# Patient Record
Sex: FEMALE | Race: WHITE | NOT HISPANIC OR LATINO | Employment: OTHER | URBAN - METROPOLITAN AREA
[De-identification: names, ages, dates, MRNs, and addresses within clinical notes are randomized per-mention and may not be internally consistent; named-entity substitution may affect disease eponyms.]

---

## 2017-01-11 ENCOUNTER — HOSPITAL ENCOUNTER (EMERGENCY)
Facility: HOSPITAL | Age: 52
Discharge: HOME | End: 2017-01-11
Attending: EMERGENCY MEDICINE
Payer: MEDICARE

## 2017-01-11 ENCOUNTER — APPOINTMENT (EMERGENCY)
Dept: RADIOLOGY | Facility: HOSPITAL | Age: 52
End: 2017-01-11
Payer: MEDICARE

## 2017-01-11 VITALS
BODY MASS INDEX: 28.35 KG/M2 | OXYGEN SATURATION: 96 % | DIASTOLIC BLOOD PRESSURE: 64 MMHG | WEIGHT: 160 LBS | TEMPERATURE: 97.9 F | RESPIRATION RATE: 20 BRPM | HEART RATE: 100 BPM | SYSTOLIC BLOOD PRESSURE: 144 MMHG | HEIGHT: 63 IN

## 2017-01-11 DIAGNOSIS — J06.9 UPPER RESPIRATORY INFECTION: Primary | ICD-10-CM

## 2017-01-11 LAB
ANION GAP SERPL CALCULATED.3IONS-SCNC: 6 MMOL/L (ref 4–13)
ATRIAL RATE: 113 BPM
BASOPHILS # BLD AUTO: 0.07 THOUSANDS/ΜL (ref 0–0.1)
BASOPHILS NFR BLD AUTO: 1 % (ref 0–1)
BUN SERPL-MCNC: 14 MG/DL (ref 5–25)
CALCIUM SERPL-MCNC: 9.3 MG/DL (ref 8.3–10.1)
CHLORIDE SERPL-SCNC: 104 MMOL/L (ref 100–108)
CO2 SERPL-SCNC: 32 MMOL/L (ref 21–32)
CREAT SERPL-MCNC: 0.92 MG/DL (ref 0.6–1.3)
EOSINOPHIL # BLD AUTO: 0.39 THOUSAND/ΜL (ref 0–0.61)
EOSINOPHIL NFR BLD AUTO: 5 % (ref 0–6)
ERYTHROCYTE [DISTWIDTH] IN BLOOD BY AUTOMATED COUNT: 13.9 % (ref 11.6–15.1)
GFR SERPL CREATININE-BSD FRML MDRD: >60 ML/MIN/1.73SQ M
GLUCOSE SERPL-MCNC: 93 MG/DL (ref 65–140)
HCT VFR BLD AUTO: 42.8 % (ref 34.8–46.1)
HGB BLD-MCNC: 14 G/DL (ref 11.5–15.4)
LYMPHOCYTES # BLD AUTO: 2.83 THOUSANDS/ΜL (ref 0.6–4.47)
LYMPHOCYTES NFR BLD AUTO: 35 % (ref 14–44)
MCH RBC QN AUTO: 32.1 PG (ref 26.8–34.3)
MCHC RBC AUTO-ENTMCNC: 32.7 G/DL (ref 31.4–37.4)
MCV RBC AUTO: 98 FL (ref 82–98)
MONOCYTES # BLD AUTO: 0.72 THOUSAND/ΜL (ref 0.17–1.22)
MONOCYTES NFR BLD AUTO: 9 % (ref 4–12)
NEUTROPHILS # BLD AUTO: 4.2 THOUSANDS/ΜL (ref 1.85–7.62)
NEUTS SEG NFR BLD AUTO: 50 % (ref 43–75)
P AXIS: 77 DEGREES
PLATELET # BLD AUTO: 240 THOUSANDS/UL (ref 149–390)
PMV BLD AUTO: 9.6 FL (ref 8.9–12.7)
POTASSIUM SERPL-SCNC: 3.9 MMOL/L (ref 3.5–5.3)
PR INTERVAL: 144 MS
QRS AXIS: 78 DEGREES
QRSD INTERVAL: 84 MS
QT INTERVAL: 322 MS
QTC INTERVAL: 441 MS
RBC # BLD AUTO: 4.36 MILLION/UL (ref 3.81–5.12)
SODIUM SERPL-SCNC: 142 MMOL/L (ref 136–145)
T WAVE AXIS: 69 DEGREES
TROPONIN I SERPL-MCNC: <0.02 NG/ML
VENTRICULAR RATE: 113 BPM
WBC # BLD AUTO: 8.21 THOUSAND/UL (ref 4.31–10.16)

## 2017-01-11 PROCEDURE — 84484 ASSAY OF TROPONIN QUANT: CPT | Performed by: EMERGENCY MEDICINE

## 2017-01-11 PROCEDURE — 93005 ELECTROCARDIOGRAM TRACING: CPT | Performed by: EMERGENCY MEDICINE

## 2017-01-11 PROCEDURE — 71020 HB CHEST X-RAY 2VW FRONTAL&LATL: CPT

## 2017-01-11 PROCEDURE — 94640 AIRWAY INHALATION TREATMENT: CPT

## 2017-01-11 PROCEDURE — 36415 COLL VENOUS BLD VENIPUNCTURE: CPT | Performed by: EMERGENCY MEDICINE

## 2017-01-11 PROCEDURE — 80048 BASIC METABOLIC PNL TOTAL CA: CPT | Performed by: EMERGENCY MEDICINE

## 2017-01-11 PROCEDURE — 99285 EMERGENCY DEPT VISIT HI MDM: CPT

## 2017-01-11 PROCEDURE — 85025 COMPLETE CBC W/AUTO DIFF WBC: CPT | Performed by: EMERGENCY MEDICINE

## 2017-01-11 PROCEDURE — 96375 TX/PRO/DX INJ NEW DRUG ADDON: CPT

## 2017-01-11 PROCEDURE — 96374 THER/PROPH/DIAG INJ IV PUSH: CPT

## 2017-01-11 RX ORDER — ONDANSETRON 2 MG/ML
4 INJECTION INTRAMUSCULAR; INTRAVENOUS ONCE
Status: COMPLETED | OUTPATIENT
Start: 2017-01-11 | End: 2017-01-11

## 2017-01-11 RX ORDER — METHYLPREDNISOLONE SODIUM SUCCINATE 125 MG/2ML
60 INJECTION, POWDER, LYOPHILIZED, FOR SOLUTION INTRAMUSCULAR; INTRAVENOUS ONCE
Status: COMPLETED | OUTPATIENT
Start: 2017-01-11 | End: 2017-01-11

## 2017-01-11 RX ORDER — AZITHROMYCIN 250 MG/1
250 TABLET, FILM COATED ORAL DAILY
Qty: 6 TABLET | Refills: 0 | Status: SHIPPED | OUTPATIENT
Start: 2017-01-11 | End: 2017-01-16

## 2017-01-11 RX ORDER — ALBUTEROL SULFATE 2.5 MG/3ML
5 SOLUTION RESPIRATORY (INHALATION) ONCE
Status: COMPLETED | OUTPATIENT
Start: 2017-01-11 | End: 2017-01-11

## 2017-01-11 RX ORDER — ALBUTEROL SULFATE 90 UG/1
2 AEROSOL, METERED RESPIRATORY (INHALATION) ONCE
Status: COMPLETED | OUTPATIENT
Start: 2017-01-11 | End: 2017-01-11

## 2017-01-11 RX ADMIN — IPRATROPIUM BROMIDE 0.5 MG: 0.5 SOLUTION RESPIRATORY (INHALATION) at 04:07

## 2017-01-11 RX ADMIN — ALBUTEROL SULFATE 2 PUFF: 90 AEROSOL, METERED RESPIRATORY (INHALATION) at 06:19

## 2017-01-11 RX ADMIN — ALBUTEROL SULFATE 5 MG: 2.5 SOLUTION RESPIRATORY (INHALATION) at 04:07

## 2017-01-11 RX ADMIN — METHYLPREDNISOLONE SODIUM SUCCINATE 60 MG: 125 INJECTION, POWDER, FOR SOLUTION INTRAMUSCULAR; INTRAVENOUS at 03:53

## 2017-01-11 RX ADMIN — ALBUTEROL SULFATE 5 MG: 2.5 SOLUTION RESPIRATORY (INHALATION) at 03:39

## 2017-01-11 RX ADMIN — ONDANSETRON 4 MG: 2 INJECTION INTRAMUSCULAR; INTRAVENOUS at 03:51

## 2017-01-11 RX ADMIN — IPRATROPIUM BROMIDE 0.5 MG: 0.5 SOLUTION RESPIRATORY (INHALATION) at 03:39

## 2017-04-04 ENCOUNTER — HOSPITAL ENCOUNTER (EMERGENCY)
Facility: HOSPITAL | Age: 52
Discharge: HOME | End: 2017-04-05
Attending: EMERGENCY MEDICINE
Payer: MEDICARE

## 2017-04-04 VITALS
HEART RATE: 93 BPM | DIASTOLIC BLOOD PRESSURE: 78 MMHG | WEIGHT: 160 LBS | OXYGEN SATURATION: 95 % | RESPIRATION RATE: 18 BRPM | HEIGHT: 63 IN | TEMPERATURE: 96.8 F | BODY MASS INDEX: 28.35 KG/M2 | SYSTOLIC BLOOD PRESSURE: 127 MMHG

## 2017-04-04 DIAGNOSIS — R51 HEADACHE(784.0): Primary | ICD-10-CM

## 2017-04-04 PROCEDURE — 96374 THER/PROPH/DIAG INJ IV PUSH: CPT

## 2017-04-04 PROCEDURE — 96375 TX/PRO/DX INJ NEW DRUG ADDON: CPT

## 2017-04-04 PROCEDURE — 96361 HYDRATE IV INFUSION ADD-ON: CPT

## 2017-04-04 RX ORDER — DIPHENHYDRAMINE HYDROCHLORIDE 50 MG/ML
25 INJECTION INTRAMUSCULAR; INTRAVENOUS ONCE
Status: COMPLETED | OUTPATIENT
Start: 2017-04-04 | End: 2017-04-04

## 2017-04-04 RX ORDER — METOCLOPRAMIDE HYDROCHLORIDE 5 MG/ML
10 INJECTION INTRAMUSCULAR; INTRAVENOUS ONCE
Status: COMPLETED | OUTPATIENT
Start: 2017-04-04 | End: 2017-04-04

## 2017-04-04 RX ORDER — KETOROLAC TROMETHAMINE 30 MG/ML
15 INJECTION, SOLUTION INTRAMUSCULAR; INTRAVENOUS ONCE
Status: COMPLETED | OUTPATIENT
Start: 2017-04-04 | End: 2017-04-04

## 2017-04-04 RX ADMIN — DIPHENHYDRAMINE HYDROCHLORIDE 25 MG: 50 INJECTION, SOLUTION INTRAMUSCULAR; INTRAVENOUS at 21:55

## 2017-04-04 RX ADMIN — SODIUM CHLORIDE 1000 ML: 0.9 INJECTION, SOLUTION INTRAVENOUS at 21:55

## 2017-04-04 RX ADMIN — KETOROLAC TROMETHAMINE 15 MG: 30 INJECTION, SOLUTION INTRAMUSCULAR at 21:55

## 2017-04-04 RX ADMIN — METOCLOPRAMIDE 10 MG: 5 INJECTION, SOLUTION INTRAMUSCULAR; INTRAVENOUS at 21:55

## 2017-04-05 PROCEDURE — 99283 EMERGENCY DEPT VISIT LOW MDM: CPT

## 2017-10-13 ENCOUNTER — HOSPITAL ENCOUNTER (EMERGENCY)
Facility: HOSPITAL | Age: 52
Discharge: HOME | End: 2017-10-13
Attending: EMERGENCY MEDICINE | Admitting: EMERGENCY MEDICINE
Payer: MEDICARE

## 2017-10-13 ENCOUNTER — APPOINTMENT (EMERGENCY)
Dept: CT IMAGING | Facility: HOSPITAL | Age: 52
End: 2017-10-13
Payer: MEDICARE

## 2017-10-13 VITALS
SYSTOLIC BLOOD PRESSURE: 161 MMHG | HEIGHT: 66 IN | RESPIRATION RATE: 18 BRPM | WEIGHT: 165 LBS | DIASTOLIC BLOOD PRESSURE: 90 MMHG | BODY MASS INDEX: 26.52 KG/M2 | TEMPERATURE: 96.4 F | OXYGEN SATURATION: 97 % | HEART RATE: 80 BPM

## 2017-10-13 DIAGNOSIS — R10.9 FLANK PAIN: Primary | ICD-10-CM

## 2017-10-13 LAB
ALBUMIN SERPL BCP-MCNC: 3.6 G/DL (ref 3.5–5)
ALP SERPL-CCNC: 125 U/L (ref 46–116)
ALT SERPL W P-5'-P-CCNC: 44 U/L (ref 12–78)
ANION GAP SERPL CALCULATED.3IONS-SCNC: 4 MMOL/L (ref 4–13)
AST SERPL W P-5'-P-CCNC: 24 U/L (ref 5–45)
BACTERIA UR QL AUTO: ABNORMAL /HPF
BASOPHILS # BLD AUTO: 0.03 THOUSANDS/ΜL (ref 0–0.1)
BASOPHILS NFR BLD AUTO: 1 % (ref 0–1)
BILIRUB SERPL-MCNC: 0.2 MG/DL (ref 0.2–1)
BILIRUB UR QL STRIP: NEGATIVE
BUN SERPL-MCNC: 10 MG/DL (ref 5–25)
CALCIUM SERPL-MCNC: 9.1 MG/DL (ref 8.3–10.1)
CHLORIDE SERPL-SCNC: 105 MMOL/L (ref 100–108)
CLARITY UR: CLEAR
CO2 SERPL-SCNC: 29 MMOL/L (ref 21–32)
COLOR UR: YELLOW
CREAT SERPL-MCNC: 0.72 MG/DL (ref 0.6–1.3)
EOSINOPHIL # BLD AUTO: 0.24 THOUSAND/ΜL (ref 0–0.61)
EOSINOPHIL NFR BLD AUTO: 4 % (ref 0–6)
ERYTHROCYTE [DISTWIDTH] IN BLOOD BY AUTOMATED COUNT: 13.9 % (ref 11.6–15.1)
GFR SERPL CREATININE-BSD FRML MDRD: 97 ML/MIN/1.73SQ M
GLUCOSE SERPL-MCNC: 92 MG/DL (ref 65–140)
GLUCOSE UR STRIP-MCNC: NEGATIVE MG/DL
HCT VFR BLD AUTO: 45.2 % (ref 34.8–46.1)
HGB BLD-MCNC: 14.8 G/DL (ref 11.5–15.4)
HGB UR QL STRIP.AUTO: ABNORMAL
KETONES UR STRIP-MCNC: NEGATIVE MG/DL
LEUKOCYTE ESTERASE UR QL STRIP: NEGATIVE
LYMPHOCYTES # BLD AUTO: 1.57 THOUSANDS/ΜL (ref 0.6–4.47)
LYMPHOCYTES NFR BLD AUTO: 25 % (ref 14–44)
MCH RBC QN AUTO: 32 PG (ref 26.8–34.3)
MCHC RBC AUTO-ENTMCNC: 32.7 G/DL (ref 31.4–37.4)
MCV RBC AUTO: 98 FL (ref 82–98)
MONOCYTES # BLD AUTO: 0.43 THOUSAND/ΜL (ref 0.17–1.22)
MONOCYTES NFR BLD AUTO: 7 % (ref 4–12)
NEUTROPHILS # BLD AUTO: 4.04 THOUSANDS/ΜL (ref 1.85–7.62)
NEUTS SEG NFR BLD AUTO: 63 % (ref 43–75)
NITRITE UR QL STRIP: NEGATIVE
NON-SQ EPI CELLS URNS QL MICRO: ABNORMAL /HPF
PH UR STRIP.AUTO: 6.5 [PH] (ref 4.5–8)
PLATELET # BLD AUTO: 254 THOUSANDS/UL (ref 149–390)
PMV BLD AUTO: 9.7 FL (ref 8.9–12.7)
POTASSIUM SERPL-SCNC: 3.9 MMOL/L (ref 3.5–5.3)
PROT SERPL-MCNC: 7.6 G/DL (ref 6.4–8.2)
PROT UR STRIP-MCNC: NEGATIVE MG/DL
RBC # BLD AUTO: 4.63 MILLION/UL (ref 3.81–5.12)
RBC #/AREA URNS AUTO: ABNORMAL /HPF
SODIUM SERPL-SCNC: 138 MMOL/L (ref 136–145)
SP GR UR STRIP.AUTO: 1.01 (ref 1–1.03)
UROBILINOGEN UR QL STRIP.AUTO: 0.2 E.U./DL
WBC # BLD AUTO: 6.31 THOUSAND/UL (ref 4.31–10.16)
WBC #/AREA URNS AUTO: ABNORMAL /HPF

## 2017-10-13 PROCEDURE — 96374 THER/PROPH/DIAG INJ IV PUSH: CPT

## 2017-10-13 PROCEDURE — 81001 URINALYSIS AUTO W/SCOPE: CPT | Performed by: EMERGENCY MEDICINE

## 2017-10-13 PROCEDURE — 74177 CT ABD & PELVIS W/CONTRAST: CPT

## 2017-10-13 PROCEDURE — 99284 EMERGENCY DEPT VISIT MOD MDM: CPT

## 2017-10-13 PROCEDURE — 80053 COMPREHEN METABOLIC PANEL: CPT | Performed by: EMERGENCY MEDICINE

## 2017-10-13 PROCEDURE — 85025 COMPLETE CBC W/AUTO DIFF WBC: CPT | Performed by: EMERGENCY MEDICINE

## 2017-10-13 PROCEDURE — 36415 COLL VENOUS BLD VENIPUNCTURE: CPT | Performed by: EMERGENCY MEDICINE

## 2017-10-13 PROCEDURE — 87086 URINE CULTURE/COLONY COUNT: CPT | Performed by: EMERGENCY MEDICINE

## 2017-10-13 RX ORDER — ACETAMINOPHEN AND CODEINE PHOSPHATE 300; 30 MG/1; MG/1
1 TABLET ORAL EVERY 6 HOURS PRN
Qty: 10 TABLET | Refills: 0 | Status: SHIPPED | OUTPATIENT
Start: 2017-10-13 | End: 2017-12-10 | Stop reason: ALTCHOICE

## 2017-10-13 RX ORDER — CIPROFLOXACIN 500 MG/1
500 TABLET, FILM COATED ORAL 2 TIMES DAILY
Qty: 20 TABLET | Refills: 0 | Status: SHIPPED | OUTPATIENT
Start: 2017-10-13 | End: 2017-10-23

## 2017-10-13 RX ADMIN — IOHEXOL 100 ML: 350 INJECTION, SOLUTION INTRAVENOUS at 11:17

## 2017-10-13 RX ADMIN — HYDROMORPHONE HYDROCHLORIDE 1 MG: 1 INJECTION, SOLUTION INTRAMUSCULAR; INTRAVENOUS; SUBCUTANEOUS at 09:21

## 2017-10-13 NOTE — ED NOTES
CT tech at bedside providing pt with PO contrast and instruction for use        Christine Arnold RN  10/13/17 4918

## 2017-10-13 NOTE — DISCHARGE INSTRUCTIONS
Flank Pain   WHAT YOU NEED TO KNOW:   Flank pain is felt in the area below your ribcage and above your hip bones, often in the lower back  Your pain may be dull or so severe that you cannot get comfortable  The pain may stay in one area or radiate to another area  It may worsen and lighten in waves  Flank pain is often a sign of problems with your urinary tract, such as a kidney stone or infection  DISCHARGE INSTRUCTIONS:   Return to the emergency department if:   · You have a fever  · Your heart is fluttering or jumping  · You see blood in your urine  · Your pain radiates into your lower abdomen and genital area  · You have intense pain in your low back next to your spine  · You are much more tired than usual and have no desire to eat  · You have a headache and your muscles jerk  Contact your healthcare provider if:   · You have an upset stomach and are vomiting  · You have to urinate more often, and with urgency  · Your pain worsens or does not improve, and you cannot get comfortable  · You pass a stone when you urinate  · You have questions or concerns about your condition or care  Medicines: The following medicines may be ordered for you:  · Pain medicine  may help decrease or relieve your pain  Do not wait until the pain is severe before you take your medicine  · Antibiotics  may help treat a urinary tract infection caused by bacteria  · Take your medicine as directed  Contact your healthcare provider if you think your medicine is not helping or if you have side effects  Tell him of her if you are allergic to any medicine  Keep a list of the medicines, vitamins, and herbs you take  Include the amounts, and when and why you take them  Bring the list or the pill bottles to follow-up visits  Carry your medicine list with you in case of an emergency    Follow up with your healthcare provider in 1 to 2 days or as directed:  Write down your questions so you remember to ask them during your visits  © 2017 [employer]  Information is for End User's use only and may not be sold, redistributed or otherwise used for commercial purposes  All illustrations and images included in CareNotes® are the copyrighted property of [employer]  or [employer]  The above information is an  only  It is not intended as medical advice for individual conditions or treatments  Talk to your doctor, nurse or pharmacist before following any medical regimen to see if it is safe and effective for you      Cipro twice a day, lots of fluids, Tylenol with codeine for pain, recheck with PCP in 2-3 days if symptoms persist

## 2017-10-13 NOTE — ED PROVIDER NOTES
History  Chief Complaint   Patient presents with    Abdominal Pain     Pt c/o right lateral mid and lower abdominal stabbing pain that began this am, denies N/V/D or any other c/o      Pt c/o sharp pain RLQ since early AM  Tender at McBurney's point  No GI or  sxs  History provided by:  Patient   used: No    Abdominal Pain   Pain location:  RLQ  Pain quality: sharp    Pain radiates to:  Does not radiate  Pain severity:  Moderate  Onset quality:  Sudden  Timing:  Constant  Progression:  Worsening  Chronicity:  New  Relieved by:  Nothing  Worsened by:  Nothing  Ineffective treatments:  None tried  Associated symptoms: no constipation, no diarrhea, no fever, no nausea and no vomiting        None       Past Medical History:   Diagnosis Date    Chronic pain     Back Pain    Hypertension     Migraine        History reviewed  No pertinent surgical history  History reviewed  No pertinent family history  I have reviewed and agree with the history as documented  Social History   Substance Use Topics    Smoking status: Current Every Day Smoker     Packs/day: 0 50     Types: Cigarettes    Smokeless tobacco: Never Used    Alcohol use No        Review of Systems   Constitutional: Negative for fever  Gastrointestinal: Positive for abdominal pain  Negative for constipation, diarrhea, nausea and vomiting  All other systems reviewed and are negative  Physical Exam  ED Triage Vitals   Temperature Pulse Respirations Blood Pressure SpO2   10/13/17 0856 10/13/17 0856 10/13/17 0856 10/13/17 0856 10/13/17 0856   (!) 96 4 °F (35 8 °C) 98 20 (!) 165/101 99 %      Temp Source Heart Rate Source Patient Position - Orthostatic VS BP Location FiO2 (%)   10/13/17 0856 10/13/17 0856 10/13/17 0856 10/13/17 0856 --   Temporal Monitor Lying Right arm       Pain Score       10/13/17 0855       6           Physical Exam   Constitutional: She is oriented to person, place, and time   She appears well-developed and well-nourished  HENT:   Nose: Nose normal    Eyes: EOM are normal  Pupils are equal, round, and reactive to light  Neck: Normal range of motion  Cardiovascular: Normal rate and regular rhythm  Pulmonary/Chest: Effort normal and breath sounds normal    Abdominal: Soft  Bowel sounds are normal  There is tenderness (RLQ)  There is rebound and guarding  Neurological: She is alert and oriented to person, place, and time  Skin: Skin is warm and dry  Psychiatric: She has a normal mood and affect  Her behavior is normal  Judgment and thought content normal    Nursing note and vitals reviewed  ED Medications  Medications   HYDROmorphone (DILAUDID) 1 mg/mL injection 1 mg (1 mg Intravenous Given 10/13/17 0921)   iohexol (OMNIPAQUE) 350 MG/ML injection (MULTI-DOSE) 100 mL (100 mL Intravenous Given 10/13/17 1117)       Diagnostic Studies  Labs Reviewed   COMPREHENSIVE METABOLIC PANEL - Abnormal        Result Value Ref Range Status    Alkaline Phosphatase 125 (*) 46 - 116 U/L Final    Sodium 138  136 - 145 mmol/L Final    Potassium 3 9  3 5 - 5 3 mmol/L Final    Chloride 105  100 - 108 mmol/L Final    CO2 29  21 - 32 mmol/L Final    Anion Gap 4  4 - 13 mmol/L Final    BUN 10  5 - 25 mg/dL Final    Creatinine 0 72  0 60 - 1 30 mg/dL Final    Comment: Standardized to IDMS reference method    Glucose 92  65 - 140 mg/dL Final    Comment: 18  If the patient is fasting, the ADA then defines impaired fasting glucose as > 100 mg/dL and diabetes as > or equal to 123 mg/dL  Specimen collection should occur prior to Sulfasalazine administration due to the potential for falsely depressed results  Specimen collection should occur prior to Sulfapyridine administration due to the potential for falsely elevated results      Calcium 9 1  8 3 - 10 1 mg/dL Final    AST 24  5 - 45 U/L Final    Comment:   Specimen collection should occur prior to Sulfasalazine administration due to the potential for falsely depressed results  ALT 44  12 - 78 U/L Final    Comment:   Specimen collection should occur prior to Sulfasalazine administration due to the potential for falsely depressed results  Total Protein 7 6  6 4 - 8 2 g/dL Final    Albumin 3 6  3 5 - 5 0 g/dL Final    Total Bilirubin 0 20  0 20 - 1 00 mg/dL Final    eGFR 97  ml/min/1 73sq m Final    Narrative:     National Kidney Disease Education Program recommendations are as follows:  GFR calculation is accurate only with a steady state creatinine  Chronic Kidney disease less than 60 ml/min/1 73 sq  meters  Kidney failure less than 15 ml/min/1 73 sq  meters     URINALYSIS WITH REFLEX TO MICROSCOPIC - Abnormal     Blood, UA Moderate (*) Negative Final    Color, UA Yellow   Final    Clarity, UA Clear   Final    Specific [place], UA 1 015  1 003 - 1 030 Final    pH, UA 6 5  4 5 - 8 0 Final    Leukocytes, UA Negative  Negative Final    Nitrite, UA Negative  Negative Final    Protein, UA Negative  Negative mg/dl Final    Glucose, UA Negative  Negative mg/dl Final    Ketones, UA Negative  Negative mg/dl Final    Urobilinogen, UA 0 2  0 2, 1 0 E U /dl E U /dl Final    Bilirubin, UA Negative  Negative Final   URINE MICROSCOPIC - Abnormal     RBC, UA 20-30 (*) None Seen, 0-5 /hpf Final    WBC, UA None Seen  None Seen, 0-5, 5-55, 5-65 /hpf Final    Epithelial Cells Occasional  None Seen, Occasional /hpf Final    Bacteria, UA Occasional  None Seen, Occasional /hpf Final   CBC AND DIFFERENTIAL - Normal    WBC 6 31  4 31 - 10 16 Thousand/uL Final    RBC 4 63  3 81 - 5 12 Million/uL Final    Hemoglobin 14 8  11 5 - 15 4 g/dL Final    Hematocrit 45 2  34 8 - 46 1 % Final    MCV 98  82 - 98 fL Final    MCH 32 0  26 8 - 34 3 pg Final    MCHC 32 7  31 4 - 37 4 g/dL Final    RDW 13 9  11 6 - 15 1 % Final    MPV 9 7  8 9 - 12 7 fL Final    Platelets 293  206 - 390 Thousands/uL Final    Neutrophils Relative 63  43 - 75 % Final    Lymphocytes Relative 25  14 - 44 % Final    Monocytes Relative 7  4 - 12 % Final    Eosinophils Relative 4  0 - 6 % Final    Basophils Relative 1  0 - 1 % Final    Neutrophils Absolute 4 04  1 85 - 7 62 Thousands/µL Final    Lymphocytes Absolute 1 57  0 60 - 4 47 Thousands/µL Final    Monocytes Absolute 0 43  0 17 - 1 22 Thousand/µL Final    Eosinophils Absolute 0 24  0 00 - 0 61 Thousand/µL Final    Basophils Absolute 0 03  0 00 - 0 10 Thousands/µL Final   URINE CULTURE       CT abdomen pelvis with contrast   Final Result      No acute CT of normality the abdomen or pelvis to account for the patient's symptoms  Normal appendix  Workstation performed: [id number]             Procedures  Procedures      Phone Contacts  ED Phone Contact    ED Course  ED Course                                MDM  Number of Diagnoses or Management Options  Flank pain: new and requires workup     Amount and/or Complexity of Data Reviewed  Clinical lab tests: ordered and reviewed  Tests in the radiology section of CPT®: ordered and reviewed    Patient Progress  Patient progress: improved    CritCare Time    Disposition  Final diagnoses:   Flank pain     ED Disposition     ED Disposition Condition Comment    Discharge  [facility] discharge to home/self care  Condition at discharge: Stable        Follow-up Information     Follow up With Specialties Details Why [address] [place] [address]  [phone]          Discharge Medication List as of 10/13/2017 12:21 PM      START taking these medications    Details   acetaminophen-codeine (TYLENOL #3) 300-30 mg per tablet Take 1 tablet by mouth every 6 (six) hours as needed for moderate pain for up to 10 doses, Starting Fri 10/13/2017, Print      ciprofloxacin (CIPRO) 500 mg tablet Take 1 tablet by mouth 2 (two) times a day for 10 days, Starting Fri 10/13/2017, Until Mon 10/23/2017, Print           No discharge procedures on file      ED Provider  Electronically Signed by Monica Ochoa MD  10/13/17 9061

## 2017-10-15 LAB — BACTERIA UR CULT: NORMAL

## 2017-12-10 ENCOUNTER — HOSPITAL ENCOUNTER (INPATIENT)
Facility: HOSPITAL | Age: 52
LOS: 4 days | Discharge: HOME | DRG: 751 | End: 2017-12-15
Attending: EMERGENCY MEDICINE | Admitting: PSYCHIATRY & NEUROLOGY
Payer: COMMERCIAL

## 2017-12-10 DIAGNOSIS — E78.5 DYSLIPIDEMIA: ICD-10-CM

## 2017-12-10 DIAGNOSIS — F32.A DEPRESSION: Primary | ICD-10-CM

## 2017-12-10 DIAGNOSIS — R45.851 SUICIDAL THOUGHTS: ICD-10-CM

## 2017-12-10 DIAGNOSIS — F33.2 SEVERE EPISODE OF RECURRENT MAJOR DEPRESSIVE DISORDER, WITHOUT PSYCHOTIC FEATURES (HCC): ICD-10-CM

## 2017-12-10 DIAGNOSIS — F41.1 GENERALIZED ANXIETY DISORDER: ICD-10-CM

## 2017-12-10 DIAGNOSIS — G47.00 INSOMNIA: ICD-10-CM

## 2017-12-10 DIAGNOSIS — F10.10 ALCOHOL ABUSE: ICD-10-CM

## 2017-12-10 LAB
ALBUMIN SERPL BCP-MCNC: 3.9 G/DL (ref 3.5–5)
ALP SERPL-CCNC: 109 U/L (ref 46–116)
ALT SERPL W P-5'-P-CCNC: 58 U/L (ref 12–78)
AMPHETAMINES SERPL QL SCN: NEGATIVE
ANION GAP SERPL CALCULATED.3IONS-SCNC: 13 MMOL/L (ref 4–13)
AST SERPL W P-5'-P-CCNC: 28 U/L (ref 5–45)
BARBITURATES UR QL: NEGATIVE
BASOPHILS # BLD AUTO: 0.06 THOUSANDS/ΜL (ref 0–0.1)
BASOPHILS NFR BLD AUTO: 1 % (ref 0–1)
BENZODIAZ UR QL: NEGATIVE
BILIRUB SERPL-MCNC: 0.6 MG/DL (ref 0.2–1)
BUN SERPL-MCNC: 14 MG/DL (ref 5–25)
CALCIUM SERPL-MCNC: 9.4 MG/DL (ref 8.3–10.1)
CHLORIDE SERPL-SCNC: 104 MMOL/L (ref 100–108)
CO2 SERPL-SCNC: 24 MMOL/L (ref 21–32)
COCAINE UR QL: NEGATIVE
CREAT SERPL-MCNC: 0.75 MG/DL (ref 0.6–1.3)
EOSINOPHIL # BLD AUTO: 0.19 THOUSAND/ΜL (ref 0–0.61)
EOSINOPHIL NFR BLD AUTO: 2 % (ref 0–6)
ERYTHROCYTE [DISTWIDTH] IN BLOOD BY AUTOMATED COUNT: 14.1 % (ref 11.6–15.1)
ETHANOL EXG-MCNC: 0.06 MG/DL
ETHANOL EXG-MCNC: 0.1 MG/DL
GFR SERPL CREATININE-BSD FRML MDRD: 92 ML/MIN/1.73SQ M
GLUCOSE SERPL-MCNC: 94 MG/DL (ref 65–140)
HCT VFR BLD AUTO: 44.9 % (ref 34.8–46.1)
HGB BLD-MCNC: 15.3 G/DL (ref 11.5–15.4)
LYMPHOCYTES # BLD AUTO: 2.54 THOUSANDS/ΜL (ref 0.6–4.47)
LYMPHOCYTES NFR BLD AUTO: 32 % (ref 14–44)
MCH RBC QN AUTO: 32.1 PG (ref 26.8–34.3)
MCHC RBC AUTO-ENTMCNC: 34.1 G/DL (ref 31.4–37.4)
MCV RBC AUTO: 94 FL (ref 82–98)
METHADONE UR QL: NEGATIVE
MONOCYTES # BLD AUTO: 0.62 THOUSAND/ΜL (ref 0.17–1.22)
MONOCYTES NFR BLD AUTO: 8 % (ref 4–12)
NEUTROPHILS # BLD AUTO: 4.44 THOUSANDS/ΜL (ref 1.85–7.62)
NEUTS SEG NFR BLD AUTO: 57 % (ref 43–75)
OPIATES UR QL SCN: NEGATIVE
PCP UR QL: NEGATIVE
PLATELET # BLD AUTO: 284 THOUSANDS/UL (ref 149–390)
PMV BLD AUTO: 9.5 FL (ref 8.9–12.7)
POTASSIUM SERPL-SCNC: 3.5 MMOL/L (ref 3.5–5.3)
PROT SERPL-MCNC: 7.8 G/DL (ref 6.4–8.2)
RBC # BLD AUTO: 4.76 MILLION/UL (ref 3.81–5.12)
SODIUM SERPL-SCNC: 141 MMOL/L (ref 136–145)
THC UR QL: NEGATIVE
WBC # BLD AUTO: 7.85 THOUSAND/UL (ref 4.31–10.16)

## 2017-12-10 PROCEDURE — 36415 COLL VENOUS BLD VENIPUNCTURE: CPT | Performed by: PHYSICIAN ASSISTANT

## 2017-12-10 PROCEDURE — 82075 ASSAY OF BREATH ETHANOL: CPT | Performed by: PHYSICIAN ASSISTANT

## 2017-12-10 PROCEDURE — 85025 COMPLETE CBC W/AUTO DIFF WBC: CPT | Performed by: PHYSICIAN ASSISTANT

## 2017-12-10 PROCEDURE — 80307 DRUG TEST PRSMV CHEM ANLYZR: CPT | Performed by: EMERGENCY MEDICINE

## 2017-12-10 PROCEDURE — 80053 COMPREHEN METABOLIC PANEL: CPT | Performed by: PHYSICIAN ASSISTANT

## 2017-12-10 PROCEDURE — 82075 ASSAY OF BREATH ETHANOL: CPT | Performed by: EMERGENCY MEDICINE

## 2017-12-10 RX ORDER — NICOTINE 21 MG/24HR
14 PATCH, TRANSDERMAL 24 HOURS TRANSDERMAL ONCE
Status: COMPLETED | OUTPATIENT
Start: 2017-12-10 | End: 2017-12-11

## 2017-12-10 RX ADMIN — NICOTINE 14 MG: 14 PATCH, EXTENDED RELEASE TRANSDERMAL at 16:47

## 2017-12-10 NOTE — ED PROVIDER NOTES
History  Chief Complaint   Patient presents with    Psychiatric Evaluation     pt c/o depression and anxiety x 3 years after the death of a family member  States the anniversary is this month and pt states she feels like "I'm having a breakdown"  Pt reports suicidal ideation - having thoughts of wanting to drive her car "into a pole and end it all"  Reports occasional ETOH intake to cope with feelings  Did drink 3 beers today  Patient presents to the ED with worsening depression  She states she has been depressed for the past 3 years since her brother   She states the past 2-3 days her depression has worsened  She states she has been drinking alcohol to help with her depression and binge drinks 3 times a week  She states she occasionally smokes marijuana which helps with her depression  Patient states she "does not want to live "  She does not currently have a psychiatrist or a therapist   She is not on any medications for depression or anxiety  Patient has never been admitted to the hospital for depression  Patient has SI with a plan to run her car into a pole  History provided by:  Patient  Psychiatric Evaluation   Presenting symptoms: depression and suicidal thoughts    Presenting symptoms: no suicide attempt    Degree of incapacity (severity): Moderate  Onset quality:  Gradual  Duration:  3 days  Timing:  Constant  Progression:  Worsening  Chronicity:  New  Context: alcohol use (binge drinks 3 times a week)    Context: not drug abuse    Treatment compliance:  Untreated  Relieved by:  Nothing  Worsened by:  Nothing  Associated symptoms: feelings of worthlessness    Associated symptoms: no abdominal pain, no anxiety, no chest pain and no decreased need for sleep        None       Past Medical History:   Diagnosis Date    Chronic pain     Back Pain    Hypertension     Migraine        History reviewed  No pertinent surgical history  History reviewed  No pertinent family history    I have reviewed and agree with the history as documented  Social History   Substance Use Topics    Smoking status: Current Every Day Smoker     Packs/day: 0 50     Types: Cigarettes    Smokeless tobacco: Never Used    Alcohol use No        Review of Systems   Constitutional: Negative for chills and fever  HENT: Negative for facial swelling and trouble swallowing  Eyes: Negative for visual disturbance  Respiratory: Negative for shortness of breath  Cardiovascular: Negative for chest pain and leg swelling  Gastrointestinal: Negative for abdominal pain, diarrhea, nausea and vomiting  Musculoskeletal: Negative for back pain and neck pain  Skin: Negative for color change and wound  Neurological: Negative for dizziness, weakness and numbness  Psychiatric/Behavioral: Positive for suicidal ideas  The patient is not nervous/anxious  All other systems reviewed and are negative  Physical Exam  ED Triage Vitals   Temperature Pulse Respirations Blood Pressure SpO2   12/10/17 1545 12/10/17 1554 12/10/17 1554 12/10/17 1554 12/10/17 1554   97 5 °F (36 4 °C) 88 18 160/88 96 %      Temp Source Heart Rate Source Patient Position - Orthostatic VS BP Location FiO2 (%)   12/10/17 1545 12/10/17 1554 -- 12/10/17 1554 --   Temporal Monitor  Right arm       Pain Score       12/10/17 1554       No Pain           Orthostatic Vital Signs  Vitals:    12/10/17 1554   BP: 160/88   Pulse: 88       Physical Exam   Constitutional: She is oriented to person, place, and time  She appears well-developed and well-nourished  She is active and cooperative  She does not appear ill  No distress  Patient tearful  HENT:   Head: Normocephalic and atraumatic  Right Ear: Hearing normal    Left Ear: Hearing normal    Nose: Nose normal    Mouth/Throat: Oropharynx is clear and moist    Eyes: Right conjunctiva is injected  Left conjunctiva is injected  Right pupil is round  Left pupil is round   Pupils are equal    Neck: Normal range of motion  Cardiovascular: Normal rate, regular rhythm and normal heart sounds  No murmur heard  Pulmonary/Chest: Effort normal and breath sounds normal  She has no wheezes  She has no rhonchi  She has no rales  Abdominal: Soft  Normal appearance and bowel sounds are normal  There is no tenderness  Musculoskeletal: Normal range of motion  She exhibits no edema, tenderness or deformity  Neurological: She is alert and oriented to person, place, and time  She has normal strength  No sensory deficit  Gait normal    Skin: Skin is warm and dry  No rash noted  She is not diaphoretic  No pallor  Psychiatric: Her speech is normal and behavior is normal  Thought content is not paranoid and not delusional  Cognition and memory are normal  She exhibits a depressed mood  She expresses suicidal ideation  She expresses no homicidal ideation  She expresses suicidal plans  She expresses no homicidal plans  Nursing note and vitals reviewed        ED Medications  Medications   nicotine (NICODERM CQ) 14 mg/24hr TD 24 hr patch 14 mg (14 mg Transdermal Medication Applied 12/10/17 1647)       Diagnostic Studies  Results Reviewed     Procedure Component Value Units Date/Time    POCT alcohol breath test [[id number]]  (Normal) Resulted:  12/10/17 1647    Lab Status:  Final result Updated:  12/10/17 1648     EXTBreath Alcohol 0 064    Comprehensive metabolic panel [[id number]] Collected:  12/10/17 1600    Lab Status:  Final result Specimen:  Blood from Arm, Right Updated:  12/10/17 1624     Sodium 141 mmol/L      Potassium 3 5 mmol/L      Chloride 104 mmol/L      CO2 24 mmol/L      Anion Gap 13 mmol/L      BUN 14 mg/dL      Creatinine 0 75 mg/dL      Glucose 94 mg/dL      Calcium 9 4 mg/dL      AST 28 U/L      ALT 58 U/L      Alkaline Phosphatase 109 U/L      Total Protein 7 8 g/dL      Albumin 3 9 g/dL      Total Bilirubin 0 60 mg/dL      eGFR 92 ml/min/1 73sq m     Narrative:         National Kidney Disease Education Program recommendations are as follows:  GFR calculation is accurate only with a steady state creatinine  Chronic Kidney disease less than 60 ml/min/1 73 sq  meters  Kidney failure less than 15 ml/min/1 73 sq  meters  Rapid drug screen, urine [[id number]]  (Normal) Collected:  12/10/17 1601    Lab Status:  Final result Specimen:  Urine from Urine, Clean Catch Updated:  12/10/17 1619     Amph/Meth UR Negative     Barbiturate Ur Negative     Benzodiazepine Urine Negative     Cocaine Urine Negative     Methadone Urine Negative     Opiate Urine Negative     PCP Ur Negative     THC Urine Negative    Narrative:         FOR MEDICAL PURPOSES ONLY  IF CONFIRMATION NEEDED PLEASE CONTACT THE LAB WITHIN 5 DAYS      Drug Screen Cutoff Levels:  AMPHETAMINE/METHAMPHETAMINES  1000 ng/mL  BARBITURATES     200 ng/mL  BENZODIAZEPINES     200 ng/mL  COCAINE      300 ng/mL  METHADONE      300 ng/mL  OPIATES      300 ng/mL  PHENCYCLIDINE     25 ng/mL  THC       50 ng/mL    CBC and differential [[id number]]  (Normal) Collected:  12/10/17 1600    Lab Status:  Final result Specimen:  Blood from Arm, Right Updated:  12/10/17 1615     WBC 7 85 Thousand/uL      RBC 4 76 Million/uL      Hemoglobin 15 3 g/dL      Hematocrit 44 9 %      MCV 94 fL      MCH 32 1 pg      MCHC 34 1 g/dL      RDW 14 1 %      MPV 9 5 fL      Platelets 406 Thousands/uL      Neutrophils Relative 57 %      Lymphocytes Relative 32 %      Monocytes Relative 8 %      Eosinophils Relative 2 %      Basophils Relative 1 %      Neutrophils Absolute 4 44 Thousands/µL      Lymphocytes Absolute 2 54 Thousands/µL      Monocytes Absolute 0 62 Thousand/µL      Eosinophils Absolute 0 19 Thousand/µL      Basophils Absolute 0 06 Thousands/µL     POCT alcohol breath test [[id number]]  (Normal) Resulted:  12/10/17 1554    Lab Status:  Final result Updated:  12/10/17 1554     EXTBreath Alcohol 0 097                 No orders to display              Procedures  Procedures       Phone Contacts  ED Phone Contact    ED Course  ED Course as of Dec 10 2149   Carter Leyvatz Dec 10, 2017   1650 Crisis called to see and evaluate patient  [address] from crisis evaluated patient over the phone  1730 Patient signed 12, it was faxed back to [facility] from crisis  5176 [facility] Patient will need to stay in the ER over night since there are no available U beds  Care transferred to Dr Maninder Patterson at 2148  MDM  CritCare Time    Disposition  Final diagnoses:   Depression   Suicidal thoughts   Alcohol abuse     Time reflects when diagnosis was documented in both MDM as applicable and the Disposition within this note     Time User Action Codes Description Comment    12/10/2017  6:55 PM Meghann Salas Add [F32 9] Depression     12/10/2017  6:55 PM Jerjonelle Salas Add [J89 587] Suicidal thoughts     12/10/2017  6:55 PM Meghann Salas Add [F10 10] Alcohol abuse       ED Disposition     None      Follow-up Information    None       Patient's Medications   Discharge Prescriptions    No medications on file     No discharge procedures on file      ED Provider  Electronically Signed by           Kellie Valenzuela PA-C  12/10/17 2146

## 2017-12-10 NOTE — ED NOTES
[facility] will need to stay in network as she has not insurance  Noted that at this time there are no in network beds

## 2017-12-10 NOTE — ED NOTES
Phone call to [facility]  Contacted [phone]  Spoke with Seun Celestin  who indicated they do not manage pt's behavioral health coverage  She noted pt's medical termed as of 10/31/7  She suggested I call [facility] at [phone] as they manage the behavioral piece  Spoke with Shi Rowe at [facility] and she indicated [employer] also termed as of 10/31/17  Pt will need to stay in network as she has no insurance

## 2017-12-10 NOTE — ED NOTES
Carlos Treadwell from crisis called - states pt has no insurance and needs to stay in-network but there are no beds currently  Pt will remain in ED until a bed becomes available        Joesph Arellano RN  12/10/17 5375

## 2017-12-10 NOTE — ED NOTES
Intake / SA completed  Pt presents to ED due to SI with plan to drive car into wall  Pt admits to increased depression / anxiety  Notes it is close to the anniversary of a family members death  Pt admits to self medicating with alcohol  States she drank 3 beers earlier today  Denies HI, A/H or V/H  Pt is requesting to sign 201 which was faxed to [facility] ED  PC was placed to [facility]   Pt is not eligible to medical assistance

## 2017-12-11 PROCEDURE — 99285 EMERGENCY DEPT VISIT HI MDM: CPT

## 2017-12-11 RX ORDER — LORAZEPAM 2 MG/ML
2 INJECTION INTRAMUSCULAR EVERY 6 HOURS PRN
Status: DISCONTINUED | OUTPATIENT
Start: 2017-12-11 | End: 2017-12-15 | Stop reason: HOSPADM

## 2017-12-11 RX ORDER — BENZTROPINE MESYLATE 1 MG/1
1 TABLET ORAL EVERY 6 HOURS PRN
Status: DISCONTINUED | OUTPATIENT
Start: 2017-12-11 | End: 2017-12-15 | Stop reason: HOSPADM

## 2017-12-11 RX ORDER — LORAZEPAM 1 MG/1
1 TABLET ORAL EVERY 6 HOURS PRN
Status: DISCONTINUED | OUTPATIENT
Start: 2017-12-11 | End: 2017-12-15 | Stop reason: HOSPADM

## 2017-12-11 RX ORDER — OLANZAPINE 10 MG/1
10 INJECTION, POWDER, LYOPHILIZED, FOR SOLUTION INTRAMUSCULAR
Status: DISCONTINUED | OUTPATIENT
Start: 2017-12-11 | End: 2017-12-15 | Stop reason: HOSPADM

## 2017-12-11 RX ORDER — THIAMINE MONONITRATE (VIT B1) 100 MG
100 TABLET ORAL DAILY
Status: DISCONTINUED | OUTPATIENT
Start: 2017-12-11 | End: 2017-12-15 | Stop reason: HOSPADM

## 2017-12-11 RX ORDER — HALOPERIDOL 5 MG/ML
5 INJECTION INTRAMUSCULAR EVERY 6 HOURS PRN
Status: DISCONTINUED | OUTPATIENT
Start: 2017-12-11 | End: 2017-12-15 | Stop reason: HOSPADM

## 2017-12-11 RX ORDER — RISPERIDONE 1 MG/1
1 TABLET, ORALLY DISINTEGRATING ORAL
Status: DISCONTINUED | OUTPATIENT
Start: 2017-12-11 | End: 2017-12-15 | Stop reason: HOSPADM

## 2017-12-11 RX ORDER — NICOTINE 21 MG/24HR
14 PATCH, TRANSDERMAL 24 HOURS TRANSDERMAL DAILY
Status: DISCONTINUED | OUTPATIENT
Start: 2017-12-12 | End: 2017-12-15 | Stop reason: HOSPADM

## 2017-12-11 RX ORDER — TRAZODONE HYDROCHLORIDE 50 MG/1
50 TABLET ORAL
Status: CANCELLED | OUTPATIENT
Start: 2017-12-11

## 2017-12-11 RX ORDER — OLANZAPINE 10 MG/1
10 TABLET ORAL
Status: DISCONTINUED | OUTPATIENT
Start: 2017-12-11 | End: 2017-12-15 | Stop reason: HOSPADM

## 2017-12-11 RX ORDER — FOLIC ACID 1 MG/1
1 TABLET ORAL DAILY
Status: DISCONTINUED | OUTPATIENT
Start: 2017-12-11 | End: 2017-12-15 | Stop reason: HOSPADM

## 2017-12-11 RX ORDER — ACETAMINOPHEN 325 MG/1
650 TABLET ORAL EVERY 6 HOURS PRN
Status: DISCONTINUED | OUTPATIENT
Start: 2017-12-11 | End: 2017-12-15 | Stop reason: HOSPADM

## 2017-12-11 RX ORDER — IBUPROFEN 400 MG/1
400 TABLET ORAL ONCE
Status: COMPLETED | OUTPATIENT
Start: 2017-12-11 | End: 2017-12-11

## 2017-12-11 RX ORDER — LORAZEPAM 1 MG/1
1 TABLET ORAL 2 TIMES DAILY
Status: DISCONTINUED | OUTPATIENT
Start: 2017-12-11 | End: 2017-12-15 | Stop reason: HOSPADM

## 2017-12-11 RX ORDER — HALOPERIDOL 5 MG
5 TABLET ORAL EVERY 6 HOURS PRN
Status: DISCONTINUED | OUTPATIENT
Start: 2017-12-11 | End: 2017-12-15 | Stop reason: HOSPADM

## 2017-12-11 RX ORDER — MAGNESIUM HYDROXIDE/ALUMINUM HYDROXICE/SIMETHICONE 120; 1200; 1200 MG/30ML; MG/30ML; MG/30ML
30 SUSPENSION ORAL EVERY 4 HOURS PRN
Status: DISCONTINUED | OUTPATIENT
Start: 2017-12-11 | End: 2017-12-15 | Stop reason: HOSPADM

## 2017-12-11 RX ORDER — BENZTROPINE MESYLATE 1 MG/ML
1 INJECTION INTRAMUSCULAR; INTRAVENOUS EVERY 6 HOURS PRN
Status: DISCONTINUED | OUTPATIENT
Start: 2017-12-11 | End: 2017-12-15 | Stop reason: HOSPADM

## 2017-12-11 RX ORDER — TRAZODONE HYDROCHLORIDE 50 MG/1
50 TABLET ORAL
Status: DISCONTINUED | OUTPATIENT
Start: 2017-12-11 | End: 2017-12-13

## 2017-12-11 RX ADMIN — LORAZEPAM 1 MG: 1 TABLET ORAL at 16:25

## 2017-12-11 RX ADMIN — TRAZODONE HYDROCHLORIDE 50 MG: 50 TABLET ORAL at 22:21

## 2017-12-11 RX ADMIN — FOLIC ACID 1 MG: 1 TABLET ORAL at 14:26

## 2017-12-11 RX ADMIN — IBUPROFEN 400 MG: 400 TABLET, FILM COATED ORAL at 07:25

## 2017-12-11 RX ADMIN — Medication 100 MG: at 14:26

## 2017-12-11 RX ADMIN — ACETAMINOPHEN 650 MG: 325 TABLET ORAL at 16:24

## 2017-12-11 NOTE — ED NOTES
Lights turned out per pt request  No further requests at this time  Will continue to monitor        Pegge Sensor, RN  12/10/17 0033

## 2017-12-11 NOTE — ED NOTES
Pt provided with safe breakfast tray and is currently eating        Mihai Smith, KULDIP  12/11/17 4949

## 2017-12-11 NOTE — ED CARE HANDOFF
Emergency Department Sign Out Note        Sign out and transfer of care from [place], [facility]  See Separate Emergency Department note  The patient, Sally Vela, was evaluated by the previous provider for depression, suicidal     Workup Completed:  Medically cleared, 201 signed  Bed search in progress    ED Course / Workup Pending (followup): Labs Reviewed   RAPID DRUG SCREEN, URINE - Normal       Result Value Ref Range Status    Amph/Meth UR Negative  Negative Final    Barbiturate Ur Negative  Negative Final    Benzodiazepine Urine Negative  Negative Final    Cocaine Urine Negative  Negative Final    Methadone Urine Negative  Negative Final    Opiate Urine Negative  Negative Final    PCP Ur Negative  Negative Final    THC Urine Negative  Negative Final    Narrative:     FOR MEDICAL PURPOSES ONLY  IF CONFIRMATION NEEDED PLEASE CONTACT THE LAB WITHIN 5 DAYS      Drug Screen Cutoff Levels:  AMPHETAMINE/METHAMPHETAMINES  1000 ng/mL  BARBITURATES     200 ng/mL  BENZODIAZEPINES     200 ng/mL  COCAINE      300 ng/mL  METHADONE      300 ng/mL  OPIATES      300 ng/mL  PHENCYCLIDINE     25 ng/mL  THC       50 ng/mL   CBC AND DIFFERENTIAL - Normal    WBC 7 85  4 31 - 10 16 Thousand/uL Final    RBC 4 76  3 81 - 5 12 Million/uL Final    Hemoglobin 15 3  11 5 - 15 4 g/dL Final    Hematocrit 44 9  34 8 - 46 1 % Final    MCV 94  82 - 98 fL Final    MCH 32 1  26 8 - 34 3 pg Final    MCHC 34 1  31 4 - 37 4 g/dL Final    RDW 14 1  11 6 - 15 1 % Final    MPV 9 5  8 9 - 12 7 fL Final    Platelets 358  241 - 390 Thousands/uL Final    Neutrophils Relative 57  43 - 75 % Final    Lymphocytes Relative 32  14 - 44 % Final    Monocytes Relative 8  4 - 12 % Final    Eosinophils Relative 2  0 - 6 % Final    Basophils Relative 1  0 - 1 % Final    Neutrophils Absolute 4 44  1 85 - 7 62 Thousands/µL Final    Lymphocytes Absolute 2 54  0 60 - 4 47 Thousands/µL Final    Monocytes Absolute 0 62  0 17 - 1 22 Thousand/µL Final Eosinophils Absolute 0 19  0 00 - 0 61 Thousand/µL Final    Basophils Absolute 0 06  0 00 - 0 10 Thousands/µL Final   POCT ALCOHOL BREATH TEST - Normal    EXTBreath Alcohol 0 097   Final   POCT ALCOHOL BREATH TEST - Normal    EXTBreath Alcohol 0 064   Final   COMPREHENSIVE METABOLIC PANEL    Sodium 401  136 - 145 mmol/L Final    Potassium 3 5  3 5 - 5 3 mmol/L Final    Chloride 104  100 - 108 mmol/L Final    CO2 24  21 - 32 mmol/L Final    Anion Gap 13  4 - 13 mmol/L Final    BUN 14  5 - 25 mg/dL Final    Creatinine 0 75  0 60 - 1 30 mg/dL Final    Comment: Standardized to IDMS reference method    Glucose 94  65 - 140 mg/dL Final    Comment: 18  If the patient is fasting, the ADA then defines impaired fasting glucose as > 100 mg/dL and diabetes as > or equal to 123 mg/dL  Specimen collection should occur prior to Sulfasalazine administration due to the potential for falsely depressed results  Specimen collection should occur prior to Sulfapyridine administration due to the potential for falsely elevated results  Calcium 9 4  8 3 - 10 1 mg/dL Final    AST 28  5 - 45 U/L Final    Comment:   Specimen collection should occur prior to Sulfasalazine administration due to the potential for falsely depressed results  ALT 58  12 - 78 U/L Final    Comment:   Specimen collection should occur prior to Sulfasalazine administration due to the potential for falsely depressed results  Alkaline Phosphatase 109  46 - 116 U/L Final    Total Protein 7 8  6 4 - 8 2 g/dL Final    Albumin 3 9  3 5 - 5 0 g/dL Final    Total Bilirubin 0 60  0 20 - 1 00 mg/dL Final    eGFR 92  ml/min/1 73sq m Final    Narrative:     National Kidney Disease Education Program recommendations are as follows:  GFR calculation is accurate only with a steady state creatinine  Chronic Kidney disease less than 60 ml/min/1 73 sq  meters  Kidney failure less than 15 ml/min/1 73 sq  meters       No orders to display                               ED Course Procedures  MDM  Number of Diagnoses or Management Options  Alcohol abuse: new and requires workup  Depression: new and requires workup  Suicidal thoughts: new and requires workup     Amount and/or Complexity of Data Reviewed  Clinical lab tests: reviewed and ordered      CritCare Time      Disposition  Final diagnoses:   Depression   Suicidal thoughts   Alcohol abuse     Time reflects when diagnosis was documented in both MDM as applicable and the Disposition within this note     Time User Action Codes Description Comment    12/10/2017  6:55 PM Sandie Jackson Add [F32 9] Depression     12/10/2017  6:55 PM Sandie Jackson Add [P12 712] Suicidal thoughts     12/10/2017  6:55 PM Sandie Jackson Add [F10 10] Alcohol abuse     12/12/2017  9:23 AM Lillie Jose Modify [F10 10] Alcohol abuse       ED Disposition     None      MD Documentation    Elsy Gabriel Most Recent Value   Accepting Physician  [address] [place] [address] Name, Ananth Lopez U  91  2w   Sending MD  [address] Name, Ananth Lopez U  91  2w      Follow-up Information     Follow up With Specialties Details Why [address] on 12/18/2017 Please follow up with [facility] on Monday 12/18/17 @ 8:00am (Monday through Friday 8:00am-2:00pm) for an in-take/ assessment for [facility] funding for outpatient therapy and psychiatric medication management  [address] [place] [id number] (U) [phone] (B) [phone]        There are no discharge medications for this patient  No discharge procedures on file         ED Provider  Electronically Signed by

## 2017-12-11 NOTE — ED NOTES
Pt requesting for motrin for generalized headache, Dr Xavi Miller made aware, awaiting new order        Sandra Lopez RN  12/11/17 6964

## 2017-12-11 NOTE — PROGRESS NOTES
Patient upset about life situation  Patient depressed and tearful  Rina Nuñez that she works 40 hrs a week and cares for mother at home  Mother's house  Patient and son live with mother  Recently son moved in gf and patient's sister moved in also  She says she cooks, cleans and none of the other family members contribute  She said she now has difficulty sleeping  Reassured her and directed her to med room if she needs something for anxiety/ sleep  Continue to monitor
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
## 2017-12-11 NOTE — ED NOTES
Blaire Rivera from [facility] states this pt is up for a bed search but waiting on discharges       Arielle Hargrove  12/11/17 7010

## 2017-12-11 NOTE — H&P
Chief Complaint:  As per psychiatry      History of Present Illness:  60-year-old female who otherwise does not seek medical attention with worsening depression  Patient admits to drinking alcohol and has been binge drinking 3 times weekly and occasional does marijuana  Patient admits to migraines otherwise denies heart lung or kidney disease  Patient currently had a who has a headache  Denies fevers, chills, shortness of breath or chest pain      Past Medical History:   Past Medical History:   Diagnosis Date    Chronic pain     Back Pain    Depression     Hypertension     Migraine          Past Surgical History:  History reviewed  No pertinent surgical history  Allergies:     Allergies   Allergen Reactions    Penicillins Anaphylaxis         Medications:    Current Facility-Administered Medications:     acetaminophen (TYLENOL) tablet 650 mg, 650 mg, Oral, Q6H PRN, Lisa Prior, PA-C    aluminum-magnesium hydroxide-simethicone (MYLANTA) 200-200-20 mg/5 mL oral suspension 30 mL, 30 mL, Oral, Q4H PRN, Lisa Prior, PA-C    benztropine (COGENTIN) injection 1 mg, 1 mg, Intramuscular, Q6H PRN, Lisa Prior, PA-C    benztropine (COGENTIN) tablet 1 mg, 1 mg, Oral, Q6H PRN, Lisa Prior, PA-C    haloperidol (HALDOL) tablet 5 mg, 5 mg, Oral, Q6H PRN, Lisa Prior, PA-C    haloperidol lactate (HALDOL) injection 5 mg, 5 mg, Intramuscular, Q6H PRN, Lisa Prior, PA-C    LORazepam (ATIVAN) 2 mg/mL injection 2 mg, 2 mg, Intramuscular, Q6H PRN, Lisa Prior, PA-C    LORazepam (ATIVAN) tablet 1 mg, 1 mg, Oral, Q6H PRN, Lisa Prior, PA-C    magnesium hydroxide (MILK OF MAGNESIA) 400 mg/5 mL oral suspension 30 mL, 30 mL, Oral, Daily PRN, Lisa Prior, PA-AMERICA    nicotine (NICODERM CQ) 14 mg/24hr TD 24 hr patch 14 mg, 14 mg, Transdermal, Once, Glen Simpson PA-C, 14 mg at 12/10/17 1647    nicotine polacrilex (NICORETTE) gum 2 mg, 2 mg, Oral, Q2H PRN, Tessy Beavers Amador Ravi PA-C    OLANZapine (ZyPREXA) IM injection 10 mg, 10 mg, Intramuscular, Q3H PRN, Debo Thomas PA-C    OLANZapine (ZyPREXA) tablet 10 mg, 10 mg, Oral, Q3H PRN, Debo Thomas PA-C    risperiDONE (RisperDAL M-TABS) dispersible tablet 1 mg, 1 mg, Oral, Q3H PRN, Debo Thomas PA-C      Social History:  Social History     History   Alcohol Use No     History   Drug Use No     History   Smoking Status    Current Every Day Smoker    Packs/day: 0 50    Types: Cigarettes   Smokeless Tobacco    Current User         Family History:  History reviewed  No pertinent family history  Review of Systems:    negative, as above, fever, chills, night sweats, weight loss, weight gain, headaches, dizziness, fatigue and weakness    Vitals:  Vitals:    12/11/17 1136   BP: 135/96   Pulse: 104   Resp: (!) 24   Temp: 98 1 °F (36 7 °C)   SpO2:        Physical Exam:  HEENT: Normocephalic, atraumatic, PER EOMI, nonicteric, trachea normal, thyroid normal, oropharynx normal   CARDIAC: regular rate & rhythm, S1 & S2 normal   No heaves, thrills, gallops or murmurs  LUNGS: Clear to auscultation, no spinal or CV tenderness  ABDOMEN: obese, negative hepatosplenomegaly, soft and non-tender  EXTREMITIES: No evidence of cyanosis, clubbing or edema  Lab Results: I have personally reviewed pertinent reports  See below  Imaging: I have personally reviewed pertinent reports  EKG, Pathology, and Other Studies: I have personally reviewed pertinent reports       Admission on 12/10/2017   Component Date Value    EXTBreath Alcohol 12/10/2017 0 097     Amph/Meth UR 12/10/2017 Negative     Barbiturate Ur 12/10/2017 Negative     Benzodiazepine Urine 12/10/2017 Negative     Cocaine Urine 12/10/2017 Negative     Methadone Urine 12/10/2017 Negative     Opiate Urine 12/10/2017 Negative     PCP Ur 12/10/2017 Negative     THC Urine 12/10/2017 Negative     WBC 12/10/2017 7 85     RBC 12/10/2017 4 76     Hemoglobin 12/10/2017 15 3     Hematocrit 12/10/2017 44 9     MCV 12/10/2017 94     MCH 12/10/2017 32 1     MCHC 12/10/2017 34 1     RDW 12/10/2017 14 1     MPV 12/10/2017 9 5     Platelets 26/04/9395 284     Neutrophils Relative 12/10/2017 57     Lymphocytes Relative 12/10/2017 32     Monocytes Relative 12/10/2017 8     Eosinophils Relative 12/10/2017 2     Basophils Relative 12/10/2017 1     Neutrophils Absolute 12/10/2017 4 44     Lymphocytes Absolute 12/10/2017 2 54     Monocytes Absolute 12/10/2017 0 62     Eosinophils Absolute 12/10/2017 0 19     Basophils Absolute 12/10/2017 0 06     Sodium 12/10/2017 141     Potassium 12/10/2017 3 5     Chloride 12/10/2017 104     CO2 12/10/2017 24     Anion Gap 12/10/2017 13     BUN 12/10/2017 14     Creatinine 12/10/2017 0 75     Glucose 12/10/2017 94     Calcium 12/10/2017 9 4     AST 12/10/2017 28     ALT 12/10/2017 58     Alkaline Phosphatase 12/10/2017 109     Total Protein 12/10/2017 7 8     Albumin 12/10/2017 3 9     Total Bilirubin 12/10/2017 0 60     eGFR 12/10/2017 92     EXTBreath Alcohol 12/10/2017 0 064          Impression:  Alcohol abuse  Depression  Anxiety    Plan:  Inpatient psychiatric management and treatment  Monitor blood pressure

## 2017-12-12 PROBLEM — F33.2 SEVERE EPISODE OF RECURRENT MAJOR DEPRESSIVE DISORDER, WITHOUT PSYCHOTIC FEATURES (HCC): Status: ACTIVE | Noted: 2017-12-12

## 2017-12-12 PROBLEM — F12.10 CANNABIS ABUSE: Status: ACTIVE | Noted: 2017-12-12

## 2017-12-12 PROBLEM — F41.1 GENERALIZED ANXIETY DISORDER: Status: ACTIVE | Noted: 2017-12-12

## 2017-12-12 PROBLEM — F10.10 ALCOHOL ABUSE: Status: ACTIVE | Noted: 2017-12-12

## 2017-12-12 LAB
BACTERIA UR QL AUTO: ABNORMAL /HPF
BILIRUB UR QL STRIP: NEGATIVE
CHOLEST SERPL-MCNC: 259 MG/DL (ref 50–200)
CLARITY UR: CLEAR
COLOR UR: YELLOW
EST. AVERAGE GLUCOSE BLD GHB EST-MCNC: 123 MG/DL
GLUCOSE UR STRIP-MCNC: NEGATIVE MG/DL
HBA1C MFR BLD: 5.9 % (ref 4.2–6.3)
HCG SERPL QL: NEGATIVE
HDLC SERPL-MCNC: 52 MG/DL (ref 40–60)
HGB UR QL STRIP.AUTO: ABNORMAL
KETONES UR STRIP-MCNC: NEGATIVE MG/DL
LDLC SERPL CALC-MCNC: 174 MG/DL (ref 0–100)
LEUKOCYTE ESTERASE UR QL STRIP: NEGATIVE
NITRITE UR QL STRIP: NEGATIVE
NON-SQ EPI CELLS URNS QL MICRO: ABNORMAL /HPF
PH UR STRIP.AUTO: 5.5 [PH] (ref 4.5–8)
PROT UR STRIP-MCNC: NEGATIVE MG/DL
RBC #/AREA URNS AUTO: ABNORMAL /HPF
SP GR UR STRIP.AUTO: 1.01 (ref 1–1.03)
TRIGL SERPL-MCNC: 165 MG/DL
TSH SERPL DL<=0.05 MIU/L-ACNC: 1.39 UIU/ML (ref 0.36–3.74)
UROBILINOGEN UR QL STRIP.AUTO: 0.2 E.U./DL
WBC #/AREA URNS AUTO: ABNORMAL /HPF

## 2017-12-12 PROCEDURE — 83036 HEMOGLOBIN GLYCOSYLATED A1C: CPT | Performed by: PHYSICIAN ASSISTANT

## 2017-12-12 PROCEDURE — 80061 LIPID PANEL: CPT | Performed by: PHYSICIAN ASSISTANT

## 2017-12-12 PROCEDURE — 84443 ASSAY THYROID STIM HORMONE: CPT | Performed by: PHYSICIAN ASSISTANT

## 2017-12-12 PROCEDURE — 81001 URINALYSIS AUTO W/SCOPE: CPT | Performed by: PHYSICIAN ASSISTANT

## 2017-12-12 PROCEDURE — 84703 CHORIONIC GONADOTROPIN ASSAY: CPT | Performed by: PHYSICIAN ASSISTANT

## 2017-12-12 RX ORDER — ESCITALOPRAM OXALATE 10 MG/1
10 TABLET ORAL DAILY
Status: DISCONTINUED | OUTPATIENT
Start: 2017-12-12 | End: 2017-12-15 | Stop reason: HOSPADM

## 2017-12-12 RX ORDER — IBUPROFEN 200 MG
200 TABLET ORAL EVERY 6 HOURS PRN
Status: DISCONTINUED | OUTPATIENT
Start: 2017-12-12 | End: 2017-12-14

## 2017-12-12 RX ADMIN — FOLIC ACID 1 MG: 1 TABLET ORAL at 08:30

## 2017-12-12 RX ADMIN — LORAZEPAM 1 MG: 1 TABLET ORAL at 13:18

## 2017-12-12 RX ADMIN — NICOTINE 14 MG: 14 PATCH, EXTENDED RELEASE TRANSDERMAL at 08:29

## 2017-12-12 RX ADMIN — ACETAMINOPHEN 650 MG: 325 TABLET ORAL at 20:22

## 2017-12-12 RX ADMIN — TRAZODONE HYDROCHLORIDE 50 MG: 50 TABLET ORAL at 21:08

## 2017-12-12 RX ADMIN — ESCITALOPRAM OXALATE 10 MG: 10 TABLET ORAL at 10:14

## 2017-12-12 RX ADMIN — Medication 100 MG: at 08:30

## 2017-12-12 RX ADMIN — LORAZEPAM 1 MG: 1 TABLET ORAL at 08:30

## 2017-12-12 RX ADMIN — LORAZEPAM 1 MG: 1 TABLET ORAL at 17:00
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
## 2017-12-12 NOTE — PROGRESS NOTES
During nurse report, it was reported that patient wanted to leave  Was here yesterday when she was admitted  She was very tearful  At beginning of shift, she was pacing the hallway  Told patient that she looked very anxious  She acknowledged  Asked patient about DC tomorrow  She neither confirmed or denied  Educated her about finding a medication and dose that would be helpful for her  Easier to do inpatient than outpatient  She has difficulty sleeping  She said she slept a little with the trazodone  She also reported headaches  She said she takes Soma for headaches and a pinched nerve  Writer suggested tylenol or ibuprofin for Momin  She said these were not helpful because she needed larger doses than 650 mg  Suggested she communicates all this with the MD when she meets with him daily

## 2017-12-12 NOTE — PROGRESS NOTES
Pt states anxiety is an "8," as she is anxious to be D/c to take care of her mother  Denies depression, S/H/I  Pt states sleep is good but appetite is "so, so " She expressed concern about her son and his girlfriend taking care of her mom and home and wants to get back, "so that she is being taking care of " Pt states she usually has no help with mom, despite her older sister living there as well  Pt shared she works FT and is overwhelmed; also became tearful about not having money to buy to-BBB gifts " Pt compliant with meds; did not attend group because she doesn't feel comfortable  Will continue to monitor, provide support and encouragement

## 2017-12-12 NOTE — TREATMENT PLAN
TREATMENT PLAN REVIEW - [address] [facility] 46 y o  1965 female MRN: [id number]    [facility] Room / Bed: RUST 253/RUST [address] Encounter: [id number]          Admit Date/Time:  12/10/2017  3:34 PM    Treatment Team: Attending Provider: Felisha Pedroza; Registered Nurse: Evelin Awad RN; Occupational Therapy Assistant: Evonne Romberg, Misty Keep; Patient Care Assistant: Denny Nicole; Patient Care Technician: David Carballo    Diagnosis: Principal Problem:    Severe episode of recurrent major depressive disorder, without psychotic features ([facility] [address] )  Active Problems:    Generalized anxiety disorder    Alcohol abuse    Cannabis abuse    Patient Strengths/Assets: cooperative, good insight, insightful, motivation for treatment/growth, patient is on a voluntary commitment    Patient Barriers/Limitations: low self esteem    Short Term Goals: decrease in depressive symptoms, decrease in anxiety symptoms, decrease in suicidal thoughts    Long Term Goals: improvement in depression, improvement in anxiety, stabilization of mood, free of suicidal thoughts, acceptance of need for psychiatric medications, acceptance of need for psychiatric treatment, acceptance of need for psychiatric follow up after discharge    Progress Towards Goals: starting psychiatric medications as prescribed    Recommended Treatment: medication management, patient medication education, group therapy, milieu therapy, continued Behavioral Health psychiatric evaluation/assessment process    Treatment Frequency: daily medication monitoring, group and milieu therapy daily, monitoring through interdisciplinary rounds, monitoring through weekly patient care conferences    Expected Discharge Date: 4-7 days    Discharge Plan: referral for outpatient medication management with a psychiatrist, referral for outpatient psychotherapy    Treatment Plan Created/Updated By: Felisha Pedroza

## 2017-12-12 NOTE — TREATMENT PLAN
RN will meet with you at least twice a day to assess for symptoms of admission and teach about medications, diagnosis and healthy coping skills

## 2017-12-12 NOTE — CASE MANAGEMENT
PT was referred to [address] on a 201 from hospitals-ED after PT brought herself to the ED reporting recent onset of worsening depression, anxiety related to the anniversary of her brother's death 3 years ago  PT was reporting SI w/ a plan to crash her car into a pole, a wall, or a tree  CM met w/ PT today  CM reviewed PT's [place] plan  PT made edits to the [facility]  CM placed a copy to be filed in medical records  PT confirms that she resides at (Physical Address) [place] [address] in [facility] (Mailing Address) [address] [place] [address] in [facility]  PT confirms that she is able to return to this address upon D/C from [address] [place] [address]  PT confirms that she lives here w/ her mother Brittny Rodriguez, [facility] 30yr old son Niurka Steinberg, Galo's fiernestine Ye, and PT's sister Karime Alarcon  PT confirms that her home telephone number is ([phone]) [phone] and that her cell phone number is ([phone]) [phone]  PT confirms having an active 's licenses and confirms having her own means of transportation  PT confirms that she drove herself to the ED and plans to drive herself home upon D/C from [address] [place] [address]  PT denies having any current/ active providers including psychiatric medication management, therapy, community case management, peer supports, D&A services, PCP, etc      PT confirms that she is currently uninsured  PT's MA termed on 10/31/17  CM reviewed the referral process for PATHS and PT confirmed that she met w/ Chloé Gibson from [facility] today  PT denies having active RX coverage  CM provided education to PT on using [address] as an uninsured individual as they tend to be less expensive for PT's without insurance than other local pharmacies  CM provided education on the discount RX cards as well  PT denies HX of I/P [facility] admissions or O/P MH services  PT reports that this is the first time she has ever received treatment for mental health symptoms       PT reports HX of the following medical conditions: Chronic Pain, Hypertension, and Migraines  PT denies HX of seizures  PT denies having any past or current legal issues  PT denies currently being under the supervision of probation or parole  PT confirms that she is employed full-time as a   PT denies having insurance through this employer and denies having benefits of FMLA, Medical Leave, STD, etc      PT reports that she is legally ; however, reports that she has been  for 15 years and has no interest in divorce  PT confirms that she is (2) children Medina Marshall 32 yrs old and Saurabh Arzola 27years old  PT reports family HX of mental illness and drug and alcohol addiction including PT's brother passing away of a heroin OD, PT's son having a HX of hospitalizations and depression, and the belief that PT's father struggled with bipolar disorder  PT denies experiencing any current forms of physical, verbal, emotional, or sexual abuse  PT reports she identifies as Presybeterian in regards to Episcopal and spiritual beliefs  PT denies having any issues with drug or alcohol abuse  UDS upon admission was (-)  PT stated, "I'm dago if I smoke a bowl once a month, and that helps me " PT declined a referral for D&A services  PT confirms that she smokes cigarettes roughly 1 PPD  PT confirms that her highest level of completed education is 12th grade and reports graduating from [employer] in [facility]  PT denies having access to any weapons or firearms  PT denies having a legal POA, legal guardian, mental health advanced directive, rep-payee, etc      PT confirms having natural supports through immediate and extended family, friends, neighbors, her boyfriend DEBBIE of 8 years       CM reviewed different levels of services in the community including PHP, IOP, community case management, peer supports, , etc  PT reports that due to work she is only interested in O/P [address] services at this time  CM agreed to outreach to PT's natural supports and working on referral for [place] for O/P [employer] [facility]  Agreed to be in contact regarding progression in stabilization and disposition planning       PT signed AIMEE's for the following:     [facility] located @ [facility] [address] Z) [phone]    [facility] located @ [place] [address] O) [phone]

## 2017-12-12 NOTE — PLAN OF CARE
Problem: Ineffective Coping  Goal: Participates in unit activities  Interventions:  - Provide therapeutic environment   - Provide required programming   - Redirect inappropriate behaviors    Outcome: Progressing  Pt attended admission group with much encouragement  She identified her major stressor as her family  Pt offered minimal responses to questions if prompted  She declined to attend further groups stating "being around people makes me anxious "  Educated pt on therapeutic value in group attendance

## 2017-12-13 RX ORDER — KETOROLAC TROMETHAMINE 30 MG/ML
30 INJECTION, SOLUTION INTRAMUSCULAR; INTRAVENOUS ONCE AS NEEDED
Status: COMPLETED | OUTPATIENT
Start: 2017-12-13 | End: 2017-12-13

## 2017-12-13 RX ORDER — TRAZODONE HYDROCHLORIDE 100 MG/1
100 TABLET ORAL
Status: DISCONTINUED | OUTPATIENT
Start: 2017-12-13 | End: 2017-12-15 | Stop reason: HOSPADM

## 2017-12-13 RX ADMIN — LORAZEPAM 1 MG: 1 TABLET ORAL at 11:11

## 2017-12-13 RX ADMIN — KETOROLAC TROMETHAMINE 30 MG: 30 INJECTION, SOLUTION INTRAMUSCULAR at 13:33

## 2017-12-13 RX ADMIN — LORAZEPAM 1 MG: 1 TABLET ORAL at 08:21

## 2017-12-13 RX ADMIN — NICOTINE 14 MG: 14 PATCH, EXTENDED RELEASE TRANSDERMAL at 08:21

## 2017-12-13 RX ADMIN — Medication 100 MG: at 08:21

## 2017-12-13 RX ADMIN — ACETAMINOPHEN 650 MG: 325 TABLET ORAL at 09:56

## 2017-12-13 RX ADMIN — FOLIC ACID 1 MG: 1 TABLET ORAL at 08:21

## 2017-12-13 RX ADMIN — TRAZODONE HYDROCHLORIDE 100 MG: 100 TABLET ORAL at 21:05

## 2017-12-13 RX ADMIN — ESCITALOPRAM OXALATE 10 MG: 10 TABLET ORAL at 08:21

## 2017-12-13 RX ADMIN — LORAZEPAM 1 MG: 1 TABLET ORAL at 17:05

## 2017-12-13 NOTE — PROGRESS NOTES
Prn ativan provided at 11:11 per pt request for c/o anxiety & panic attack  Attempted to suggest additional methods of anxiety reduction, however, pt verbalized no interest and took the ativan and walked away

## 2017-12-13 NOTE — PLAN OF CARE
Problem: DISCHARGE PLANNING  Goal: Discharge to home or other facility with appropriate resources  INTERVENTIONS:  - Identify barriers to discharge w/patient and caregiver  - Arrange for needed discharge resources and transportation as appropriate  - Identify discharge learning needs (meds, wound care, etc )  - Arrange for interpretive services to assist at discharge as needed  - Refer to Case Management Department for coordinating discharge planning if the patient needs post-hospital services based on physician/advanced practitioner order or complex needs related to functional status, cognitive ability, or social support system   Outcome: Progressing    CM made a referral for [facility] funding [facility] through [facility] for therapy and psychiatric medication management  PT plans to return to live at home w/ family upon D/C from [address] [place] [address]

## 2017-12-13 NOTE — PLAN OF CARE
Problem: Ineffective Coping  Goal: Participates in unit activities  Interventions:  - Provide therapeutic environment   - Provide required programming   - Redirect inappropriate behaviors    Outcome: Progressing  Pt remained mostly seclusive and resistive to group interaction, however, did attend pet therapy group and appeared to enjoy interacting with the dogs

## 2017-12-13 NOTE — PROGRESS NOTES
Pt irritable and seclusive to self  Said she does not like attending groups because they make her anxious  Denies SI  Reports doing well on current medicines  Hopeful for discharge soon  C/o headache that was unrelieved by tylenol  Said she normally takes soma  PA made aware and additional order given  Pt declined lunch  Said she plans to follow up with therapist and psychiatrist after discharge   Pt said she has never received treatment for her depression in the past

## 2017-12-13 NOTE — CASE MANAGEMENT
CM completed a new client registration referral for [facility] through [facility] for [employer] located @ [facility] [place] [address] (j) [phone] (a) [phone]       PHIL faxed over the following clinical information:     Client Registration Form  Face Sheet  Medication List   Psychiatric Evaluation

## 2017-12-13 NOTE — PROGRESS NOTES
Progress Note - [facility] Penny 46 y o  female MRN: [id number]  Unit/Bed#: [facility] 253-01 Encounter: [id number]    Assessment/Plan   Principal Problem:    Severe episode of recurrent major depressive disorder, without psychotic features ([address] )  Active Problems:    Generalized anxiety disorder    Alcohol abuse    Cannabis abuse      Subjective:  Patient remained compliant with medications with no acute side effects  Patient remained focused on discharge and appears to be minimizing her symptoms pressure in symptoms  Patient remained irritable on the unit yesterday and was not attending groups  I discussed with patient on importance of attending groups and expressing her feelings, but patient reports not liking groups  She reports tolerating Lexapro and Ativan with no acute side effects  She is agreeable for staying in hospital for safety assessment and ruling out any side effects from medications  She describes good support from her boyfriend, who visited her on the unit yesterday  Boyfriend did verbalized importance of this admission to patient  It was emphasized how patient is not putting herself first and is still focused on others and her job at this time  Patient reports understanding and is willing to work with primary team on the unit  Current Medications:    escitalopram 10 mg Oral Daily   folic acid 1 mg Oral Daily   LORazepam 1 mg Oral BID   nicotine 14 mg Transdermal Daily   thiamine 100 mg Oral Daily       Behavioral Health Medications: all current active meds have been reviewed  Vital signs in last 24 hours:  Temp:  [97 6 °F (36 4 °C)-98 3 °F (36 8 °C)] 97 6 °F (36 4 °C)  HR:  [87-93] 87  Resp:  [17-18] 17  BP: (118-129)/(56-73) 129/73    Laboratory results:    I have personally reviewed all pertinent laboratory/tests results    Labs in last 72 hours:   Recent Labs      12/10/17   1600  12/12/17   0557   WBC  7 85   --    RBC  4 76   --    HGB  15 3   --    HCT  44 9   -- PLT  284   --    RDW  14 1   --    NEUTROABS  4 44   --    NA  141   --    K  3 5   --    CL  104   --    CO2  24   --    BUN  14   --    CREATININE  0 75   --    GLUCOSE  94   --    CALCIUM  9 4   --    AST  28   --    ALT  58   --    ALKPHOS  109   --    PROT  7 8   --    ALBUMIN  3 9   --    BILITOT  0 60   --    CHOL   --   259*   HDL   --   52   TRIG   --   165*   LDLCALC   --   174*   [id number]   --   1 387   PREGSERUM   --   Negative     Admission Labs:   Admission on 12/10/2017   Component Date Value    EXTBreath Alcohol 12/10/2017 0 097     Amph/Meth UR 12/10/2017 Negative     Barbiturate Ur 12/10/2017 Negative     Benzodiazepine Urine 12/10/2017 Negative     Cocaine Urine 12/10/2017 Negative     Methadone Urine 12/10/2017 Negative     Opiate Urine 12/10/2017 Negative     PCP Ur 12/10/2017 Negative     THC Urine 12/10/2017 Negative     WBC 12/10/2017 7 85     RBC 12/10/2017 4 76     Hemoglobin 12/10/2017 15 3     Hematocrit 12/10/2017 44 9     MCV 12/10/2017 94     MCH 12/10/2017 32 1     MCHC 12/10/2017 34 1     RDW 12/10/2017 14 1     MPV 12/10/2017 9 5     Platelets 04/67/9872 284     Neutrophils Relative 12/10/2017 57     Lymphocytes Relative 12/10/2017 32     Monocytes Relative 12/10/2017 8     Eosinophils Relative 12/10/2017 2     Basophils Relative 12/10/2017 1     Neutrophils Absolute 12/10/2017 4 44     Lymphocytes Absolute 12/10/2017 2 54     Monocytes Absolute 12/10/2017 0 62     Eosinophils Absolute 12/10/2017 0 19     Basophils Absolute 12/10/2017 0 06     Sodium 12/10/2017 141     Potassium 12/10/2017 3 5     Chloride 12/10/2017 104     CO2 12/10/2017 24     Anion Gap 12/10/2017 13     BUN 12/10/2017 14     Creatinine 12/10/2017 0 75     Glucose 12/10/2017 94     Calcium 12/10/2017 9 4     AST 12/10/2017 28     ALT 12/10/2017 58     Alkaline Phosphatase 12/10/2017 109     Total Protein 12/10/2017 7 8     Albumin 12/10/2017 3 9     Total Bilirubin 12/10/2017 0 60     eGFR 12/10/2017 92     EXTBreath Alcohol 12/10/2017 0 064     TSH 3RD GENERATON 12/12/2017 1 387     Preg, Serum 12/12/2017 Negative     Hemoglobin A1C 12/12/2017 5 9     EAG 12/12/2017 123     Color, UA 12/12/2017 Yellow     Clarity, UA 12/12/2017 Clear     Specific Gravity, UA 12/12/2017 1 010     pH, UA 12/12/2017 5 5     Leukocytes, UA 12/12/2017 Negative     Nitrite, UA 12/12/2017 Negative     Protein, UA 12/12/2017 Negative     Glucose, UA 12/12/2017 Negative     Ketones, UA 12/12/2017 Negative     Urobilinogen, UA 12/12/2017 0 2     Bilirubin, UA 12/12/2017 Negative     Blood, UA 12/12/2017 Moderate*    Cholesterol 12/12/2017 259*    Triglycerides 12/12/2017 165*    HDL, Direct 12/12/2017 52     LDL Calculated 12/12/2017 174*    RBC, UA 12/12/2017 4-10*    WBC, UA 12/12/2017 0-1*    Epithelial Cells 12/12/2017 Occasional     Bacteria, UA 12/12/2017 None Seen        Psychiatric Review of Systems:  Behavior over the last 24 hours:  Improving slowly  Sleep: normal  Appetite: normal  Medication side effects: No  ROS: no complaints    Mental Status Evaluation:  Appearance:  casually dressed   Behavior:  guarded   Speech:  soft   Mood:  anxious and depressed   Affect:  constricted   Language naming objects and repeating phrases   Thought Process:  normal   Thought Content:  obsessions   Perceptual Disturbances: None   Risk Potential: Suicidal Ideations without plan, Homicidal Ideations none and Potential for Aggression No   Sensorium:  person, place and time/date   Cognition:  grossly intact   Consciousness:  awake    Attention: attention span appeared shorter than expected for age   Insight:  limited   Judgment: limited   Intellect fair   Gait/Station: normal gait/station   Motor Activity: no abnormal movements     Progress Toward Goals: slow progress    Recommended Treatment:   Continue with group therapy, milieu therapy and occupational therapy      Continue following current medications:   escitalopram 10 mg Oral Daily   folic acid 1 mg Oral Daily   LORazepam 1 mg Oral BID   nicotine 14 mg Transdermal Daily   thiamine 100 mg Oral Daily       Risks, benefits and possible side effects of Medications:   Risks, benefits, and possible side effects of medications explained to patient and patient verbalizes understanding  Risks of medications in pregnancy explained if female patient  Patient verbalizes understanding and agrees to notify her doctor if she becomes pregnant  This note has been constructed using a voice recognition system  There may be translation, syntax,  or grammatical errors  If you have any questions, please contact the dictating provider

## 2017-12-13 NOTE — CASE MANAGEMENT
CM met w/ PT today and provided an update on the [facility] to [facility] with a request for [facility] funding for O/P MH therapy and psychiatric medication management until [facility] becomes active  PT verbalized being in agreement w/ this planning  Agreed to be in contact regarding progression in stabilization and disposition planning

## 2017-12-13 NOTE — PROGRESS NOTES
Prn tylenol provided at 09:56 per pt request for c/o HA  Pt refused the prn motrin when offered  Pt reports the tylenol did not relieve her HA and she remains focused on wanting soma  Encouraged pt to suck on ice chips or sip on water but pt refused  Also offered a heat pack or an ice pack but pt again refused  Pt presented with an irritable edge mumbling under her breath as she walked away

## 2017-12-14 RX ORDER — ESCITALOPRAM OXALATE 10 MG/1
10 TABLET ORAL DAILY
Qty: 30 TABLET | Refills: 1 | Status: SHIPPED | OUTPATIENT
Start: 2017-12-15 | End: 2019-09-27

## 2017-12-14 RX ORDER — IBUPROFEN 800 MG/1
800 TABLET ORAL EVERY 8 HOURS PRN
Status: DISCONTINUED | OUTPATIENT
Start: 2017-12-14 | End: 2017-12-15 | Stop reason: HOSPADM

## 2017-12-14 RX ORDER — LORAZEPAM 1 MG/1
1 TABLET ORAL 2 TIMES DAILY
Qty: 30 TABLET | Refills: 3 | Status: SHIPPED | OUTPATIENT
Start: 2017-12-14 | End: 2019-09-27

## 2017-12-14 RX ORDER — ATORVASTATIN CALCIUM 20 MG/1
20 TABLET, FILM COATED ORAL
Status: DISCONTINUED | OUTPATIENT
Start: 2017-12-14 | End: 2017-12-15 | Stop reason: HOSPADM

## 2017-12-14 RX ORDER — ATORVASTATIN CALCIUM 20 MG/1
20 TABLET, FILM COATED ORAL
Qty: 30 TABLET | Refills: 1 | Status: SHIPPED | OUTPATIENT
Start: 2017-12-14 | End: 2019-09-27

## 2017-12-14 RX ORDER — TRAZODONE HYDROCHLORIDE 100 MG/1
100 TABLET ORAL
Qty: 30 TABLET | Refills: 1 | Status: SHIPPED | OUTPATIENT
Start: 2017-12-14

## 2017-12-14 RX ADMIN — NICOTINE 14 MG: 14 PATCH, EXTENDED RELEASE TRANSDERMAL at 08:08

## 2017-12-14 RX ADMIN — TRAZODONE HYDROCHLORIDE 100 MG: 100 TABLET ORAL at 21:13

## 2017-12-14 RX ADMIN — LORAZEPAM 1 MG: 1 TABLET ORAL at 17:03

## 2017-12-14 RX ADMIN — LORAZEPAM 1 MG: 1 TABLET ORAL at 08:05

## 2017-12-14 RX ADMIN — LORAZEPAM 1 MG: 1 TABLET ORAL at 12:31

## 2017-12-14 RX ADMIN — FOLIC ACID 1 MG: 1 TABLET ORAL at 08:05

## 2017-12-14 RX ADMIN — ESCITALOPRAM OXALATE 10 MG: 10 TABLET ORAL at 08:05

## 2017-12-14 RX ADMIN — Medication 100 MG: at 08:05

## 2017-12-14 RX ADMIN — ATORVASTATIN CALCIUM 20 MG: 20 TABLET, FILM COATED ORAL at 17:03

## 2017-12-14 RX ADMIN — IBUPROFEN 800 MG: 800 TABLET, FILM COATED ORAL at 10:56

## 2017-12-14 NOTE — DISCHARGE INSTRUCTIONS
Depression   WHAT YOU NEED TO KNOW:   Depression is a medical condition that causes feelings of sadness or hopelessness that do not go away  Depression may cause you to lose interest in things you used to enjoy  These feelings may interfere with your daily life  DISCHARGE INSTRUCTIONS:   Call 911 for any of the following:   · You think about harming yourself or someone else  Contact your healthcare provider if:   · Your symptoms do not improve  · You cannot make it to your next appointment  · You have new symptoms  · You have questions or concerns about your condition or care  Medicines:   · Antidepressants  may be given to improve or balance your mood  You may need to take this medicine for several weeks before you begin to feel better  · Take your medicine as directed  Contact your healthcare provider if you think your medicine is not helping or if you have side effects  Tell him of her if you are allergic to any medicine  Keep a list of the medicines, vitamins, and herbs you take  Include the amounts, and when and why you take them  Bring the list or the pill bottles to follow-up visits  Carry your medicine list with you in case of an emergency  Therapy  may be used to treat your depression  A therapist will help you learn to cope with your thoughts and feelings  This can be done alone or in a group  It may also be done with family members or a significant other  Self-care:   · Get regular physical activity  Try to exercise for 30 minutes, 3 to 5 days a week  Work with your healthcare provider to develop an exercise plan that you enjoy  Physical activity may improve your symptoms  · Get enough sleep  Create a routine to help you relax before bed  You can listen to music, read, or do yoga  Try to go to bed and wake up at the same time every day  Sleep is important for emotional health  · Eat a variety of healthy foods from all of the food groups    A healthy meal plan is low in fat, salt, and added sugar  Ask your healthcare provider for more information about a meal plan that is right for you  · Do not drink alcohol or use drugs  Alcohol and drugs can make your symptoms worse  Follow up with your healthcare provider as directed: Your healthcare provider will monitor your progress at follow-up visits  He or she will also monitor your medicine if you take antidepressants  Your healthcare provider will ask if the medicine is helping  Tell him or her about any side effects or problems you may have with your medicine  The type or amount of medicine may need to be changed  Write down your questions so you remember to ask them during your visits  © 2017 [address] Information is for End User's use only and may not be sold, redistributed or otherwise used for commercial purposes  All illustrations and images included in CareNotes® are the copyrighted property of A D A M , Inc  or Peter Boggs  The above information is an  only  It is not intended as medical advice for individual conditions or treatments  Talk to your doctor, nurse or pharmacist before following any medical regimen to see if it is safe and effective for you  Alcohol Use Disorder   WHAT YOU NEED TO KNOW:   Alcohol use disorder (AUD) is problem drinking  AUD includes alcohol abuse and alcohol dependency  DISCHARGE INSTRUCTIONS:   Seek care immediately if:   · Your heart is beating faster than usual     · You have hallucinations  · You cannot remember what happens while you are drinking  · You have seizures  Contact your healthcare provider if:   · You are anxious and have nausea  · Your hands are shaky and you are sweating heavily  · You have questions or concerns about your condition or care  Follow up with your healthcare provider as directed:  Do not try to stop drinking on your own  Your healthcare provider may need to help you withdraw from alcohol safely   He may need to admit you to the hospital  You may also need any of the following treatments:  · Medicines to decrease your craving for alcohol    · Support groups such as Alcoholics Anonymous     · Therapy from a psychiatrist or psychologist    · Admission to an inpatient facility for treatment for severe AUD  For support and more information:   · Substance Abuse and [address] [place]  Web Address: https://wesync.tv/  · Alcoholics Anonymous  Web Address: http://[employer]/  © 2017 [address]  Information is for End User's use only and may not be sold, redistributed or otherwise used for commercial purposes  All illustrations and images included in CareNotes® are the copyrighted property of A D A M , Inc  or Peter Boggs  The above information is an  only  It is not intended as medical advice for individual conditions or treatments  Talk to your doctor, nurse or pharmacist before following any medical regimen to see if it is safe and effective for you  Generalized Anxiety Disorder   WHAT YOU NEED TO KNOW:   Generalized anxiety disorder (SAJAN) is a condition that causes you to feel worried or nervous for at least 6 months  The anxiety may be much more severe than the event causing it  You may not be able to do your daily activities because of the anxiety  DISCHARGE INSTRUCTIONS:   Call 911 for any of the following:   · You feel like hurting yourself or someone else  · You have chest pain, tightness, or heaviness that may spread to your shoulders, arms, jaw, neck, or back  Seek care immediately if:   · You feel like fainting or are lightheaded or too dizzy to stand up  Contact your healthcare provider if:   · You have new symptoms since your last visit  · Your anxiety keeps you from doing your daily activities, such as self-care, family, or work      · You have problems that you think may be caused by the medicine you are taking  · Your symptoms are getting worse  · You have questions or concerns about your condition or care  Medicines:   · Medicines  may be given to relieve anxiety and depression and help you feel calm and relaxed  · Take your medicine as directed  Contact your healthcare provider if you think your medicine is not helping or if you have side effects  Tell him or her if you are allergic to any medicine  Keep a list of the medicines, vitamins, and herbs you take  Include the amounts, and when and why you take them  Bring the list or the pill bottles to follow-up visits  Carry your medicine list with you in case of an emergency  Follow up with your healthcare provider as directed:  Write down your questions so you remember to ask them during your visits  Monitor your condition:  Keep a record of the situations that cause your symptoms  Bring the record with you when you see your healthcare provider  Include the following:  · What were you doing when the symptoms started? · Had you eaten anything unusual, or taken a new medicine or herbal supplement? · Were you stressed or upset during the time leading up to the attack? · How often do you have symptoms? How long do they last?    · What were your thoughts and feelings during these situations? · What symptoms did you have? · Did anything help ease or stop the symptoms, such as a relaxation technique? Self-care:   · Limit or do not drink caffeine  Caffeine can increase your heartbeat and make your anxiety symptoms worse  · Limit or do not drink alcohol  Ask your healthcare provider if alcohol is safe for you  You may not be able to drink alcohol if you take certain anxiety or depression medicines  Alcohol can also increase depression  Limit alcohol to 1 drink per day if you are a woman  Limit alcohol to 2 drinks per day if you are a man  A drink of alcohol is 12 ounces of beer, 5 ounces of wine, or 1½ ounces of liquor  · Manage stress  Stress may increase symptoms of SAJAN  Relaxation therapy teaches you how to feel less physical and emotional stress  Deep breathing, muscle relaxation, and music are some forms of relaxation therapy  · Avoid hyperventilating  Some people may hyperventilate during an anxiety attack and not even notice it  Hyperventilation means that your breaths are too fast and shallow  Breathing this way can cause numbness or tingling in your hands and lips  During an anxiety attack, focus on taking very slow, deep breaths  Your healthcare provider may show you how to breathe in and out of a paper bag when you hyperventilate  Never use a plastic bag  © 2017 [address] is for End User's use only and may not be sold, redistributed or otherwise used for commercial purposes  All illustrations and images included in CareNotes® are the copyrighted property of A D A [employer]  or Peter Boggs  The above information is an  only  It is not intended as medical advice for individual conditions or treatments  Talk to your doctor, nurse or pharmacist before following any medical regimen to see if it is safe and effective for you

## 2017-12-14 NOTE — CASE MANAGEMENT
PT stopped CM in the hallway and stated, "Have you talked to the doctor?" CM inquired about what specifically PT was inquiring about  PT stated, "About my discharge tomorrow " CM reviewed that PT is projected for D/C tomorrow and encouraged PT to actively participating in clinical treatment including groups to increase effective coping skills to manage symptoms and stressors  PT stated, "I don't do groups " Discussed at least going into the group for education purposes- PT declined

## 2017-12-14 NOTE — PLAN OF CARE
Problem: Ineffective Coping  Goal: Identifies ineffective coping skills  Outcome: Progressing    Goal: Identifies healthy coping skills  Outcome: Progressing    Goal: Demonstrates healthy coping skills  Outcome: Progressing    Goal: Patient/Family participate in treatment and DC plans  Interventions:  - Provide therapeutic environment   Outcome: Progressing    Goal: Patient/Family verbalizes awareness of resources  Outcome: Progressing    Goal: Understands least restrictive measures  Interventions:  - Utilize least restrictive behavior   Outcome: Progressing      Problem: Risk for Self Injury/Neglect  Goal: Treatment Goal: Remain safe during length of stay, learn and adopt new coping skills, and be free of self-injurious ideation, impulses and acts at the time of discharge  Outcome: Progressing    Goal: Verbalize thoughts and feelings  Interventions:  - Assess and re-assess patient's lethality and potential for self-injury  - Engage patient in 1:1 interactions, daily, for a minimum of 15 minutes  - Encourage patient to express feelings, fears, frustrations, hopes  - Establish rapport/trust with patient    Outcome: Progressing    Goal: Refrain from harming self  Interventions:  - Monitor patient closely, per order  - Develop a trusting relationship  - Supervise medication ingestion, monitor effects and side effects    Outcome: Progressing    Goal: Attend and participate in unit activities, including therapeutic, recreational, and educational groups  Interventions:  - Provide therapeutic and educational activities daily, encourage attendance and participation, and document same in the medical record  - Obtain collateral information, encourage visitation and family involvement in care    Outcome: Progressing    Goal: Recognize maladaptive responses and adopt new coping mechanisms  Outcome: Progressing      Problem: Depression  Goal: Treatment Goal: Demonstrate behavioral control of depressive symptoms, verbalize feelings of improved mood/affect, and adopt new coping skills prior to discharge  Outcome: Progressing    Goal: Verbalize thoughts and feelings  Interventions:  - Assess and re-assess patient's level of risk   - Engage patient in 1:1 interactions, daily, for a minimum of 15 minutes   - Encourage patient to express feelings, fears, frustrations, hopes    Outcome: Progressing    Goal: Refrain from harming self  Interventions:  - Monitor patient closely, per order   - Supervise medication ingestion, monitor effects and side effects    Outcome: Progressing    Goal: Refrain from isolation  Interventions:  - Develop a trusting relationship   - Encourage socialization    Outcome: Progressing  Improving, visible on the unit this evening  Goal: Refrain from self-neglect  Outcome: Progressing    Goal: Attend and participate in unit activities, including therapeutic, recreational, and educational groups  Interventions:  - Provide therapeutic and educational activities daily, encourage attendance and participation, and document same in the medical record    Outcome: Progressing    Goal: Complete daily ADLs, including personal hygiene independently, as able  Interventions:  - Observe, teach, and assist patient with ADLS  -  Monitor and promote a balance of rest/activity, with adequate nutrition and elimination    Outcome: Progressing      Problem: Anxiety  Goal: Anxiety is at manageable level  Interventions:  - Assess and monitor patient's anxiety level  - Monitor for signs and symptoms of anxiety both physical and emotional (heart palpitations, chest pain, shortness of breath, headaches, nausea, feeling jumpy, restlessness, irritable, apprehensive)  - Collaborate with interdisciplinary team and initiate plan and interventions as ordered    - Maybee patient to unit/surroundings  - Explain treatment plan  - Encourage participation in care  - Encourage verbalization of concerns/fears  - Identify coping mechanisms  - Assist in developing anxiety-reducing skills  - Administer/offer alternative therapies  - Limit or eliminate stimulants   Outcome: Progressing  Reports improving anxiety since admission

## 2017-12-14 NOTE — PLAN OF CARE
Problem: Ineffective Coping  Goal: Participates in unit activities  Interventions:  - Provide therapeutic environment   - Provide required programming   - Redirect inappropriate behaviors    Outcome: Progressing  Pt observed out in milieu more today socializing with peers and watching tv  She does, however return to her room when prompted to attend group stating "I don't do groups "  Pt completed 7-day post d/c schedule in preparation for d/c

## 2017-12-14 NOTE — DISCHARGE INSTR - OTHER ORDERS
You were provided with the following information, services, and resources throughout [facility]:     Support groups, 12 step groups, throughout [facility] for additions resources and services  Discount Prescription cards to decrease cost of medications for uninsured individuals  [facility] Department of    Mental Health/Developmental Programs Etelvinapily Gustavo [address] [address] Kaci Samson Services                     [phone]  TDD Line                                           [phone]    ADVOCACY/PEER SUPPORT FOR [address]                               [phone]  Consumer/Family Satisfaction Team    The [address]    [phone]    Disability Rights [place]                             [phone]                                                    [TDD] [phone]                                [address] [facility] [address]  [phone]    The following agencies may provide services to people with no insurance and those with Medical Assistance and Insurance:  [address]         [address] [facility]               [phone]  The [facility]               [phone]  Family Service Association [facility]   [address]  [facility]           [phone]      D&A SELF-HELP SUPPORT GROUPS    Al-[employer]odd  Families of people with addiction       [phone] or [phone]  Alcoholics Anonymous                                           [phone] or [phone]  Cocaine Anonymous                                   [phone]  Codependence Anonymous                           [phone]  Double Trouble Groups ([address] [facility])  - [address] [facility]                   [phone]  Narcotics Anonymous                                 [phone] [address] [facility] Dept  of Mental Health/Developmental Programs                                                        [phone]  Information and referrals for case management, residential, and employment services  The following agencies provide services to people with no Insurance and those with [place]:  Kelly                             [phone]  [facility] Human Services                                    [phone]  [facility] Case Management)  [facility] & NUR[facility] SITE                       [address]                                     [phone]    The following agencies provide services and supports to people with Medical Assistance and Insurance:  Family Service Association [facility]* [address]*                                          [phone]  *[place] speaking staff available      [address]                          [phone]  Gisele Alonzo                                   [phone]  press 1  Department of [employer]  [phone] [facility]:                [address] [facility]: [phone]                 - Warm Line:                              [address]:                  [phone]    ACCESS-Childrens Crisis Support       [phone]  National Suicide Prevention Lifeline      1-800 [address] [facility] after Hours Emergency #            1-112-618-101.385.9042  [address]      [phone]  [address]       [phone]  (Network of Victim Assistance)  A [facility] Hotline                      [phone]  24hr  Domestic [place] on Aging Saritha Weston Abuse #) 2-973.458.9720    SELF-HELP/FAMILY [address]                             [phone]  Peer support, education & socialization in [address]  [phone]  [facility]  Peer Support services that are designed to promote recovery and resiliency  The [place] [facility] [address]     [phone]  Free Family Education Programs to help family members of individuals with an active addiction  [employer]  Ctr      [phone]  [address] [place] [address]  Ctr   [phone]    [facility] Program    [phone]  [address] [place] [address] Association           [phone]  KADEEM [facility]                          [phone]  ([facility] on [address] [facility])  Provides individual & family support, education & advocacy  CONTACT Helpline (a Warm Line)          [address] [facility]               [address]             [address]               [address] Alden Loyola ([place]OLA) [address]                    www cspbucks  org    FINANCIAL & FOOD [address] [place], pantry, utility aid & self-sufficiency programs  - [address]                             [phone]  Say Mccloud                            [phone]    [place] [address]                  [phone]  Programs include [employer], [address] insurance    Social Security Administration              [phone]    [facility] INFORMATION    [facility] Transport                        [phone]  Applications and appointment scheduling for [employer], Persons with Disabilities Program, Gisel Nolan, and [address]                           [phone]  SEPTA TDD Line                                     [address] [facility]                     [phone]  For individuals who are homeless or experiencing a housing-related crisis  [facility] [address]                                 [phone]                 - [address] [place] [address]  Public housing for people with disabilities  Kaci Pat[address] Association (D&A):  Jenna at  [employer]  Projects for Assistance in Transition from Homelessness (LZDA)                                      [phone]   [facility] [place]                     [phone]                           - [place]                     [address] [facility]               [phone]  HLBSWCE ADLMX            [address]                [address] [facility]    [address]               [phone]  Protection from [facility]

## 2017-12-14 NOTE — CASE MANAGEMENT
CM outreached to PT's Mother- Ruthie Bynum located @ [address] [place] [address] (n) [phone] and provided an update on PT's diagnosis, medications, and progression in stabilization  CM reviewed PT's D/C plan for tomorrow and aftercare services  CM confirmed that PT applied for MA in the hospital and reviewed that PT will be provided with scripts to get medications filled  Twin Arcos verbalized being in support of PT's D/C tomorrow and denied any concerns regarding PT being released  Discussed PT's plan to drive herself home upon D/C from [facility] as PT's car is in the parking lot  Twin Arcos verbalized being in support of this as well  Agreed to be in contact if there are any changes

## 2017-12-14 NOTE — PROGRESS NOTES
Patient is calm and cooperative  Reports depression "I have my moments" but has improved overall since admission  Reports she has continued to struggle with sleep and only sleeps "three hours at a time"  Reports that she hopes the increase in trazadone will help tonight  Writer educated pt about taking it right before she is ready to go to bed  States that her healthy coping mechanisms include reading and listening to music  States that she feels ready for discharge but hopes that she will be able to control her anxiety when at home  Concerned about being discharged with prescriptions; writer informed pt that the doctor will discuss this with her and reminded her to let the doctor know her concerns and that she is worried about costs  Denies si, contracts for safety  Visible on the unit today

## 2017-12-14 NOTE — PROGRESS NOTES
Progress Note - 16 [facility] Penny 46 y o  female MRN: [id number]  Unit/Bed#: [facility] 253-01 Encounter: [id number]    Assessment/Plan   Principal Problem:    Severe episode of recurrent major depressive disorder, without psychotic features ([address] )  Active Problems:    Generalized anxiety disorder    Alcohol abuse    Cannabis abuse      Subjective:  Patient reports decreased depression and reduced passive death wishes  Sleep is improved with PRN Trazodone  Reports improved attention, concentration, and decreased racing thoughts  Anxiety rated as more under control at this time with scheduled Ativan  Denied psychosis  Does not show signs of snehal  Does not like to attend group due to social anxiety  Hopeful for 1:1 therapy outpatient at [facility]  Medication compliant  Tolerating medications well with tentative discharge tomorrow      Current Medications:  Current Facility-Administered Medications   Medication Dose Route Frequency    acetaminophen (TYLENOL) tablet 650 mg  650 mg Oral Q6H PRN    aluminum-magnesium hydroxide-simethicone (MYLANTA) 200-200-20 mg/5 mL oral suspension 30 mL  30 mL Oral Q4H PRN    atorvastatin (LIPITOR) tablet 20 mg  20 mg Oral Daily With Dinner    benztropine (COGENTIN) injection 1 mg  1 mg Intramuscular Q6H PRN    benztropine (COGENTIN) tablet 1 mg  1 mg Oral Q6H PRN    escitalopram (LEXAPRO) tablet 10 mg  10 mg Oral Daily    folic acid (FOLVITE) tablet 1 mg  1 mg Oral Daily    haloperidol (HALDOL) tablet 5 mg  5 mg Oral Q6H PRN    haloperidol lactate (HALDOL) injection 5 mg  5 mg Intramuscular Q6H PRN    ibuprofen (MOTRIN) tablet 800 mg  800 mg Oral Q8H PRN    LORazepam (ATIVAN) 2 mg/mL injection 2 mg  2 mg Intramuscular Q6H PRN    LORazepam (ATIVAN) tablet 1 mg  1 mg Oral Q6H PRN    LORazepam (ATIVAN) tablet 1 mg  1 mg Oral BID    magnesium hydroxide (MILK OF MAGNESIA) 400 mg/5 mL oral suspension 30 mL  30 mL Oral Daily PRN    nicotine (NICODERM CQ) 14 mg/24hr TD 24 hr patch 14 mg  14 mg Transdermal Daily    nicotine polacrilex (NICORETTE) gum 2 mg  2 mg Oral Q2H PRN    OLANZapine (ZyPREXA) IM injection 10 mg  10 mg Intramuscular Q3H PRN    OLANZapine (ZyPREXA) tablet 10 mg  10 mg Oral Q3H PRN    risperiDONE (RisperDAL M-TABS) dispersible tablet 1 mg  1 mg Oral Q3H PRN    thiamine (VITAMIN B1) tablet 100 mg  100 mg Oral Daily    traZODone (DESYREL) tablet 100 mg  100 mg Oral HS PRN       Behavioral Health Medications: all current active meds have been reviewed and continue current psychiatric medications  Vitals:  Vitals:    12/14/17 0716   BP: 137/83   Pulse: 85   Resp: 18   Temp: (!) 96 6 °F (35 9 °C)   SpO2:        Laboratory results:    I have personally reviewed all pertinent laboratory/tests results    Most Recent Labs:   Lab Results   Component Value Date    WBC 7 85 12/10/2017    RBC 4 76 12/10/2017    HGB 15 3 12/10/2017    HCT 44 9 12/10/2017     12/10/2017    RDW 14 1 12/10/2017    NEUTROABS 4 44 12/10/2017     12/10/2017    K 3 5 12/10/2017     12/10/2017    CO2 24 12/10/2017    BUN 14 12/10/2017    CREATININE 0 75 12/10/2017    GLUCOSE 94 12/10/2017    CALCIUM 9 4 12/10/2017    AST 28 12/10/2017    ALT 58 12/10/2017    ALKPHOS 109 12/10/2017    PROT 7 8 12/10/2017    ALBUMIN 3 9 12/10/2017    BILITOT 0 60 12/10/2017    CHOL 259 (H) 12/12/2017    HDL 52 12/12/2017    TRIG 165 (H) 12/12/2017    LDLCALC 174 (H) 12/12/2017    [id number] 1 387 12/12/2017    PREGSERUM Negative 12/12/2017       Psychiatric Review of Systems:  Behavior over the last 24 hours:  Slight progression  Sleep: normal, with Trazodone PRN  Appetite: normal  Medication side effects: No  ROS: headache    Mental Status Evaluation:  Appearance:  casually dressed   Behavior:  less guarded   Speech:  normal pitch and normal volume   Mood:  less anxious and depressed   Affect:  mood-congruent   Language appropriate   Thought Process:  normal   Thought Content:  normal  Denied delusions/obsessions   Perceptual Disturbances: None   Risk Potential: Diminished passive death wishes  Denied HI  Potential for aggression: No   Sensorium:  person, place and time/date   Cognition:  grossly intact   Consciousness:  alert and awake    Attention: attention span and concentration were age appropriate   Insight:  partial   Judgment: fair   Gait/Station: normal gait/station and normal balance   Motor Activity: no abnormal movements     Progress Toward Goals: progressing    Recommended Treatment: Continue with group therapy, milieu therapy and occupational therapy  1   Will add Lipitor 20mg QD for dyslipidemia  2  Increase Ibuprofen to 800mg TID PRN for headaches  3  Continue other medications  4  Disposition planning    Risks, benefits and possible side effects of Medications:   Risks, benefits, and possible side effects of medications explained to patient and patient verbalizes understanding        Ruthie Kahn PA-C

## 2017-12-15 VITALS
RESPIRATION RATE: 20 BRPM | TEMPERATURE: 96.2 F | DIASTOLIC BLOOD PRESSURE: 71 MMHG | WEIGHT: 167.6 LBS | HEIGHT: 64 IN | HEART RATE: 83 BPM | BODY MASS INDEX: 28.61 KG/M2 | SYSTOLIC BLOOD PRESSURE: 132 MMHG | OXYGEN SATURATION: 94 %

## 2017-12-15 RX ADMIN — LORAZEPAM 1 MG: 1 TABLET ORAL at 09:01

## 2017-12-15 RX ADMIN — IBUPROFEN 800 MG: 800 TABLET, FILM COATED ORAL at 09:03

## 2017-12-15 RX ADMIN — Medication 100 MG: at 09:01

## 2017-12-15 RX ADMIN — FOLIC ACID 1 MG: 1 TABLET ORAL at 09:01

## 2017-12-15 RX ADMIN — ESCITALOPRAM OXALATE 10 MG: 10 TABLET ORAL at 09:01

## 2017-12-15 NOTE — DISCHARGE INSTR - LAB
Contact Information: If you have any questions, concerns, pended studies, tests and/or procedures, or emergencies regarding your inpatient behavioral health visit  Please contact [place] [facility] [phone] and ask to speak to a , nurse or physician  A contact is available 24 hours/ 7 days a week at this number  Summary of Procedures Performed During your Stay:  Below is a list of major procedures performed during your hospital stay and a summary of results:  - No major procedures performed  Pending Studies     None        If studies are pending at discharge, follow up with your PCP and/or referring provider

## 2017-12-15 NOTE — DISCHARGE SUMMARY
Discharge Summary - [facility] Penny 46 y o  female MRN: [id number]  Unit/Bed#: [facility] 253-01 Encounter: [id number]     Admission Date: 12/10/2017         Discharge Date: 12/15/2017    Attending Psychiatrist: Arabella Yee MD    Reason for Admission/HPI:   History of Present Illness   Patient is a 46year old female who presented to [address] [place] [address] ED due to suicidal ideation with plan to crash her car  She reported precipitating events as anniversary of death of brother coming up (3 years ago), having family members move in with her (son, his girlfriend, and cousin), and self medicating with alcohol and THC  Patient reports over the last month an increase in depression with symptoms of poor sleep, lack of energy, guilt, crying episodes, anhedonia, and hopelessness  Patient also reported increased anxiety with panic attacks  Patient denied psychosis and did not endorse signs of snehal  Patient denied prior inpatient psychiatric hospitalizations and is not in treatment with outpatient psychiatrist     Psychosocial Stressors: family and alcohol      Hospital Course:   Behavioral Health Medications:   current meds:   Current Facility-Administered Medications   Medication Dose Route Frequency    acetaminophen (TYLENOL) tablet 650 mg  650 mg Oral Q6H PRN    aluminum-magnesium hydroxide-simethicone (MYLANTA) 200-200-20 mg/5 mL oral suspension 30 mL  30 mL Oral Q4H PRN    atorvastatin (LIPITOR) tablet 20 mg  20 mg Oral Daily With Dinner    benztropine (COGENTIN) injection 1 mg  1 mg Intramuscular Q6H PRN    benztropine (COGENTIN) tablet 1 mg  1 mg Oral Q6H PRN    escitalopram (LEXAPRO) tablet 10 mg  10 mg Oral Daily    folic acid (FOLVITE) tablet 1 mg  1 mg Oral Daily    haloperidol (HALDOL) tablet 5 mg  5 mg Oral Q6H PRN    haloperidol lactate (HALDOL) injection 5 mg  5 mg Intramuscular Q6H PRN    ibuprofen (MOTRIN) tablet 800 mg  800 mg Oral Q8H PRN    LORazepam (ATIVAN) 2 mg/mL injection 2 mg  2 mg Intramuscular Q6H PRN    LORazepam (ATIVAN) tablet 1 mg  1 mg Oral Q6H PRN    LORazepam (ATIVAN) tablet 1 mg  1 mg Oral BID    magnesium hydroxide (MILK OF MAGNESIA) 400 mg/5 mL oral suspension 30 mL  30 mL Oral Daily PRN    nicotine (NICODERM CQ) 14 mg/24hr TD 24 hr patch 14 mg  14 mg Transdermal Daily    nicotine polacrilex (NICORETTE) gum 2 mg  2 mg Oral Q2H PRN    OLANZapine (ZyPREXA) IM injection 10 mg  10 mg Intramuscular Q3H PRN    OLANZapine (ZyPREXA) tablet 10 mg  10 mg Oral Q3H PRN    risperiDONE (RisperDAL M-TABS) dispersible tablet 1 mg  1 mg Oral Q3H PRN    thiamine (VITAMIN B1) tablet 100 mg  100 mg Oral Daily    traZODone (DESYREL) tablet 100 mg  100 mg Oral HS PRN     Patient was admitted to [facility] on voluntary 201 commitment for safety and stabilization  On admission patient was placed on Lexapro 10mg QD for depression and Ativan 1mg BID for anxiety/panic attack management  She did not require titrations of medications  She tolerated medications with no acute side effects  Her mood brightened over the course of her treatment, and she was seen in [facility] interacting appropriately with peers  She did not demonstrate dangerous behavior to self or others during her inpatient stay  On day of discharge patient had reduced depression, controllable anxiety, denied psychosis, did not show signs of snehal, and denied suicidal/homicidal ideations  Mental Status at time of Discharge:     Appearance:  casually dressed   Behavior:  cooperative   Speech:  normal pitch and normal volume   Mood:  euthymic   Affect:  mood-congruent   Thought Process:  normal   Thought Content:  normal  Denied delusions/obsessions   Perceptual Disturbances: None   Risk Potential: Denied SI/HI   Potential for aggression: No   Sensorium:  person, place and time/date   Cognition:  grossly intact   Consciousness:  alert and awake    Attention: attention span and concentration were age appropriate   Insight:  fair   Judgment: fair   Gait/Station: normal gait/station and normal balance   Motor Activity: no abnormal movements     Discharge Diagnosis:   Severe episode of recurrent major depressive disorder, without psychotic features   Generalized anxiety disorder  Alcohol abuse  Cannabis abuse    Discharge Medications:  See after visit summary for reconciled discharge medications provided to patient and family  Discharge instructions/Information to patient and family:   See after visit summary for information provided to patient and family  Provisions for Follow-Up Care:  See after visit summary for information related to follow-up care and any pertinent home health orders  Discharge Statement   I spent 34 minutes discharging the patient  This time was spent on the day of discharge  I had direct contact with the patient on the day of discharge  On day of discharge patient had mental status exam performed, discharge instructions/medications reviewed, and outpatient planning discussed  She was given 1 month plus 1 refill of scripts  She denied tobacco cessation therapy or rehab services      Guillaume Hickman PA-C

## 2017-12-15 NOTE — PROGRESS NOTES
Visible on unit & social with most peers, while in her doorway or in the hallway  During 1:1 interaction, denies all s/s's except depression & anxiety, both of which she rates at a 4  Is hopeful about discharge, stating that she has "good family support " Is also anxious to see her pets, which give her ana maria  Verbalizes understanding that "it'll be hard" at home, but feels armed to handle situations  Remains adamant re: not attending groups here  Will continue to monitor

## 2018-02-15 ENCOUNTER — HOSPITAL ENCOUNTER (EMERGENCY)
Facility: HOSPITAL | Age: 53
Discharge: HOME | End: 2018-02-15
Admitting: EMERGENCY MEDICINE
Payer: COMMERCIAL

## 2018-02-15 ENCOUNTER — APPOINTMENT (EMERGENCY)
Dept: CT IMAGING | Facility: HOSPITAL | Age: 53
End: 2018-02-15
Payer: COMMERCIAL

## 2018-02-15 VITALS
TEMPERATURE: 97.6 F | DIASTOLIC BLOOD PRESSURE: 91 MMHG | HEART RATE: 102 BPM | SYSTOLIC BLOOD PRESSURE: 152 MMHG | WEIGHT: 172 LBS | RESPIRATION RATE: 22 BRPM | BODY MASS INDEX: 30.48 KG/M2 | HEIGHT: 63 IN | OXYGEN SATURATION: 97 %

## 2018-02-15 DIAGNOSIS — K29.70 GASTRITIS: Primary | ICD-10-CM

## 2018-02-15 LAB
ALBUMIN SERPL BCP-MCNC: 3.6 G/DL (ref 3.5–5)
ALP SERPL-CCNC: 108 U/L (ref 46–116)
ALT SERPL W P-5'-P-CCNC: 23 U/L (ref 12–78)
ANION GAP SERPL CALCULATED.3IONS-SCNC: 8 MMOL/L (ref 4–13)
AST SERPL W P-5'-P-CCNC: 18 U/L (ref 5–45)
BASOPHILS # BLD AUTO: 0.02 THOUSANDS/ΜL (ref 0–0.1)
BASOPHILS NFR BLD AUTO: 0 % (ref 0–1)
BILIRUB SERPL-MCNC: 0.5 MG/DL (ref 0.2–1)
BUN SERPL-MCNC: 14 MG/DL (ref 5–25)
CALCIUM SERPL-MCNC: 9.5 MG/DL (ref 8.3–10.1)
CHLORIDE SERPL-SCNC: 100 MMOL/L (ref 100–108)
CLARITY, POC: CLEAR
CO2 SERPL-SCNC: 32 MMOL/L (ref 21–32)
COLOR, POC: NORMAL
CREAT SERPL-MCNC: 0.86 MG/DL (ref 0.6–1.3)
EOSINOPHIL # BLD AUTO: 0.1 THOUSAND/ΜL (ref 0–0.61)
EOSINOPHIL NFR BLD AUTO: 1 % (ref 0–6)
ERYTHROCYTE [DISTWIDTH] IN BLOOD BY AUTOMATED COUNT: 14 % (ref 11.6–15.1)
EXT BILIRUBIN, UA: NORMAL
EXT BLOOD URINE: NORMAL
EXT GLUCOSE, UA: NORMAL
EXT KETONES: NORMAL
EXT NITRITE, UA: NORMAL
EXT PH, UA: 8
EXT PROTEIN, UA: 30
EXT SPECIFIC GRAVITY, UA: 1
EXT UROBILINOGEN: 0.2
GFR SERPL CREATININE-BSD FRML MDRD: 78 ML/MIN/1.73SQ M
GLUCOSE SERPL-MCNC: 111 MG/DL (ref 65–140)
HCT VFR BLD AUTO: 44.2 % (ref 34.8–46.1)
HGB BLD-MCNC: 15 G/DL (ref 11.5–15.4)
LIPASE SERPL-CCNC: 346 U/L (ref 73–393)
LYMPHOCYTES # BLD AUTO: 1.48 THOUSANDS/ΜL (ref 0.6–4.47)
LYMPHOCYTES NFR BLD AUTO: 13 % (ref 14–44)
MCH RBC QN AUTO: 32.8 PG (ref 26.8–34.3)
MCHC RBC AUTO-ENTMCNC: 33.9 G/DL (ref 31.4–37.4)
MCV RBC AUTO: 97 FL (ref 82–98)
MONOCYTES # BLD AUTO: 0.93 THOUSAND/ΜL (ref 0.17–1.22)
MONOCYTES NFR BLD AUTO: 8 % (ref 4–12)
NEUTROPHILS # BLD AUTO: 9.1 THOUSANDS/ΜL (ref 1.85–7.62)
NEUTS SEG NFR BLD AUTO: 78 % (ref 43–75)
PLATELET # BLD AUTO: 200 THOUSANDS/UL (ref 149–390)
PMV BLD AUTO: 9.9 FL (ref 8.9–12.7)
POTASSIUM SERPL-SCNC: 4 MMOL/L (ref 3.5–5.3)
PROT SERPL-MCNC: 7.9 G/DL (ref 6.4–8.2)
RBC # BLD AUTO: 4.57 MILLION/UL (ref 3.81–5.12)
SODIUM SERPL-SCNC: 140 MMOL/L (ref 136–145)
WBC # BLD AUTO: 11.63 THOUSAND/UL (ref 4.31–10.16)
WBC # BLD EST: NORMAL 10*3/UL

## 2018-02-15 PROCEDURE — 96375 TX/PRO/DX INJ NEW DRUG ADDON: CPT

## 2018-02-15 PROCEDURE — 85025 COMPLETE CBC W/AUTO DIFF WBC: CPT | Performed by: EMERGENCY MEDICINE

## 2018-02-15 PROCEDURE — 80053 COMPREHEN METABOLIC PANEL: CPT | Performed by: EMERGENCY MEDICINE

## 2018-02-15 PROCEDURE — 81002 URINALYSIS NONAUTO W/O SCOPE: CPT | Performed by: EMERGENCY MEDICINE

## 2018-02-15 PROCEDURE — 36415 COLL VENOUS BLD VENIPUNCTURE: CPT | Performed by: EMERGENCY MEDICINE

## 2018-02-15 PROCEDURE — 74177 CT ABD & PELVIS W/CONTRAST: CPT

## 2018-02-15 PROCEDURE — C9113 INJ PANTOPRAZOLE SODIUM, VIA: HCPCS | Performed by: PHYSICIAN ASSISTANT

## 2018-02-15 PROCEDURE — 99284 EMERGENCY DEPT VISIT MOD MDM: CPT

## 2018-02-15 PROCEDURE — 83690 ASSAY OF LIPASE: CPT | Performed by: EMERGENCY MEDICINE

## 2018-02-15 PROCEDURE — 96374 THER/PROPH/DIAG INJ IV PUSH: CPT

## 2018-02-15 RX ORDER — OMEPRAZOLE 40 MG/1
40 CAPSULE, DELAYED RELEASE ORAL DAILY
Qty: 15 CAPSULE | Refills: 0 | Status: SHIPPED | OUTPATIENT
Start: 2018-02-15 | End: 2019-09-27

## 2018-02-15 RX ORDER — PANTOPRAZOLE SODIUM 40 MG/1
40 INJECTION, POWDER, FOR SOLUTION INTRAVENOUS ONCE
Status: COMPLETED | OUTPATIENT
Start: 2018-02-15 | End: 2018-02-15

## 2018-02-15 RX ORDER — ONDANSETRON 2 MG/ML
4 INJECTION INTRAMUSCULAR; INTRAVENOUS ONCE
Status: COMPLETED | OUTPATIENT
Start: 2018-02-15 | End: 2018-02-15

## 2018-02-15 RX ADMIN — ONDANSETRON 4 MG: 2 INJECTION INTRAMUSCULAR; INTRAVENOUS at 16:13

## 2018-02-15 RX ADMIN — IOHEXOL 100 ML: 350 INJECTION, SOLUTION INTRAVENOUS at 17:48

## 2018-02-15 RX ADMIN — PANTOPRAZOLE SODIUM 40 MG: 40 INJECTION, POWDER, FOR SOLUTION INTRAVENOUS at 19:01

## 2018-02-15 NOTE — ED NOTES
Returned from [facility]  Made comfortable       Millicent Galeana RN  02/15/18 [id number]

## 2018-02-15 NOTE — ED PROVIDER NOTES
History  Chief Complaint   Patient presents with    Abdominal Pain     c/o abdominal pain x2days- vomit x2 today, coffee color looking  thick dark BM this morning     Teressa Holter is a 45 y/o female presenting to the ER with severe epigastric abdominal pain and vomiting  Patient states the pain started yesterday  The pain is centrally located extending down to the umbilicus and radiates bilaterally to her left and right to her back region  Patient states the pain is sharp/shooting and is constant  Rates her pain as a 4/10 now but at its worse is 10/10  Patient has not tried anything for the pain  The pain is made worse with movement and walking  Patient has tried laying flat, sitting up and sitting forward without relief  Patient states she was able to eat yesterday with decreased appetite and pain worse after eating  Patient states she woke up 2-3 times in the middle of the night for the pain  Patient states she has two episodes of vomiting this morning of coffee colored consistency  She also reports a dark tarry bowel movement this morning as well  Patient states the pain is getting worse and has never had an episode like this before  Patient is a current 1/2 pack/day smoker, reports she drinks 2 beers a month "if she is dago" and denies illicit drug use  Denies taking NSAIDs daily  Prior to Admission Medications   Prescriptions Last Dose Informant Patient Reported? Taking?    LORazepam (ATIVAN) 1 mg tablet   No No   Sig: Take 1 tablet by mouth 2 (two) times a day At 9AM and 6PM   atorvastatin (LIPITOR) 20 mg tablet   No No   Sig: Take 1 tablet by mouth daily with dinner   escitalopram (LEXAPRO) 10 mg tablet   No No   Sig: Take 1 tablet by mouth daily At 9AM   traZODone (DESYREL) 100 mg tablet   No No   Sig: Take 1 tablet by mouth daily at bedtime as needed for sleep   Patient taking differently: Take 150 mg by mouth daily at bedtime as needed for sleep        Facility-Administered Medications: None Past Medical History:   Diagnosis Date    Chronic pain     Back Pain    Depression     Hypertension     Migraine        History reviewed  No pertinent surgical history  History reviewed  No pertinent family history  I have reviewed and agree with the history as documented  Social History   Substance Use Topics    Smoking status: Current Every Day Smoker     Packs/day: 0 50     Types: Cigarettes    Smokeless tobacco: Current User    Alcohol use No        Review of Systems   Constitutional: Positive for appetite change and diaphoresis  Negative for chills and fever  Respiratory: Negative for choking, chest tightness, shortness of breath and wheezing  Cardiovascular: Negative for chest pain and palpitations  Gastrointestinal: Positive for abdominal pain, nausea and vomiting  Negative for anal bleeding, blood in stool, constipation and diarrhea  Genitourinary: Negative for dysuria and hematuria  Musculoskeletal: Positive for back pain  Neurological: Positive for dizziness, light-headedness and headaches  Negative for syncope  Psychiatric/Behavioral: Negative for confusion  All other systems reviewed and are negative  Physical Exam  ED Triage Vitals [02/15/18 1550]   Temperature Pulse Respirations Blood Pressure SpO2   97 6 °F (36 4 °C) (!) 106 22 169/100 97 %      Temp Source Heart Rate Source Patient Position - Orthostatic VS BP Location FiO2 (%)   Temporal -- Sitting Right arm --      Pain Score       9           Orthostatic Vital Signs  Vitals:    02/15/18 1815 02/15/18 1830 02/15/18 1900 02/15/18 1915   BP: 142/84 141/86 151/92 152/91   Pulse: (!) 106 (!) 107 105 102   Patient Position - Orthostatic VS:           Physical Exam   Constitutional: She is oriented to person, place, and time  Vital signs are normal  She appears well-developed and well-nourished  She is cooperative  Non-toxic appearance  No distress (patient is uncomfortable )     HENT:   Head: Normocephalic and atraumatic  Mouth/Throat: Oropharynx is clear and moist and mucous membranes are normal    Eyes: Conjunctivae and EOM are normal  Pupils are equal, round, and reactive to light  Neck: Trachea normal, normal range of motion and full passive range of motion without pain  Neck supple  Cardiovascular: Regular rhythm, S1 normal, S2 normal, intact distal pulses and normal pulses  Tachycardia present  Murmur (congenital, innocent murmur) heard  Pulmonary/Chest: Breath sounds normal  No respiratory distress  She has no decreased breath sounds  She has no wheezes  She has no rhonchi  She has no rales  Abdominal: Soft  Normal appearance and bowel sounds are normal  She exhibits distension  She exhibits no fluid wave and no ascites  There is no hepatosplenomegaly  There is tenderness in the right upper quadrant, epigastric area and left upper quadrant  There is guarding  No hernia  Rectal exam performed  FOBT negative  Musculoskeletal: Normal range of motion  Lymphadenopathy:     She has no cervical adenopathy  Neurological: She is alert and oriented to person, place, and time  She has normal strength  No sensory deficit  Skin: Skin is warm, dry and intact  Rash (tavon colored macular/papular rash located on abdomen) noted  No bruising and no ecchymosis noted  Rash is maculopapular  Rash is not macular and not papular  No erythema  Psychiatric: She has a normal mood and affect  Her speech is normal and behavior is normal  Thought content normal    Nursing note and vitals reviewed        ED Medications  Medications   ondansetron (ZOFRAN) injection 4 mg (4 mg Intravenous Given 2/15/18 1613)   iohexol (OMNIPAQUE) 350 MG/ML injection (MULTI-DOSE) 100 mL (100 mL Intravenous Given 2/15/18 1748)   pantoprazole (PROTONIX) injection 40 mg (40 mg Intravenous Given 2/15/18 1901)       Diagnostic Studies  Results Reviewed     Procedure Component Value Units Date/Time    POCT urinalysis dipstick [[id number]]  (Normal) Resulted:  02/15/18 1811    Lab Status:  Final result Specimen:  Urine from Urine, Other Updated:  02/15/18 1812     Color, UA Mariya     Clarity, UA Clear     EXT Glucose, UA Neg     EXT Bilirubin, UA (Ref: Negative) Neg     EXT Ketones, UA (Ref: Negative) Neg     EXT Spec Grav, UA 1 005     EXT Blood, UA (Ref: Negative) Large     EXT pH, UA 8 0     EXT Protein, UA (Ref: Negative) 30     EXT Urobilinogen, UA (Ref: 0 2- 1 0) 0 2     EXT Leukocytes, UA (Ref: Negative) Neg     EXT Nitrite, UA (Ref: Negative) Neg    Comprehensive metabolic panel [[id number]] Collected:  02/15/18 1606    Lab Status:  Final result Specimen:  Blood from Line, Venous Updated:  02/15/18 1636     Sodium 140 mmol/L      Potassium 4 0 mmol/L      Chloride 100 mmol/L      CO2 32 mmol/L      Anion Gap 8 mmol/L      BUN 14 mg/dL      Creatinine 0 86 mg/dL      Glucose 111 mg/dL      Calcium 9 5 mg/dL      AST 18 U/L      ALT 23 U/L      Alkaline Phosphatase 108 U/L      Total Protein 7 9 g/dL      Albumin 3 6 g/dL      Total Bilirubin 0 50 mg/dL      eGFR 78 ml/min/1 73sq m     Narrative:         National Kidney Disease Education Program recommendations are as follows:  GFR calculation is accurate only with a steady state creatinine  Chronic Kidney disease less than 60 ml/min/1 73 sq  meters  Kidney failure less than 15 ml/min/1 73 sq  meters      Lipase [[id number]]  (Normal) Collected:  02/15/18 1606    Lab Status:  Final result Specimen:  Blood from Line, Venous Updated:  02/15/18 1636     Lipase 346 u/L     CBC and differential [[id number]]  (Abnormal) Collected:  02/15/18 1606    Lab Status:  Final result Specimen:  Blood from Line, Venous Updated:  02/15/18 1621     WBC 11 63 (H) Thousand/uL      RBC 4 57 Million/uL      Hemoglobin 15 0 g/dL      Hematocrit 44 2 %      MCV 97 fL      MCH 32 8 pg      MCHC 33 9 g/dL      RDW 14 0 %      MPV 9 9 fL      Platelets 870 Thousands/uL      Neutrophils Relative 78 (H) %      Lymphocytes Relative 13 (L) % Monocytes Relative 8 %      Eosinophils Relative 1 %      Basophils Relative 0 %      Neutrophils Absolute 9 10 (H) Thousands/µL      Lymphocytes Absolute 1 48 Thousands/µL      Monocytes Absolute 0 93 Thousand/µL      Eosinophils Absolute 0 10 Thousand/µL      Basophils Absolute 0 02 Thousands/µL                  CT abdomen pelvis with contrast   Final Result by Lillie Benedict MD (02/15 1813)      Severe wall thickening of the distal gastric body and gastric antrum suggesting gastritis  There are suspected tiny gastric ulcers within the distal gastric body, please correlate with endoscopy/direct visualization  Workstation performed: [id number]                    Procedures  Procedures       Phone Contacts  ED Phone Contact    ED Course  ED Course                                MDM  CritCare Time    Disposition  Final diagnoses:   Gastritis     Time reflects when diagnosis was documented in both MDM as applicable and the Disposition within this note     Time User Action Codes Description Comment    2/15/2018  7:21 PM Jolanta Hanley [K29 70] Gastritis       ED Disposition     ED Disposition Condition Comment    Discharge  [facility] discharge to home/self care      Condition at discharge: Stable        Follow-up Information     Follow up With Specialties Details Why Contact Jennifer Sanchez MD Gastroenterology Call For [place] [address]  [address] [place] [address]          Discharge Medication List as of 2/15/2018  7:30 PM      START taking these medications    Details   omeprazole (PriLOSEC) 40 MG capsule Take 1 capsule (40 mg total) by mouth daily for 15 days, Starting Thu 2/15/2018, Until Fri 3/2/2018, Print         CONTINUE these medications which have NOT CHANGED    Details   atorvastatin (LIPITOR) 20 mg tablet Take 1 tablet by mouth daily with dinner, Starting Thu 12/14/2017, Print      escitalopram (LEXAPRO) 10 mg tablet Take 1 tablet by mouth daily At 9AM, Starting Fri 12/15/2017, Print      LORazepam (ATIVAN) 1 mg tablet Take 1 tablet by mouth 2 (two) times a day At [facility] and 6PM, Starting Thu 12/14/2017, Print      traZODone (DESYREL) 100 mg tablet Take 1 tablet by mouth daily at bedtime as needed for sleep, Starting Thu 12/14/2017, Print           No discharge procedures on file      ED Provider  Electronically Signed by           Josh Mansfield PA-C  02/22/18 4054

## 2018-02-16 NOTE — ED NOTES
Pt sitting upright in bed  Pt states "I am feeling better"  No complaints of nausea or pain at this time       Shonda Mckinley RN  02/15/18 8730

## 2018-02-16 NOTE — DISCHARGE INSTRUCTIONS
Follow up with Gastroenterology for recheck of possible gastric ulcers noted on CT scan  Drink plenty of fluids  Avoid greasy, spicy, heavily condimented foods  Gastritis   WHAT YOU NEED TO KNOW:   Gastritis is inflammation or irritation of the lining of your stomach  DISCHARGE INSTRUCTIONS:   Call 911 for any of the following:   · You develop chest pain or shortness of breath  Return to the emergency department if:   · You vomit blood  · You have black or bloody bowel movements  · You have severe stomach or back pain  Contact your healthcare provider if:   · You have a fever  · You have new or worsening symptoms, even after treatment  · You have questions or concerns about your condition or care  Medicines:   · Medicines  may be given to help treat a bacterial infection or decrease stomach acid  · Take your medicine as directed  Contact your healthcare provider if you think your medicine is not helping or if you have side effects  Tell him or her if you are allergic to any medicine  Keep a list of the medicines, vitamins, and herbs you take  Include the amounts, and when and why you take them  Bring the list or the pill bottles to follow-up visits  Carry your medicine list with you in case of an emergency  Manage or prevent gastritis:   · Do not smoke  Nicotine and other chemicals in cigarettes and cigars can make your symptoms worse and cause lung damage  Ask your healthcare provider for information if you currently smoke and need help to quit  E-cigarettes or smokeless tobacco still contain nicotine  Talk to your healthcare provider before you use these products  · Do not drink alcohol  Alcohol can prevent healing and make your gastritis worse  Talk to your healthcare provider if you need help to stop drinking  · Do not take NSAIDs or aspirin unless directed  These and similar medicines can cause irritation   If your healthcare provider says it is okay to take NSAIDs, take them with food  · Do not eat foods that cause irritation  Foods such as oranges and salsa can cause burning or pain  Eat a variety of healthy foods  Examples include fruits (not citrus), vegetables, low-fat dairy products, beans, whole-grain breads, and lean meats and fish  Try to eat small meals, and drink water with your meals  Do not eat for at least 3 hours before you go to bed  · Find ways to relax and decrease stress  Stress can increase stomach acid and make gastritis worse  Activities such as yoga, meditation, or listening to music can help you relax  Spend time with friends, or do things you enjoy  Follow up with your healthcare provider as directed: You may need ongoing tests or treatment, or referral to a gastroenterologist  Write down your questions so you remember to ask them during your visits  © 2017 [employer] Information is for End User's use only and may not be sold, redistributed or otherwise used for commercial purposes  All illustrations and images included in CareNotes® are the copyrighted property of [employer] , [employer]  or [employer]  The above information is an  only  It is not intended as medical advice for individual conditions or treatments  Talk to your doctor, nurse or pharmacist before following any medical regimen to see if it is safe and effective for you

## 2019-01-08 ENCOUNTER — APPOINTMENT (EMERGENCY)
Dept: CT IMAGING | Facility: HOSPITAL | Age: 54
End: 2019-01-08
Payer: MEDICARE

## 2019-01-08 ENCOUNTER — HOSPITAL ENCOUNTER (EMERGENCY)
Facility: HOSPITAL | Age: 54
Discharge: HOME | End: 2019-01-08
Attending: EMERGENCY MEDICINE
Payer: MEDICARE

## 2019-01-08 VITALS
WEIGHT: 155 LBS | BODY MASS INDEX: 27.46 KG/M2 | HEART RATE: 92 BPM | RESPIRATION RATE: 20 BRPM | DIASTOLIC BLOOD PRESSURE: 59 MMHG | SYSTOLIC BLOOD PRESSURE: 103 MMHG | HEIGHT: 63 IN | OXYGEN SATURATION: 97 % | TEMPERATURE: 97.2 F

## 2019-01-08 DIAGNOSIS — R10.31 RLQ ABDOMINAL PAIN: Primary | ICD-10-CM

## 2019-01-08 DIAGNOSIS — N39.0 UTI (URINARY TRACT INFECTION): ICD-10-CM

## 2019-01-08 LAB
ALBUMIN SERPL BCP-MCNC: 3.3 G/DL (ref 3.5–5)
ALP SERPL-CCNC: 98 U/L (ref 46–116)
ALT SERPL W P-5'-P-CCNC: 20 U/L (ref 12–78)
AMORPH URATE CRY URNS QL MICRO: ABNORMAL /HPF
ANION GAP SERPL CALCULATED.3IONS-SCNC: 9 MMOL/L (ref 4–13)
AST SERPL W P-5'-P-CCNC: 8 U/L (ref 5–45)
BACTERIA UR QL AUTO: ABNORMAL /HPF
BASOPHILS # BLD AUTO: 0.06 THOUSANDS/ΜL (ref 0–0.1)
BASOPHILS NFR BLD AUTO: 1 % (ref 0–1)
BILIRUB SERPL-MCNC: 0.2 MG/DL (ref 0.2–1)
BILIRUB UR QL STRIP: NEGATIVE
BUN SERPL-MCNC: 16 MG/DL (ref 5–25)
CALCIUM SERPL-MCNC: 8.8 MG/DL (ref 8.3–10.1)
CHLORIDE SERPL-SCNC: 103 MMOL/L (ref 100–108)
CLARITY UR: ABNORMAL
CLARITY, POC: NORMAL
CO2 SERPL-SCNC: 28 MMOL/L (ref 21–32)
COLOR UR: YELLOW
COLOR, POC: NORMAL
CREAT SERPL-MCNC: 1.03 MG/DL (ref 0.6–1.3)
EOSINOPHIL # BLD AUTO: 0.31 THOUSAND/ΜL (ref 0–0.61)
EOSINOPHIL NFR BLD AUTO: 3 % (ref 0–6)
ERYTHROCYTE [DISTWIDTH] IN BLOOD BY AUTOMATED COUNT: 14.9 % (ref 11.6–15.1)
EXT BILIRUBIN, UA: NORMAL
EXT BLOOD URINE: NORMAL
EXT GLUCOSE, UA: NORMAL
EXT KETONES: NORMAL
EXT NITRITE, UA: POSITIVE
EXT PH, UA: 5
EXT PROTEIN, UA: NORMAL
EXT SPECIFIC GRAVITY, UA: 1.02
EXT UROBILINOGEN: 0.2
GFR SERPL CREATININE-BSD FRML MDRD: 62 ML/MIN/1.73SQ M
GLUCOSE SERPL-MCNC: 86 MG/DL (ref 65–140)
GLUCOSE UR STRIP-MCNC: NEGATIVE MG/DL
HCT VFR BLD AUTO: 42.2 % (ref 34.8–46.1)
HGB BLD-MCNC: 14.2 G/DL (ref 11.5–15.4)
HGB UR QL STRIP.AUTO: ABNORMAL
IMM GRANULOCYTES # BLD AUTO: 0.06 THOUSAND/UL (ref 0–0.2)
IMM GRANULOCYTES NFR BLD AUTO: 1 % (ref 0–2)
KETONES UR STRIP-MCNC: NEGATIVE MG/DL
LEUKOCYTE ESTERASE UR QL STRIP: ABNORMAL
LYMPHOCYTES # BLD AUTO: 4.33 THOUSANDS/ΜL (ref 0.6–4.47)
LYMPHOCYTES NFR BLD AUTO: 38 % (ref 14–44)
MCH RBC QN AUTO: 32.3 PG (ref 26.8–34.3)
MCHC RBC AUTO-ENTMCNC: 33.6 G/DL (ref 31.4–37.4)
MCV RBC AUTO: 96 FL (ref 82–98)
MONOCYTES # BLD AUTO: 0.58 THOUSAND/ΜL (ref 0.17–1.22)
MONOCYTES NFR BLD AUTO: 5 % (ref 4–12)
NEUTROPHILS # BLD AUTO: 6.14 THOUSANDS/ΜL (ref 1.85–7.62)
NEUTS SEG NFR BLD AUTO: 52 % (ref 43–75)
NITRITE UR QL STRIP: POSITIVE
NON-SQ EPI CELLS URNS QL MICRO: ABNORMAL /HPF
OTHER STN SPEC: ABNORMAL
PH UR STRIP.AUTO: 6 [PH] (ref 4.5–8)
PLATELET # BLD AUTO: 272 THOUSANDS/UL (ref 149–390)
PMV BLD AUTO: 9.6 FL (ref 8.9–12.7)
POTASSIUM SERPL-SCNC: 3.6 MMOL/L (ref 3.5–5.3)
PROT SERPL-MCNC: 7.1 G/DL (ref 6.4–8.2)
PROT UR STRIP-MCNC: ABNORMAL MG/DL
RBC # BLD AUTO: 4.39 MILLION/UL (ref 3.81–5.12)
RBC #/AREA URNS AUTO: ABNORMAL /HPF
SODIUM SERPL-SCNC: 140 MMOL/L (ref 136–145)
SP GR UR STRIP.AUTO: 1.02 (ref 1–1.03)
UROBILINOGEN UR QL STRIP.AUTO: 0.2 E.U./DL
WBC # BLD AUTO: 11.48 THOUSAND/UL (ref 4.31–10.16)
WBC # BLD EST: NORMAL 10*3/UL
WBC #/AREA URNS AUTO: ABNORMAL /HPF

## 2019-01-08 PROCEDURE — 96374 THER/PROPH/DIAG INJ IV PUSH: CPT

## 2019-01-08 PROCEDURE — 74177 CT ABD & PELVIS W/CONTRAST: CPT

## 2019-01-08 PROCEDURE — 81001 URINALYSIS AUTO W/SCOPE: CPT | Performed by: PHYSICIAN ASSISTANT

## 2019-01-08 PROCEDURE — 80053 COMPREHEN METABOLIC PANEL: CPT | Performed by: PHYSICIAN ASSISTANT

## 2019-01-08 PROCEDURE — 85025 COMPLETE CBC W/AUTO DIFF WBC: CPT | Performed by: PHYSICIAN ASSISTANT

## 2019-01-08 PROCEDURE — 87186 SC STD MICRODIL/AGAR DIL: CPT | Performed by: PHYSICIAN ASSISTANT

## 2019-01-08 PROCEDURE — 87077 CULTURE AEROBIC IDENTIFY: CPT | Performed by: PHYSICIAN ASSISTANT

## 2019-01-08 PROCEDURE — 36415 COLL VENOUS BLD VENIPUNCTURE: CPT | Performed by: PHYSICIAN ASSISTANT

## 2019-01-08 PROCEDURE — 87086 URINE CULTURE/COLONY COUNT: CPT | Performed by: PHYSICIAN ASSISTANT

## 2019-01-08 PROCEDURE — 99284 EMERGENCY DEPT VISIT MOD MDM: CPT

## 2019-01-08 RX ORDER — KETOROLAC TROMETHAMINE 30 MG/ML
15 INJECTION, SOLUTION INTRAMUSCULAR; INTRAVENOUS ONCE
Status: COMPLETED | OUTPATIENT
Start: 2019-01-08 | End: 2019-01-08

## 2019-01-08 RX ORDER — CIPROFLOXACIN 500 MG/1
500 TABLET, FILM COATED ORAL ONCE
Status: COMPLETED | OUTPATIENT
Start: 2019-01-08 | End: 2019-01-08

## 2019-01-08 RX ORDER — CIPROFLOXACIN 500 MG/1
500 TABLET, FILM COATED ORAL 2 TIMES DAILY
Qty: 13 TABLET | Refills: 0 | Status: SHIPPED | OUTPATIENT
Start: 2019-01-08 | End: 2019-01-15

## 2019-01-08 RX ORDER — CLONAZEPAM 1 MG/1
0.5 TABLET ORAL 2 TIMES DAILY
COMMUNITY

## 2019-01-08 RX ADMIN — KETOROLAC TROMETHAMINE 15 MG: 30 INJECTION, SOLUTION INTRAMUSCULAR; INTRAVENOUS at 12:05

## 2019-01-08 RX ADMIN — CIPROFLOXACIN HYDROCHLORIDE 500 MG: 500 TABLET, FILM COATED ORAL at 13:33

## 2019-01-08 RX ADMIN — IOHEXOL 100 ML: 350 INJECTION, SOLUTION INTRAVENOUS at 13:06

## 2019-01-08 NOTE — ED PROVIDER NOTES
History  Chief Complaint   Patient presents with    Abdominal Pain     Patient states she has been having R lower quad pain that radiates into her back for a couple of days  Patient has nausea  Monty Bales had a hard stool today     Patient is a 47 y/o F with h/o anxiety, depression, and alcohol abuse that presents to the ED with RLQ abdominal pain x 3 days  She states the pain was coming and going, but now is constant  She denies fevers/chills  No vomiting or diarrhea, but she has nausea  Last BM was today, no blood in stool  She denies constipation  The pain radiates to her lower back  No urinary symptoms  Nothing makes the pain better  She did not take anything for the pain  Sitting too long makes the pain worse  Last meal was last night  History provided by:  Patient  Abdominal Pain   Pain location:  RLQ  Pain quality: aching    Pain radiates to:  Back  Pain severity:  Moderate  Onset quality:  Sudden  Duration:  3 days  Timing:  Intermittent  Progression:  Worsening  Chronicity:  New  Context: not sick contacts, not suspicious food intake and not trauma    Relieved by:  Nothing  Worsened by:  Nothing  Ineffective treatments:  None tried  Associated symptoms: anorexia and nausea    Associated symptoms: no chest pain, no chills, no constipation, no cough, no diarrhea, no dysuria, no fever, no shortness of breath, no vaginal bleeding and no vomiting    Risk factors: alcohol abuse    Risk factors: has not had multiple surgeries, not pregnant and no recent hospitalization        Prior to Admission Medications   Prescriptions Last Dose Informant Patient Reported? Taking?    DULoxetine HCl (CYMBALTA PO)   Yes Yes   Sig: Take by mouth   HydrOXYzine Pamoate (VISTARIL PO)   Yes Yes   Sig: Take by mouth   LORazepam (ATIVAN) 1 mg tablet   No Yes   Sig: Take 1 tablet by mouth 2 (two) times a day At 9AM and 6PM   atorvastatin (LIPITOR) 20 mg tablet   No Yes   Sig: Take 1 tablet by mouth daily with dinner clonazePAM (KlonoPIN) 1 mg tablet   Yes Yes   Sig: Take 0 5 mg by mouth 2 (two) times a day   escitalopram (LEXAPRO) 10 mg tablet   No Yes   Sig: Take 1 tablet by mouth daily At 9AM   omeprazole (PriLOSEC) 40 MG capsule   No No   Sig: Take 1 capsule (40 mg total) by mouth daily for 15 days   traZODone (DESYREL) 100 mg tablet   No Yes   Sig: Take 1 tablet by mouth daily at bedtime as needed for sleep   Patient taking differently: Take 150 mg by mouth daily at bedtime as needed for sleep        Facility-Administered Medications: None       Past Medical History:   Diagnosis Date    Chronic pain     Back Pain    Depression     Hypertension     Migraine        History reviewed  No pertinent surgical history  History reviewed  No pertinent family history  I have reviewed and agree with the history as documented  Social History   Substance Use Topics    Smoking status: Current Every Day Smoker     Packs/day: 0 50     Types: Cigarettes    Smokeless tobacco: Current User    Alcohol use No        Review of Systems   Constitutional: Negative for chills and fever  HENT: Negative  Respiratory: Negative for cough and shortness of breath  Cardiovascular: Negative for chest pain and leg swelling  Gastrointestinal: Positive for abdominal pain, anorexia and nausea  Negative for blood in stool, constipation, diarrhea and vomiting  Genitourinary: Negative for dysuria and vaginal bleeding  Musculoskeletal: Positive for back pain  Negative for neck pain  Skin: Negative for color change and rash  Neurological: Negative for dizziness, weakness and numbness  Psychiatric/Behavioral: Negative for confusion  All other systems reviewed and are negative  Physical Exam  Physical Exam   Constitutional: She is oriented to person, place, and time  She appears well-developed and well-nourished  She is active and cooperative  She does not appear ill  No distress  HENT:   Head: Normocephalic and atraumatic  Right Ear: Hearing and tympanic membrane normal    Left Ear: Hearing and tympanic membrane normal    Nose: Nose normal    Mouth/Throat: Oropharynx is clear and moist and mucous membranes are normal    Eyes: Pupils are equal, round, and reactive to light  Conjunctivae are normal    Neck: Normal range of motion  Cardiovascular: Normal rate, regular rhythm and normal heart sounds  No murmur heard  Pulmonary/Chest: Effort normal and breath sounds normal  She has no wheezes  She has no rhonchi  She has no rales  Abdominal: Soft  Normal appearance and bowel sounds are normal  There is tenderness in the right lower quadrant  There is tenderness at McBurney's point  There is no rigidity, no rebound and no guarding  Musculoskeletal: Normal range of motion  She exhibits no edema  Neurological: She is alert and oriented to person, place, and time  She has normal strength  No sensory deficit  Skin: Skin is warm and dry  No rash noted  She is not diaphoretic  No pallor  Nursing note and vitals reviewed        Vital Signs  ED Triage Vitals   Temperature Pulse Respirations Blood Pressure SpO2   01/08/19 1150 01/08/19 1150 01/08/19 1150 01/08/19 1150 01/08/19 1150   (!) 97 2 °F (36 2 °C) 98 20 130/61 96 %      Temp src Heart Rate Source Patient Position - Orthostatic VS BP Location FiO2 (%)   -- 01/08/19 1250 01/08/19 1250 01/08/19 1250 --    Monitor Lying Right arm       Pain Score       01/08/19 1150       8           Vitals:    01/08/19 1150 01/08/19 1250   BP: 130/61 103/57   Pulse: 98 97   Patient Position - Orthostatic VS:  Lying       Visual Acuity      ED Medications  Medications   ciprofloxacin (CIPRO) tablet 500 mg (not administered)   ketorolac (TORADOL) injection 15 mg (15 mg Intravenous Given 1/8/19 1205)   iohexol (OMNIPAQUE) 350 MG/ML injection (MULTI-DOSE) 100 mL (100 mL Intravenous Given 1/8/19 1306)       Diagnostic Studies  Results Reviewed     Procedure Component Value Units Date/Time    UA w Reflex to Microscopic w Reflex to Culture [[id number]] Collected:  01/08/19 1250    Lab Status:  No result Specimen:  Urine from Urine, Clean Catch     Comprehensive metabolic panel [[id number]]  (Abnormal) Collected:  01/08/19 1204    Lab Status:  Final result Specimen:  Blood from Arm, Left Updated:  01/08/19 1239     Sodium 140 mmol/L      Potassium 3 6 mmol/L      Chloride 103 mmol/L      CO2 28 mmol/L      ANION GAP 9 mmol/L      BUN 16 mg/dL      Creatinine 1 03 mg/dL      Glucose 86 mg/dL      Calcium 8 8 mg/dL      AST 8 U/L      ALT 20 U/L      Alkaline Phosphatase 98 U/L      Total Protein 7 1 g/dL      Albumin 3 3 (L) g/dL      Total Bilirubin 0 20 mg/dL      eGFR 62 ml/min/1 73sq m     Narrative:         National Kidney Disease Education Program recommendations are as follows:  GFR calculation is accurate only with a steady state creatinine  Chronic Kidney disease less than 60 ml/min/1 73 sq  meters  Kidney failure less than 15 ml/min/1 73 sq  meters      CBC and differential [[id number]]  (Abnormal) Collected:  01/08/19 1204    Lab Status:  Final result Specimen:  Blood from Arm, Left Updated:  01/08/19 1224     WBC 11 48 (H) Thousand/uL      RBC 4 39 Million/uL      Hemoglobin 14 2 g/dL      Hematocrit 42 2 %      MCV 96 fL      MCH 32 3 pg      MCHC 33 6 g/dL      RDW 14 9 %      MPV 9 6 fL      Platelets 569 Thousands/uL      Neutrophils Relative 52 %      Immat GRANS % 1 %      Lymphocytes Relative 38 %      Monocytes Relative 5 %      Eosinophils Relative 3 %      Basophils Relative 1 %      Neutrophils Absolute 6 14 Thousands/µL      Immature Grans Absolute 0 06 Thousand/uL      Lymphocytes Absolute 4 33 Thousands/µL      Monocytes Absolute 0 58 Thousand/µL      Eosinophils Absolute 0 31 Thousand/µL      Basophils Absolute 0 06 Thousands/µL     POCT urinalysis dipstick [[id number]]  (Normal) Resulted:  01/08/19 1209    Lab Status:  Final result Specimen:  Urine Updated:  01/08/19 1210     Color, UA silas Clarity, UA cloudy     Glucose, UA (Ref: Negative) neg     Bilirubin, UA (Ref: Negative) neg     Ketones, UA (Ref: Negative) neg     Spec Grav, UA (Ref:1 003-1 030) 1 020     Blood, UA (Ref: Negative) moderate     pH, UA (Ref: 4 5-8 0) 5 0     Protein, UA (Ref: Negative) trace     Urobilinogen, UA (Ref: 0 2- 1 0) 0 2      Leukocytes, UA (Ref: Negative) large     Nitrite, UA (Ref: Negative) positive                 CT abdomen pelvis with contrast   Final Result by Destin Austin MD (01/08 1318)      No acute pathology  No evidence for appendicitis  Workstation performed: [id number]                    Procedures  Procedures       Phone Contacts  ED Phone Contact    ED Course                               MDM  Number of Diagnoses or Management Options  RLQ abdominal pain: new and requires workup  UTI (urinary tract infection): new and requires workup  Diagnosis management comments: Patient with RLQ abdominal pain, will order CT to r/o appy  Amount and/or Complexity of Data Reviewed  Clinical lab tests: ordered and reviewed  Tests in the radiology section of CPT®: ordered and reviewed    Patient Progress  Patient progress: stable    CritCare Time    Disposition  Final diagnoses:   RLQ abdominal pain   UTI (urinary tract infection)     Time reflects when diagnosis was documented in both MDM as applicable and the Disposition within this note     Time User Action Codes Description Comment    1/8/2019  1:27 PM Sherry Wilcox Add [R10 31] RLQ abdominal pain     1/8/2019  1:27 PM Sherry Wilcox Add [N39 0] UTI (urinary tract infection)       ED Disposition     ED Disposition Condition Comment    Discharge  [facility] discharge to home/self care      Condition at discharge: Stable        Follow-up Information     Follow up With Specialties Details Why Nuno Ambrocio MD Family Medicine In 2 days For recheck [place] [id number]  [phone]            Patient's Medications   Discharge Prescriptions    CIPROFLOXACIN (CIPRO) 500 MG TABLET    Take 1 tablet (500 mg total) by mouth 2 (two) times a day for 7 days       Start Date: 1/8/2019  End Date: 1/15/2019       Order Dose: 500 mg       Quantity: 13 tablet    Refills: 0     No discharge procedures on file      ED Provider  Electronically Signed by           Antonio Langston PA-C  01/08/19 8277

## 2019-01-08 NOTE — DISCHARGE INSTRUCTIONS
Rest, increase fluids  Tylenol/motrin for discomfort  Follow up with family doctor in 2-3 days for recheck  Take antibiotic twice a day for next 7 days  Abdominal Pain   WHAT YOU NEED TO KNOW:   Abdominal pain can be dull, achy, or sharp  You may have pain in one area of your abdomen, or in your entire abdomen  Your pain may be caused by a condition such as constipation, food sensitivity or poisoning, infection, or a blockage  Abdominal pain can also be from a hernia, appendicitis, or an ulcer  Liver, gallbladder, or kidney conditions can also cause abdominal pain  The cause of your abdominal pain may be unknown  DISCHARGE INSTRUCTIONS:   Return to the emergency department if:   · You have new chest pain or shortness of breath  · You have pulsing pain in your upper abdomen or lower back that suddenly becomes constant  · Your pain is in the right lower abdominal area and worsens with movement  · You have a fever over 100 4°F (38°C) or shaking chills  · You are vomiting and cannot keep food or liquids down  · Your pain does not improve or gets worse over the next 8 to 12 hours  · You see blood in your vomit or bowel movements, or they look black and tarry  · Your skin or the whites of your eyes turn yellow  · You are a woman and have a large amount of vaginal bleeding that is not your monthly period  Contact your healthcare provider if:   · You have pain in your lower back  · You are a man and have pain in your testicles  · You have pain when you urinate  · You have questions or concerns about your condition or care  Follow up with your healthcare provider within 24 hours or as directed:  Write down your questions so you remember to ask them during your visits  Medicines:   · Medicines  may be given to calm your stomach and prevent vomiting or to decrease pain  Ask how to take pain medicine safely  · Take your medicine as directed    Contact your healthcare provider if you think your medicine is not helping or if you have side effects  Tell him of her if you are allergic to any medicine  Keep a list of the medicines, vitamins, and herbs you take  Include the amounts, and when and why you take them  Bring the list or the pill bottles to follow-up visits  Carry your medicine list with you in case of an emergency  © 2017 [address]  Information is for End User's use only and may not be sold, redistributed or otherwise used for commercial purposes  All illustrations and images included in CareNotes® are the copyrighted property of [employer]  or [employer]  The above information is an  only  It is not intended as medical advice for individual conditions or treatments  Talk to your doctor, nurse or pharmacist before following any medical regimen to see if it is safe and effective for you  Urinary Tract Infection in Women, Ambulatory Care   GENERAL INFORMATION:   A urinary tract infection (UTI)  is caused by bacteria that get inside your urinary tract  Most bacteria that enter your urinary tract are expelled when you urinate  If the bacteria stay in your urinary tract, you may get an infection  Your urinary tract includes your kidneys, ureters, bladder, and urethra  Urine is made in your kidneys, and it flows from the ureters to the bladder  Urine leaves the bladder through the urethra  A UTI is more common in your lower urinary tract, which includes your bladder and urethra          Common symptoms include the following:   · Urinating more often or waking from sleep to urinate    · Pain or burning when you urinate    · Pain or pressure in your lower abdomen     · Urine that smells bad    · Blood in your urine    · Leaking urine  Seek immediate care for the following symptoms:   · Urinating very little or not at all    · Vomiting    · Shaking chills with a fever    · Side or back pain that gets worse  Treatment for a UTI may include medicines to treat a bacterial infection  You may also need medicines to decrease pain and burning, or decrease the urge to urinate often  Prevent a UTI:   · Urinate when you feel the urge  Do not hold your urine  Urinate as soon as you feel you have to  · Drink liquids as directed  Ask how much liquid to drink each day and which liquids are best for you  You may need to drink more fluids than usual to help flush out the bacteria  Do not drink alcohol, caffeine, and citrus juices  These can irritate your bladder and increase your symptoms  · Apply heat  on your abdomen for 20 to 30 minutes every 2 hours for as many days as directed  Heat helps decrease discomfort and pressure in your bladder  Follow up with your healthcare provider as directed:  Write down your questions so you remember to ask them during your visits  CARE AGREEMENT:   You have the right to help plan your care  Learn about your health condition and how it may be treated  Discuss treatment options with your caregivers to decide what care you want to receive  You always have the right to refuse treatment  The above information is an  only  It is not intended as medical advice for individual conditions or treatments  Talk to your doctor, nurse or pharmacist before following any medical regimen to see if it is safe and effective for you  © 2014 [address] is for End User's use only and may not be sold, redistributed or otherwise used for commercial purposes  All illustrations and images included in CareNotes® are the copyrighted property of A D A M [employer]  or [employer]

## 2019-01-10 LAB — BACTERIA UR CULT: ABNORMAL

## 2019-09-27 ENCOUNTER — HOSPITAL ENCOUNTER (EMERGENCY)
Facility: HOSPITAL | Age: 54
Discharge: HOME | End: 2019-09-27
Attending: EMERGENCY MEDICINE | Admitting: EMERGENCY MEDICINE
Payer: MEDICARE

## 2019-09-27 ENCOUNTER — APPOINTMENT (EMERGENCY)
Dept: RADIOLOGY | Facility: HOSPITAL | Age: 54
End: 2019-09-27
Payer: MEDICARE

## 2019-09-27 VITALS
HEART RATE: 90 BPM | OXYGEN SATURATION: 97 % | WEIGHT: 167.3 LBS | SYSTOLIC BLOOD PRESSURE: 122 MMHG | RESPIRATION RATE: 16 BRPM | DIASTOLIC BLOOD PRESSURE: 77 MMHG | BODY MASS INDEX: 29.64 KG/M2 | TEMPERATURE: 97.5 F

## 2019-09-27 DIAGNOSIS — M25.511 ACUTE PAIN OF RIGHT SHOULDER: Primary | ICD-10-CM

## 2019-09-27 PROCEDURE — 96372 THER/PROPH/DIAG INJ SC/IM: CPT

## 2019-09-27 PROCEDURE — 99284 EMERGENCY DEPT VISIT MOD MDM: CPT | Performed by: PHYSICIAN ASSISTANT

## 2019-09-27 PROCEDURE — 73030 X-RAY EXAM OF SHOULDER: CPT

## 2019-09-27 PROCEDURE — 99283 EMERGENCY DEPT VISIT LOW MDM: CPT

## 2019-09-27 RX ORDER — CLONAZEPAM 1 MG/1
TABLET ORAL
Status: ON HOLD | COMMUNITY
Start: 2018-03-23 | End: 2021-02-04

## 2019-09-27 RX ORDER — HYDROXYZINE HYDROCHLORIDE 10 MG/1
25 TABLET, FILM COATED ORAL 3 TIMES DAILY
COMMUNITY

## 2019-09-27 RX ORDER — CARISOPRODOL 350 MG/1
TABLET ORAL
COMMUNITY
Start: 2019-04-04 | End: 2019-09-27

## 2019-09-27 RX ORDER — ARIPIPRAZOLE 15 MG/1
7.5 TABLET ORAL
COMMUNITY

## 2019-09-27 RX ORDER — ATORVASTATIN CALCIUM 20 MG/1
20 TABLET, FILM COATED ORAL DAILY
COMMUNITY

## 2019-09-27 RX ORDER — ACETAMINOPHEN 325 MG/1
650 TABLET ORAL EVERY 6 HOURS PRN
Qty: 30 TABLET | Refills: 0 | Status: SHIPPED | OUTPATIENT
Start: 2019-09-27 | End: 2019-09-28 | Stop reason: SDUPTHER

## 2019-09-27 RX ORDER — ATORVASTATIN CALCIUM 20 MG/1
TABLET, FILM COATED ORAL DAILY
COMMUNITY
Start: 2019-04-04 | End: 2019-09-27

## 2019-09-27 RX ORDER — HYDROXYZINE PAMOATE 25 MG/1
1 CAPSULE ORAL EVERY 8 HOURS
COMMUNITY
Start: 2018-05-14 | End: 2021-02-05 | Stop reason: HOSPADM

## 2019-09-27 RX ORDER — LIDOCAINE 50 MG/G
1 PATCH TOPICAL ONCE
Status: DISCONTINUED | OUTPATIENT
Start: 2019-09-27 | End: 2019-09-27 | Stop reason: HOSPADM

## 2019-09-27 RX ORDER — DULOXETIN HYDROCHLORIDE 60 MG/1
60 CAPSULE, DELAYED RELEASE ORAL
COMMUNITY

## 2019-09-27 RX ORDER — ACETAMINOPHEN 325 MG/1
650 TABLET ORAL EVERY 6 HOURS PRN
Qty: 30 TABLET | Refills: 0 | Status: SHIPPED | OUTPATIENT
Start: 2019-09-27 | End: 2019-09-27 | Stop reason: SDUPTHER

## 2019-09-27 RX ORDER — KETOROLAC TROMETHAMINE 30 MG/ML
30 INJECTION, SOLUTION INTRAMUSCULAR; INTRAVENOUS ONCE
Status: COMPLETED | OUTPATIENT
Start: 2019-09-27 | End: 2019-09-27

## 2019-09-27 RX ADMIN — LIDOCAINE 1 PATCH: 50 PATCH TOPICAL at 19:50

## 2019-09-27 RX ADMIN — KETOROLAC TROMETHAMINE 30 MG: 30 INJECTION, SOLUTION INTRAMUSCULAR; INTRAVENOUS at 19:48

## 2019-09-28 NOTE — ED PROVIDER NOTES
History  Chief Complaint   Patient presents with    Shoulder Pain     c/o of pain to rt  shoulder since last saturday - has spent the last 2 weeks moving - difficulty raising arm above head     Is a 57-year-old female who presents today with right shoulder discomfort that started about a week ago  Patient reports that she has spent the last 2 weeks helping a friend move  She states she has done a lot of lifting above her head  She reports tenderness of the anterior and posterior shoulder  She reports significant weakness  He has not taken anything for her pain  She denies any fall or injury  She reports no numbness or tingling  She reports no neck pain  She reports no fevers or joint swelling  There is no significant swelling of the shoulder  Prior to Admission Medications   Prescriptions Last Dose Informant Patient Reported? Taking?    ARIPiprazole (ABILIFY) 15 mg tablet 9/26/2019 at 2100  Yes Yes   Sig: Take 7 5 mg by mouth daily at bedtime   DULoxetine (CYMBALTA) 60 mg delayed release capsule 9/26/2019 at 2100  Yes Yes   Sig: Take 60 mg by mouth daily at bedtime   DULoxetine HCl (CYMBALTA PO) 9/27/2019 at 0900  Yes Yes   Sig: Take 30 mg by mouth every morning    HydrOXYzine Pamoate (VISTARIL PO) Not Taking at Unknown time  Yes No   Sig: Take by mouth   atorvastatin (LIPITOR) 20 mg tablet 9/27/2019 at 0900  Yes Yes   Sig: Take 20 mg by mouth daily   clonazePAM (KLONOPIN) 1 mg tablet 9/26/2019 at 2100  Yes Yes   Sig: take 1/2 in the am and 1 tab at pm   clonazePAM (KlonoPIN) 1 mg tablet 9/27/2019 at 0900  Yes Yes   Sig: Take 0 5 mg by mouth 2 (two) times a day   hydrOXYzine HCL (ATARAX) 10 mg tablet 9/27/2019 at 1400  Yes Yes   Sig: Take 15 mg by mouth 3 (three) times a day   hydrOXYzine pamoate (VISTARIL) 25 mg capsule 9/27/2019 at 1400  Yes Yes   Sig: Take 1 capsule by mouth every 8 (eight) hours   traZODone (DESYREL) 100 mg tablet 9/26/2019 at 2100  No Yes   Sig: Take 1 tablet by mouth daily at bedtime as needed for sleep   Patient taking differently: Take 150 mg by mouth daily at bedtime as needed for sleep        Facility-Administered Medications: None       Past Medical History:   Diagnosis Date    Anxiety     Chronic pain     Back Pain    Depression     Hyperlipidemia     Migraine        Past Surgical History:   Procedure Laterality Date    NO PAST SURGERIES         History reviewed  No pertinent family history  I have reviewed and agree with the history as documented  Social History     Tobacco Use    Smoking status: Current Every Day Smoker     Packs/day: 0 50     Types: Cigarettes    Smokeless tobacco: Current User   Substance Use Topics    Alcohol use: No    Drug use: No        Review of Systems   Constitutional: Negative  HENT: Negative  Eyes: Negative  Respiratory: Negative  Cardiovascular: Negative  Gastrointestinal: Negative  Endocrine: Negative  Genitourinary: Negative  Musculoskeletal: Positive for arthralgias and myalgias  Negative for joint swelling  Skin: Negative  Allergic/Immunologic: Negative  Neurological: Negative  Hematological: Negative  Psychiatric/Behavioral: Negative  Physical Exam  Physical Exam   Constitutional: She is oriented to person, place, and time  She appears well-developed and well-nourished  Cardiovascular: Normal rate, regular rhythm and normal heart sounds  Pulmonary/Chest: Effort normal and breath sounds normal    Abdominal: Soft  Musculoskeletal:   Negative empty can test and drop-arm test   Patient does report significant tenderness with raising her right arm above her head  She has no significant decreased strength  No distal neuropathy  Plus two radial pulse  No distal cyanosis  No obvious deformity  Neurological: She is alert and oriented to person, place, and time  Skin: Capillary refill takes less than 2 seconds  Psychiatric: She has a normal mood and affect   Her behavior is normal  Judgment and thought content normal        Vital Signs  ED Triage Vitals   Temperature Pulse Respirations Blood Pressure SpO2   09/27/19 1847 09/27/19 1847 09/27/19 1847 09/27/19 1847 09/27/19 1847   97 5 °F (36 4 °C) 90 16 122/77 97 %      Temp Source Heart Rate Source Patient Position - Orthostatic VS BP Location FiO2 (%)   09/27/19 1847 09/27/19 1847 09/27/19 1847 09/27/19 1847 --   Tympanic Monitor Sitting Left arm       Pain Score       09/27/19 1948       8           Vitals:    09/27/19 1847   BP: 122/77   Pulse: 90   Patient Position - Orthostatic VS: Sitting         Visual Acuity      ED Medications  Medications   ketorolac (TORADOL) injection 30 mg (30 mg Intramuscular Given 9/27/19 1948)       Diagnostic Studies  Results Reviewed     None                 XR shoulder 2+ views RIGHT   Final Result by Belen Tomlin MD (09/27 1942)      No acute osseous abnormality  Workstation performed: [id number]                    Procedures  Procedures       ED Course       MDM  Number of Diagnoses or Management Options  Acute pain of right shoulder:   Diagnosis management comments: This is a 26-year-old female who presents today with shoulder discomfort  She has a negative empty can test and drop-arm test   X-ray shoulder did not reveal any acute fracture  Differential diagnosis includes rotator cuff injury or bursitis  I gave the patient a number to call for orthopedics  She was placed in a sling  She was told to take Tylenol as needed for pain  Patient was discharged home stable        Disposition  Final diagnoses:   Acute pain of right shoulder     Time reflects when diagnosis was documented in both MDM as applicable and the Disposition within this note     Time User Action Codes Description Comment    9/27/2019  7:55 PM Edwin Kelly Add [M25 512] Acute pain of left shoulder     9/28/2019  4:04 PM Edwin Kelly Add [M25 511] Acute pain of right shoulder     9/28/2019  4:04 PM Loura Can Modify [A03 823] Acute pain of left shoulder     9/28/2019  4:04 PM Loura Can Modify [L10 042] Acute pain of right shoulder     9/28/2019  4:04 PM Loura Can Remove [U72 064] Acute pain of left shoulder       ED Disposition     ED Disposition Condition Date/Time Comment    Discharge Stable Fri Sep 27, 2019  7:55 PM Javier Mckeon discharge to home/self care  Follow-up Information     Follow up With Specialties Details Why Contact Info Additional [address] [facility] Orthopedic Surgery Schedule an appointment as soon as possible for a visit   [address]  [id number]  [address] [facility], [address] [place] [address], [address] [place], [place], [address] [phone]          Discharge Medication List as of 9/27/2019  7:59 PM      CONTINUE these medications which have CHANGED    Details   acetaminophen (TYLENOL) 325 mg tablet Take 2 tablets (650 mg total) by mouth every 6 (six) hours as needed for mild pain, Starting Fri 9/27/2019, Print         CONTINUE these medications which have NOT CHANGED    Details   ARIPiprazole (ABILIFY) 15 mg tablet Take 7 5 mg by mouth daily at bedtime, Historical Med      atorvastatin (LIPITOR) 20 mg tablet Take 20 mg by mouth daily, Historical Med      !! clonazePAM (KlonoPIN) 1 mg tablet Take 0 5 mg by mouth 2 (two) times a day, Historical Med      !! clonazePAM (KLONOPIN) 1 mg tablet take 1/2 in the am and 1 tab at pm, Historical Med      !! DULoxetine (CYMBALTA) 60 mg delayed release capsule Take 60 mg by mouth daily at bedtime, Historical Med      !!  DULoxetine HCl (CYMBALTA PO) Take 30 mg by mouth every morning , Historical Med      hydrOXYzine HCL (ATARAX) 10 mg tablet Take 15 mg by mouth 3 (three) times a day, Historical Med      !! hydrOXYzine pamoate (VISTARIL) 25 mg capsule Take 1 capsule by mouth every 8 (eight) hours, Starting Mon 5/14/2018, Historical Med      traZODone (DESYREL) 100 mg tablet Take 1 tablet by mouth daily at bedtime as needed for sleep, Starting Thu 12/14/2017, Print      !! HydrOXYzine Pamoate (VISTARIL PO) Take by mouth, Historical Med       !! - Potential duplicate medications found  Please discuss with provider  No discharge procedures on file      ED Provider  Electronically Signed by           Chasity Milan PA-C  09/28/19 6220

## 2020-01-20 ENCOUNTER — APPOINTMENT (EMERGENCY)
Dept: RADIOLOGY | Facility: HOSPITAL | Age: 55
End: 2020-01-20
Payer: MEDICARE

## 2020-01-20 ENCOUNTER — HOSPITAL ENCOUNTER (EMERGENCY)
Facility: HOSPITAL | Age: 55
Discharge: HOME | End: 2020-01-20
Attending: EMERGENCY MEDICINE | Admitting: EMERGENCY MEDICINE
Payer: MEDICARE

## 2020-01-20 VITALS
HEART RATE: 86 BPM | SYSTOLIC BLOOD PRESSURE: 119 MMHG | BODY MASS INDEX: 27.46 KG/M2 | RESPIRATION RATE: 16 BRPM | WEIGHT: 155 LBS | DIASTOLIC BLOOD PRESSURE: 67 MMHG | OXYGEN SATURATION: 96 %

## 2020-01-20 DIAGNOSIS — M62.838 MUSCLE SPASM: ICD-10-CM
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
## 2020-01-20 DIAGNOSIS — M79.606 LEG PAIN: ICD-10-CM

## 2020-01-20 DIAGNOSIS — M79.603 ARM PAIN: ICD-10-CM

## 2020-01-20 PROCEDURE — 73080 X-RAY EXAM OF ELBOW: CPT

## 2020-01-20 PROCEDURE — 73110 X-RAY EXAM OF WRIST: CPT

## 2020-01-20 PROCEDURE — 73564 X-RAY EXAM KNEE 4 OR MORE: CPT

## 2020-01-20 PROCEDURE — 99284 EMERGENCY DEPT VISIT MOD MDM: CPT

## 2020-01-20 PROCEDURE — 99284 EMERGENCY DEPT VISIT MOD MDM: CPT | Performed by: EMERGENCY MEDICINE

## 2020-01-20 PROCEDURE — 73502 X-RAY EXAM HIP UNI 2-3 VIEWS: CPT

## 2020-01-20 PROCEDURE — 73030 X-RAY EXAM OF SHOULDER: CPT

## 2020-01-20 RX ORDER — NAPROXEN 500 MG/1
500 TABLET ORAL 2 TIMES DAILY WITH MEALS
Qty: 30 TABLET | Refills: 0 | Status: ON HOLD | OUTPATIENT
Start: 2020-01-20 | End: 2021-02-04

## 2020-01-20 RX ORDER — CYCLOBENZAPRINE HCL 10 MG
10 TABLET ORAL ONCE
Status: COMPLETED | OUTPATIENT
Start: 2020-01-20 | End: 2020-01-20

## 2020-01-20 RX ORDER — CYCLOBENZAPRINE HCL 10 MG
10 TABLET ORAL 2 TIMES DAILY PRN
Qty: 20 TABLET | Refills: 0 | Status: ON HOLD | OUTPATIENT
Start: 2020-01-20 | End: 2021-02-04

## 2020-01-20 RX ORDER — NAPROXEN 500 MG/1
500 TABLET ORAL ONCE
Status: COMPLETED | OUTPATIENT
Start: 2020-01-20 | End: 2020-01-20

## 2020-01-20 RX ADMIN — CYCLOBENZAPRINE HYDROCHLORIDE 10 MG: 10 TABLET, FILM COATED ORAL at 16:50

## 2020-01-20 RX ADMIN — NAPROXEN 500 MG: 500 TABLET ORAL at 16:50

## 2020-01-20 NOTE — ED PROVIDER NOTES
History  Chief Complaint   Patient presents with    Fall     pt tripped over slipper, mechanical fall at home, landed on left side, no LOC, no head injury, c/o pain to left knee left arm and left side/hip        63-year-old female presents with complaint of diffuse left-sided pain status post trip and fall at home  She reports that she fell out of her slippers causing her to fall forward  She caught herself with her hands and knees and complains of pain left side of her body  She denies head injury or loss of consciousness  She does complain of mild left-sided neck pain along with need diffuse arm pain  She took no medications for her complaints and denies a history of ongoing falls  She has no focal neurological deficits, numbness, weakness, tingling  Fall   Mechanism of injury: fall    Injury location:  Hand and leg  Incident location:  Home  Arrived directly from scene: yes    Fall:     Fall occurred:  Walking and tripped    Impact surface:  Hard floor    Point of impact:  Hands and knees  Suspicion of alcohol use: no    Suspicion of drug use: no    Associated symptoms: neck pain (Mild, left-sided)    Associated symptoms: no abdominal pain, no back pain, no chest pain, no difficulty breathing, no headaches, no hearing loss, no loss of consciousness, no nausea, no seizures and no vomiting        Prior to Admission Medications   Prescriptions Last Dose Informant Patient Reported? Taking?    ARIPiprazole (ABILIFY) 15 mg tablet   Yes No   Sig: Take 7 5 mg by mouth daily at bedtime   DULoxetine (CYMBALTA) 60 mg delayed release capsule   Yes No   Sig: Take 60 mg by mouth daily at bedtime   DULoxetine HCl (CYMBALTA PO)   Yes No   Sig: Take 30 mg by mouth every morning    HydrOXYzine Pamoate (VISTARIL PO)   Yes No   Sig: Take by mouth   atorvastatin (LIPITOR) 20 mg tablet   Yes No   Sig: Take 20 mg by mouth daily   clonazePAM (KLONOPIN) 1 mg tablet   Yes No   Sig: take 1/2 in the am and 1 tab at pm clonazePAM (KlonoPIN) 1 mg tablet   Yes No   Sig: Take 0 5 mg by mouth 2 (two) times a day   hydrOXYzine HCL (ATARAX) 10 mg tablet   Yes No   Sig: Take 15 mg by mouth 3 (three) times a day   hydrOXYzine pamoate (VISTARIL) 25 mg capsule   Yes No   Sig: Take 1 capsule by mouth every 8 (eight) hours   traZODone (DESYREL) 100 mg tablet   No No   Sig: Take 1 tablet by mouth daily at bedtime as needed for sleep   Patient taking differently: Take 150 mg by mouth daily at bedtime as needed for sleep        Facility-Administered Medications: None       Past Medical History:   Diagnosis Date    Anxiety     Chronic pain     Back Pain    Depression     Hyperlipidemia     Migraine        Past Surgical History:   Procedure Laterality Date    NO PAST SURGERIES      OTHER SURGICAL HISTORY      cyst removed from left axila       History reviewed  No pertinent family history  I have reviewed and agree with the history as documented  Social History     Tobacco Use    Smoking status: Current Every Day Smoker     Packs/day: 0 25     Types: Cigarettes    Smokeless tobacco: Current User   Substance Use Topics    Alcohol use: No    Drug use: No        Review of Systems   Constitutional: Negative for appetite change, chills, fatigue and fever  HENT: Negative for hearing loss, postnasal drip, sinus pain and trouble swallowing  Eyes: Negative for redness and itching  Respiratory: Negative for chest tightness, shortness of breath and wheezing  Cardiovascular: Negative for chest pain and leg swelling  Gastrointestinal: Negative for abdominal pain, constipation, diarrhea, nausea and vomiting  Endocrine: Negative  Genitourinary: Negative for difficulty urinating and dysuria  Musculoskeletal: Positive for neck pain (Mild, left-sided)  Negative for back pain and myalgias  Skin: Negative for color change, rash and wound  Allergic/Immunologic: Negative      Neurological: Negative for dizziness, tremors, seizures, loss of consciousness, syncope, facial asymmetry, speech difficulty, weakness, light-headedness, numbness and headaches  Hematological: Negative  Does not bruise/bleed easily  Psychiatric/Behavioral: Negative  Physical Exam  Physical Exam   Constitutional: She is oriented to person, place, and time  She appears well-developed and well-nourished  HENT:   Head: Normocephalic and atraumatic  Nose: Nose normal    Mouth/Throat: Oropharynx is clear and moist    Eyes: Pupils are equal, round, and reactive to light  Conjunctivae and EOM are normal    Neck: Normal range of motion  Neck supple  Cardiovascular: Normal rate, regular rhythm and normal heart sounds  Pulmonary/Chest: Effort normal and breath sounds normal  No respiratory distress  She has no wheezes  Abdominal: Soft  Bowel sounds are normal  There is no tenderness  There is no guarding  Musculoskeletal: She exhibits no edema, tenderness or deformity  Left shoulder: She exhibits normal range of motion, no swelling, no effusion, no crepitus, no deformity, no pain, no spasm, normal pulse and normal strength  Left elbow: She exhibits normal range of motion, no swelling, no effusion and no deformity  Left wrist: She exhibits normal range of motion, no swelling, no effusion, no crepitus and no deformity  Left hip: She exhibits normal range of motion, normal strength, no swelling, no deformity and no laceration  Left knee: She exhibits no swelling, no effusion, no ecchymosis, no deformity, no erythema, normal alignment, no LCL laxity, normal patellar mobility, normal meniscus and no MCL laxity  Cervical back: She exhibits spasm  She exhibits no bony tenderness and no swelling  Back:         Arms:       Legs:  Neurological: She is alert and oriented to person, place, and time  Skin: Skin is warm and dry  Capillary refill takes less than 2 seconds  No rash noted     Psychiatric: She has a normal mood and affect  Her behavior is normal    Nursing note and vitals reviewed  Vital Signs  ED Triage Vitals   Temp Pulse Respirations Blood Pressure SpO2   -- 01/20/20 1526 01/20/20 1526 01/20/20 1526 01/20/20 1526    87 16 150/94 96 %      Temp src Heart Rate Source Patient Position - Orthostatic VS BP Location FiO2 (%)   -- 01/20/20 1526 01/20/20 1526 01/20/20 1526 --    Monitor Lying Right arm       Pain Score       01/20/20 1650       8           Vitals:    01/20/20 1526   BP: 150/94   Pulse: 87   Patient Position - Orthostatic VS: Lying         Visual Acuity      ED Medications  Medications   cyclobenzaprine (FLEXERIL) tablet 10 mg (10 mg Oral Given 1/20/20 1650)   naproxen (NAPROSYN) tablet 500 mg (500 mg Oral Given 1/20/20 1650)       Diagnostic Studies  Results Reviewed     None                 XR shoulder 2+ views LEFT   ED Interpretation by Castillo Kendrick DO (01/20 1649)   No acute findings      XR wrist 3+ views LEFT   ED Interpretation by Castillo Kendrick DO (01/20 1649)   No acute findings      XR hip/pelv 2-3 vws left if performed   ED Interpretation by Castillo Kendrick DO (01/20 1649)   No acute findings      XR knee 4+ vw left injury   ED Interpretation by Castillo Kendrick DO (01/20 1649)   No acute findings      XR elbow 3+ vw left   ED Interpretation by Castillo Kendrick DO (01/20 1649)   No acute findings       by Patricio Lilly (01/20 1629)                 Procedures  Procedures         ED Course                               MDM  Number of Diagnoses or Management Options  Arm pain:   Fall, initial encounter:   Leg pain:   Muscle spasm:   Diagnosis management comments: 55-year-old female presents with complaint of diffuse left-sided pain after suffering a fall earlier today  She reports that she blew out of her slipper causing her to fall  She caught herself and denies head injury or loss of consciousness  Her pain is primarily along the left side of her body    She is noted to have spasms of her left trapezius muscle with no midline tenderness or neurological deficits  X-rays are unremarkable for any acute findings  Discussed supportive care measures, treatment plan, need for follow-up, and reasons return to the ER  Amount and/or Complexity of Data Reviewed  Tests in the radiology section of CPT®: ordered and reviewed  Independent visualization of images, tracings, or specimens: yes          Disposition  Final diagnoses:   Fall, initial encounter   Arm pain   Leg pain   Muscle spasm     Time reflects when diagnosis was documented in both MDM as applicable and the Disposition within this note     Time User Action Codes Description Comment    1/20/2020  4:50 PM Alejandro Ke Add [M22  EVNZ] Fall, initial encounter     1/20/2020  4:50 PM Alejandro Ke Add [I21 406] Arm pain     1/20/2020  4:50 PM Alejandro Ke Add [Q28 611] Leg pain     1/20/2020  4:50 PM Alejandro Ke Add [C15 770] Muscle spasm       ED Disposition     ED Disposition Condition Date/Time Comment    Discharge Stable Mon Jan 20, 2020  4:50 PM Bk Betancourt discharge to home/self care              Follow-up Information     Follow up With Specialties Details Why [address] Guerrero MD Family Medicine   [place] [address] Emergency Department Emergency Medicine  If symptoms worsen [address] [address] [phone]          Patient's Medications   Discharge Prescriptions    CYCLOBENZAPRINE (FLEXERIL) 10 MG TABLET    Take 1 tablet (10 mg total) by mouth 2 (two) times a day as needed for muscle spasms       Start Date: 1/20/2020 End Date: --       Order Dose: 10 mg       Quantity: 20 tablet    Refills: 0    NAPROXEN (NAPROSYN) 500 MG TABLET    Take 1 tablet (500 mg total) by mouth 2 (two) times a day with meals       Start Date: 1/20/2020 End Date: --       Order Dose: 500 mg       Quantity: 30 tablet Refills: 0     No discharge procedures on file      ED Provider  Electronically Signed by           Aj Simon DO  01/20/20 8361

## 2020-03-15 ENCOUNTER — HOSPITAL ENCOUNTER (EMERGENCY)
Facility: HOSPITAL | Age: 55
Discharge: HOME | End: 2020-03-15
Attending: EMERGENCY MEDICINE
Payer: MEDICARE

## 2020-03-15 VITALS
BODY MASS INDEX: 30.89 KG/M2 | OXYGEN SATURATION: 98 % | RESPIRATION RATE: 16 BRPM | WEIGHT: 174.38 LBS | DIASTOLIC BLOOD PRESSURE: 77 MMHG | HEART RATE: 100 BPM | TEMPERATURE: 96.3 F | SYSTOLIC BLOOD PRESSURE: 112 MMHG

## 2020-03-15 DIAGNOSIS — J20.9 ACUTE BRONCHITIS, UNSPECIFIED ORGANISM: Primary | ICD-10-CM

## 2020-03-15 PROCEDURE — 99283 EMERGENCY DEPT VISIT LOW MDM: CPT

## 2020-03-15 PROCEDURE — 94640 AIRWAY INHALATION TREATMENT: CPT

## 2020-03-15 PROCEDURE — 99284 EMERGENCY DEPT VISIT MOD MDM: CPT | Performed by: EMERGENCY MEDICINE

## 2020-03-15 RX ORDER — AZITHROMYCIN 250 MG/1
TABLET, FILM COATED ORAL
Qty: 4 TABLET | Refills: 0 | Status: SHIPPED | OUTPATIENT
Start: 2020-03-15 | End: 2020-03-19

## 2020-03-15 RX ORDER — AZITHROMYCIN 250 MG/1
500 TABLET, FILM COATED ORAL ONCE
Status: COMPLETED | OUTPATIENT
Start: 2020-03-15 | End: 2020-03-15

## 2020-03-15 RX ORDER — ALBUTEROL SULFATE 90 UG/1
2 AEROSOL, METERED RESPIRATORY (INHALATION) EVERY 4 HOURS PRN
Qty: 1 INHALER | Refills: 0 | Status: SHIPPED | OUTPATIENT
Start: 2020-03-15

## 2020-03-15 RX ORDER — ALBUTEROL SULFATE 90 UG/1
2 AEROSOL, METERED RESPIRATORY (INHALATION) ONCE
Status: COMPLETED | OUTPATIENT
Start: 2020-03-15 | End: 2020-03-15

## 2020-03-15 RX ADMIN — ALBUTEROL SULFATE 5 MG: 2.5 SOLUTION RESPIRATORY (INHALATION) at 19:14

## 2020-03-15 RX ADMIN — ALBUTEROL SULFATE 2 PUFF: 90 AEROSOL, METERED RESPIRATORY (INHALATION) at 19:48

## 2020-03-15 RX ADMIN — AZITHROMYCIN 500 MG: 250 TABLET, FILM COATED ORAL at 19:14

## 2020-03-15 RX ADMIN — IPRATROPIUM BROMIDE 0.5 MG: 0.5 SOLUTION RESPIRATORY (INHALATION) at 19:14

## 2020-03-15 NOTE — ED PROVIDER NOTES
History  Chief Complaint   Patient presents with    Cold Like Symptoms     Day 2 of cough, runny nose and some cough with chest discomfort and sore throat  Denies fevers  Patient is a 51-year-old female with past medical history of anxiety depression hyperlipidemia as well as smoking who presents with a 2 day history of URI symptoms  Patient complained of a sore throat frontal headache as well as a nonproductive cough  Patient does smoke  Did not call her doctor  Did not take anything for it because she thought she would ride it out    Denies any nausea vomiting diarrhea  Denies any recent travel fever  Afebrile here          Prior to Admission Medications   Prescriptions Last Dose Informant Patient Reported? Taking?    ARIPiprazole (ABILIFY) 15 mg tablet   Yes No   Sig: Take 7 5 mg by mouth daily at bedtime   DULoxetine (CYMBALTA) 60 mg delayed release capsule   Yes No   Sig: Take 60 mg by mouth daily at bedtime   DULoxetine HCl (CYMBALTA PO)   Yes No   Sig: Take 30 mg by mouth every morning    HydrOXYzine Pamoate (VISTARIL PO)   Yes No   Sig: Take by mouth   atorvastatin (LIPITOR) 20 mg tablet   Yes No   Sig: Take 20 mg by mouth daily   clonazePAM (KLONOPIN) 1 mg tablet   Yes No   Sig: take 1/2 in the am and 1 tab at pm   clonazePAM (KlonoPIN) 1 mg tablet   Yes No   Sig: Take 0 5 mg by mouth 2 (two) times a day   cyclobenzaprine (FLEXERIL) 10 mg tablet   No No   Sig: Take 1 tablet (10 mg total) by mouth 2 (two) times a day as needed for muscle spasms   hydrOXYzine HCL (ATARAX) 10 mg tablet   Yes No   Sig: Take 15 mg by mouth 3 (three) times a day   hydrOXYzine pamoate (VISTARIL) 25 mg capsule   Yes No   Sig: Take 1 capsule by mouth every 8 (eight) hours   naproxen (NAPROSYN) 500 mg tablet   No No   Sig: Take 1 tablet (500 mg total) by mouth 2 (two) times a day with meals   traZODone (DESYREL) 100 mg tablet   No No   Sig: Take 1 tablet by mouth daily at bedtime as needed for sleep   Patient taking differently: Take 150 mg by mouth daily at bedtime as needed for sleep        Facility-Administered Medications: None       Past Medical History:   Diagnosis Date    Anxiety     Chronic pain     Back Pain    Depression     Hyperlipidemia     Migraine        Past Surgical History:   Procedure Laterality Date    NO PAST SURGERIES      OTHER SURGICAL HISTORY      cyst removed from left axila       History reviewed  No pertinent family history  I have reviewed and agree with the history as documented  E-Cigarette/Vaping    E-Cigarette Use Never User      E-Cigarette/Vaping Substances     Social History     Tobacco Use    Smoking status: Current Every Day Smoker     Packs/day: 0 25     Types: Cigarettes    Smokeless tobacco: Current User   Substance Use Topics    Alcohol use: No    Drug use: No       Review of Systems   Constitutional: Negative  HENT: Positive for sore throat  Eyes: Negative  Respiratory: Positive for cough  Cardiovascular: Negative  Gastrointestinal: Negative  Endocrine: Negative  Genitourinary: Negative  Musculoskeletal: Negative  Skin: Negative  Allergic/Immunologic: Negative  Neurological: Positive for headaches  Hematological: Negative  Psychiatric/Behavioral: Negative  All other systems reviewed and are negative  Physical Exam  Physical Exam   Constitutional: She is oriented to person, place, and time  She appears well-developed and well-nourished  HENT:   Head: Normocephalic  Right Ear: External ear normal    Left Ear: External ear normal    Mouth/Throat: Oropharynx is clear and moist    Eyes: Pupils are equal, round, and reactive to light  Conjunctivae and EOM are normal    Neck: Normal range of motion  Neck supple  Cardiovascular: Normal rate, regular rhythm, normal heart sounds and intact distal pulses  Pulmonary/Chest: Effort normal  She has wheezes  Expiratory wheezes bilateral fields   Abdominal: Soft   Bowel sounds are normal    Musculoskeletal: Normal range of motion  She exhibits no edema, tenderness or deformity  Neurological: She is alert and oriented to person, place, and time  Skin: Skin is warm and dry  Capillary refill takes less than 2 seconds  Psychiatric: She has a normal mood and affect  Her behavior is normal    Nursing note and vitals reviewed  Vital Signs  ED Triage Vitals [03/15/20 1831]   Temperature Pulse Respirations Blood Pressure SpO2   (!) 96 3 °F (35 7 °C) 100 16 112/77 98 %      Temp Source Heart Rate Source Patient Position - Orthostatic VS BP Location FiO2 (%)   Tympanic Monitor Lying Left arm --      Pain Score       8           Vitals:    03/15/20 1831   BP: 112/77   Pulse: 100   Patient Position - Orthostatic VS: Lying         Visual Acuity      ED Medications  Medications   albuterol (PROVENTIL HFA,VENTOLIN HFA) inhaler 2 puff (has no administration in time range)   albuterol inhalation solution 5 mg (5 mg Nebulization Given 3/15/20 1914)   ipratropium (ATROVENT) 0 02 % inhalation solution 0 5 mg (0 5 mg Nebulization Given 3/15/20 1914)   azithromycin (ZITHROMAX) tablet 500 mg (500 mg Oral Given 3/15/20 1914)       Diagnostic Studies  Results Reviewed     None                 No orders to display              Procedures  Procedures         ED Course                                 MDM  Number of Diagnoses or Management Options  Acute bronchitis, unspecified organism:         Disposition  Final diagnoses:   Acute bronchitis, unspecified organism     Time reflects when diagnosis was documented in both MDM as applicable and the Disposition within this note     Time User Action Codes Description Comment    3/15/2020  7:42 PM Jasson Franklin Add [J20 9] Acute bronchitis, unspecified organism       ED Disposition     ED Disposition Condition Date/Time Comment    Discharge Stable Sun Mar 15, 2020  7:42 PM Oma Lucero discharge to home/self care              Follow-up Information     Follow up With Specialties Details Why [address] Guerrero MD Family Medicine   [address]  [phone]            Patient's Medications   Discharge Prescriptions    ALBUTEROL (PROVENTIL HFA,VENTOLIN HFA) 90 MCG/ACT INHALER    Inhale 2 puffs every 4 (four) hours as needed for wheezing       Start Date: 3/15/2020 End Date: --       Order Dose: 2 puffs       Quantity: 1 Inhaler    Refills: 0    AZITHROMYCIN (ZITHROMAX) 250 MG TABLET    Take 1 tablet daily x 4 days       Start Date: 3/15/2020 End Date: 3/19/2020       Order Dose: --       Quantity: 4 tablet    Refills: 0     No discharge procedures on file      PDMP Review     None          ED Provider  Electronically Signed by           Virginia Wood MD  03/15/20 [id number]

## 2020-09-13 ENCOUNTER — HOSPITAL ENCOUNTER (EMERGENCY)
Facility: HOSPITAL | Age: 55
Discharge: HOME | End: 2020-09-13
Attending: EMERGENCY MEDICINE
Payer: MEDICARE

## 2020-09-13 ENCOUNTER — APPOINTMENT (EMERGENCY)
Dept: RADIOLOGY | Facility: HOSPITAL | Age: 55
End: 2020-09-13
Payer: MEDICARE

## 2020-09-13 VITALS
RESPIRATION RATE: 18 BRPM | DIASTOLIC BLOOD PRESSURE: 79 MMHG | BODY MASS INDEX: 30.5 KG/M2 | TEMPERATURE: 97.2 F | SYSTOLIC BLOOD PRESSURE: 154 MMHG | OXYGEN SATURATION: 96 % | WEIGHT: 172.18 LBS | HEART RATE: 105 BPM

## 2020-09-13 DIAGNOSIS — M79.661 BILATERAL CALF PAIN: ICD-10-CM

## 2020-09-13 DIAGNOSIS — M25.551 RIGHT HIP PAIN: Primary | ICD-10-CM

## 2020-09-13 DIAGNOSIS — M79.662 BILATERAL CALF PAIN: ICD-10-CM

## 2020-09-13 LAB
ANION GAP SERPL CALCULATED.3IONS-SCNC: 6 MMOL/L (ref 5–14)
BASOPHILS # BLD AUTO: 0.1 THOUSANDS/ΜL (ref 0–0.1)
BASOPHILS NFR BLD AUTO: 1 % (ref 0–1)
BUN SERPL-MCNC: 11 MG/DL (ref 5–25)
CALCIUM SERPL-MCNC: 9.8 MG/DL (ref 8.4–10.2)
CHLORIDE SERPL-SCNC: 101 MMOL/L (ref 97–108)
CK SERPL-CCNC: 78 U/L (ref 30–135)
CO2 SERPL-SCNC: 28 MMOL/L (ref 22–30)
CREAT SERPL-MCNC: 0.89 MG/DL (ref 0.6–1.2)
EOSINOPHIL # BLD AUTO: 0.4 THOUSAND/ΜL (ref 0–0.4)
EOSINOPHIL NFR BLD AUTO: 5 % (ref 0–6)
ERYTHROCYTE [DISTWIDTH] IN BLOOD BY AUTOMATED COUNT: 14.5 %
GFR SERPL CREATININE-BSD FRML MDRD: 73 ML/MIN/1.73SQ M
GLUCOSE SERPL-MCNC: 102 MG/DL (ref 70–99)
HCT VFR BLD AUTO: 41.3 % (ref 36–46)
HGB BLD-MCNC: 14 G/DL (ref 12–16)
LYMPHOCYTES # BLD AUTO: 2.4 THOUSANDS/ΜL (ref 0.5–4)
LYMPHOCYTES NFR BLD AUTO: 28 % (ref 25–45)
MCH RBC QN AUTO: 31 PG (ref 26–34)
MCHC RBC AUTO-ENTMCNC: 34 G/DL (ref 31–36)
MCV RBC AUTO: 91 FL (ref 80–100)
MONOCYTES # BLD AUTO: 0.6 THOUSAND/ΜL (ref 0.2–0.9)
MONOCYTES NFR BLD AUTO: 7 % (ref 1–10)
NEUTROPHILS # BLD AUTO: 4.9 THOUSANDS/ΜL (ref 1.8–7.8)
NEUTS SEG NFR BLD AUTO: 59 % (ref 45–65)
NT-PROBNP SERPL-MCNC: 111 PG/ML (ref 0–299)
PLATELET # BLD AUTO: 280 THOUSANDS/UL (ref 150–450)
PMV BLD AUTO: 7.4 FL (ref 8.9–12.7)
POTASSIUM SERPL-SCNC: 3.9 MMOL/L (ref 3.6–5)
RBC # BLD AUTO: 4.52 MILLION/UL (ref 4–5.2)
SODIUM SERPL-SCNC: 135 MMOL/L (ref 137–147)
WBC # BLD AUTO: 8.3 THOUSAND/UL (ref 4.5–11)

## 2020-09-13 PROCEDURE — 99283 EMERGENCY DEPT VISIT LOW MDM: CPT

## 2020-09-13 PROCEDURE — 83880 ASSAY OF NATRIURETIC PEPTIDE: CPT | Performed by: PHYSICIAN ASSISTANT

## 2020-09-13 PROCEDURE — 82550 ASSAY OF CK (CPK): CPT | Performed by: PHYSICIAN ASSISTANT

## 2020-09-13 PROCEDURE — 36415 COLL VENOUS BLD VENIPUNCTURE: CPT | Performed by: PHYSICIAN ASSISTANT

## 2020-09-13 PROCEDURE — 73502 X-RAY EXAM HIP UNI 2-3 VIEWS: CPT

## 2020-09-13 PROCEDURE — 85025 COMPLETE CBC W/AUTO DIFF WBC: CPT | Performed by: PHYSICIAN ASSISTANT

## 2020-09-13 PROCEDURE — 80048 BASIC METABOLIC PNL TOTAL CA: CPT | Performed by: PHYSICIAN ASSISTANT

## 2020-09-13 PROCEDURE — 71046 X-RAY EXAM CHEST 2 VIEWS: CPT

## 2020-09-13 PROCEDURE — 99282 EMERGENCY DEPT VISIT SF MDM: CPT | Performed by: PHYSICIAN ASSISTANT

## 2020-09-13 RX ORDER — ACETAMINOPHEN 500 MG
1000 TABLET ORAL EVERY 6 HOURS PRN
Qty: 30 TABLET | Refills: 0 | Status: SHIPPED | OUTPATIENT
Start: 2020-09-13

## 2020-09-13 NOTE — ED PROVIDER NOTES
History  Chief Complaint   Patient presents with    Leg Pain     c/o left hip and leg pain, took a muscle relaxer about 30 min  ago     72-year-old female presents ED for left hip pain and bilateral calf muscle cramping x1 day  Patient denies any injury or trauma  Patient reports that she was sitting at her computer desk last night for about 8 hours, and sits on a chair which her feet do not reach the floor  Patient stated that when she then stood up and began walking around after sitting for extended period of time she felt the onset of her symptoms  She states some improvement in the left hip pain and bilateral calf muscle soreness throughout the day  Patient also endorses bilateral pedal edema 2 days ago that has since completely subsided and improved  She still endorses mild L hip tenderness with ambulation  Patient reports taking a muscle relaxer 30 minutes ago with slight improvement in her symptoms  Denies any fever, chills, numbness, paresthesias, and gait abnormality  History provided by:  Patient   used: No             Prior to Admission Medications   Prescriptions Last Dose Informant Patient Reported? Taking?    ARIPiprazole (ABILIFY) 15 mg tablet   Yes No   Sig: Take 7 5 mg by mouth daily at bedtime   DULoxetine (CYMBALTA) 60 mg delayed release capsule   Yes No   Sig: Take 60 mg by mouth daily at bedtime   DULoxetine HCl (CYMBALTA PO)   Yes No   Sig: Take 30 mg by mouth every morning    HydrOXYzine Pamoate (VISTARIL PO)   Yes No   Sig: Take by mouth   albuterol (PROVENTIL HFA,VENTOLIN HFA) 90 mcg/act inhaler   No No   Sig: Inhale 2 puffs every 4 (four) hours as needed for wheezing   atorvastatin (LIPITOR) 20 mg tablet   Yes No   Sig: Take 20 mg by mouth daily   clonazePAM (KLONOPIN) 1 mg tablet   Yes No   Sig: take 1/2 in the am and 1 tab at pm   clonazePAM (KlonoPIN) 1 mg tablet   Yes No   Sig: Take 0 5 mg by mouth 2 (two) times a day   cyclobenzaprine (FLEXERIL) 10 mg tablet   No No   Sig: Take 1 tablet (10 mg total) by mouth 2 (two) times a day as needed for muscle spasms   hydrOXYzine HCL (ATARAX) 10 mg tablet   Yes No   Sig: Take 15 mg by mouth 3 (three) times a day   hydrOXYzine pamoate (VISTARIL) 25 mg capsule   Yes No   Sig: Take 1 capsule by mouth every 8 (eight) hours   naproxen (NAPROSYN) 500 mg tablet   No No   Sig: Take 1 tablet (500 mg total) by mouth 2 (two) times a day with meals   traZODone (DESYREL) 100 mg tablet   No No   Sig: Take 1 tablet by mouth daily at bedtime as needed for sleep   Patient taking differently: Take 150 mg by mouth daily at bedtime as needed for sleep        Facility-Administered Medications: None       Past Medical History:   Diagnosis Date    Anxiety     Chronic pain     Back Pain    Depression     Hyperlipidemia     Migraine        Past Surgical History:   Procedure Laterality Date    NO PAST SURGERIES      OTHER SURGICAL HISTORY      cyst removed from left axila       History reviewed  No pertinent family history  I have reviewed and agree with the history as documented  E-Cigarette/Vaping    E-Cigarette Use Never User      E-Cigarette/Vaping Substances     Social History     Tobacco Use    Smoking status: Current Every Day Smoker     Packs/day: 0 50     Types: Cigarettes    Smokeless tobacco: Current User   Substance Use Topics    Alcohol use: No    Drug use: Yes     Types: Methamphetamines     Comment: used last night, first time in 30 years       Review of Systems   Constitutional: Negative for chills, diaphoresis, fatigue and fever  HENT: Negative for congestion, rhinorrhea and sore throat  Respiratory: Negative for cough and shortness of breath  Cardiovascular: Negative for chest pain and palpitations  Gastrointestinal: Negative for abdominal pain, constipation, diarrhea, nausea and vomiting  Genitourinary: Negative for difficulty urinating, dysuria and hematuria     Musculoskeletal: Positive for arthralgias (L hip and b/l calf)  Negative for back pain, gait problem, joint swelling, myalgias and neck pain  Skin: Negative for color change, pallor and rash  Neurological: Negative for dizziness, weakness, light-headedness, numbness and headaches  Hematological: Negative for adenopathy  Does not bruise/bleed easily  Psychiatric/Behavioral: Negative for behavioral problems  Physical Exam  Physical Exam  Vitals signs and nursing note reviewed  Constitutional:       General: She is not in acute distress  Appearance: She is well-developed  She is obese  She is not diaphoretic  HENT:      Head: Normocephalic and atraumatic  Right Ear: External ear normal       Left Ear: External ear normal       Nose: Nose normal  No congestion  Mouth/Throat:      Pharynx: No oropharyngeal exudate  Eyes:      General: No scleral icterus  Right eye: No discharge  Left eye: No discharge  Conjunctiva/sclera: Conjunctivae normal       Pupils: Pupils are equal, round, and reactive to light  Cardiovascular:      Rate and Rhythm: Normal rate and regular rhythm  Pulses: Normal pulses  Heart sounds: Normal heart sounds  No murmur  Pulmonary:      Effort: Pulmonary effort is normal  No respiratory distress  Breath sounds: Normal breath sounds  No wheezing or rales  Abdominal:      General: There is no distension  Palpations: Abdomen is soft  There is no mass  Tenderness: There is no abdominal tenderness  There is no right CVA tenderness, left CVA tenderness, guarding or rebound  Hernia: No hernia is present  Musculoskeletal: Normal range of motion  General: No swelling, deformity or signs of injury  Left hip: She exhibits tenderness  She exhibits normal range of motion, normal strength, no swelling, no crepitus, no deformity and no laceration  Right lower leg: No edema  Left lower leg: No edema          Legs:       Comments: Left hip mild TTP   Full range of motion  Sensation to light touch intact equal bilaterally  No calf induration, erythema, edema or deformity  Lower extremity strength 5/5  Distal pulses intact and equal bilaterally  Skin:     General: Skin is warm and dry  Capillary Refill: Capillary refill takes less than 2 seconds  Coloration: Skin is not jaundiced or pale  Findings: No bruising, erythema, lesion or rash  Neurological:      Mental Status: She is alert and oriented to person, place, and time  Sensory: No sensory deficit  Motor: No weakness        Gait: Gait normal    Psychiatric:         Mood and Affect: Mood normal          Behavior: Behavior normal          Vital Signs  ED Triage Vitals   Temperature Pulse Respirations Blood Pressure SpO2   09/13/20 1721 09/13/20 1720 09/13/20 1720 09/13/20 1720 09/13/20 1720   (!) 97 2 °F (36 2 °C) 105 18 154/79 96 %      Temp Source Heart Rate Source Patient Position - Orthostatic VS BP Location FiO2 (%)   09/13/20 1721 09/13/20 1720 09/13/20 1720 09/13/20 1720 --   Tympanic Monitor Sitting Left arm       Pain Score       --                  Vitals:    09/13/20 1720   BP: 154/79   Pulse: 105   Patient Position - Orthostatic VS: Sitting         Visual Acuity      ED Medications  Medications - No data to display    Diagnostic Studies  Results Reviewed     Procedure Component Value Units Date/Time    NT-BNP PRO [[id number]]  (Normal) Collected:  09/13/20 1809    Lab Status:  Final result Specimen:  Blood from Arm, Right Updated:  09/13/20 1843     NT-proBNP 111 pg/mL     CK (with reflex to MB) [[id number]]  (Normal) Collected:  09/13/20 1809    Lab Status:  Final result Specimen:  Blood from Arm, Right Updated:  09/13/20 1833     Total CK 78 U/L     Basic metabolic panel [[id number]]  (Abnormal) Collected:  09/13/20 1809    Lab Status:  Final result Specimen:  Blood from Arm, Right Updated:  09/13/20 1833     Sodium 135 mmol/L      Potassium 3 9 mmol/L      Chloride 101 mmol/L      CO2 28 mmol/L      ANION GAP 6 mmol/L      BUN 11 mg/dL      Creatinine 0 89 mg/dL      Glucose 102 mg/dL      Calcium 9 8 mg/dL      eGFR 73 ml/min/1 73sq m     Narrative:       [employer] guidelines for Chronic Kidney Disease (CKD):     Stage 1 with normal or high GFR (GFR > 90 mL/min/1 73 square meters)    Stage 2 Mild CKD (GFR = 60-89 mL/min/1 73 square meters)    Stage 3A Moderate CKD (GFR = 45-59 mL/min/1 73 square meters)    Stage 3B Moderate CKD (GFR = 30-44 mL/min/1 73 square meters)    Stage 4 Severe CKD (GFR = 15-29 mL/min/1 73 square meters)    Stage 5 End Stage CKD (GFR <15 mL/min/1 73 square meters)  Note: GFR calculation is accurate only with a steady state creatinine    CBC and differential [[id number]]  (Abnormal) Collected:  09/13/20 1809    Lab Status:  Final result Specimen:  Blood from Arm, Right Updated:  09/13/20 1823     WBC 8 30 Thousand/uL      RBC 4 52 Million/uL      Hemoglobin 14 0 g/dL      Hematocrit 41 3 %      MCV 91 fL      MCH 31 0 pg      MCHC 34 0 g/dL      RDW 14 5 %      MPV 7 4 fL      Platelets 078 Thousands/uL      Neutrophils Relative 59 %      Lymphocytes Relative 28 %      Monocytes Relative 7 %      Eosinophils Relative 5 %      Basophils Relative 1 %      Neutrophils Absolute 4 90 Thousands/µL      Lymphocytes Absolute 2 40 Thousands/µL      Monocytes Absolute 0 60 Thousand/µL      Eosinophils Absolute 0 40 Thousand/µL      Basophils Absolute 0 10 Thousands/µL                  XR hip/pelv 2-3 vws left if performed    (Results Pending)   XR chest 2 views    (Results Pending)              Procedures  Procedures         ED Course                                       MDM  Number of Diagnoses or Management Options  Bilateral calf pain: new and requires workup  Right hip pain: new and requires workup  Diagnosis management comments: 60-year-old female presents to the ED for left hip pain, bilateral calf muscle soreness, and bilateral pedal edema  Bilateral pedal edema was 2 days ago and completely subsided prior to arrival in the ED  BNP normal   No auscultated heart murmurs  X-ray of left hip normal   CK normal   Likely cause of patient's symptoms was poor ergonomics of sitting in a chair over 8 hours  Patient advised to sit in chair with feet on the floor she is going to sit for extended periods of time  Patient advised to ambulate frequently well sitting promote good perfusion  Patient provided Tylenol as needed  Patient about follow-up with primary care doctor  Patient stable at discharge  Vital signs stable at discharge  Discussed the results of labs and imaging to include all significant and non-significant findings, and the need for any follow-up imaging or care  Discussed the most likely cause of current symptoms  Discussed in detail any red flag signs and symptoms that would require immediate follow-up care in the emergency room  Instructed to follow-up with primary care provider for reevaluation  Discussed all prescribed medications to include doses, use, and route  Patient was satisfied with discharge plan and was provided the opportunity to inquire about any questions or concerns regarding ED visit          Amount and/or Complexity of Data Reviewed  Clinical lab tests: ordered and reviewed  Tests in the radiology section of CPT®: ordered and reviewed  Review and summarize past medical records: yes  Discuss the patient with other providers: yes  Independent visualization of images, tracings, or specimens: yes    Risk of Complications, Morbidity, and/or Mortality  Presenting problems: moderate  Diagnostic procedures: moderate  Management options: moderate    Patient Progress  Patient progress: stable      Disposition  Final diagnoses:   Right hip pain   Bilateral calf pain     Time reflects when diagnosis was documented in both MDM as applicable and the Disposition within this note     Time User Action Codes Description Comment    9/13/2020  7:11 PM Luther Elizabethr Add [F69 654] Right hip pain     9/13/2020  7:13 PM Lutherjose Johnsonr Add [F76 [id number],  T15 083] Bilateral calf pain       ED Disposition     ED Disposition Condition Date/Time Comment    Discharge Stable Sun Sep 13, 2020  7:11 PM Vasquez Orta discharge to home/self care  Follow-up Information    None         Discharge Medication List as of 9/13/2020  7:14 PM      START taking these medications    Details   acetaminophen (TYLENOL) 500 mg tablet Take 2 tablets (1,000 mg total) by mouth every 6 (six) hours as needed for mild pain or moderate pain, Starting Sun 9/13/2020, Normal         CONTINUE these medications which have NOT CHANGED    Details   albuterol (PROVENTIL HFA,VENTOLIN HFA) 90 mcg/act inhaler Inhale 2 puffs every 4 (four) hours as needed for wheezing, Starting Sun 3/15/2020, Normal      ARIPiprazole (ABILIFY) 15 mg tablet Take 7 5 mg by mouth daily at bedtime, Historical Med      atorvastatin (LIPITOR) 20 mg tablet Take 20 mg by mouth daily, Historical Med      !! clonazePAM (KlonoPIN) 1 mg tablet Take 0 5 mg by mouth 2 (two) times a day, Historical Med      !! clonazePAM (KLONOPIN) 1 mg tablet take 1/2 in the am and 1 tab at pm, Historical Med      cyclobenzaprine (FLEXERIL) 10 mg tablet Take 1 tablet (10 mg total) by mouth 2 (two) times a day as needed for muscle spasms, Starting Mon 1/20/2020, Print      !! DULoxetine (CYMBALTA) 60 mg delayed release capsule Take 60 mg by mouth daily at bedtime, Historical Med      !! DULoxetine HCl (CYMBALTA PO) Take 30 mg by mouth every morning , Historical Med      hydrOXYzine HCL (ATARAX) 10 mg tablet Take 15 mg by mouth 3 (three) times a day, Historical Med      !!  HydrOXYzine Pamoate (VISTARIL PO) Take by mouth, Historical Med      !! hydrOXYzine pamoate (VISTARIL) 25 mg capsule Take 1 capsule by mouth every 8 (eight) hours, Starting Mon 5/14/2018, Historical Med      naproxen (NAPROSYN) 500 mg tablet Take 1 tablet (500 mg total) by mouth 2 (two) times a day with meals, Starting Mon 1/20/2020, Print      traZODone (DESYREL) 100 mg tablet Take 1 tablet by mouth daily at bedtime as needed for sleep, Starting Thu 12/14/2017, Print       !! - Potential duplicate medications found  Please discuss with provider  No discharge procedures on file      PDMP Review     None          ED Provider  Electronically Signed by           Anushka Carter PA-C  09/13/20 [id number]

## 2020-09-14 NOTE — ED ATTENDING ATTESTATION
I was the attending physician on duty at the time the patient visited the emergency department  The patient was evaluated and dispositioned by the APC  I was personally available for consultation  I am administratively signing the chart after the fact      Neil Lin MD

## 2020-09-24 ENCOUNTER — APPOINTMENT (EMERGENCY)
Dept: RADIOLOGY | Facility: HOSPITAL | Age: 55
End: 2020-09-24
Payer: MEDICARE

## 2020-09-24 ENCOUNTER — HOSPITAL ENCOUNTER (EMERGENCY)
Facility: HOSPITAL | Age: 55
Discharge: HOME | End: 2020-09-24
Attending: EMERGENCY MEDICINE | Admitting: EMERGENCY MEDICINE
Payer: MEDICARE

## 2020-09-24 VITALS
HEART RATE: 102 BPM | RESPIRATION RATE: 20 BRPM | BODY MASS INDEX: 31.13 KG/M2 | DIASTOLIC BLOOD PRESSURE: 73 MMHG | TEMPERATURE: 98.1 F | OXYGEN SATURATION: 97 % | WEIGHT: 175.71 LBS | SYSTOLIC BLOOD PRESSURE: 139 MMHG

## 2020-09-24 DIAGNOSIS — S90.111A CONTUSION OF RIGHT GREAT TOE WITHOUT DAMAGE TO NAIL, INITIAL ENCOUNTER: Primary | ICD-10-CM

## 2020-09-24 PROCEDURE — 99283 EMERGENCY DEPT VISIT LOW MDM: CPT

## 2020-09-24 PROCEDURE — 73660 X-RAY EXAM OF TOE(S): CPT

## 2020-09-24 PROCEDURE — 99282 EMERGENCY DEPT VISIT SF MDM: CPT | Performed by: PHYSICIAN ASSISTANT

## 2020-09-24 RX ORDER — CARISOPRODOL 350 MG/1
350 TABLET ORAL 3 TIMES DAILY
COMMUNITY

## 2020-09-24 NOTE — ED PROVIDER NOTES
History  Chief Complaint   Patient presents with    Toe Pain     pt complains of left great toe pain x2 days after buying a pair of new shoes  toe is noted to be swollen and purple  no loss of sensation  pt does complain of tingling at times     27-year-old female with past medical history of anxiety, depression, hyperlipidemia and migraine presenting for evaluation of right great toe pain  Patient states that she did recently buy and where new shoes which might have caused the pain to the right great toe  She states she also may have stubbed her toe but does not remember  Denies taking anything for the pain  No numbness tingling or weakness  No previous injury  No hx of gout, does see podiatrist           Prior to Admission Medications   Prescriptions Last Dose Informant Patient Reported? Taking?    ARIPiprazole (ABILIFY) 15 mg tablet 9/23/2020 at Unknown time  Yes Yes   Sig: Take 7 5 mg by mouth daily at bedtime   DULoxetine (CYMBALTA) 60 mg delayed release capsule 9/24/2020 at Unknown time  Yes Yes   Sig: Take 60 mg by mouth daily at bedtime   DULoxetine HCl (CYMBALTA PO) Not Taking at Unknown time  Yes No   Sig: Take 30 mg by mouth every morning    HydrOXYzine Pamoate (VISTARIL PO) Not Taking at Unknown time  Yes No   Sig: Take by mouth   acetaminophen (TYLENOL) 500 mg tablet   No No   Sig: Take 2 tablets (1,000 mg total) by mouth every 6 (six) hours as needed for mild pain or moderate pain   albuterol (PROVENTIL HFA,VENTOLIN HFA) 90 mcg/act inhaler Unknown at Unknown time  No No   Sig: Inhale 2 puffs every 4 (four) hours as needed for wheezing   atorvastatin (LIPITOR) 20 mg tablet 9/24/2020 at Unknown time  Yes Yes   Sig: Take 20 mg by mouth daily   carisoprodol (SOMA) 350 mg tablet Unknown at Unknown time  Yes No   Sig: Take 350 mg by mouth 3 (three) times a day   clonazePAM (KLONOPIN) 1 mg tablet 9/24/2020 at Unknown time  Yes Yes   Sig: take 1/2 in the am and 1 tab at pm   clonazePAM (KlonoPIN) 1 mg tablet Not Taking at Unknown time  Yes No   Sig: Take 0 5 mg by mouth 2 (two) times a day   cyclobenzaprine (FLEXERIL) 10 mg tablet Not Taking at Unknown time  No No   Sig: Take 1 tablet (10 mg total) by mouth 2 (two) times a day as needed for muscle spasms   Patient not taking: Reported on 9/24/2020   hydrOXYzine HCL (ATARAX) 10 mg tablet Unknown at Unknown time  Yes No   Sig: Take 25 mg by mouth 3 (three) times a day    hydrOXYzine pamoate (VISTARIL) 25 mg capsule Not Taking at Unknown time  Yes No   Sig: Take 1 capsule by mouth every 8 (eight) hours   naproxen (NAPROSYN) 500 mg tablet Not Taking at Unknown time  No No   Sig: Take 1 tablet (500 mg total) by mouth 2 (two) times a day with meals   Patient not taking: Reported on 9/24/2020   traZODone (DESYREL) 100 mg tablet 9/23/2020 at Unknown time  No Yes   Sig: Take 1 tablet by mouth daily at bedtime as needed for sleep   Patient taking differently: Take 150 mg by mouth daily at bedtime as needed for sleep        Facility-Administered Medications: None       Past Medical History:   Diagnosis Date    Anxiety     Chronic pain     Back Pain    Depression     Hyperlipidemia     Migraine        Past Surgical History:   Procedure Laterality Date    NO PAST SURGERIES      OTHER SURGICAL HISTORY      cyst removed from left axila       History reviewed  No pertinent family history  I have reviewed and agree with the history as documented  E-Cigarette/Vaping    E-Cigarette Use Never User      E-Cigarette/Vaping Substances     Social History     Tobacco Use    Smoking status: Current Every Day Smoker     Packs/day: 0 50     Types: Cigarettes    Smokeless tobacco: Current User   Substance Use Topics    Alcohol use: No    Drug use: Yes     Types: Methamphetamines     Comment: social meth user        Review of Systems   All other systems reviewed and are negative  Physical Exam  Physical Exam  Vitals signs and nursing note reviewed     Constitutional: General: She is not in acute distress  Appearance: She is well-developed  HENT:      Head: Normocephalic and atraumatic  Eyes:      Conjunctiva/sclera: Conjunctivae normal       Comments: EOM grossly intact   Neck:      Musculoskeletal: Normal range of motion and neck supple  Vascular: No JVD  Cardiovascular:      Rate and Rhythm: Normal rate  Pulmonary:      Effort: Pulmonary effort is normal    Abdominal:      Palpations: Abdomen is soft  Musculoskeletal:        Feet:       Comments: FROM, steady gait, cap refill brisk, strength and sensation grossly intact throughout   Skin:     General: Skin is warm and dry  Capillary Refill: Capillary refill takes less than 2 seconds  Neurological:      Mental Status: She is alert and oriented to person, place, and time  Psychiatric:         Behavior: Behavior normal          Vital Signs  ED Triage Vitals [09/24/20 1612]   Temperature Pulse Respirations Blood Pressure SpO2   98 1 °F (36 7 °C) 102 20 139/73 97 %      Temp Source Heart Rate Source Patient Position - Orthostatic VS BP Location FiO2 (%)   Tympanic Monitor Lying Left arm --      Pain Score       --           Vitals:    09/24/20 1612   BP: 139/73   Pulse: 102   Patient Position - Orthostatic VS: Lying         Visual Acuity      ED Medications  Medications - No data to display    Diagnostic Studies  Results Reviewed     None                 XR toe great min 2 views RIGHT   Final Result by Jane Amor MD (09/24 1650)      No acute osseous abnormality  Workstation performed: [id number]                    Procedures  Procedures         ED Course                           SBIRT 20yo+      Most Recent Value   SBIRT (22 yo +)   In order to provide better care to our patients, we are screening all of our patients for alcohol and drug use  Would it be okay to ask you these screening questions? Yes Filed at: 09/24/2020 1622   Initial Alcohol Screen: US AUDIT-C    1   How often do you have a drink containing alcohol?  0 Filed at: 09/24/2020 1622   2  How many drinks containing alcohol do you have on a typical day you are drinking? 0 Filed at: 09/24/2020 1622   3a  Male UNDER 65: How often do you have five or more drinks on one occasion? 0 Filed at: 09/24/2020 1622   3b  FEMALE Any Age, or MALE 65+: How often do you have 4 or more drinks on one occassion? 0 Filed at: 09/24/2020 1622   Audit-C Score  0 Filed at: 09/24/2020 1622   MANOJ: How many times in the past year have you    Used an illegal drug or used a prescription medication for non-medical reasons? Monthly Filed at: 09/24/2020 1622   DAST-10: In the past 12 months      1  Have you used drugs other than those required for medical reasons? 1 Filed at: 09/24/2020 1622   2  Do you use more than one drug at a time? 0 Filed at: 09/24/2020 1622   3  Have you had medical problems as a result of your drug use (e g , memory loss, hepatitis, convulsions, bleeding, etc )? 0 Filed at: 09/24/2020 1622   4  Have you had "blackouts" or "flashbacks" as a result of drug use? YesNo  0 Filed at: 09/24/2020 1622   5  Do you ever feel bad or guilty about your drug use? 0 Filed at: 09/24/2020 1622   6  Does your spouse (or parent) ever complain about your involvement with drugs? 0 Filed at: 09/24/2020 1622   7  Have you neglected your family because of your use of drugs? 0 Filed at: 09/24/2020 1622   8  Have you engaged in illegal activities in order to obtain drugs? 0 Filed at: 09/24/2020 1622   9  Have you ever experienced withdrawal symptoms (felt sick) when you stopped taking drugs? 0 Filed at: 09/24/2020 1622   10   Are you always able to stop using drugs when you want to?  0 Filed at: 09/24/2020 1622   DAST-10 Score  1 Filed at: 09/24/2020 1622                  MDM  Number of Diagnoses or Management Options  Contusion of right great toe without damage to nail, initial encounter:   Diagnosis management comments: 59-year-old female presenting for evaluation of R great toe pain, pt unsure if it was new shoes she wore or she may have stubbed her toe, pt has ecchymosis at the base of the nail, no gross deformity, no drainage or erythema, no fx visualized on xray, no hx of gout, sensation intact, offered post op shoe but she declined, distally neurovascularly intact, f/u with pcp as an outpatient    All imaging discussed with patient, strict return to ED precautions discussed  Pt verbalizes understanding and agrees with plan  Pt is stable for discharge    Portions of the record may have been created with voice recognition software  Occasional wrong word or "sound a like" substitutions may have occurred due to the inherent limitations of voice recognition software  Read the chart carefully and recognize, using context, where substitutions have occurred  Disposition  Final diagnoses:   Contusion of right great toe without damage to nail, initial encounter     Time reflects when diagnosis was documented in both MDM as applicable and the Disposition within this note     Time User Action Codes Description Comment    9/24/2020  4:53 PM Shaun CROSS Add [C00 111A] Contusion of right great toe without damage to nail, initial encounter       ED Disposition     ED Disposition Condition Date/Time Comment    Discharge Stable Thu Sep 24, 2020  4:52 PM Celestine Bales discharge to home/self care              Follow-up Information     Follow up With Specialties Details Why [id number] Guerrero MD Family Medicine Call in 1 day  [address] DPM Podiatry, [address] [facility]  Call in 1 day  Shayna Marroquin [address]  [address] [place]  [facility] [place] Emergency Department Emergency Medicine Go to  If symptoms worsen [address] [address] [phone]          Patient's Medications   Discharge Prescriptions    No medications on file No discharge procedures on file      PDMP Review     None          ED Provider  Electronically Signed by           Monik Alvarez PA-C  09/24/20 [id number]

## 2020-12-12 ENCOUNTER — TELEPHONE (OUTPATIENT)
Dept: OTHER | Facility: OTHER | Age: 55
End: 2020-12-12

## 2021-02-04 ENCOUNTER — APPOINTMENT (EMERGENCY)
Dept: CT IMAGING | Facility: HOSPITAL | Age: 56
End: 2021-02-04
Payer: MEDICARE

## 2021-02-04 ENCOUNTER — HOSPITAL ENCOUNTER (OUTPATIENT)
Facility: HOSPITAL | Age: 56
Setting detail: OBSERVATION
Discharge: HOME | End: 2021-02-05
Attending: EMERGENCY MEDICINE | Admitting: INTERNAL MEDICINE
Payer: MEDICARE

## 2021-02-04 DIAGNOSIS — R47.9 SPEECH ABNORMALITY: ICD-10-CM

## 2021-02-04 DIAGNOSIS — M54.41 ACUTE BILATERAL LOW BACK PAIN WITH BILATERAL SCIATICA: ICD-10-CM

## 2021-02-04 DIAGNOSIS — R41.82 AMS (ALTERED MENTAL STATUS): Primary | ICD-10-CM

## 2021-02-04 DIAGNOSIS — R26.9 GAIT ABNORMALITY: ICD-10-CM

## 2021-02-04 DIAGNOSIS — J33.9 NASAL POLYPS: ICD-10-CM

## 2021-02-04 DIAGNOSIS — R29.90 STROKE-LIKE SYMPTOMS: ICD-10-CM

## 2021-02-04 DIAGNOSIS — R68.89 FORGETFULNESS: ICD-10-CM

## 2021-02-04 DIAGNOSIS — M54.42 ACUTE BILATERAL LOW BACK PAIN WITH BILATERAL SCIATICA: ICD-10-CM

## 2021-02-04 DIAGNOSIS — M54.9 CHRONIC BACK PAIN: ICD-10-CM

## 2021-02-04 DIAGNOSIS — G89.29 CHRONIC BACK PAIN: ICD-10-CM

## 2021-02-04 PROBLEM — M54.50 ACUTE BILATERAL LOW BACK PAIN: Status: ACTIVE | Noted: 2021-02-04

## 2021-02-04 PROBLEM — Z72.0 TOBACCO ABUSE: Status: ACTIVE | Noted: 2021-02-04

## 2021-02-04 LAB
ANION GAP SERPL CALCULATED.3IONS-SCNC: 5 MMOL/L (ref 5–14)
APAP SERPL-MCNC: <10 UG/ML (ref 10–20)
BASOPHILS # BLD AUTO: 0 THOUSANDS/ΜL (ref 0–0.1)
BASOPHILS NFR BLD AUTO: 1 % (ref 0–1)
BUN SERPL-MCNC: 12 MG/DL (ref 5–25)
CALCIUM SERPL-MCNC: 9.2 MG/DL (ref 8.4–10.2)
CHLORIDE SERPL-SCNC: 103 MMOL/L (ref 97–108)
CO2 SERPL-SCNC: 31 MMOL/L (ref 22–30)
CREAT SERPL-MCNC: 0.97 MG/DL (ref 0.6–1.2)
EOSINOPHIL # BLD AUTO: 0.2 THOUSAND/ΜL (ref 0–0.4)
EOSINOPHIL NFR BLD AUTO: 3 % (ref 0–6)
ERYTHROCYTE [DISTWIDTH] IN BLOOD BY AUTOMATED COUNT: 14.3 %
ETHANOL SERPL-MCNC: <10 MG/DL (ref 0–10)
GFR SERPL CREATININE-BSD FRML MDRD: 66 ML/MIN/1.73SQ M
GLUCOSE SERPL-MCNC: 119 MG/DL (ref 70–99)
HCT VFR BLD AUTO: 38.4 % (ref 36–46)
HGB BLD-MCNC: 12.7 G/DL (ref 12–16)
LYMPHOCYTES # BLD AUTO: 1.7 THOUSANDS/ΜL (ref 0.5–4)
LYMPHOCYTES NFR BLD AUTO: 23 % (ref 25–45)
MCH RBC QN AUTO: 31.3 PG (ref 26–34)
MCHC RBC AUTO-ENTMCNC: 33 G/DL (ref 31–36)
MCV RBC AUTO: 95 FL (ref 80–100)
MONOCYTES # BLD AUTO: 0.4 THOUSAND/ΜL (ref 0.2–0.9)
MONOCYTES NFR BLD AUTO: 6 % (ref 1–10)
NEUTROPHILS # BLD AUTO: 4.8 THOUSANDS/ΜL (ref 1.8–7.8)
NEUTS SEG NFR BLD AUTO: 67 % (ref 45–65)
PLATELET # BLD AUTO: 226 THOUSANDS/UL (ref 150–450)
PMV BLD AUTO: 7.6 FL (ref 8.9–12.7)
POTASSIUM SERPL-SCNC: 4.3 MMOL/L (ref 3.6–5)
RBC # BLD AUTO: 4.05 MILLION/UL (ref 4–5.2)
SALICYLATES SERPL-MCNC: <1 MG/DL (ref 10–30)
SODIUM SERPL-SCNC: 139 MMOL/L (ref 137–147)
TROPONIN I SERPL-MCNC: <0.01 NG/ML (ref 0–0.03)
TSH SERPL DL<=0.05 MIU/L-ACNC: 1.16 UIU/ML (ref 0.47–4.68)
WBC # BLD AUTO: 7.3 THOUSAND/UL (ref 4.5–11)

## 2021-02-04 PROCEDURE — 84443 ASSAY THYROID STIM HORMONE: CPT | Performed by: PHYSICIAN ASSISTANT

## 2021-02-04 PROCEDURE — 82077 ASSAY SPEC XCP UR&BREATH IA: CPT | Performed by: PHYSICIAN ASSISTANT

## 2021-02-04 PROCEDURE — 84484 ASSAY OF TROPONIN QUANT: CPT | Performed by: PHYSICIAN ASSISTANT

## 2021-02-04 PROCEDURE — 99285 EMERGENCY DEPT VISIT HI MDM: CPT | Performed by: PHYSICIAN ASSISTANT

## 2021-02-04 PROCEDURE — 70496 CT ANGIOGRAPHY HEAD: CPT

## 2021-02-04 PROCEDURE — 70498 CT ANGIOGRAPHY NECK: CPT

## 2021-02-04 PROCEDURE — 36415 COLL VENOUS BLD VENIPUNCTURE: CPT | Performed by: PHYSICIAN ASSISTANT

## 2021-02-04 PROCEDURE — 99220 PR INITIAL OBSERVATION CARE/DAY 70 MINUTES: CPT | Performed by: PHYSICIAN ASSISTANT

## 2021-02-04 PROCEDURE — 80179 DRUG ASSAY SALICYLATE: CPT | Performed by: PHYSICIAN ASSISTANT

## 2021-02-04 PROCEDURE — 80143 DRUG ASSAY ACETAMINOPHEN: CPT | Performed by: PHYSICIAN ASSISTANT

## 2021-02-04 PROCEDURE — 80048 BASIC METABOLIC PNL TOTAL CA: CPT | Performed by: PHYSICIAN ASSISTANT

## 2021-02-04 PROCEDURE — 93005 ELECTROCARDIOGRAM TRACING: CPT

## 2021-02-04 PROCEDURE — 85025 COMPLETE CBC W/AUTO DIFF WBC: CPT | Performed by: PHYSICIAN ASSISTANT

## 2021-02-04 PROCEDURE — 99285 EMERGENCY DEPT VISIT HI MDM: CPT

## 2021-02-04 RX ORDER — ASPIRIN 325 MG
325 TABLET ORAL ONCE
Status: COMPLETED | OUTPATIENT
Start: 2021-02-04 | End: 2021-02-04

## 2021-02-04 RX ORDER — DULOXETIN HYDROCHLORIDE 60 MG/1
60 CAPSULE, DELAYED RELEASE ORAL
Status: DISCONTINUED | OUTPATIENT
Start: 2021-02-04 | End: 2021-02-05 | Stop reason: HOSPADM

## 2021-02-04 RX ORDER — HYDROXYZINE HYDROCHLORIDE 10 MG/1
10 TABLET, FILM COATED ORAL 3 TIMES DAILY
Status: DISCONTINUED | OUTPATIENT
Start: 2021-02-04 | End: 2021-02-05 | Stop reason: HOSPADM

## 2021-02-04 RX ORDER — ALBUTEROL SULFATE 90 UG/1
2 AEROSOL, METERED RESPIRATORY (INHALATION) EVERY 4 HOURS PRN
Status: DISCONTINUED | OUTPATIENT
Start: 2021-02-04 | End: 2021-02-05 | Stop reason: HOSPADM

## 2021-02-04 RX ORDER — CLONAZEPAM 0.5 MG/1
0.5 TABLET ORAL DAILY
Status: DISCONTINUED | OUTPATIENT
Start: 2021-02-05 | End: 2021-02-05 | Stop reason: HOSPADM

## 2021-02-04 RX ORDER — CLONAZEPAM 1 MG/1
1 TABLET ORAL
Status: DISCONTINUED | OUTPATIENT
Start: 2021-02-04 | End: 2021-02-05 | Stop reason: HOSPADM

## 2021-02-04 RX ORDER — NICOTINE 21 MG/24HR
1 PATCH, TRANSDERMAL 24 HOURS TRANSDERMAL
Status: DISCONTINUED | OUTPATIENT
Start: 2021-02-04 | End: 2021-02-05 | Stop reason: HOSPADM

## 2021-02-04 RX ORDER — CARISOPRODOL 350 MG/1
350 TABLET ORAL 3 TIMES DAILY
Status: DISCONTINUED | OUTPATIENT
Start: 2021-02-04 | End: 2021-02-05 | Stop reason: HOSPADM

## 2021-02-04 RX ORDER — ATORVASTATIN CALCIUM 40 MG/1
40 TABLET, FILM COATED ORAL EVERY EVENING
Status: DISCONTINUED | OUTPATIENT
Start: 2021-02-04 | End: 2021-02-05 | Stop reason: HOSPADM

## 2021-02-04 RX ORDER — ASPIRIN 81 MG/1
81 TABLET, CHEWABLE ORAL DAILY
Status: DISCONTINUED | OUTPATIENT
Start: 2021-02-05 | End: 2021-02-05 | Stop reason: HOSPADM

## 2021-02-04 RX ORDER — ARIPIPRAZOLE 10 MG/1
10 TABLET ORAL
Status: DISCONTINUED | OUTPATIENT
Start: 2021-02-04 | End: 2021-02-05 | Stop reason: HOSPADM

## 2021-02-04 RX ADMIN — NICOTINE 1 PATCH: 14 PATCH, EXTENDED RELEASE TRANSDERMAL at 22:31

## 2021-02-04 RX ADMIN — ASPIRIN 325 MG ORAL TABLET 325 MG: 325 PILL ORAL at 22:31

## 2021-02-04 RX ADMIN — IOHEXOL 100 ML: 350 INJECTION, SOLUTION INTRAVENOUS at 19:22

## 2021-02-04 RX ADMIN — ATORVASTATIN CALCIUM 40 MG: 40 TABLET, FILM COATED ORAL at 22:31

## 2021-02-04 NOTE — ED PROVIDER NOTES
History  Chief Complaint   Patient presents with    Back Pain     Hx of Sciatic pain, lower back and radiates down BLE  Patient has not informed her PCP  Current pain began last night, states when this happens her legs feel weak  Patient is a 53 y/o female with hx of sciatica back pain, anxiety/depression, HLD, presents to the ED via EMS for evaluation  Per patient she has sciatica lower back pain  Per EMS, patient coming in with lower back pain  Per daughter, patient will state she is only there for back pain  Daughter states she had called EMS today because patient has been "shuffling" loss of balance, and been having difficulty ambulating, speech has changed and sounding like a "drunk person" and memory loss "was supposed to watch my kids today while I was at the grocery store and didn't even know I was gone for hours", patient constantly sleeping as well over the past 2 weeks, forgetting she leaves the oven on  Daughter noticed these changes over the past 2 days  Pt states she was told by her family that she has been more forgetful, tired and slurring her speech  Pt denies ETOH or drug use  Pt states she does take trazadone, soma and klonopin which may be giving her these sx  Pt without fever, headache, vision changes, focal weakness, sensory deficit, facial asymmetry, cough, SOB, chest pain, abdominal pain  History provided by:  EMS personnel and relative (daughter Ivan Servin)      Prior to Admission Medications   Prescriptions Last Dose Informant Patient Reported? Taking?    ARIPiprazole (ABILIFY) 15 mg tablet 2/3/2021 at Unknown time  Yes Yes   Sig: Take 7 5 mg by mouth daily at bedtime   DULoxetine (CYMBALTA) 60 mg delayed release capsule 2/3/2021 at Unknown time  Yes Yes   Sig: Take 60 mg by mouth daily at bedtime   acetaminophen (TYLENOL) 500 mg tablet 2/3/2021 at Unknown time  No Yes   Sig: Take 2 tablets (1,000 mg total) by mouth every 6 (six) hours as needed for mild pain or moderate pain albuterol (PROVENTIL HFA,VENTOLIN HFA) 90 mcg/act inhaler 2/3/2021 at Unknown time  No Yes   Sig: Inhale 2 puffs every 4 (four) hours as needed for wheezing   atorvastatin (LIPITOR) 20 mg tablet   Yes No   Sig: Take 20 mg by mouth daily   carisoprodol (SOMA) 350 mg tablet 2/3/2021 at Unknown time  Yes Yes   Sig: Take 350 mg by mouth 3 (three) times a day   clonazePAM (KlonoPIN) 1 mg tablet 2/3/2021 at Unknown time  Yes Yes   Sig: Take 0 5 mg by mouth 2 (two) times a day   hydrOXYzine HCL (ATARAX) 10 mg tablet 2/3/2021 at Unknown time  Yes Yes   Sig: Take 25 mg by mouth 3 (three) times a day    hydrOXYzine pamoate (VISTARIL) 25 mg capsule 2/3/2021 at Unknown time  Yes Yes   Sig: Take 1 capsule by mouth every 8 (eight) hours   traZODone (DESYREL) 100 mg tablet   No No   Sig: Take 1 tablet by mouth daily at bedtime as needed for sleep   Patient taking differently: Take 150 mg by mouth daily at bedtime as needed for sleep        Facility-Administered Medications: None       Past Medical History:   Diagnosis Date    Anxiety     Chronic pain     Back Pain    Depression     Hyperlipidemia     Migraine        Past Surgical History:   Procedure Laterality Date    OTHER SURGICAL HISTORY      cyst removed from left axila       Family History   Problem Relation Age of Onset    Heart failure Mother     Heart disease Father      I have reviewed and agree with the history as documented  E-Cigarette/Vaping    E-Cigarette Use Never User      E-Cigarette/Vaping Substances     Social History     Tobacco Use    Smoking status: Current Every Day Smoker     Packs/day: 0 25     Types: Cigarettes    Smokeless tobacco: Current User   Substance Use Topics    Alcohol use: Never     Frequency: Never    Drug use: Not Currently     Types: Methamphetamines     Comment: reports last use years ago       Review of Systems   Constitutional: Positive for activity change and fatigue  Negative for chills and fever     HENT: Negative for ear pain and sore throat  Eyes: Negative for pain and visual disturbance  Respiratory: Negative for chest tightness and shortness of breath  Cardiovascular: Negative for chest pain, palpitations and leg swelling  Gastrointestinal: Negative for abdominal pain, diarrhea, nausea and vomiting  Genitourinary: Negative for dysuria, flank pain and hematuria  Musculoskeletal: Positive for back pain and gait problem  Negative for neck pain  Skin: Negative for rash  Neurological: Positive for speech difficulty and weakness  Negative for dizziness, tremors, seizures, syncope, facial asymmetry, light-headedness, numbness and headaches  Psychiatric/Behavioral: Positive for confusion  Physical Exam  Physical Exam  Constitutional:       General: She is not in acute distress  Appearance: She is well-developed  She is not ill-appearing or toxic-appearing  HENT:      Head: Normocephalic and atraumatic  Right Ear: External ear normal       Left Ear: External ear normal       Nose: Nose normal       Mouth/Throat:      Pharynx: Uvula midline  Eyes:      Conjunctiva/sclera: Conjunctivae normal       Pupils: Pupils are equal, round, and reactive to light  Neck:      Musculoskeletal: Normal range of motion and neck supple  Cardiovascular:      Rate and Rhythm: Normal rate and regular rhythm  Pulmonary:      Effort: Pulmonary effort is normal  No respiratory distress  Breath sounds: Normal breath sounds  No stridor  No decreased breath sounds, wheezing, rhonchi or rales  Musculoskeletal:      Lumbar back: She exhibits decreased range of motion and tenderness (right SI pain)  She exhibits no bony tenderness (no midline)  Skin:     General: Skin is warm and dry  Capillary Refill: Capillary refill takes less than 2 seconds  Findings: No rash  Neurological:      Mental Status: She is alert and oriented to person, place, and time  GCS: GCS eye subscore is 4   GCS verbal subscore is 5  GCS motor subscore is 6  Sensory: Sensation is intact  Motor: Motor function is intact  No weakness  Coordination: Coordination is intact  Romberg sign negative  Coordination normal  Finger-Nose-Finger Test and Heel to NIX [facility] Test normal       Gait: Gait abnormal (shuffles)  Deep Tendon Reflexes: Reflexes are normal and symmetric  Reflex Scores:       Patellar reflexes are 2+ on the right side and 2+ on the left side  Comments: GCS 15  AAOx4  PERRL  EOMI  No pronator drift   strength 5/5 bilaterally  B/L UE strength 5/5 throughout  Finger to nose normal  B/L LE strength 5/5 throughout   Gross sensation to b/l upper and lower extremities intact        Psychiatric:         Behavior: Behavior normal          Vital Signs  ED Triage Vitals [02/04/21 1746]   Temperature Pulse Respirations Blood Pressure SpO2   98 °F (36 7 °C) 94 19 114/71 97 %      Temp Source Heart Rate Source Patient Position - Orthostatic VS BP Location FiO2 (%)   Tympanic Monitor Sitting Left arm --      Pain Score       7           Vitals:    02/05/21 0430 02/05/21 0630 02/05/21 0731 02/05/21 1312   BP: 116/82 114/60 112/75 108/63   Pulse: 93 84 84 95   Patient Position - Orthostatic VS: Lying Lying Lying Lying         Visual Acuity  Visual Acuity      Most Recent Value   L Pupil Size (mm)  3   R Pupil Size (mm)  3   L Pupil Shape  Round   R Pupil Shape  Round          ED Medications  Medications   iohexol (OMNIPAQUE) 350 MG/ML injection (SINGLE-DOSE) 100 mL (100 mL Intravenous Given 2/4/21 1922)   aspirin tablet 325 mg (325 mg Oral Given 2/4/21 2231)   acetaminophen (TYLENOL) tablet 650 mg (650 mg Oral Given 2/5/21 1358)       Diagnostic Studies  Results Reviewed     Procedure Component Value Units Date/Time    Hemoglobin A1c w/EAG Estimation [[id number]] Collected: 02/05/21 0505    Lab Status: Final result Specimen: Blood from Arm, Left Updated: 02/05/21 1526     Hemoglobin A1C 5 3 %       mg/dl Lipid Panel with Direct LDL reflex [[id number]]  (Normal) Collected: 02/05/21 0505    Lab Status: Final result Specimen: Blood from Arm, Left Updated: 02/05/21 2485     Cholesterol 161 mg/dL      Triglycerides 69 mg/dL      HDL, Direct 43 mg/dL      LDL Calculated 104 mg/dL     UA w Reflex to Microscopic w Reflex to Culture [[id number]]  (Abnormal) Collected: 02/05/21 0012    Lab Status: Final result Specimen: Urine, Clean Catch Updated: 02/05/21 0033     Color, UA Yellow     Clarity, UA Clear     Specific Gravity, UA 1 015     pH, UA 6 0     Leukocytes, UA Negative     Nitrite, UA Negative     Protein, UA 15 (Trace) mg/dl      Glucose, UA Negative mg/dl      Ketones, UA Negative mg/dl      Bilirubin, UA Negative     Blood, UA 50 0     UROBILINOGEN UA 1 0 mg/dL     TSH [[id number]]  (Normal) Collected: 02/04/21 1823    Lab Status: Final result Specimen: Blood from Arm, Right Updated: 02/04/21 2203     TSH 3RD GENERATON 1 160 uIU/mL     Narrative:      Patients undergoing fluorescein dye angiography may retain small amounts of fluorescein in the body for 48-72 hours post procedure  Samples containing fluorescein can produce falsely depressed TSH values  If the patient had this procedure,a specimen should be resubmitted post fluorescein clearance  Troponin I [[id number]]  (Normal) Collected: 02/04/21 2058    Lab Status: Final result Specimen: Blood from Arm, Right Updated: 02/04/21 2130     Troponin I <0 01 ng/mL     Ethanol [[id number]]  (Normal) Collected: 02/04/21 2058    Lab Status: Final result Specimen: Blood from Arm, Right Updated: 02/04/21 2126     Ethanol Lvl <24 mg/dL     Salicylate level [[id number]]  (Abnormal) Collected: 02/04/21 2058    Lab Status: Final result Specimen: Blood from Arm, Right Updated: 84/00/05 6080     Salicylate Lvl <0 1 mg/dL     Acetaminophen level-If concentration is detectable, please discuss with medical  on call   [[id number]]  (Abnormal) Collected: 02/04/21 9727 Lab Status: Final result Specimen: Blood from Arm, Right Updated: 02/04/21 2126     Acetaminophen Level <10 ug/mL     Basic metabolic panel [[id number]]  (Abnormal) Collected: 02/04/21 1823    Lab Status: Final result Specimen: Blood from Arm, Right Updated: 02/04/21 1842     Sodium 139 mmol/L      Potassium 4 3 mmol/L      Chloride 103 mmol/L      CO2 31 mmol/L      ANION GAP 5 mmol/L      BUN 12 mg/dL      Creatinine 0 97 mg/dL      Glucose 119 mg/dL      Calcium 9 2 mg/dL      eGFR 66 ml/min/1 73sq m     Narrative:      [facility] guidelines for Chronic Kidney Disease (CKD):     Stage 1 with normal or high GFR (GFR > 90 mL/min/1 73 square meters)    Stage 2 Mild CKD (GFR = 60-89 mL/min/1 73 square meters)    Stage 3A Moderate CKD (GFR = 45-59 mL/min/1 73 square meters)    Stage 3B Moderate CKD (GFR = 30-44 mL/min/1 73 square meters)    Stage 4 Severe CKD (GFR = 15-29 mL/min/1 73 square meters)    Stage 5 End Stage CKD (GFR <15 mL/min/1 73 square meters)  Note: GFR calculation is accurate only with a steady state creatinine    CBC and differential [[id number]]  (Abnormal) Collected: 02/04/21 1823    Lab Status: Final result Specimen: Blood from Arm, Right Updated: 02/04/21 1833     WBC 7 30 Thousand/uL      RBC 4 05 Million/uL      Hemoglobin 12 7 g/dL      Hematocrit 38 4 %      MCV 95 fL      MCH 31 3 pg      MCHC 33 0 g/dL      RDW 14 3 %      MPV 7 6 fL      Platelets 441 Thousands/uL      Neutrophils Relative 67 %      Lymphocytes Relative 23 %      Monocytes Relative 6 %      Eosinophils Relative 3 %      Basophils Relative 1 %      Neutrophils Absolute 4 80 Thousands/µL      Lymphocytes Absolute 1 70 Thousands/µL      Monocytes Absolute 0 40 Thousand/µL      Eosinophils Absolute 0 20 Thousand/µL      Basophils Absolute 0 00 Thousands/µL                  MRI brain wo contrast   Final Result by Katelyn Stockton DO (02/05 0914)      White matter changes suggestive of chronic microangiopathy  No acute intracranial pathology  Workstation performed: [id number]         MRI lumbar spine wo contrast   Final Result by [facility],  (02/05 9578)      Mild noncompressive lumbar degenerative change  Workstation performed: [id number]         CTA head and neck with and without contrast   Final Result by Vidhya Denis MD (02/04 2008)      No acute brain parenchymal findings  No large vessel flow limiting stenosis  Possible nasal polyposis  Consider direct visualization on a nonemergent basis  Immediate                  Workstation performed: [id number]                    Procedures  ECG 12 Lead Documentation Only    Date/Time: 2/4/2021 8:56 PM  Performed by: Carine Haque PA-C  Authorized by: Carine Haque PA-C     Indications / Diagnosis:  Ams  ECG reviewed by me, the ED Provider: yes    Patient location:  ED  Previous ECG:     Previous ECG:  Unavailable    Comparison to cardiac monitor: Yes    Interpretation:     Interpretation: normal    Rate:     ECG rate:  79    ECG rate assessment: normal    Rhythm:     Rhythm: sinus rhythm    Ectopy:     Ectopy: none    QRS:     QRS axis:  Normal    QRS intervals:  Normal  Conduction:     Conduction: normal    ST segments:     ST segments:  Normal  T waves:     T waves: normal    Comments:      QT/QTc: 378/433  No STEMI             ED Course  ED Course as of Feb 06 0047   Thu Feb 04, 2021   [address] Spoke with daughter, Jane Munguia, states she called EMS today because patient has been "shuffling" loss of balance, and been having difficulty ambulating, speech has changed and sounding like a "drunk person" and memory loss "was supposed to watch my kids today while I was at the grocery store and didn't even know I was gone for hours", patient constantly sleeping as well over the past 2 weeks, forgetting she leaves the oven on  Daughter noticed these changes over the past 2 days  2111 Discussed with SLIM   We had a detailed discussion of the patient's condition and case, including need for admission   Accepts to Dr Lincoln Maddox service for obs   Bed request/bridging orders placed  SBIRT 22yo+      Most Recent Value   SBIRT (22 yo +)   In order to provide better care to our patients, we are screening all of our patients for alcohol and drug use  Would it be okay to ask you these screening questions? No Filed at: 02/04/2021 1749                    MDM  Number of Diagnoses or Management Options  AMS (altered mental status): new and requires workup  Chronic back pain: established and improving  Forgetfulness: new and requires workup  Gait abnormality: new and requires workup  Nasal polyps: new and requires workup  Speech abnormality: new and requires workup  Diagnosis management comments: Patient is a 55 y/o female with hx of sciatica back pain, anxiety/depression, HLD, presents to the ED via EMS for evaluation  Per patient she has sciatica lower back pain  Per EMS, patient coming in with lower back pain  Per daughter, patient will state she is only there for back pain  Daughter states she had called EMS today because patient has been "shuffling" loss of balance, and been having difficulty ambulating, speech has changed and sounding like a "drunk person" and memory loss "was supposed to watch my kids today while I was at the grocery store and didn't even know I was gone for hours", patient constantly sleeping as well over the past 2 weeks, forgetting she leaves the oven on  Daughter noticed these changes over the past 2 days  Pt states she was told by her family that she has been more forgetful, tired and slurring her speech  Pt denies ETOH or drug use  Pt states she does take trazadone, soma and klonopin which may be giving her these sx  EKG shows no acute ST abnormality  Lab work unremarkable including TSH and troponin   CTA head and neck show: No acute brain parenchymal findings    No large vessel flow limiting stenosis  Possible nasal polyposis  Consider direct visualization on a nonemergent basis  Discussed case with daughter, recommend admission for further workup of neuro complaints and AMS  Pt agreeable for admission to observation  Will admit for stroke pathway  Discussed with SLIM  We had a detailed discussion of the patient's condition and case, including need for admission   Accepts to his/her service   Bed request/bridging orders placed  Disposition  Final diagnoses:   AMS (altered mental status)   Speech abnormality   Gait abnormality   Chronic back pain   Forgetfulness   Nasal polyps     Time reflects when diagnosis was documented in both MDM as applicable and the Disposition within this note     Time User Action Codes Description Comment    2/4/2021  9:10 PM Latasha Grime Add [R41 82] AMS (altered mental status)     2/4/2021  9:10 PM Latasha Grime Add [R47 9] Speech abnormality     2/4/2021  9:10 PM Latasha Grime Add [R26 9] Gait abnormality     2/4/2021  9:10 PM Latasha Grime Add [M54 9,  G89 29] Chronic back pain     2/4/2021  9:10 PM Olena Rascon [R68 89] Forgetfulness     2/4/2021  9:22 PM Marino Blew Add [R29 90] Stroke-like symptoms     2/5/2021  1:14 PM Don Owsley Add [M54 42,  M54 41] Acute bilateral low back pain with bilateral sciatica     2/6/2021 12:44 AM Latasha Grime Add [J33 9] Nasal polyps       ED Disposition     ED Disposition Condition Date/Time Comment    Admit Stable Thu Feb 4, 2021  9:09 PM Case was discussed with MESSI and the patient's admission status was agreed to be Admission Status: observation status to the service of Dr Zulema Noble           Follow-up Information     Follow up With Specialties Details Why Contact Info Additional Osei Colmenares MD Family Medicine Schedule an appointment as soon as possible for a visit in 1 week(s)  [address] [place] [address]   [phone] Lenny Ayala,  Family Medicine   [address] [address]  [place] [address]  [phone]       [place] [address] Neurosurgery Schedule an appointment as soon as possible for a visit in 1 week(s)  [address] [facility] [address] [address]  [facility] [address], [address], [address] [phone]          Discharge Medication List as of 2/5/2021  1:43 PM      START taking these medications    Details   !! acetaminophen (TYLENOL) 325 mg tablet Take 3 tablets (975 mg total) by mouth every 8 (eight) hours, Starting Fri 2/5/2021, Normal       !! - Potential duplicate medications found  Please discuss with provider  CONTINUE these medications which have NOT CHANGED    Details   !! acetaminophen (TYLENOL) 500 mg tablet Take 2 tablets (1,000 mg total) by mouth every 6 (six) hours as needed for mild pain or moderate pain, Starting Sun 9/13/2020, Normal      albuterol (PROVENTIL HFA,VENTOLIN HFA) 90 mcg/act inhaler Inhale 2 puffs every 4 (four) hours as needed for wheezing, Starting Sun 3/15/2020, Normal      ARIPiprazole (ABILIFY) 15 mg tablet Take 7 5 mg by mouth daily at bedtime, Historical Med      carisoprodol (SOMA) 350 mg tablet Take 350 mg by mouth 3 (three) times a day, Historical Med      clonazePAM (KlonoPIN) 1 mg tablet Take 0 5 mg by mouth 2 (two) times a day, Historical Med      DULoxetine (CYMBALTA) 60 mg delayed release capsule Take 60 mg by mouth daily at bedtime, Historical Med      hydrOXYzine HCL (ATARAX) 10 mg tablet Take 25 mg by mouth 3 (three) times a day , Historical Med      atorvastatin (LIPITOR) 20 mg tablet Take 20 mg by mouth daily, Historical Med      traZODone (DESYREL) 100 mg tablet Take 1 tablet by mouth daily at bedtime as needed for sleep, Starting Thu 12/14/2017, Print       !! - Potential duplicate medications found  Please discuss with provider        STOP taking these medications       hydrOXYzine pamoate (VISTARIL) 25 mg capsule Comments:   Reason for Stopping:             Outpatient Discharge Orders   Ambulatory referral to Physical Therapy   Standing Status: Future Standing Exp   Date: 02/05/22      Discharge Diet     Activity as tolerated       PDMP Review     None          ED Provider  Electronically Signed by           Ese Cobos PA-C  02/06/21 7579

## 2021-02-05 ENCOUNTER — APPOINTMENT (OUTPATIENT)
Dept: MRI IMAGING | Facility: HOSPITAL | Age: 56
End: 2021-02-05
Payer: MEDICARE

## 2021-02-05 ENCOUNTER — APPOINTMENT (OUTPATIENT)
Dept: NON INVASIVE DIAGNOSTICS | Facility: HOSPITAL | Age: 56
End: 2021-02-05
Payer: MEDICARE

## 2021-02-05 VITALS
SYSTOLIC BLOOD PRESSURE: 108 MMHG | BODY MASS INDEX: 33.95 KG/M2 | DIASTOLIC BLOOD PRESSURE: 63 MMHG | RESPIRATION RATE: 20 BRPM | HEIGHT: 63 IN | OXYGEN SATURATION: 95 % | WEIGHT: 191.58 LBS | TEMPERATURE: 96.8 F | HEART RATE: 95 BPM

## 2021-02-05 LAB
AMPHETAMINES SERPL QL SCN: NEGATIVE
ATRIAL RATE: 79 BPM
BACTERIA UR QL AUTO: NORMAL /HPF
BARBITURATES UR QL: NEGATIVE
BENZODIAZ UR QL: NEGATIVE
BILIRUB UR QL STRIP: NEGATIVE
CHOLEST SERPL-MCNC: 161 MG/DL
CLARITY UR: CLEAR
COCAINE UR QL: NEGATIVE
COLOR UR: YELLOW
EST. AVERAGE GLUCOSE BLD GHB EST-MCNC: 105 MG/DL
GLUCOSE UR STRIP-MCNC: NEGATIVE MG/DL
HBA1C MFR BLD: 5.3 %
HDLC SERPL-MCNC: 43 MG/DL
HGB UR QL STRIP.AUTO: 50
KETONES UR STRIP-MCNC: NEGATIVE MG/DL
LDLC SERPL CALC-MCNC: 104 MG/DL
LEUKOCYTE ESTERASE UR QL STRIP: NEGATIVE
METHADONE UR QL: NEGATIVE
NITRITE UR QL STRIP: NEGATIVE
NON-SQ EPI CELLS URNS QL MICRO: NORMAL /HPF
OPIATES UR QL SCN: NEGATIVE
OXYCODONE+OXYMORPHONE UR QL SCN: NEGATIVE
P AXIS: 70 DEGREES
PCP UR QL: NEGATIVE
PH UR STRIP.AUTO: 6 [PH]
PR INTERVAL: 168 MS
PROT UR STRIP-MCNC: ABNORMAL MG/DL
QRS AXIS: 75 DEGREES
QRSD INTERVAL: 82 MS
QT INTERVAL: 378 MS
QTC INTERVAL: 433 MS
RBC #/AREA URNS AUTO: NORMAL /HPF
SP GR UR STRIP.AUTO: 1.01 (ref 1–1.04)
T WAVE AXIS: 74 DEGREES
THC UR QL: NEGATIVE
TRIGL SERPL-MCNC: 69 MG/DL
UROBILINOGEN UA: 1 MG/DL
VENTRICULAR RATE: 79 BPM
WBC #/AREA URNS AUTO: NORMAL /HPF

## 2021-02-05 PROCEDURE — 92610 EVALUATE SWALLOWING FUNCTION: CPT

## 2021-02-05 PROCEDURE — 72148 MRI LUMBAR SPINE W/O DYE: CPT

## 2021-02-05 PROCEDURE — 83036 HEMOGLOBIN GLYCOSYLATED A1C: CPT | Performed by: PHYSICIAN ASSISTANT

## 2021-02-05 PROCEDURE — 80061 LIPID PANEL: CPT | Performed by: PHYSICIAN ASSISTANT

## 2021-02-05 PROCEDURE — 93010 ELECTROCARDIOGRAM REPORT: CPT | Performed by: INTERNAL MEDICINE

## 2021-02-05 PROCEDURE — 93306 TTE W/DOPPLER COMPLETE: CPT

## 2021-02-05 PROCEDURE — 70551 MRI BRAIN STEM W/O DYE: CPT

## 2021-02-05 PROCEDURE — G1004 CDSM NDSC: HCPCS

## 2021-02-05 PROCEDURE — 99217 PR OBSERVATION CARE DISCHARGE MANAGEMENT: CPT | Performed by: INTERNAL MEDICINE

## 2021-02-05 PROCEDURE — 81001 URINALYSIS AUTO W/SCOPE: CPT | Performed by: PHYSICIAN ASSISTANT

## 2021-02-05 PROCEDURE — 80307 DRUG TEST PRSMV CHEM ANLYZR: CPT | Performed by: PHYSICIAN ASSISTANT

## 2021-02-05 PROCEDURE — 97163 PT EVAL HIGH COMPLEX 45 MIN: CPT

## 2021-02-05 PROCEDURE — 93306 TTE W/DOPPLER COMPLETE: CPT | Performed by: INTERNAL MEDICINE

## 2021-02-05 PROCEDURE — 81003 URINALYSIS AUTO W/O SCOPE: CPT | Performed by: PHYSICIAN ASSISTANT

## 2021-02-05 PROCEDURE — 97167 OT EVAL HIGH COMPLEX 60 MIN: CPT

## 2021-02-05 RX ORDER — ACETAMINOPHEN 325 MG/1
650 TABLET ORAL ONCE
Status: COMPLETED | OUTPATIENT
Start: 2021-02-05 | End: 2021-02-05

## 2021-02-05 RX ORDER — ACETAMINOPHEN 325 MG/1
975 TABLET ORAL EVERY 8 HOURS SCHEDULED
Qty: 2 TABLET | Refills: 0 | Status: SHIPPED | OUTPATIENT
Start: 2021-02-05

## 2021-02-05 RX ADMIN — ENOXAPARIN SODIUM 40 MG: 40 INJECTION SUBCUTANEOUS at 09:34

## 2021-02-05 RX ADMIN — CLONAZEPAM 1 MG: 1 TABLET ORAL at 00:15

## 2021-02-05 RX ADMIN — HYDROXYZINE HYDROCHLORIDE 10 MG: 10 TABLET ORAL at 09:34

## 2021-02-05 RX ADMIN — CARISOPRODOL 350 MG: 350 TABLET ORAL at 00:14

## 2021-02-05 RX ADMIN — ACETAMINOPHEN 650 MG: 325 TABLET ORAL at 13:58

## 2021-02-05 RX ADMIN — CLONAZEPAM 0.5 MG: 0.5 TABLET ORAL at 09:34

## 2021-02-05 RX ADMIN — ARIPIPRAZOLE 10 MG: 10 TABLET ORAL at 00:14

## 2021-02-05 RX ADMIN — DULOXETINE HYDROCHLORIDE 60 MG: 60 CAPSULE, DELAYED RELEASE ORAL at 00:14

## 2021-02-05 RX ADMIN — CARISOPRODOL 350 MG: 350 TABLET ORAL at 09:34

## 2021-02-05 RX ADMIN — ASPIRIN 81 MG CHEWABLE TABLET 81 MG: 81 TABLET CHEWABLE at 09:34

## 2021-02-05 RX ADMIN — HYDROXYZINE HYDROCHLORIDE 10 MG: 10 TABLET ORAL at 00:14

## 2021-02-05 NOTE — OCCUPATIONAL THERAPY NOTE
Occupational Therapy Evaluation     Patient Name: Romero Martin  VTLBP'U Date: 2/5/2021  Problem List  Principal Problem:    Stroke-like symptoms  Active Problems:    Severe episode of recurrent major depressive disorder, without psychotic features ([address] )    Generalized anxiety disorder    Acute bilateral low back pain with bilateral sciatica    Tobacco abuse    Past Medical History  Past Medical History:   Diagnosis Date    Anxiety     Chronic pain     Back Pain    Depression     Hyperlipidemia     Migraine      Past Surgical History  Past Surgical History:   Procedure Laterality Date    OTHER SURGICAL HISTORY      cyst removed from left axila           02/05/21 0925   OT Last Visit   OT Visit Date 02/05/21   Note Type   Note type Evaluation   Restrictions/Precautions   Weight Bearing Precautions Per Order No   Pain Assessment   Pain Assessment Tool 0-10   Pain Score 7   Pain Location/Orientation Location: Back;Orientation: Lower; Location: Leg;Orientation: Bilateral   Pain Onset/Description Onset: Ongoing   Hospital Pain Intervention(s) Medication (See MAR)   Home Living   Type of [address] [place] [address] Two level;Bed/bath upstairs   Bathroom Shower/Tub Tub/shower unit   Bathroom Toilet Standard   Bathroom Equipment   (none )   Home Equipment   (none )   Prior Function   Level of [place] Independent with ADLs and functional mobility   Lives With [employer] Help From Family   ADL Assistance Independent   IADLs Independent   Comments Pt reports that she is independent with ADLs, does not use AD  Pt does endorse "furniture surfing" over the past month, denies falls  Pt states someone from her family is home with her at all times      Psychosocial   Psychosocial (WDL) WDL   Subjective   Subjective "I"m only here because of my back pain"   ADL   Grooming Assistance 6  Modified Independent   UB Bathing Assistance 6  Modified Independent   LB Bathing Assistance 6  Modified Independent   UB Dressing Assistance 6  Modified independent   LB Dressing Assistance 6  Modified independent   Toileting Assistance  6  Modified independent   Bed Mobility   Supine to Sit 6  Modified independent   Sit to Supine 6  Modified independent   Transfers   Sit to Stand 5  Supervision   Stand to Sit 5  Supervision   Toilet transfer 5  Supervision   Functional Mobility   Functional Mobility 5  Supervision   Additional Comments short distances   Balance   Static Sitting Good   Dynamic Sitting Fair +   Static Standing Fair +   Dynamic Standing Fair +   Ambulatory Fair   Activity Tolerance   Activity Tolerance Patient limited by pain   RUE Assessment   RUE Assessment WFL   LUE Assessment   LUE Assessment WFL   Hand Function   Gross Motor Coordination Functional   Sensation   Light Touch No apparent deficits   Proprioception   Proprioception No apparent deficits   Cognition   Arousal/Participation Alert; Cooperative   Attention Within functional limits   Orientation Level Oriented X4   Memory Decreased short term memory   Following Commands Follows all commands and directions without difficulty   Assessment   Limitation Decreased high-level ADLs   Prognosis Good   Assessment   Pt is a 54 y o  female seen for OT evaluation s/p admit to [facility] on 2/4/2021 w/ Stroke-like symptoms, impaired speech, gait abnormality, and confusion  See above for extensive list of comorbidities affecting Pt's functional performance at time of assessment  Personal factors affecting Pt at time of IE include:limited insight into deficits, health management  and environment  Prior to admission, Pt was independent, did not use AD although does endorse "wall surfing" at times over the past month  Upon evaluation: Pt requires supervision for ambulation and bathroom mobility, no LOB  Pt able to manage toilet hygiene and clothing management in standing  Pt with decreased recall and STM as well as requires increased time for cognitive processing   The following deficits impact occupational performance: impaired memory and impaired problem solving  Pt to benefit from continued skilled OT services while in the hospital to address deficits as defined above and maximize level of functional independence w ADL's and functional mobility  Occupational performance areas to address include: health maintenance, functional mobility, community mobility and clothing management  From OT standpoint, recommendation at time of d/c would be return to previous environment with social support  Recommend Pt have supervision for IADLs including meal prep and medication management initially from family to increase safety and problem-solving effectiveness  Goals   STG Time Frame   (1-3 more sessions )   Short Term Goals 1  Pt will complete 1-step IADL [place]  2  Pt will complete functional ambulation [place]  3  Pt will complete a formal cognitive assessment  Plan   Treatment Interventions Continued evaluation; Activityengagement; Energy conservation   Goal Expiration Date 02/09/21   OT Treatment Day 0   OT Frequency   (1-3 more sessions )   Recommendation   OT Discharge Recommendation Return to previous environment with social support

## 2021-02-05 NOTE — NURSING NOTE
Patient discharged to home, IV removed  Patient given discharge instructions with stated understanding  Patient left unit ambulatory with belongings in stable condition accompanied by PCA

## 2021-02-05 NOTE — PLAN OF CARE
Problem: OCCUPATIONAL THERAPY ADULT  Goal: Performs self-care activities at highest level of function for planned discharge setting  See evaluation for individualized goals  Description: Treatment Interventions: Continued evaluation, Activityengagement, Energy conservation          See flowsheet documentation for full assessment, interventions and recommendations  Note: Limitation: Decreased high-level ADLs  Prognosis: Good  Assessment: Pt is a 54 y o  female seen for OT evaluation s/p admit to [facility] on [facility] w/ Stroke-like symptoms, impaired speech, gait abnormality, and confusion  See above for extensive list of comorbidities affecting Pt's functional performance at time of assessment  Personal factors affecting Pt at time of IE include:limited insight into deficits, health management  and environment  Prior to admission, Pt was independent, did not use AD although does endorse "wall surfing" at times over the past month  Upon evaluation: Pt requires supervision for ambulation and bathroom mobility, no LOB  Pt able to manage toilet hygiene and clothing management in standing  Pt with decreased recall and STM as well as requires increased time for cognitive processing  The following deficits impact occupational performance: impaired memory and impaired problem solving  Pt to benefit from continued skilled OT services while in the hospital to address deficits as defined above and maximize level of functional independence w ADL's and functional mobility  Occupational performance areas to address include: health maintenance, functional mobility, community mobility and clothing management  From OT standpoint, recommendation at time of d/c would be return to previous environment with social support  Recommend Pt have supervision for IADLs including meal prep and medication management initially from family to increase safety and problem-solving effectiveness          OT Discharge Recommendation: Return to previous environment with social support

## 2021-02-05 NOTE — PLAN OF CARE
Problem: Potential for Falls  Goal: Patient will remain free of falls  Description: INTERVENTIONS:  - Assess patient frequently for physical needs  -  Identify cognitive and physical deficits and behaviors that affect risk of falls  -  [place] fall precautions as indicated by assessment   - Educate patient/family on patient safety including physical limitations  - Instruct patient to call for assistance with activity based on assessment  - Modify environment to reduce risk of injury  - Consider OT/PT consult to assist with strengthening/mobility  Outcome: Progressing     Problem: Neurological Deficit  Goal: Neurological status is stable or improving  Description: Interventions:  - Monitor and assess patient's level of consciousness, motor function, sensory function, and level of assistance needed for ADLs  - Monitor and report changes from baseline  Collaborate with interdisciplinary team to initiate plan and implement interventions as ordered  - Provide and maintain a safe environment  - Consider seizure precautions  - Consider fall precautions  - Consider aspiration precautions  - Consider bleeding precautions  Outcome: Progressing     Problem: Activity Intolerance/Impaired Mobility  Goal: Mobility/activity is maintained at optimum level for patient  Description: Interventions:  - Assess and monitor patient  barriers to mobility and need for assistive/adaptive devices  - Assess patient's emotional response to limitations  - Collaborate with interdisciplinary team and initiate plans and interventions as ordered  - Encourage independent activity per ability   - Maintain proper body alignment  - Perform active/passive rom as tolerated/ordered    - Plan activities to conserve energy   - Turn patient as appropriate  Outcome: Progressing     Problem: Communication Impairment  Goal: Ability to express needs and understand communication  Description: Assess patient's communication skills and ability to understand information  Patient will demonstrate use of effective communication techniques, alternative methods of communication and understanding even if not able to speak  - Encourage communication and provide alternate methods of communication as needed  - Collaborate with case management/ for discharge needs  - Include patient/family/caregiver in decisions related to communication  Outcome: Progressing     Problem: Potential for Aspiration  Goal: Non-ventilated patient's risk of aspiration is minimized  Description: Assess and monitor vital signs, respiratory status, and labs (WBC)  Monitor for signs of aspiration (tachypnea, cough, rales, wheezing, cyanosis, fever)  - Assess and monitor patient's ability to swallow  - Place patient up in chair to eat if possible  - HOB up at 90 degrees to eat if unable to get patient up into chair   - Supervise patient during oral intake  - Instruct patient/ family to take small bites  - Instruct patient/ family to take small single sips when taking liquids  - Follow patient-specific strategies generated by speech pathologist   Outcome: Progressing  Goal: Ventilated patient's risk of aspiration is minimized  Description: Assess and monitor vital signs, respiratory status, airway cuff pressure, and labs (WBC)  Monitor for signs of aspiration (tachypnea, cough, rales, wheezing, cyanosis, fever)  - Elevate head of bed 30 degrees if patient has tube feeding   - Monitor tube feeding  Outcome: Progressing     Problem: Nutrition  Goal: Nutrition/Hydration status is improving  Description: Monitor and assess patient's nutrition/hydration status for malnutrition (ex- brittle hair, bruises, dry skin, pale skin and conjunctiva, muscle wasting, smooth red tongue, and disorientation)  Collaborate with interdisciplinary team and initiate plan and interventions as ordered  Monitor patient's weight and dietary intake as ordered or per policy   Utilize nutrition screening tool and intervene per policy  Determine patient's food preferences and provide high-protein, high-caloric foods as appropriate  - Assist patient with eating   - Allow adequate time for meals   - Encourage patient to take dietary supplement as ordered  - Collaborate with clinical nutritionist   - Include patient/family/caregiver in decisions related to nutrition  Outcome: Progressing     Problem: NEUROSENSORY - ADULT  Goal: Achieves stable or improved neurological status  Description: INTERVENTIONS  - Monitor and report changes in neurological status  - Monitor vital signs such as temperature, blood pressure, glucose, and any other labs ordered   - Initiate measures to prevent increased intracranial pressure  - Monitor for seizure activity and implement precautions if appropriate      Outcome: Progressing  Goal: Remains free of injury related to seizures activity  Description: INTERVENTIONS  - Maintain airway, patient safety  and administer oxygen as ordered  - Monitor patient for seizure activity, document and report duration and description of seizure to physician/advanced practitioner  - If seizure occurs,  ensure patient safety during seizure  - Reorient patient post seizure  - Seizure pads on all 4 side rails  - Instruct patient/family to notify RN of any seizure activity including if an aura is experienced  - Instruct patient/family to call for assistance with activity based on nursing assessment  - Administer anti-seizure medications if ordered    Outcome: Progressing  Goal: Achieves maximal functionality and self care  Description: INTERVENTIONS  - Monitor swallowing and airway patency with patient fatigue and changes in neurological status  - Encourage and assist patient to increase activity and self care     - Encourage visually impaired, hearing impaired and aphasic patients to use assistive/communication devices  Outcome: Progressing     Problem: CARDIOVASCULAR - ADULT  Goal: Maintains optimal cardiac output and hemodynamic stability  Description: INTERVENTIONS:  - Monitor I/O, vital signs and rhythm  - Monitor for S/S and trends of decreased cardiac output  - Administer and titrate ordered vasoactive medications to optimize hemodynamic stability  - Assess quality of pulses, skin color and temperature  - Assess for signs of decreased coronary artery perfusion  - Instruct patient to report change in severity of symptoms  Outcome: Progressing  Goal: Absence of cardiac dysrhythmias or at baseline rhythm  Description: INTERVENTIONS:  - Continuous cardiac monitoring, vital signs, obtain 12 lead EKG if ordered  - Administer antiarrhythmic and heart rate control medications as ordered  - Monitor electrolytes and administer replacement therapy as ordered  Outcome: Progressing     Problem: RESPIRATORY - ADULT  Goal: Achieves optimal ventilation and oxygenation  Description: INTERVENTIONS:  - Assess for changes in respiratory status  - Assess for changes in mentation and behavior  - Position to facilitate oxygenation and minimize respiratory effort  - Oxygen administered by appropriate delivery if ordered  - Initiate smoking cessation education as indicated  - Encourage broncho-pulmonary hygiene including cough, deep breathe, Incentive Spirometry  - Assess the need for suctioning and aspirate as needed  - Assess and instruct to report SOB or any respiratory difficulty  - Respiratory Therapy support as indicated  Outcome: Progressing     Problem: GASTROINTESTINAL - ADULT  Goal: Minimal or absence of nausea and/or vomiting  Description: INTERVENTIONS:  - Administer IV fluids if ordered to ensure adequate hydration  - Maintain NPO status until nausea and vomiting are resolved  - Nasogastric tube if ordered  - Administer ordered antiemetic medications as needed  - Provide nonpharmacologic comfort measures as appropriate  - Advance diet as tolerated, if ordered  - Consider nutrition services referral to assist patient with adequate nutrition and appropriate food choices  Outcome: Progressing  Goal: Maintains or returns to baseline bowel function  Description: INTERVENTIONS:  - Assess bowel function  - Encourage oral fluids to ensure adequate hydration  - Administer IV fluids if ordered to ensure adequate hydration  - Administer ordered medications as needed  - Encourage mobilization and activity  - Consider nutritional services referral to assist patient with adequate nutrition and appropriate food choices  Outcome: Progressing  Goal: Maintains adequate nutritional intake  Description: INTERVENTIONS:  - Monitor percentage of each meal consumed  - Identify factors contributing to decreased intake, treat as appropriate  - Assist with meals as needed  - Monitor I&O, weight, and lab values if indicated  - Obtain nutrition services referral as needed  Outcome: Progressing  Goal: Establish and maintain optimal ostomy function  Description: INTERVENTIONS:  - Assess bowel function  - Encourage oral fluids to ensure adequate hydration  - Administer IV fluids if ordered to ensure adequate hydration   - Administer ordered medications as needed  - Encourage mobilization and activity  - Nutrition services referral to assist patient with appropriate food choices  - Assess stoma site  - Consider wound care consult   Outcome: Progressing     Problem: GENITOURINARY - ADULT  Goal: Maintains or returns to baseline urinary function  Description: INTERVENTIONS:  - Assess urinary function  - Encourage oral fluids to ensure adequate hydration if ordered  - Administer IV fluids as ordered to ensure adequate hydration  - Administer ordered medications as needed  - Offer frequent toileting  - Follow urinary retention protocol if ordered  Outcome: Progressing  Goal: Absence of urinary retention  Description: INTERVENTIONS:  - Assess patients ability to void and empty bladder  - Monitor I/O  - Bladder scan as needed  - Discuss with physician/AP medications to alleviate retention as needed  - Discuss catheterization for long term situations as appropriate  Outcome: Progressing  Goal: Urinary catheter remains patent  Description: INTERVENTIONS:  - Assess patency of urinary catheter  - If patient has a chronic desai, consider changing catheter if non-functioning  - Follow guidelines for intermittent irrigation of non-functioning urinary catheter  Outcome: Progressing     Problem: METABOLIC, FLUID AND ELECTROLYTES - ADULT  Goal: Electrolytes maintained within normal limits  Description: INTERVENTIONS:  - Monitor labs and assess patient for signs and symptoms of electrolyte imbalances  - Administer electrolyte replacement as ordered  - Monitor response to electrolyte replacements, including repeat lab results as appropriate  - Instruct patient on fluid and nutrition as appropriate  Outcome: Progressing  Goal: Fluid balance maintained  Description: INTERVENTIONS:  - Monitor labs   - Monitor I/O and WT  - Instruct patient on fluid and nutrition as appropriate  - Assess for signs & symptoms of volume excess or deficit  Outcome: Progressing  Goal: Glucose maintained within target range  Description: INTERVENTIONS:  - Monitor Blood Glucose as ordered  - Assess for signs and symptoms of hyperglycemia and hypoglycemia  - Administer ordered medications to maintain glucose within target range  - Assess nutritional intake and initiate nutrition service referral as needed  Outcome: Progressing     Problem: SKIN/TISSUE INTEGRITY - ADULT  Goal: Skin integrity remains intact  Description: INTERVENTIONS  - Identify patients at risk for skin breakdown  - Assess and monitor skin integrity  - Assess and monitor nutrition and hydration status  - Monitor labs (i e  albumin)  - Assess for incontinence   - Turn and reposition patient  - Assist with mobility/ambulation  - Relieve pressure over bony prominences  - Avoid friction and shearing  - Provide appropriate hygiene as needed including keeping skin clean and dry  - Evaluate need for skin moisturizer/barrier cream  - Collaborate with interdisciplinary team (i e  Nutrition, Rehabilitation, etc )   - Patient/family teaching  Outcome: Progressing  Goal: Incision(s), wounds(s) or drain site(s) healing without S/S of infection  Description: INTERVENTIONS  - Assess and document risk factors for skin impairment   - Assess and document dressing, incision, wound bed, drain sites and surrounding tissue  - Consider nutrition services referral as needed  - Oral mucous membranes remain intact  - Provide patient/ family education  Outcome: Progressing  Goal: Oral mucous membranes remain intact  Description: INTERVENTIONS  - Assess oral mucosa and hygiene practices  - Implement preventative oral hygiene regimen  - Implement oral medicated treatments as ordered  - Initiate Nutrition services referral as needed  Outcome: Progressing     Problem: HEMATOLOGIC - ADULT  Goal: Maintains hematologic stability  Description: INTERVENTIONS  - Assess for signs and symptoms of bleeding or hemorrhage  - Monitor labs  - Administer supportive blood products/factors as ordered and appropriate  Outcome: Progressing     Problem: MUSCULOSKELETAL - ADULT  Goal: Maintain or return mobility to safest level of function  Description: INTERVENTIONS:  - Assess patient's ability to carry out ADLs; assess patient's baseline for ADL function and identify physical deficits which impact ability to perform ADLs (bathing, care of mouth/teeth, toileting, grooming, dressing, etc )  - Assess/evaluate cause of self-care deficits   - Assess range of motion  - Assess patient's mobility  - Assess patient's need for assistive devices and provide as appropriate  - Encourage maximum independence but intervene and supervise when necessary  - Involve family in performance of ADLs  - Assess for home care needs following discharge   - Consider OT consult to assist with ADL evaluation and planning for discharge  - Provide patient education as appropriate  Outcome: Progressing  Goal: Maintain proper alignment of affected body part  Description: INTERVENTIONS:  - Support, maintain and protect limb and body alignment  - Provide patient/ family with appropriate education  Outcome: Progressing

## 2021-02-05 NOTE — INCIDENTAL FINDINGS
The following findings require follow up:  Radiographic finding   Finding:  Possible nasal polyposis   Consider direct visualization on a nonemergent basis   Follow up required: yes   Follow up should be done within 1-2 weeks     Please notify the following clinician to assist with the follow up:   Family doctor

## 2021-02-05 NOTE — H&P
H&P- Rach Blount 1965, 54 y o  female MRN: [id number]    Unit/Bed#: 7T [facility] 702-02 Encounter: [id number]    Primary Care Provider: Diamantina Fothergill, MD   Date and time admitted to hospital: 2/4/2021  5:40 PM        * Stroke-like symptoms  Assessment & Plan  · Patient with gait abnormality with shuffled gait, slurred speech, forgetfulness, loss of balance - possibly medication related  · CTA head and neck with no acute findings  · Will admit under stroke pathway  · MRI brain, echo, PT OT eval for DC needs, tele neuro Dr Camacho Qureshi    Acute bilateral low back pain with bilateral sciatica  Assessment & Plan  · Acute on chronic back pain, initially with right sided radiculopathy now has bilateral radiculopathy from back to knee  · Tried Soma and ibuprofen  · Will check an MRI, patient was seen at [place] early January 2021 for back pain x-rays lumbar: Mild lumbar degenerative disc change, multilevel  No acute bony lumbar finding    Tobacco abuse  Assessment & Plan  · Nicotine patch    Generalized anxiety disorder  Assessment & Plan  · Continue Klonopin    Severe episode of recurrent major depressive disorder, without psychotic features [facility])  Assessment & Plan  · Continue trazodone      TeleMedicine H&P - [facility] Internal Medicine    Patient Information: Rach Blount 54 y o  female MRN: [id number]  Unit/Bed#: 7T [facility] 702-02 Encounter: [id number]  Admitting Physician: Hiwot Cardona PA-C  PCP: Diamantina Fothergill, MD  Date of Admission:  02/04/21    REQUIRED DOCUMENTATION:     1  This service was provided via Telemedicine  2  Provider located at [facility]  3  TeleMed provider: Hiwot Cardona PA-C   4  Identify all parties in room with patient during tele consult:  Celeste Berger RN  5  After connecting through [employer], patient was identified by name and date of birth and assistant checked wristband    Patient was then informed that this was a Telemedicine visit and that the exam was being conducted confidentially over secure lines  My office door was closed  No one else was in the room  Patient acknowledged consent and understanding of privacy and security of the Telemedicine visit, and gave us permission to have the assistant stay in the room in order to assist with the history and to conduct the exam   I informed the patient that I have reviewed their record in Epic and presented the opportunity for them to ask any questions regarding the visit today  The patient agreed to participate  VTE Prophylaxis: Enoxaparin (Lovenox)  / sequential compression device   Code Status: full code  Discussion with patient    Anticipated Length of Stay:  Patient will be admitted on an Observation basis with an anticipated length of stay of  < 2 midnights  Justification for Hospital Stay: stroke work up    Chief Complaint:   Slurred speech, abnormal gait, loss of balance, forgetful, acute on chronic back pain    History of Present Illness:    Shaun Conrad is a 54 y o  female who presents with slurred speech, abnormal gait, loss of balance, forgetful and acute on chronic back pain  Patient with past medical history of chronic back pain, anxiety, depression, hyperlipidemia, migraines was brought in by the daughter secondary to report of acute on chronic back pain, ambulatory dysfunction, shuffling gait, slurred speech, forgetful  ER reported via tiger text that per daughter,  Patient was noted to be more forgetful was leaving the oven on during the night and forgetting she was supposed to watch her grandkids  There was no report of alcohol or drug use however she has prior history of both  Patient did report pain with right leg radiculopathy was taking Soma and ibuprofen yesterday  She also takes Klonopin and trazodone  Patient reports back pain, low back pain that radiates to both buttocks and down to knees  She reports that she started w/ right side yesterday and today has both sides  No loss of bowel or bladder  Has a burning sensation in leg "like someone is taking a sabre to it" Denies heavy lifting, no injuries  She was seen at [facility] ER on 1/3/2021 for back pain  She reports speech feels off  She reports that when she walks down steps her vision is disturbed, but if she keeps head up its ok  Patient reports her gate is different know and is shuffling  Has some increased confusion  Review of Systems:    Review of Systems   Constitutional: Negative for chills and fever  HENT: Negative for rhinorrhea, sore throat and trouble swallowing  Eyes: Negative for discharge and redness  Respiratory: Negative for cough and shortness of breath  Cardiovascular: Negative for chest pain and leg swelling  Gastrointestinal: Positive for nausea  Negative for abdominal pain, diarrhea and vomiting  Genitourinary: Negative for dysuria and hematuria  Musculoskeletal: Positive for back pain and neck pain  Skin: Negative for rash and wound  Neurological: Positive for dizziness, speech difficulty (noted by daughter and patient reports off some), weakness, numbness (both legs to knees) and headaches  Psychiatric/Behavioral: Positive for confusion and suicidal ideas  Negative for agitation and self-injury  Past Medical and Surgical History:     Past Medical History:   Diagnosis Date    Anxiety     Chronic pain     Back Pain    Depression     Hyperlipidemia     Migraine        Past Surgical History:   Procedure Laterality Date    OTHER SURGICAL HISTORY      cyst removed from left axila       Meds/Allergies:    Prior to Admission medications    Medication Sig Start Date End Date Taking?  Authorizing Provider   acetaminophen (TYLENOL) 500 mg tablet Take 2 tablets (1,000 mg total) by mouth every 6 (six) hours as needed for mild pain or moderate pain 9/13/20   Neel Patrick PA-C   albuterol (PROVENTIL HFA,VENTOLIN HFA) 90 mcg/act inhaler Inhale 2 puffs every 4 (four) hours as needed for wheezing 3/15/20   Katheryn Cain MD   ARIPiprazole (ABILIFY) 15 mg tablet Take 7 5 mg by mouth daily at bedtime    Historical Provider, MD   atorvastatin (LIPITOR) 20 mg tablet Take 20 mg by mouth daily    Historical Provider, MD   carisoprodol (SOMA) 350 mg tablet Take 350 mg by mouth 3 (three) times a day    Historical Provider, MD   clonazePAM (KlonoPIN) 1 mg tablet Take 0 5 mg by mouth 2 (two) times a day    Historical Provider, MD   clonazePAM (KLONOPIN) 1 mg tablet take 1/2 in the am and 1 tab at pm 3/23/18   Historical Provider, MD   cyclobenzaprine (FLEXERIL) 10 mg tablet Take 1 tablet (10 mg total) by mouth 2 (two) times a day as needed for muscle spasms  Patient not taking: Reported on 9/24/2020 1/20/20   Michael Brown DO   DULoxetine (CYMBALTA) 60 mg delayed release capsule Take 60 mg by mouth daily at bedtime    Historical Provider, MD   DULoxetine HCl (CYMBALTA PO) Take 30 mg by mouth every morning     Historical Provider, MD   hydrOXYzine HCL (ATARAX) 10 mg tablet Take 25 mg by mouth 3 (three) times a day     Historical Provider, MD   HydrOXYzine Pamoate (VISTARIL PO) Take by mouth    Historical Provider, MD   hydrOXYzine pamoate (VISTARIL) 25 mg capsule Take 1 capsule by mouth every 8 (eight) hours 5/14/18   Historical Provider, MD   naproxen (NAPROSYN) 500 mg tablet Take 1 tablet (500 mg total) by mouth 2 (two) times a day with meals  Patient not taking: Reported on 9/24/2020 1/20/20   Michael Brown DO   traZODone (DESYREL) 100 mg tablet Take 1 tablet by mouth daily at bedtime as needed for sleep  Patient taking differently: Take 150 mg by mouth daily at bedtime as needed for sleep   12/14/17   Mini Zavala     all medications and allergies reviewed    Allergies:    Allergies   Allergen Reactions    Penicillins Anaphylaxis, Rash and Edema     Anaphylaxis       Social History:     Marital Status: Single   Occupation: unemployed  Patient Pre-hospital Living Situation: Home  Patient Pre-hospital Level of Mobility:  Mobile, no assisted device  Patient Pre-hospital Diet Restrictions:  None  Substance Use History:   Social History     Substance and Sexual Activity   Alcohol Use Never    Frequency: Never     Social History     Tobacco Use   Smoking Status Current Every Day Smoker    Packs/day: 0 25    Types: Cigarettes   Smokeless Tobacco Current User     Social History     Substance and Sexual Activity   Drug Use Not Currently    Types: Methamphetamines    Comment: reports last use years ago       Family History:  I have reviewed the patients family history     Physical Exam:     Vitals:   Blood Pressure: 102/72 (02/04/21 2330)  Pulse: 89 (02/04/21 2330)  Temperature: (!) 97 1 °F (36 2 °C) (02/04/21 2330)  Temp Source: Temporal (02/04/21 2330)  Respirations: 20 (02/04/21 2330)  Height: 5' 3" (160 cm) (02/04/21 2130)  Weight - Scale: 86 9 kg (191 lb 9 3 oz) (02/04/21 2130)  SpO2: 95 % (02/04/21 2330)    Physical Exam  Vitals signs reviewed  Constitutional:       Appearance: Normal appearance  She is obese  Comments: [de-identified]  female   HENT:      Head: Normocephalic and atraumatic  Nose: Nose normal    Eyes:      General:         Right eye: No discharge  Left eye: No discharge  Extraocular Movements: Extraocular movements intact  Conjunctiva/sclera: Conjunctivae normal    Neck:      Musculoskeletal: Normal range of motion  Cardiovascular:      Comments: Heart RRR per nursing  Pulmonary:      Effort: Pulmonary effort is normal       Comments: Lungs clear per nursing  Abdominal:      Comments: Abdomen soft, nontender per nursing   Musculoskeletal:         General: Tenderness present  Comments: +pain in low back w/ palpation w/ nursing, + pain low back w/ lifting legs   Neurological:      General: No focal deficit present  Mental Status: She is alert and oriented to person, place, and time  Mental status is at baseline  Comments: Patient's speech seemed appropriate on my exam, no facial droop, moving extremities  Psychiatric:         Mood and Affect: Mood normal          Behavior: Behavior normal          Thought Content: Thought content normal        [facility] Stroke Scale (NIHSS)          1a  Level of  Consciousness (LOC) 0 = Alert, keenly responsive  1 = Not alert, but arousable by minor        stimulation  2 = Not alert, required repeated stimulation to         attend  3 = Responds only with reflex motor or totally         unresponsive       1a   0   1b  LOC Questions  Asked to say month and his/her age 0 = Answers both questions correctly  1 = Answers one question correctly        (dysarthric, intubated)  2 = Answers neither question correctly        (aphasic, stupor)  1b   0   1c  LOC Commands  Asked to open & close eyes, then  & release with non-affected hand  0 = Performs both tasks correctly  1 = Performs one task correctly  2 = Performs neither task correctly  1c   0     2  Best Gaze  Asked to follow with eyes through horizontal plane  0 = Normal  1 = Partial gaze palsy  2 = Forced deviation or total gaze paresis  2   0   3  Visual  Visual fields (quadrants) tested with finger counting or visual threat  0 = No visual loss  1 = Partial hemianopia (extinction)  2 = Complete hemianopia  3 = Bilateral hemianopia (including         blindness)  3   0   4  Facial Palsy  Asked to show teeth & raise eyebrows  0 = Normal symmetrical movement  1 = Minor paralysis  2 = Partial paralysis (total/near total         paralysis of lower face)  3 = Complete paralysis of one or both         sides (upper & lower)  4   0   5  Motor Arm  Asked to extend arms (palm down) 90º (if sitting) or 45º (if supine) & hold for 10 seconds  0 = No drift; arm stays at 90º/45º for full 10        seconds  1 = Drift; arm drifts down but does not hit         bed or other support    2 = Some effort against gravity; drifts down         to bed or support  3 = No effort against gravity: arm falls to         bed or support  4 = No movement  9 = Amputation, joint fusion  5a  (Left)       0      5b  (Right)        0    6  Motor Leg  While supine, asked to hold leg at 30º for 5 seconds  0 = No drift; leg stays at 30º for full 5        seconds  1 = Drift; leg drifts down but does not hit         the bed or other support  2 = Some effort against gravity; drifts down         to bed or support  3 = No effort against gravity; leg falls to         bed or support  4 = No movement  9 = Amputation, joint fusion  6a   (Left)       0        6b  (Right)       0   7  Limb Ataxia  Finger - nose & heel shin tests on both sides  0 = Absent  1 = Present in one limb  2 = Present in two limbs  7   0   8  Sensory  Sensation or grimace to pin prick or withdrawal from noxious stimuli in obtunded or aphasic patient   0 = Normal; no sensory loss  1 = Mild / moderate sensory loss; may be        dulled / "not as sharp"  2 = Severe / total sensory loss; coma     8   0   9  Best Language  Asked to describe a picture, name objects & read simple words  (See NIHSS language tools)  0 = No aphasia; normal  1 = Mild / moderate aphasia; some loss of        fluency / comprehension without        limitation of expression of ideas (can        identify picture from patient's        responses)  2 = Severe aphasia (cannot identify         pictures from responses)  3 = Mute; global aphasia; no usable        speech; cannot follow simple        commands  9   0   10  Dysarthria   0 = Normal   1 = Mild / moderate; slurs some words;         can be understood  2 = Severe; so slurred as to be         unintelligible; mute;anarthric     9 = Intubated or other physical barrier  10   0   11  Extinction & Inattention  Look at Visual (from #3) and double simultaneous tactile     0 = No abnormality    1 = Inattention or extinction in one sensory         modality  2 = Profound shanelle inattention or        inattention to more than one modality;        does not recognize own hand; orients        to only one side of space  11   0     Complete NIHSS Score (0-42): 0               Additional Data:     Lab Results: I have personally reviewed pertinent reports  Results from last 7 days   Lab Units 02/04/21  1823   WBC Thousand/uL 7 30   HEMOGLOBIN g/dL 12 7   HEMATOCRIT % 38 4   PLATELETS Thousands/uL 226   NEUTROS PCT % 67*   LYMPHS PCT % 23*   MONOS PCT % 6   EOS PCT % 3     Results from last 7 days   Lab Units 02/04/21  1823   SODIUM mmol/L 139   POTASSIUM mmol/L 4 3   CHLORIDE mmol/L 103   CO2 mmol/L 31*   BUN mg/dL 12   CREATININE mg/dL 0 97   ANION GAP mmol/L 5   CALCIUM mg/dL 9 2   GLUCOSE RANDOM mg/dL 119*     Imaging: I have personally reviewed pertinent reports  CTA head and neck with and without contrast   Final Result by Wyatt Harrison MD (02/04 2008)      No acute brain parenchymal findings  No large vessel flow limiting stenosis  Possible nasal polyposis  Consider direct visualization on a nonemergent basis  Immediate                  Workstation performed: [id number]         MRI Inpatient Order    (Results Pending)   MRI inpatient order    (Results Pending)       Epic / Care Everywhere Records Reviewed: Yes    ** Please Note: This note has been constructed using a voice recognition system   **

## 2021-02-05 NOTE — CASE MANAGEMENT
CM met with pt to issue observation notice  Pt gave verbal understanding,could not  Sign document due to severe pain, Pt requested CM to sign on her behalf  Denied copy  Original placed in chart for scanning       CM department will continue to follow through pt's D/C

## 2021-02-05 NOTE — DISCHARGE SUMMARY
Discharge- Ashli Warner 1965, 54 y o  female MRN: [id number]    Unit/Bed#: 7T [facility] 702-02 Encounter: [id number]    Primary Care Provider: Leonidas Foster MD   Date and time admitted to hospital: 2/4/2021  5:40 PM        Tobacco abuse  Assessment & Plan  · Nicotine patch    Acute bilateral low back pain with bilateral sciatica  Assessment & Plan  · Acute on chronic back pain, initially with right sided radiculopathy now has bilateral radiculopathy from back to knee  · Tried Soma and ibuprofen  · MRI lumbar spine without contrast:  Did not reveal any acute changes  · Outpatient PT/OT  · Neurosurgery evaluations outpatient pain worsens  · Tylenol scheduled for pain  · Would avoid narcotics in this patient given multiple sedating meds such as benzodiazepines    Generalized anxiety disorder  Assessment & Plan  · Continue Klonopin    Severe episode of recurrent major depressive disorder, without psychotic features ([facility] [address] )  Assessment & Plan  · Continue trazodone    * Stroke-like symptoms  Assessment & Plan  · Resolved  Patient with gait abnormality with shuffled gait, slurred speech, forgetfulness, loss of balance - possibly medication related  Patient was admitted on the stroke pathway without any unrevealing neurological findings  · MRI of the brain, CTA, CT out any acute findings   · Contacted Dr Matteo Blackwood with neuro and recommended it was secondary due to medication given MRI, CTA, MRI of the lumbar spine did not have any acute findings  · Echo to be followed up as outpatient  · PT OT evaluated patient recommended home with physical therapy            Discharging Physician / Practitioner: Ericka Sterling MD  PCP: Leonidas Foster MD  Admission Date:   Admission Orders (From admission, onward)     Ordered        02/04/21 2110  Place in Observation  Once                   Discharge Date: 2/5/2021    Resolved Problems  Date Reviewed: 2/4/2021    None          Consultations During [facility] [place] [place] Stay:  · Neurology-phone    Procedures Performed:   · None    Significant Findings / Test Results:   · none    Incidental Findings:   · None    Test Results Pending at Discharge (will require follow up): · None     Outpatient Tests Requested:  · None    Complications:  None    Reason for Admission:  Acute on chronic low back pain    Hospital Course:     Sam Shannon is a 54 y o  female patient who originally presented to the hospital on 2/4/2021 due to stroke-like symptoms such as problem speaking, back pain etc   Patient was admitted on the stroke by with given concern for stroke-like symptoms  Patient was somewhat drowsy when she was admitted  All this resolved during my evaluation  CT CTA did not reveal any acute stenosis, aneurysms etc  MRI brain was performed did not reveal any acute strokes  MRI of the lumbar spine also was performed did not reveal any herniated disc, any acute findings  Patient was given Tylenol for pain  PT OT were consulted recommended patient physical therapy  Patient overall was able to ambulate well with physical therapy  Patient was not in any extreme pain  Patient was recommended to follow up outpatient with Neurosurgery if symptoms worsen  Please see above list of diagnoses and related plan for additional information  Condition at Discharge: stable     Discharge Day Visit / Exam:     Subjective:  Patient seen and examined, no acute pain now  Vitals: Blood Pressure: 108/63 (02/05/21 1312)  Pulse: 95 (02/05/21 1312)  Temperature: (!) 96 8 °F (36 °C) (02/05/21 1312)  Temp Source: Temporal (02/05/21 1312)  Respirations: 20 (02/05/21 1312)  Height: 5' 3" (160 cm) (02/04/21 2130)  Weight - Scale: 86 9 kg (191 lb 9 3 oz) (02/04/21 2130)  SpO2: 95 % (02/05/21 1312)  Exam:   Physical Exam  Constitutional:       General: She is not in acute distress  Appearance: Normal appearance  HENT:      Head: Normocephalic and atraumatic     Eyes:      General:         Right eye: No discharge  Left eye: No discharge  Cardiovascular:      Rate and Rhythm: Normal rate and regular rhythm  Pulses: Normal pulses  Heart sounds: No murmur  Pulmonary:      Effort: Pulmonary effort is normal  No respiratory distress  Breath sounds: Normal breath sounds  No wheezing  Musculoskeletal:      Right lower leg: No edema  Left lower leg: No edema  Skin:     General: Skin is warm and dry  Neurological:      General: No focal deficit present  Mental Status: She is alert and oriented to person, place, and time  Mental status is at baseline  Coordination: Coordination normal       Gait: Gait abnormal       Comments: Ambulating well    Psychiatric:         Mood and Affect: Mood normal          Discussion with Family: none    Discharge instructions/Information to patient and family:   See after visit summary for information provided to patient and family  Provisions for Follow-Up Care:  See after visit summary for information related to follow-up care and any pertinent home health orders  Disposition:     Home with VNA Services (Reminder: Complete face to face encounter)      Planned Readmission:  none     Discharge Statement:  I spent 31 minutes discharging the patient  This time was spent on the day of discharge  I had direct contact with the patient on the day of discharge  Greater than 50% of the total time was spent examining patient, answering all patient questions, arranging and discussing plan of care with patient as well as directly providing post-discharge instructions  Additional time then spent on discharge activities  Discharge Medications:  See after visit summary for reconciled discharge medications provided to patient and family        ** Please Note: This note has been constructed using a voice recognition system **

## 2021-02-05 NOTE — ASSESSMENT & PLAN NOTE
· Patient with gait abnormality with shuffled gait, slurred speech, forgetfulness, loss of balance - possibly medication related  · CTA head and neck with no acute findings  · Will admit under stroke pathway  · MRI brain, echo, PT OT eval for DC needs, tele neuro Dr Belgica Koch

## 2021-02-05 NOTE — DISCHARGE INSTRUCTIONS
Anxiety   WHAT YOU SHOULD KNOW:   Anxiety is a condition that causes you to feel excessive worry, uneasiness, or fear  Family or work stress, smoking, caffeine, and alcohol can increase your risk for anxiety  Certain medicines or health conditions can also increase your risk  Anxiety may begin gradually, and can become a long-term condition if it is not managed or treated  AFTER YOU LEAVE:   Medicines:   · Medicines  can help you feel more calm and relaxed, and decrease your symptoms  · Take your medicine as directed  Contact your healthcare provider if you think your medicine is not helping or if you have side effects  Tell him if you are allergic to any medicine  Keep a list of the medicines, vitamins, and herbs you take  Include the amounts, and when and why you take them  Bring the list or the pill bottles to follow-up visits  Carry your medicine list with you in case of an emergency  Follow up with your healthcare provider within 2 weeks or as directed:  Write down your questions so you remember to ask them during your visits  Manage anxiety:   · Go to counseling as directed  Cognitive behavioral therapy can help you understand and change how you react to events that trigger your symptoms  · Find ways to manage your symptoms  Activities such as exercise, meditation, or listening to music can help you relax  · Practice deep breathing  Breathing can change how your body reacts to stress  Focus on taking slow, deep breaths several times a day, or during an anxiety attack  Breathe in through your nose, and out through your mouth  · Avoid caffeine  Caffeine can make your symptoms worse  Avoid foods or drinks that are meant to increase your energy level  · Limit or avoid alcohol  Ask your healthcare provider if alcohol is safe for you  You may not be able to drink alcohol if you take certain anxiety or depression medicines  Limit alcohol to 1 drink per day if you are a woman   Limit alcohol to 2 drinks per day if you are a man  A drink of alcohol is 12 ounces of beer, 5 ounces of wine, or 1½ ounces of liquor  Contact your healthcare provider if:   · Your symptoms get worse or do not get better with treatment  · You think your medicine may be causing side effects  · Your anxiety keeps you from doing your regular daily activities  · You have new symptoms since your last visit  · You have questions or concerns about your condition or care  Seek care immediately or call 911 if:   · You have chest pain, tightness, or heaviness that may spread to your shoulders, arms, jaw, neck, or back  · You feel like hurting yourself or someone else  · You feel dizzy, lightheaded, or faint  © 2014 [address] is for End User's use only and may not be sold, redistributed or otherwise used for commercial purposes  All illustrations and images included in CareNotes® are the copyrighted property of A D A M [employer] Inc  or Peter Boggs  The above information is an  only  It is not intended as medical advice for individual conditions or treatments  Talk to your doctor, nurse or pharmacist before following any medical regimen to see if it is safe and effective for you  Acute Low Back Pain   WHAT YOU NEED TO KNOW:   Acute low back pain is sudden discomfort in your lower back area that lasts for up to 6 weeks  The discomfort makes it difficult to tolerate activity  DISCHARGE INSTRUCTIONS:   Seek care immediately or call 911 if:   · You have severe pain  · You have sudden stiffness and heaviness on both buttocks down to both legs  · You have numbness or weakness in one leg, or pain in both legs  · You have numbness in your genital area or across your lower back  · You cannot control your urine or bowel movements  Contact your healthcare provider if:   · You have a fever      · You have pain at night or when you rest     · Your pain does not get better with treatment  · You have pain that worsens when you cough or sneeze  · You suddenly feel something pop or snap in your back  · You have questions or concerns about your condition or care  Medicines: The following medicines may be ordered by your healthcare provider:  · Acetaminophen  decreases pain  It is available without a doctor's order  Ask how much to take and how often to take it  Follow directions  Acetaminophen can cause liver damage if not taken correctly  · NSAIDs  help decrease swelling and pain  This medicine is available with or without a doctor's order  NSAIDs can cause stomach bleeding or kidney problems in certain people  If you take blood thinner medicine, always ask your healthcare provider if NSAIDs are safe for you  Always read the medicine label and follow directions  · Prescription pain medicine  may be given  Ask your healthcare provider how to take this medicine safely  · Muscle relaxers  decrease pain by relaxing the muscles in your lower spine  · Take your medicine as directed  Contact your healthcare provider if you think your medicine is not helping or if you have side effects  Tell him of her if you are allergic to any medicine  Keep a list of the medicines, vitamins, and herbs you take  Include the amounts, and when and why you take them  Bring the list or the pill bottles to follow-up visits  Carry your medicine list with you in case of an emergency  Self-care:   · Stay active  as much as you can without causing more pain  Bed rest could make your back pain worse  Start with some light exercises such as walking  Avoid heavy lifting until your pain is gone  Ask for more information about the activities or exercises that are right for you  · Ice  helps decrease swelling, pain, and muscle spams  Put crushed ice in a plastic bag  Cover it with a towel  Place it on your lower back for 20 to 30 minutes every 2 hours   Do this for about 2 to 3 days after your pain starts, or as directed  · Heat  helps decrease pain and muscle spasms  Start to use heat after treatment with ice has stopped  Use a small towel dampened with warm water or a heating pad, or sit in a warm bath  Apply heat on the area for 20 to 30 minutes every 2 hours for as many days as directed  Alternate heat and ice  Prevent acute low back pain:   · Use proper body mechanics  ¨ Bend at the hips and knees when you  objects  Do not bend from the waist  Use your leg muscles as you lift the load  Do not use your back  Keep the object close to your chest as you lift it  Try not to twist or lift anything above your waist     ¨ Change your position often when you stand for long periods of time  Rest one foot on a small box or footrest, and then switch to the other foot often  ¨ Try not to sit for long periods of time  When you do, sit in a straight-backed chair with your feet flat on the floor  Never reach, pull, or push while you are sitting  · Do exercises that strengthen your back muscles  Warm up before you exercise  Ask your healthcare provider the best exercises for you  · Maintain a healthy weight  Ask your healthcare provider how much you should weigh  Ask him to help you create a weight loss plan if you are overweight  Follow up with your healthcare provider as directed:  Return for a follow-up visit if you still have pain after 1 to 3 weeks of treatment  You may need to visit an orthopedist if your back pain lasts more than 6 to 12 weeks  Write down your questions so you remember to ask them during your visits  © 2017 [address] Information is for End User's use only and may not be sold, redistributed or otherwise used for commercial purposes  All illustrations and images included in CareNotes® are the copyrighted property of [employer]  or Peter Boggs  The above information is an  only   It is not intended as medical advice for individual conditions or treatments  Talk to your doctor, nurse or pharmacist before following any medical regimen to see if it is safe and effective for you

## 2021-02-05 NOTE — UTILIZATION REVIEW
Initial Clinical Review    Admission: Date/Time/Statement:   Admission Orders (From admission, onward)     Ordered        02/04/21 2110  Place in Observation  Once                   Orders Placed This Encounter   Procedures    Place in Observation     Standing Status:   Standing     Number of Occurrences:   1     Order Specific Question:   Level of Care     Answer:   Med Surg [16]     ED Arrival Information     Expected Arrival Acuity Means of Arrival Escorted By Service Admission Type    - 2/4/2021 17:40 Less Urgent Ambulance [facility] ([address]) General Medicine Urgent    Arrival Complaint    back pain         Chief Complaint   Patient presents with    Back Pain     Hx of Sciatic pain, lower back and radiates down BLE  Patient has not informed her PCP  Current pain began last night, states when this happens her legs feel weak  Assessment/Plan:  55 yo female Admitted to Observation with Stroke like symptoms and Acute bilateral low back pain w/ sciatica  Plan is for [facility] FACILITY checks, Neuro consult, MRI, ECHO, PT/OT  Presents ed to ED with Right lower back pain radiating down r leg to knee - took soma and ibuprofen w/o improvement  Dtr notes shuffling" loss of balance,  difficulty ambulating, speech has changed and sounding like a "drunk person" and memory loss - forgetful -leaving oven on, forgetting she was to watch grandchildren  PMH  of chronic back pain, anxiety, depression, hyperlipidemia, migraines  No acute findings on CTA Head & Neck         ED Triage Vitals [02/04/21 1746]   Temperature Pulse Respirations Blood Pressure SpO2   98 °F (36 7 °C) 94 19 114/71 97 %      Temp Source Heart Rate Source Patient Position - Orthostatic VS BP Location FiO2 (%)   Tympanic Monitor Sitting Left arm --      Pain Score       7          Wt Readings from Last 1 Encounters:   02/04/21 86 9 kg (191 lb 9 3 oz)     Additional Vital Signs:   02/05/21 0731  96 5 °F (35 8 °C)Abnormal   84  20  112/75  -- 95 %  None (Room air)  Lying   02/05/21 0630  98 1 °F (36 7 °C)  84  18  114/60  --  95 %  None (Room air)  Lying   02/05/21 0430  97 5 °F (36 4 °C)  93  18  116/82  --  94 %  None (Room air)  Lying   02/05/21 0230  96 7 °F (35 9 °C)Abnormal   86  18  127/72  --  92 %  None (Room air)  Lying   02/05/21 0030  97 5 °F (36 4 °C)  88  18  112/70  --  95 %  None (Room air)  --   02/04/21 2330  97 1 °F (36 2 °C)Abnormal   89  20  102/72  --  95 %  None (Room air)  Lying   02/04/21 2230  97 3 °F (36 3 °C)Abnormal   86  20  130/86  96  96 %  None (Room air)  Sitting   02/04/21 2130  97 1 °F (36 2 °C)Abnormal   89  20  119/81  90  96 %  None (Room air)  Sitting       Pertinent Labs/Diagnostic Test Results:   Results from last 7 days   Lab Units 02/04/21  1823   WBC Thousand/uL 7 30   HEMOGLOBIN g/dL 12 7   HEMATOCRIT % 38 4   PLATELETS Thousands/uL 226   NEUTROS ABS Thousands/µL 4 80     Results from last 7 days   Lab Units 02/04/21  1823   SODIUM mmol/L 139   POTASSIUM mmol/L 4 3   CHLORIDE mmol/L 103   CO2 mmol/L 31*   ANION GAP mmol/L 5   BUN mg/dL 12   CREATININE mg/dL 0 97   EGFR ml/min/1 73sq m 66   CALCIUM mg/dL 9 2     Results from last 7 days   Lab Units 02/04/21  1823   GLUCOSE RANDOM mg/dL 119*       Results from last 7 days   Lab Units 02/04/21  2058   TROPONIN I ng/mL <0 01     Results from last 7 days   Lab Units 02/04/21  1823   TSH 3RD GENERATON uIU/mL 1 160     Results from last 7 days   Lab Units 02/05/21  0012   CLARITY UA  Clear   COLOR UA  Yellow   SPEC GRAV UA  1 015   PH UA  6 0   GLUCOSE UA mg/dl Negative   KETONES UA mg/dl Negative   BLOOD UA  50 0*   PROTEIN UA mg/dl 15 (Trace)*   NITRITE UA  Negative   BILIRUBIN UA  Negative   UROBILINOGEN UA mg/dL 1 0   LEUKOCYTES UA  Negative   WBC UA /hpf 0-1   RBC UA /hpf 2-4   BACTERIA UA /hpf Occasional   EPITHELIAL CELLS WET PREP /hpf Occasional     Results from last 7 days   Lab Units 02/05/21  0011   AMPH/METH  Negative   BARBITURATE UR  Negative BENZODIAZEPINE UR  Negative   COCAINE UR  Negative   METHADONE URINE  Negative   OPIATE UR  Negative   PCP UR  Negative   THC UR  Negative     Results from last 7 days   Lab Units 02/04/21 2058   ETHANOL LVL mg/dL <10   ACETAMINOPHEN LVL ug/mL <02*   SALICYLATE LVL mg/dL <8 4*     2/4 EKG:  ECG rate:  79    ECG rate assessment: normal       Rhythm: sinus rhythm      Ectopy: none      QRS axis:  Normal    QRS intervals:  Normal    Conduction: normal      ST segments:  Normal    T waves: normal    Comments:      QT/QTc: 378/433  No STEMI    2/4 CTA Head & Neck: No acute brain parenchymal findings  No large vessel flow limiting stenosis  Possible nasal polyposis  Consider direct visualization on a nonemergent basis       2/5 MRI Lumbar Spine:  Mild noncompressive lumbar degenerative change      2/5 MRI Brain  ECHO       ED Treatment:   Medication Administration from 02/04/2021 1740 to 02/04/2021 2141       Date/Time Order Dose Route Action Action by Comments        Past Medical History:   Diagnosis Date    Anxiety     Chronic pain     Back Pain    Depression     Hyperlipidemia     Migraine      Present on Admission:   Severe episode of recurrent major depressive disorder, without psychotic features ([facility] [address] )   Generalized anxiety disorder   Stroke-like symptoms   Acute bilateral low back pain with bilateral sciatica   Tobacco abuse      Admitting Diagnosis: Back pain [M54 9]  Gait abnormality [R26 9]  Forgetfulness [R68 89]  Chronic back pain [M54 9, G89 29]  Speech abnormality [R47 9]  Stroke-like symptoms [R29 90]  AMS (altered mental status) [R41 82]  Age/Sex: 54 y o  female     Admission Orders:  Scheduled Medications:  ARIPiprazole, 10 mg, Oral, HS  aspirin, 81 mg, Oral, Daily  atorvastatin, 40 mg, Oral, QPM  carisoprodol, 350 mg, Oral, TID  clonazePAM, 0 5 mg, Oral, Daily  clonazePAM, 1 mg, Oral, HS  DULoxetine, 60 mg, Oral, HS  enoxaparin, 40 mg, Subcutaneous, Daily  hydrOXYzine HCL, 10 mg, Oral, TID  nicotine, 1 patch, Transdermal, HS    Continuous IV Infusions:     PRN Meds:  albuterol, 2 puff, Inhalation, Q4H PRN  traZODone, 150 mg, Oral, HS PRN    TELEMETRY  Neuro checks q1h x 4, q2h x 8, q4h  SCDs  IP CONSULT TO NEUROLOGY  IP CONSULT TO CASE MANAGEMENT  IP CONSULT TO NUTRITION SERVICES    Network Utilization Review Department  ATTENTION: Please call with any questions or concerns to [phone] and carefully listen to the prompts so that you are directed to the right person  All voicemails are confidential   Richy Aragon all requests for admission clinical reviews, approved or denied determinations and any other requests to dedicated fax number below belonging to the campus where the patient is receiving treatment   List of dedicated fax numbers for the Facilities:  [address] DENIALS (Administrative/Medical Necessity) [phone]   [address] (Maternity/NICU/Pediatrics) [phone]   [address] [place] [address] Dr Gunnar Barahona [address] (Osei Ludwig [address]) [address] [place] [address] Shay[address] [place] [address] [phone]

## 2021-02-05 NOTE — SPEECH THERAPY NOTE
Speech Language/Pathology  Speech-Language Pathology Bedside Swallow Evaluation        Patient Name: Katty Storey    RSBHT'U Date: 2/5/2021     Problem List  Principal Problem:    Stroke-like symptoms  Active Problems:    Severe episode of recurrent major depressive disorder, without psychotic features ([facility] [address] )    Generalized anxiety disorder    Acute bilateral low back pain with bilateral sciatica    Tobacco abuse         Past Medical History  Past Medical History:   Diagnosis Date    Anxiety     Chronic pain     Back Pain    Depression     Hyperlipidemia     Migraine        Past Surgical History  Past Surgical History:   Procedure Laterality Date    OTHER SURGICAL HISTORY      cyst removed from left axila       Summary    Pt presents with oropharyngeal swallow that appears [facility]  There was mildly prolonged mastication, bolus formation, and transfer; suspect this is due to being edentulous  Pt does not wear her dentures when she eats, and is eating all types of foods at home without teeth  There were no s/s of aspiration  Pt does c/o chronic dry mouth and pharynx, she feels this is due to her psych meds  Gave pt suggestions to manage this, including taking sips of liquid prior to PO intake or taking meds, alternating solids and liquids, choose moist, soft foods, consider placing pills in pudding/applesauce/yogurt if needed to give an extra coating/more easily move through the mouth/throat, artificial saliva  Pt verbalized understanding  Pt feels her speech is "not normal"  Speech sounded perhaps minimally imprecise/slushy during conversation, but 100% intelligible  Pt able to have a conversation without any difficulty, no word finding issues, speech was fluent  Will plan to check MRI results, and determine need for speech/language assessment  Speech/language assessment can also be completed as an OP if pt discharged over the weekend        Recommendations:   Diet: regular diet and thin liquids, choose softer, moist foods, add sauce  Meds: whole with liquid or whole in pudding/applesauce if needed  Independent for feeding  Aspiration precautions and compensatory swallowing strategies: upright posture, only feed when fully alert, slow rate of feeding, small bites/sips and alternating bites and sips  Other Recommendations/ considerations: Check results of MRI, determine need for speech/language assessment       Current Medical Status  Copied from admission:  Shaun Conrad is a 54 y o  female who presents with slurred speech, abnormal gait, loss of balance, forgetful and acute on chronic back pain  Patient with past medical history of chronic back pain, anxiety, depression, hyperlipidemia, migraines was brought in by the daughter secondary to report of acute on chronic back pain, ambulatory dysfunction, shuffling gait, slurred speech, forgetful  ER reported via tiger text that per daughter,  Patient was noted to be more forgetful was leaving the oven on during the night and forgetting she was supposed to watch her grandkids  There was no report of alcohol or drug use however she has prior history of both  Patient did report pain with right leg radiculopathy was taking Soma and ibuprofen yesterday  She also takes Klonopin and trazodone  Patient reports back pain, low back pain that radiates to both buttocks and down to knees  She reports that she started w/ right side yesterday and today has both sides  No loss of bowel or bladder  Has a burning sensation in leg "like someone is taking a sabre to it" Denies heavy lifting, no injuries  She was seen at [facility] ER on 1/3/2021 for back pain  She reports speech feels off  She reports that when she walks down steps her vision is disturbed, but if she keeps head up its ok  Patient reports her gate is different know and is shuffling  Has some increased confusion  Order received and chart reviewed  Order received as part of stroke pathway   Pt passed RN dysphagia screen, however pt reporting difficulty swallowing pills (seem to get stuck in her mouth sometimes) and sometimes food (feels is sticks in her throat due to chronic dryness), therefore proceeded with assessment as ordered  Past medical history:   Please see H&P for details    Special Studies:  CTA head-IMPRESSION:  No acute brain parenchymal findings  No large vessel flow limiting stenosis  Possible nasal polyposis  Consider direct visualization on a nonemergent basis       MRI brain-pending      Social/Education/Vocational Hx:  Pt lives with family    Swallow Information   Current Risks for Dysphagia & Aspiration: pt is overall "slow" slow reflexes, slow movements, distracted at times  Current Symptoms/Concerns: pt c/o difficulty swallowing some pills (stick in her mouth), and small particles of food stick in her throat; pt feels this is due to "cotton mouth", dry mucosa, secondary to psych meds  Current Diet: regular diet and thin liquids   Baseline Diet: regular diet and thin liquids    Baseline Assessment   Behavior/Cognition: awake, overall mildly slow/delayed reactions, somewhat distracted  Speech/Language Status: able to participate in conversation, able to follow commands and ? minimal dysarthria, but pt does not have teeth  Patient Positioning: upright in bed     Swallow Mechanism Exam   Facial: symmetrical  Labial: decreased ROM right side and slight  Lingual: WFL and but some tremors  Velum: unable to visualize  Mandible: adequate ROM  Dentition: edentulous; pt has dentures at home but rarely wears them, does not typically eat with them in  Vocal quality:clear/adequate   Volitional Cough: strong/productive   Respiratory: Room air    Consistencies Assessed and Performance   Consistencies Administered: thin liquids, soft solids and hard solids  -pt fed himself breakfast, including scrambled eggs, cold cereal in milk, banana, animal crackers, coffee and milk by cup    Oral Stage: Adequate bolus retrieval and draw from straw, good lip seal, mildly prolonged mastication, bolus formation and transfer, suspect due to being edentulous  Pharyngeal Stage: Hyolaryngeal elevation observed and palpated  Swallows appeared timely  There were no s/s of aspiration  Pt did c/o mild globus sensation with the egg, which cleared with a sip of liquid  Pt felt this was due to being dry        Esophageal Concerns: none reported      Results Reviewed with: patient and RN   Dysphagia Goals: none at this time  Discharge recommendation: no follow up needed for swallowing

## 2021-02-05 NOTE — ASSESSMENT & PLAN NOTE
· Acute on chronic back pain, initially with right sided radiculopathy now has bilateral radiculopathy from back to knee  · Tried Soma and ibuprofen  · Will check an MRI, patient was seen at [facility] early January 2021 for back pain x-rays lumbar: Mild lumbar degenerative disc change, multilevel   No acute bony lumbar finding

## 2021-02-06 NOTE — ASSESSMENT & PLAN NOTE
· Acute on chronic back pain, initially with right sided radiculopathy now has bilateral radiculopathy from back to knee  · Tried Soma and ibuprofen  · MRI lumbar spine without contrast:  Did not reveal any acute changes  · Outpatient PT/OT  · Neurosurgery evaluations outpatient pain worsens  · Tylenol scheduled for pain  · Would avoid narcotics in this patient given multiple sedating meds such as benzodiazepines

## 2021-02-06 NOTE — ED ATTENDING ATTESTATION
I was the attending physician on duty at the time the patient visited the emergency department  The patient was evaluated and dispositioned by the APC  I was personally available for consultation  I am administratively signing the chart after the fact      Tomasz Mina MD

## 2021-02-06 NOTE — ASSESSMENT & PLAN NOTE
· Resolved  Patient with gait abnormality with shuffled gait, slurred speech, forgetfulness, loss of balance - possibly medication related  Patient was admitted on the stroke pathway without any unrevealing neurological findings  · MRI of the brain, CTA, CT out any acute findings   · Contacted Dr Adeel Cisneros with neuro and recommended it was secondary due to medication given MRI, CTA, MRI of the lumbar spine did not have any acute findings  · Echo to be followed up as outpatient  · PT OT evaluated patient recommended home with physical therapy

## 2021-02-27 ENCOUNTER — APPOINTMENT (EMERGENCY)
Dept: CT IMAGING | Facility: HOSPITAL | Age: 56
End: 2021-02-27
Payer: MEDICARE

## 2021-02-27 ENCOUNTER — HOSPITAL ENCOUNTER (EMERGENCY)
Facility: HOSPITAL | Age: 56
Discharge: HOME | End: 2021-02-28
Attending: EMERGENCY MEDICINE
Payer: MEDICARE

## 2021-02-27 DIAGNOSIS — T88.7XXA MEDICATION SIDE EFFECT: Primary | ICD-10-CM

## 2021-02-27 LAB
AMPHETAMINES SERPL QL SCN: NEGATIVE
ANION GAP SERPL CALCULATED.3IONS-SCNC: 5 MMOL/L (ref 5–14)
APTT PPP: 33 SECONDS (ref 23–37)
BACTERIA UR QL AUTO: NORMAL /HPF
BARBITURATES UR QL: NEGATIVE
BASOPHILS # BLD AUTO: 0.1 THOUSANDS/ΜL (ref 0–0.1)
BASOPHILS NFR BLD AUTO: 1 % (ref 0–1)
BENZODIAZ UR QL: NEGATIVE
BILIRUB UR QL STRIP: NEGATIVE
BUN SERPL-MCNC: 14 MG/DL (ref 5–25)
CALCIUM SERPL-MCNC: 9.4 MG/DL (ref 8.4–10.2)
CHLORIDE SERPL-SCNC: 100 MMOL/L (ref 97–108)
CLARITY UR: CLEAR
CO2 SERPL-SCNC: 34 MMOL/L (ref 22–30)
COCAINE UR QL: NEGATIVE
COLOR UR: YELLOW
CREAT SERPL-MCNC: 0.95 MG/DL (ref 0.6–1.2)
EOSINOPHIL # BLD AUTO: 0.2 THOUSAND/ΜL (ref 0–0.4)
EOSINOPHIL NFR BLD AUTO: 3 % (ref 0–6)
ERYTHROCYTE [DISTWIDTH] IN BLOOD BY AUTOMATED COUNT: 13.9 %
ETHANOL SERPL-MCNC: <10 MG/DL (ref 0–10)
FLUAV RNA RESP QL NAA+PROBE: NEGATIVE
FLUBV RNA RESP QL NAA+PROBE: NEGATIVE
GFR SERPL CREATININE-BSD FRML MDRD: 68 ML/MIN/1.73SQ M
GLUCOSE SERPL-MCNC: 117 MG/DL (ref 70–99)
GLUCOSE UR STRIP-MCNC: NEGATIVE MG/DL
HCT VFR BLD AUTO: 39.2 % (ref 36–46)
HGB BLD-MCNC: 13.4 G/DL (ref 12–16)
HGB UR QL STRIP.AUTO: 10
INR PPP: 0.95 (ref 0.84–1.19)
KETONES UR STRIP-MCNC: NEGATIVE MG/DL
LEUKOCYTE ESTERASE UR QL STRIP: NEGATIVE
LYMPHOCYTES # BLD AUTO: 2.3 THOUSANDS/ΜL (ref 0.5–4)
LYMPHOCYTES NFR BLD AUTO: 32 % (ref 25–45)
MCH RBC QN AUTO: 32.2 PG (ref 26–34)
MCHC RBC AUTO-ENTMCNC: 34.2 G/DL (ref 31–36)
MCV RBC AUTO: 94 FL (ref 80–100)
METHADONE UR QL: NEGATIVE
MONOCYTES # BLD AUTO: 0.5 THOUSAND/ΜL (ref 0.2–0.9)
MONOCYTES NFR BLD AUTO: 7 % (ref 1–10)
NEUTROPHILS # BLD AUTO: 4.1 THOUSANDS/ΜL (ref 1.8–7.8)
NEUTS SEG NFR BLD AUTO: 57 % (ref 45–65)
NITRITE UR QL STRIP: NEGATIVE
NON-SQ EPI CELLS URNS QL MICRO: NORMAL /HPF
OPIATES UR QL SCN: NEGATIVE
OXYCODONE+OXYMORPHONE UR QL SCN: NEGATIVE
PCP UR QL: NEGATIVE
PH UR STRIP.AUTO: 6.5 [PH]
PLATELET # BLD AUTO: 207 THOUSANDS/UL (ref 150–450)
PMV BLD AUTO: 7.6 FL (ref 8.9–12.7)
POTASSIUM SERPL-SCNC: 4.6 MMOL/L (ref 3.6–5)
PROT UR STRIP-MCNC: NEGATIVE MG/DL
PROTHROMBIN TIME: 12.7 SECONDS (ref 11.6–14.5)
RBC # BLD AUTO: 4.16 MILLION/UL (ref 4–5.2)
RBC #/AREA URNS AUTO: NORMAL /HPF
RSV RNA RESP QL NAA+PROBE: NEGATIVE
SARS-COV-2 RNA RESP QL NAA+PROBE: NEGATIVE
SODIUM SERPL-SCNC: 139 MMOL/L (ref 137–147)
SP GR UR STRIP.AUTO: 1.01 (ref 1–1.04)
THC UR QL: NEGATIVE
TROPONIN I SERPL-MCNC: <0.01 NG/ML (ref 0–0.03)
UROBILINOGEN UA: NEGATIVE MG/DL
WBC # BLD AUTO: 7.2 THOUSAND/UL (ref 4.5–11)
WBC #/AREA URNS AUTO: NORMAL /HPF

## 2021-02-27 PROCEDURE — 81003 URINALYSIS AUTO W/O SCOPE: CPT | Performed by: EMERGENCY MEDICINE

## 2021-02-27 PROCEDURE — 99285 EMERGENCY DEPT VISIT HI MDM: CPT

## 2021-02-27 PROCEDURE — 85025 COMPLETE CBC W/AUTO DIFF WBC: CPT | Performed by: EMERGENCY MEDICINE

## 2021-02-27 PROCEDURE — 93005 ELECTROCARDIOGRAM TRACING: CPT

## 2021-02-27 PROCEDURE — 99285 EMERGENCY DEPT VISIT HI MDM: CPT | Performed by: EMERGENCY MEDICINE

## 2021-02-27 PROCEDURE — 70498 CT ANGIOGRAPHY NECK: CPT

## 2021-02-27 PROCEDURE — 81001 URINALYSIS AUTO W/SCOPE: CPT | Performed by: EMERGENCY MEDICINE

## 2021-02-27 PROCEDURE — 0241U HB NFCT DS VIR RESP RNA 4 TRGT: CPT | Performed by: EMERGENCY MEDICINE

## 2021-02-27 PROCEDURE — 85730 THROMBOPLASTIN TIME PARTIAL: CPT | Performed by: EMERGENCY MEDICINE

## 2021-02-27 PROCEDURE — 85610 PROTHROMBIN TIME: CPT | Performed by: EMERGENCY MEDICINE

## 2021-02-27 PROCEDURE — 84484 ASSAY OF TROPONIN QUANT: CPT | Performed by: EMERGENCY MEDICINE

## 2021-02-27 PROCEDURE — 82077 ASSAY SPEC XCP UR&BREATH IA: CPT | Performed by: EMERGENCY MEDICINE

## 2021-02-27 PROCEDURE — 70496 CT ANGIOGRAPHY HEAD: CPT

## 2021-02-27 PROCEDURE — 80307 DRUG TEST PRSMV CHEM ANLYZR: CPT | Performed by: EMERGENCY MEDICINE

## 2021-02-27 PROCEDURE — 36415 COLL VENOUS BLD VENIPUNCTURE: CPT | Performed by: EMERGENCY MEDICINE

## 2021-02-27 PROCEDURE — 80048 BASIC METABOLIC PNL TOTAL CA: CPT | Performed by: EMERGENCY MEDICINE

## 2021-02-27 RX ADMIN — IOHEXOL 100 ML: 350 INJECTION, SOLUTION INTRAVENOUS at 18:17

## 2021-02-27 NOTE — ED NOTES
Pt attempted to answer year for Dr Judi Tyler, partially states " 20-- " does not verbally answer any other questions      Morgan Figueroa RN  02/27/21 5851

## 2021-02-27 NOTE — ED PROVIDER NOTES
History  Chief Complaint   Patient presents with    Altered Mental Status     pt arrives via ambulance litter reportedly EMS called by daughter for altered mental status, pt arrives non verbal but following commands      Patient is a 51-year-old female with a history of mental health issues who presents via [facility] with altered mental status  Daughter called because she saw the patient trying to put cream cheese on what is a presumed imaginary bagel  Patient was able to walk to the EMS truck  Accu-Chek was 132  Per review of epic patient was admitted here earlier this month for similar symptoms  CT a at that time was negative as well as MRI  Thought was symptoms were due to patient's medication  Patient did present here today with a plethora of various sedating medications  On the last visit no psychiatric consult was obtained to readdress medications  Patient unwilling or unable to provide any information at this time  Prior to Admission Medications   Prescriptions Last Dose Informant Patient Reported? Taking?    ARIPiprazole (ABILIFY) 15 mg tablet   Yes No   Sig: Take 7 5 mg by mouth daily at bedtime   DULoxetine (CYMBALTA) 60 mg delayed release capsule   Yes No   Sig: Take 60 mg by mouth daily at bedtime   acetaminophen (TYLENOL) 325 mg tablet   No No   Sig: Take 3 tablets (975 mg total) by mouth every 8 (eight) hours   acetaminophen (TYLENOL) 500 mg tablet   No No   Sig: Take 2 tablets (1,000 mg total) by mouth every 6 (six) hours as needed for mild pain or moderate pain   albuterol (PROVENTIL HFA,VENTOLIN HFA) 90 mcg/act inhaler   No No   Sig: Inhale 2 puffs every 4 (four) hours as needed for wheezing   atorvastatin (LIPITOR) 20 mg tablet   Yes No   Sig: Take 20 mg by mouth daily   carisoprodol (SOMA) 350 mg tablet   Yes No   Sig: Take 350 mg by mouth 3 (three) times a day   clonazePAM (KlonoPIN) 1 mg tablet   Yes No   Sig: Take 0 5 mg by mouth 2 (two) times a day   hydrOXYzine HCL (ATARAX) 10 mg tablet   Yes No   Sig: Take 25 mg by mouth 3 (three) times a day    traZODone (DESYREL) 100 mg tablet   No No   Sig: Take 1 tablet by mouth daily at bedtime as needed for sleep   Patient taking differently: Take 150 mg by mouth daily at bedtime as needed for sleep        Facility-Administered Medications: None       Past Medical History:   Diagnosis Date    Anxiety     Chronic pain     Back Pain    Depression     Hyperlipidemia     Migraine        Past Surgical History:   Procedure Laterality Date    OTHER SURGICAL HISTORY      cyst removed from left axila       Family History   Problem Relation Age of Onset    Heart failure Mother     Heart disease Father      I have reviewed and agree with the history as documented  E-Cigarette/Vaping    E-Cigarette Use Never User      E-Cigarette/Vaping Substances     Social History     Tobacco Use    Smoking status: Current Every Day Smoker     Packs/day: 0 25     Types: Cigarettes    Smokeless tobacco: Current User   Substance Use Topics    Alcohol use: Never     Frequency: Never    Drug use: Not Currently     Types: Methamphetamines     Comment: reports last use years ago       Review of Systems   Unable to perform ROS: Patient nonverbal       Physical Exam  Physical Exam  Vitals signs and nursing note reviewed  Constitutional:       Appearance: She is well-developed  She is obese  HENT:      Head: Normocephalic and atraumatic  Mouth/Throat:      Mouth: Mucous membranes are moist       Pharynx: Oropharynx is clear  Eyes:      Extraocular Movements: Extraocular movements intact  Pupils: Pupils are equal, round, and reactive to light  Neck:      Musculoskeletal: Normal range of motion  Cardiovascular:      Rate and Rhythm: Normal rate and regular rhythm  Heart sounds: Normal heart sounds  Pulmonary:      Effort: Pulmonary effort is normal       Breath sounds: Normal breath sounds     Abdominal:      General: Bowel sounds are normal       Palpations: Abdomen is soft  Musculoskeletal: Normal range of motion  Skin:     General: Skin is warm and dry  Capillary Refill: Capillary refill takes less than 2 seconds  Neurological:      Mental Status: She is alert  Cranial Nerves: No facial asymmetry  Sensory: Sensation is intact  Motor: Tremor present  Gait: Gait is intact  Comments: Patient is nonverbal at this time  Cannot determine any dysarthria or dysphagia  Patient does have baseline tremor in upper extremities  Is able to move all 4 extremities without any issue  Will follow commands           Vital Signs  ED Triage Vitals [02/27/21 1658]   Temperature Pulse Respirations Blood Pressure SpO2   (!) 97 2 °F (36 2 °C) 98 18 143/72 100 %      Temp Source Heart Rate Source Patient Position - Orthostatic VS BP Location FiO2 (%)   Tympanic Monitor Lying Left arm --      Pain Score       No Pain           Vitals:    02/28/21 0445 02/28/21 0745 02/28/21 1053 02/28/21 1146   BP: 142/85 114/69 110/62 132/88   Pulse: 86 81 88 87   Patient Position - Orthostatic VS: Sitting Lying Lying Sitting         Visual Acuity  Visual Acuity      Most Recent Value   L Pupil Size (mm)  4   R Pupil Size (mm)  4          ED Medications  Medications   iohexol (OMNIPAQUE) 350 MG/ML injection (MULTI-DOSE) 100 mL (100 mL Intravenous Given 2/27/21 1817)   acetaminophen (TYLENOL) tablet 650 mg (650 mg Oral Given 2/28/21 0823)       Diagnostic Studies  Results Reviewed     Procedure Component Value Units Date/Time    Rapid drug screen, urine [[id number]]  (Normal) Collected: 02/27/21 2245    Lab Status: Final result Specimen: Urine, Clean Catch Updated: 02/27/21 6729     Amph/Meth UR Negative     Barbiturate Ur Negative     Benzodiazepine Urine Negative     Cocaine Urine Negative     Methadone Urine Negative     Opiate Urine Negative     PCP Ur Negative     THC Urine Negative     Oxycodone Urine Negative    Narrative:      FOR MEDICAL PURPOSES ONLY  IF CONFIRMATION NEEDED PLEASE CONTACT THE LAB WITHIN 5 DAYS  Drug Screen Cutoff Levels:  AMPHETAMINE/METHAMPHETAMINES  1000 ng/mL  BARBITURATES     200 ng/mL  BENZODIAZEPINES     200 ng/mL  COCAINE      300 ng/mL  METHADONE      300 ng/mL  OPIATES      300 ng/mL  PHENCYCLIDINE     25 ng/mL  THC       50 ng/mL  OXYCODONE      100 ng/mL    Urine Microscopic [[id number]]  (Normal) Collected: 02/27/21 2245    Lab Status: Final result Specimen: Urine, Clean Catch Updated: 02/27/21 2258     RBC, UA 0-1 /hpf      WBC, UA 0-1 /hpf      Epithelial Cells Occasional /hpf      Bacteria, UA Occasional /hpf     UA w Reflex to Microscopic w Reflex to Culture [[id number]]  (Abnormal) Collected: 02/27/21 2245    Lab Status: Final result Specimen: Urine, Clean Catch Updated: 02/27/21 2258     Color, UA Yellow     Clarity, UA Clear     Specific Gravity, UA 1 010     pH, UA 6 5     Leukocytes, UA Negative     Nitrite, UA Negative     Protein, UA Negative mg/dl      Glucose, UA Negative mg/dl      Ketones, UA Negative mg/dl      Bilirubin, UA Negative     Blood, UA 10 0     UROBILINOGEN UA Negative mg/dL     COVID[facility], [facility]/B, [facility] [[id number]]  (Normal) Collected: 02/27/21 1705    Lab Status: Final result Specimen: Nares from Nose Updated: 02/27/21 1757     SARS-CoV-2 Negative     INFLUENZA A PCR Negative     INFLUENZA B PCR Negative     RSV PCR Negative    Narrative: This test has been authorized by FDA under an EUA (Emergency Use Assay) for use by authorized laboratories  Clinical caution and judgement should be used with the interpretation of these results with consideration of the clinical impression and other laboratory testing  Testing reported as "Positive" or "Negative" has been proven to be accurate according to standard laboratory validation requirements  All testing is performed with control materials showing appropriate reactivity at standard intervals      Troponin I [[id number]] (Normal) Collected: 02/27/21 1705    Lab Status: Final result Specimen: Blood from Arm, Right Updated: 02/27/21 1743     Troponin I <0 01 ng/mL     Ethanol [[id number]]  (Normal) Collected: 02/27/21 1705    Lab Status: Final result Specimen: Blood from Arm, Right Updated: 02/27/21 1729     Ethanol Lvl <10 mg/dL     Basic metabolic panel [[id number]]  (Abnormal) Collected: 02/27/21 1705    Lab Status: Final result Specimen: Blood from Arm, Right Updated: 02/27/21 1729     Sodium 139 mmol/L      Potassium 4 6 mmol/L      Chloride 100 mmol/L      CO2 34 mmol/L      ANION GAP 5 mmol/L      BUN 14 mg/dL      Creatinine 0 95 mg/dL      Glucose 117 mg/dL      Calcium 9 4 mg/dL      eGFR 68 ml/min/1 73sq m     Narrative:      [place] guidelines for Chronic Kidney Disease (CKD):     Stage 1 with normal or high GFR (GFR > 90 mL/min/1 73 square meters)    Stage 2 Mild CKD (GFR = 60-89 mL/min/1 73 square meters)    Stage 3A Moderate CKD (GFR = 45-59 mL/min/1 73 square meters)    Stage 3B Moderate CKD (GFR = 30-44 mL/min/1 73 square meters)    Stage 4 Severe CKD (GFR = 15-29 mL/min/1 73 square meters)    Stage 5 End Stage CKD (GFR <15 mL/min/1 73 square meters)  Note: GFR calculation is accurate only with a steady state creatinine    APTT [[id number]]  (Normal) Collected: 02/27/21 1705    Lab Status: Final result Specimen: Blood from Arm, Right Updated: 02/27/21 1728     PTT 33 seconds     Protime-INR [[id number]]  (Normal) Collected: 02/27/21 1705    Lab Status: Final result Specimen: Blood from Arm, Right Updated: 02/27/21 1728     Protime 12 7 seconds      INR 0 95    CBC and differential [[id number]]  (Abnormal) Collected: 02/27/21 1705    Lab Status: Final result Specimen: Blood from Arm, Right Updated: 02/27/21 1722     WBC 7 20 Thousand/uL      RBC 4 16 Million/uL      Hemoglobin 13 4 g/dL      Hematocrit 39 2 %      MCV 94 fL      MCH 32 2 pg      MCHC 34 2 g/dL      RDW 13 9 %      MPV 7 6 fL Platelets 177 Thousands/uL      Neutrophils Relative 57 %      Lymphocytes Relative 32 %      Monocytes Relative 7 %      Eosinophils Relative 3 %      Basophils Relative 1 %      Neutrophils Absolute 4 10 Thousands/µL      Lymphocytes Absolute 2 30 Thousands/µL      Monocytes Absolute 0 50 Thousand/µL      Eosinophils Absolute 0 20 Thousand/µL      Basophils Absolute 0 10 Thousands/µL                  CTA head and neck with and without contrast   Final Result by Lorene Myers MD (02/27 1926)         1  No evidence of acute infarct, intracranial hemorrhage or mass effect  2   No stenosis, dissection or occlusion of the carotid or vertebral arteries or major vessels of the [place]  Workstation performed: [id number]                    Procedures  ECG 12 Lead Documentation Only    Date/Time: 2/27/2021 5:00 PM  Performed by: Rudy Villatoro MD  Authorized by: Rudy Villatoro MD     Indications / Diagnosis:  Altered  ECG reviewed by me, the ED Provider: yes    Patient location:  ED  Interpretation:     Interpretation: abnormal    Quality:     Tracing quality:  Limited by artifact  Rate:     ECG rate:  102    ECG rate assessment: tachycardic    Rhythm:     Rhythm: sinus tachycardia    Ectopy:     Ectopy: none    QRS:     QRS axis:  Normal    QRS intervals:  Normal  Conduction:     Conduction: normal    ST segments:     ST segments:  Normal  T waves:     T waves: normal               ED Course  ED Course as of Feb 28 1513   Sat Feb 27, 2021   [address] [place] [address] Patient now verbal   States that she has a headache  Is able to tell me what year it is correctly  1928 Patient medically cleared for psychiatric evaluation  2131 Patient more awake alert oriented at this time  Flight me from her room asking for 2 things  1  Something to drink and to the remote the TV  Again is oriented now to person place time                                  SBIRT 22yo+      Most Recent Value   SBIRT (21 yo +)   In order to provide better care to our patients, we are screening all of our patients for alcohol and drug use  Would it be okay to ask you these screening questions? Yes Filed at: 02/27/2021 2140   Initial Alcohol Screen: US AUDIT-C    1  How often do you have a drink containing alcohol?  0 Filed at: 02/27/2021 2140   2  How many drinks containing alcohol do you have on a typical day you are drinking? 0 Filed at: 02/27/2021 2140   3a  Male UNDER 65: How often do you have five or more drinks on one occasion? 0 Filed at: 02/27/2021 2140   3b  FEMALE Any Age, or MALE 65+: How often do you have 4 or more drinks on one occassion? 0 Filed at: 02/27/2021 2140   Audit-C Score  0 Filed at: 02/27/2021 2140   MANOJ: How many times in the past year have you    Used an illegal drug or used a prescription medication for non-medical reasons? Never Filed at: 02/27/2021 2140                    MDM  Number of Diagnoses or Management Options  Medication side effect:      Amount and/or Complexity of Data Reviewed  Clinical lab tests: ordered and reviewed  Tests in the radiology section of CPT®: ordered  Tests in the medicine section of CPT®: reviewed and ordered  Obtain history from someone other than the patient: yes  Review and summarize past medical records: yes  Discuss the patient with other providers: yes  Independent visualization of images, tracings, or specimens: yes        Disposition  Final diagnoses:   Medication side effect     Time reflects when diagnosis was documented in both MDM as applicable and the Disposition within this note     Time User Action Codes Description Comment    2/27/2021  8:08 PM Michael Marquez Add [T88  7XXA] Medication side effect       ED Disposition     ED Disposition Condition Date/Time Comment    Discharge Stable Sun Feb 28, 2021 10:12 AM         MD Documentation      Most Recent Value   Sending MD Lyndon Jensen, DO      Follow-up Information    None         Discharge Medication List as of 2/28/2021 10:12 AM      CONTINUE these medications which have NOT CHANGED    Details   !! acetaminophen (TYLENOL) 325 mg tablet Take 3 tablets (975 mg total) by mouth every 8 (eight) hours, Starting Fri 2/5/2021, Normal      !! acetaminophen (TYLENOL) 500 mg tablet Take 2 tablets (1,000 mg total) by mouth every 6 (six) hours as needed for mild pain or moderate pain, Starting Sun 9/13/2020, Normal      albuterol (PROVENTIL HFA,VENTOLIN HFA) 90 mcg/act inhaler Inhale 2 puffs every 4 (four) hours as needed for wheezing, Starting Sun 3/15/2020, Normal      ARIPiprazole (ABILIFY) 15 mg tablet Take 7 5 mg by mouth daily at bedtime, Historical Med      atorvastatin (LIPITOR) 20 mg tablet Take 20 mg by mouth daily, Historical Med      carisoprodol (SOMA) 350 mg tablet Take 350 mg by mouth 3 (three) times a day, Historical Med      clonazePAM (KlonoPIN) 1 mg tablet Take 0 5 mg by mouth 2 (two) times a day, Historical Med      DULoxetine (CYMBALTA) 60 mg delayed release capsule Take 60 mg by mouth daily at bedtime, Historical Med      hydrOXYzine HCL (ATARAX) 10 mg tablet Take 25 mg by mouth 3 (three) times a day , Historical Med      traZODone (DESYREL) 100 mg tablet Take 1 tablet by mouth daily at bedtime as needed for sleep, Starting u 12/14/2017, Print       !! - Potential duplicate medications found  Please discuss with provider  No discharge procedures on file      PDMP Review     None          ED Provider  Electronically Signed by           Shyla Sanabria MD  02/27/21 3392       Shyla Sanabria MD  02/28/21 3491

## 2021-02-28 VITALS
BODY MASS INDEX: 34.76 KG/M2 | SYSTOLIC BLOOD PRESSURE: 132 MMHG | HEART RATE: 87 BPM | TEMPERATURE: 96.6 F | OXYGEN SATURATION: 96 % | DIASTOLIC BLOOD PRESSURE: 88 MMHG | RESPIRATION RATE: 16 BRPM | WEIGHT: 196.21 LBS

## 2021-02-28 LAB
ATRIAL RATE: 101 BPM
ATRIAL RATE: 102 BPM
ATRIAL RATE: 102 BPM
ATRIAL RATE: 103 BPM
P AXIS: 44 DEGREES
P AXIS: 62 DEGREES
P AXIS: 63 DEGREES
P AXIS: 65 DEGREES
PR INTERVAL: 130 MS
PR INTERVAL: 146 MS
PR INTERVAL: 148 MS
PR INTERVAL: 152 MS
QRS AXIS: 67 DEGREES
QRS AXIS: 71 DEGREES
QRS AXIS: 80 DEGREES
QRS AXIS: 89 DEGREES
QRSD INTERVAL: 56 MS
QRSD INTERVAL: 58 MS
QRSD INTERVAL: 76 MS
QRSD INTERVAL: 78 MS
QT INTERVAL: 320 MS
QT INTERVAL: 350 MS
QT INTERVAL: 384 MS
QT INTERVAL: 386 MS
QTC INTERVAL: 419 MS
QTC INTERVAL: 456 MS
QTC INTERVAL: 497 MS
QTC INTERVAL: 503 MS
T WAVE AXIS: 103 DEGREES
T WAVE AXIS: 64 DEGREES
T WAVE AXIS: 75 DEGREES
T WAVE AXIS: 88 DEGREES
VENTRICULAR RATE: 101 BPM
VENTRICULAR RATE: 102 BPM
VENTRICULAR RATE: 102 BPM
VENTRICULAR RATE: 103 BPM

## 2021-02-28 PROCEDURE — 93010 ELECTROCARDIOGRAM REPORT: CPT

## 2021-02-28 RX ORDER — ACETAMINOPHEN 325 MG/1
650 TABLET ORAL ONCE
Status: COMPLETED | OUTPATIENT
Start: 2021-02-28 | End: 2021-02-28

## 2021-02-28 RX ADMIN — ACETAMINOPHEN 650 MG: 325 TABLET ORAL at 08:23

## 2021-02-28 NOTE — ED NOTES
Patient awake an assessed; noted very slight left lower lip turned down with smile; patient c/o tightness at back of head and starting of a headache; also lower back pain due to litter; requestiong  Medication for pain; Ambulated to bedside toilet and back to bed without difficulty       Td Turcios, KULDIP  02/28/21 Dc [address] Lavell Jose RN  02/28/21 7236

## 2021-02-28 NOTE — ED NOTES
Sleeping quietly on litter, observation of chest rising and falling noted while sleeping       Sarithael Kendrick, KULDIP  02/28/21 2252

## 2021-02-28 NOTE — ED NOTES
Sleeping quietly on litter, observation of chest rising and falling noted while sleeping       Jo Frames, KULDIP  02/28/21 3849

## 2021-02-28 NOTE — ED CARE HANDOFF
Emergency Department Sign Out Note        Sign out and transfer of care from Dr Dominga Frankel  See Separate Emergency Department note  The patient, Halley Quiles, was evaluated by the previous provider for Altered Mental Status  Workup Completed:  Medical Clearance    ED Course / Workup Pending (followup): Patient with previous AMS  [place] to be due to polypharmacy interaction  Presents with similar today  Stroke work up unremarkable  Patient not given any additional doses of home meds and has gone from hallucinating that she was spreading jam on a bagel to now being AOx3 without hallucinations  Plans for hold in ER overnight and Psych eval in morning  No acute events during day shift  Procedures  MDM    Disposition  Final diagnoses:   Medication side effect     Time reflects when diagnosis was documented in both MDM as applicable and the Disposition within this note     Time User Action Codes Description Comment    2/27/2021  8:08 PM Jia Hope  7XXA] Medication side effect       ED Disposition     ED Disposition Condition Date/Time Comment    Transfer to [facility] Feb 27, 2021  7:29 PM Halley Quiles should be transferred out to pendng review and has been medically cleared  Follow-up Information    None       Patient's Medications   Discharge Prescriptions    No medications on file     No discharge procedures on file         ED Provider  Electronically Signed by     Mario Nuñez DO  02/27/21 6682

## 2021-02-28 NOTE — ED NOTES
Patient was seen by ED Crisis  She presented as guarded, offering minimal information  She stated she did not recall the events that led to her ED presentation  Denied suicidal ideation, homicidal ideation  Denied hallucinations  There is no evidence of delusional material  Patient complains of being too tired and having poor energy and motivation  She attributed this to lack of sleep at night  When asked if she is taking excessive medication, she stated, "Oh, no! I'm very, very careful about that " There is a history of alcoholism and marijuana use  She reports she has been clean for 2 years, and continues treatment with an outpatient / provider  Patient gave permission to speak with her daughter, Leeroy Garcia, [phone]  Patient resides with her and her children  She stated she and other relatives suspect the patient is manipulating her outpatient provider for medication for back pain and for insomnia, resulting in new medications she may not need  Patient may also be taking additional doses of medication  She noted that the patient sleeps solidly throughout the night, although, in the ED, the patient stated she is not sleeping at all at night  In addition, the patient is returning to sleep shortly after waking  Zee Gamboa stated she will call the provider in the morning to try to get an accurate list of medication and will then count up her medication to compare counts with what should be remaining  She is willing to have the patient return home  ED Crisis spoke with the patient about possibly taking excessive medication, or being on too many sedating medications  Emphasized that family will reach out to her provider for accurate information  If this continues to occur, family will step in to box and administer her medication

## 2021-02-28 NOTE — ED NOTES
Sleeping quietly on litter, observation of chest rising and falling noted while sleeping       Rosy Gee RN  02/28/21 0190

## 2021-02-28 NOTE — ED NOTES
Patient is more awake and alert, able to tell us she is in [facility], that it is the month of February, and that Joelle You is the president  Dr Judi Tyler is at the bedside a repeat swallow evaluation completed by this nurse, she passed and Dr Judi Tyler did order regular finger type foods, patient given a few small sips of water without any coughing or sputtering  Television turned on for her to watch       Zenaida Rubalcava RN  02/27/21 5887

## 2021-02-28 NOTE — ED NOTES
Insurance   EVS (Eligibility Verification System) called - [phone]    Automated system indicates: activve with [facility]

## 2021-02-28 NOTE — ED NOTES
Patient did wake up and sat up complained of back pain and generalized pain, offered Tylenol or whatever she might want? Patient declined to take anything at all  Patient offered cranberry juice and a craft of juice was given to her       Carlos Steiner RN  02/28/21 7367

## 2021-02-28 NOTE — ED NOTES
Patient was sent to the ED by daughter, after daughter saw her trying to put cream cheese on an invisible bagel  Patient states that she asked to come to the ED due to bumping in to walls  Patient offers little information, upon interview  She is very sleepy, answers questions with her eyes closed with minimal answers     She denies any hallucinations  She  Is not suicidal or homicdial   She is not clear what she thought might happen when she came to the ED, but she is willing to see a psychiatrist in the morning  Patient does not report any changes in her activities until the last few days, when she felt more sleepy  She reports that she saw her psychiatrist at [facility] and said that she got medication for her legs (cramps?)  She said that she dosen't go out, and likes to play on the computer all day

## 2021-02-28 NOTE — ED NOTES
Sleeping quietly on litter, observation of chest rising and falling noted while sleeping       Michael Huffman RN  02/28/21 0507

## 2021-02-28 NOTE — ED NOTES
Sleeping quietly on litter, observation of chest rising and falling noted while sleeping       Ankit Jacques RN  02/28/21 8213

## 2021-02-28 NOTE — ED NOTES
Dr Zeke Mdarid made aware and in to see patient and cleared for PO intake       Con Serna RN  02/28/21 [address] [facility]  02/28/21 8909

## 2021-02-28 NOTE — ED NOTES
Breakfast tray collected and pain reassessed  Pt states that her back "Still hurts", and headache is at a 3/10   No further request for pain meds, HOB lowered at pt's request      Trisha Hinojosa RN  02/28/21 [address]ango  02/28/21 8183

## 2021-02-28 NOTE — DISCHARGE INSTRUCTIONS
You are to permit your daughter to speak with your outpatient providers in order to obtain an accurate medication list  Your daughter may, at a future date, box and administer your medications for you, if she suspects you are oversedated  Follow up with your psychiatrist, Peggy Salcedo MD and therapist, Emilio Ni from [facility], [phone]    Be sure your all your providers have an accurate medication list

## 2021-02-28 NOTE — ED NOTES
Sleeping quietly on litter, observation of chest rising and falling noted while sleeping       Valiant Mall, RN  02/28/21 3233

## 2021-02-28 NOTE — ED NOTES
Patient given safety breakfast tray and voiced understanding that she is waiting for the psychiatrist for consult       Hollie Lassiter RN  02/28/21 9485

## 2021-05-10 ENCOUNTER — TELEPHONE (OUTPATIENT)
Dept: OTHER | Facility: OTHER | Age: 56
End: 2021-05-10

## 2021-05-10 NOTE — TELEPHONE ENCOUNTER
Patient called to discuss a persistent cough  She did not know what office she was calling for  She has not been seen by an [address] [place] [address] provider in last year, except at [facility]  [facility] that [facility]  She stated she can't find the phone giuseppe henao for her PCP  I advised her to go to the ER if she develops respiratory distress  Advised to call back if she needs further assistance

## 2021-05-25 ENCOUNTER — HOSPITAL ENCOUNTER (EMERGENCY)
Facility: HOSPITAL | Age: 56
Discharge: HOME | End: 2021-05-25
Payer: MEDICARE

## 2021-05-25 ENCOUNTER — APPOINTMENT (EMERGENCY)
Dept: RADIOLOGY | Facility: HOSPITAL | Age: 56
End: 2021-05-25
Payer: MEDICARE

## 2021-05-25 VITALS
SYSTOLIC BLOOD PRESSURE: 118 MMHG | OXYGEN SATURATION: 98 % | TEMPERATURE: 97.5 F | DIASTOLIC BLOOD PRESSURE: 75 MMHG | BODY MASS INDEX: 32.59 KG/M2 | WEIGHT: 184 LBS | RESPIRATION RATE: 16 BRPM | HEART RATE: 85 BPM

## 2021-05-25 DIAGNOSIS — R07.89 CHEST WALL PAIN: Primary | ICD-10-CM

## 2021-05-25 LAB
ALBUMIN SERPL BCP-MCNC: 4 G/DL (ref 3–5.2)
ALP SERPL-CCNC: 101 U/L (ref 43–122)
ALT SERPL W P-5'-P-CCNC: 12 U/L
ANION GAP SERPL CALCULATED.3IONS-SCNC: 3 MMOL/L (ref 5–14)
AST SERPL W P-5'-P-CCNC: 20 U/L (ref 14–36)
ATRIAL RATE: 86 BPM
BASOPHILS # BLD AUTO: 0.1 THOUSANDS/ΜL (ref 0–0.1)
BASOPHILS NFR BLD AUTO: 1 % (ref 0–1)
BILIRUB SERPL-MCNC: 0.68 MG/DL
BUN SERPL-MCNC: 11 MG/DL (ref 5–25)
CALCIUM SERPL-MCNC: 9.5 MG/DL (ref 8.4–10.2)
CHLORIDE SERPL-SCNC: 101 MMOL/L (ref 97–108)
CO2 SERPL-SCNC: 31 MMOL/L (ref 22–30)
CREAT SERPL-MCNC: 0.95 MG/DL (ref 0.6–1.2)
EOSINOPHIL # BLD AUTO: 0.1 THOUSAND/ΜL (ref 0–0.4)
EOSINOPHIL NFR BLD AUTO: 1 % (ref 0–6)
ERYTHROCYTE [DISTWIDTH] IN BLOOD BY AUTOMATED COUNT: 14.4 %
GFR SERPL CREATININE-BSD FRML MDRD: 68 ML/MIN/1.73SQ M
GLUCOSE SERPL-MCNC: 98 MG/DL (ref 70–99)
HCT VFR BLD AUTO: 37.8 % (ref 36–46)
HGB BLD-MCNC: 12.9 G/DL (ref 12–16)
LYMPHOCYTES # BLD AUTO: 1.8 THOUSANDS/ΜL (ref 0.5–4)
LYMPHOCYTES NFR BLD AUTO: 23 % (ref 25–45)
MCH RBC QN AUTO: 31.7 PG (ref 26–34)
MCHC RBC AUTO-ENTMCNC: 34.2 G/DL (ref 31–36)
MCV RBC AUTO: 93 FL (ref 80–100)
MONOCYTES # BLD AUTO: 0.7 THOUSAND/ΜL (ref 0.2–0.9)
MONOCYTES NFR BLD AUTO: 9 % (ref 1–10)
NEUTROPHILS # BLD AUTO: 5.1 THOUSANDS/ΜL (ref 1.8–7.8)
NEUTS SEG NFR BLD AUTO: 66 % (ref 45–65)
P AXIS: 6 DEGREES
PLATELET # BLD AUTO: 218 THOUSANDS/UL (ref 150–450)
PMV BLD AUTO: 8.2 FL (ref 8.9–12.7)
POTASSIUM SERPL-SCNC: 4.3 MMOL/L (ref 3.6–5)
PR INTERVAL: 154 MS
PROT SERPL-MCNC: 7.7 G/DL (ref 5.9–8.4)
QRS AXIS: 4 DEGREES
QRSD INTERVAL: 78 MS
QT INTERVAL: 356 MS
QTC INTERVAL: 426 MS
RBC # BLD AUTO: 4.08 MILLION/UL (ref 4–5.2)
SODIUM SERPL-SCNC: 135 MMOL/L (ref 137–147)
T WAVE AXIS: 0 DEGREES
TROPONIN I SERPL-MCNC: <0.01 NG/ML (ref 0–0.03)
VENTRICULAR RATE: 86 BPM
WBC # BLD AUTO: 7.8 THOUSAND/UL (ref 4.5–11)

## 2021-05-25 PROCEDURE — 93010 ELECTROCARDIOGRAM REPORT: CPT | Performed by: INTERNAL MEDICINE

## 2021-05-25 PROCEDURE — 85025 COMPLETE CBC W/AUTO DIFF WBC: CPT

## 2021-05-25 PROCEDURE — 71045 X-RAY EXAM CHEST 1 VIEW: CPT

## 2021-05-25 PROCEDURE — 99284 EMERGENCY DEPT VISIT MOD MDM: CPT

## 2021-05-25 PROCEDURE — 96374 THER/PROPH/DIAG INJ IV PUSH: CPT

## 2021-05-25 PROCEDURE — 93005 ELECTROCARDIOGRAM TRACING: CPT

## 2021-05-25 PROCEDURE — 80053 COMPREHEN METABOLIC PANEL: CPT

## 2021-05-25 PROCEDURE — 84484 ASSAY OF TROPONIN QUANT: CPT

## 2021-05-25 PROCEDURE — 36415 COLL VENOUS BLD VENIPUNCTURE: CPT

## 2021-05-25 RX ORDER — NAPROXEN 500 MG/1
500 TABLET ORAL 2 TIMES DAILY WITH MEALS
Qty: 30 TABLET | Refills: 0 | Status: SHIPPED | OUTPATIENT
Start: 2021-05-25

## 2021-05-25 RX ORDER — NAPROXEN 500 MG/1
500 TABLET ORAL 2 TIMES DAILY WITH MEALS
Qty: 30 TABLET | Refills: 0 | Status: SHIPPED | OUTPATIENT
Start: 2021-05-25 | End: 2021-05-25 | Stop reason: ALTCHOICE

## 2021-05-25 RX ORDER — KETOROLAC TROMETHAMINE 30 MG/ML
15 INJECTION, SOLUTION INTRAMUSCULAR; INTRAVENOUS ONCE
Status: COMPLETED | OUTPATIENT
Start: 2021-05-25 | End: 2021-05-25

## 2021-05-25 RX ADMIN — KETOROLAC TROMETHAMINE 15 MG: 30 INJECTION, SOLUTION INTRAMUSCULAR; INTRAVENOUS at 17:13

## 2021-05-25 NOTE — ED PROVIDER NOTES
History  Chief Complaint   Patient presents with    Fall     pt states mechanical fell OOB yesterday landed on left side  no LOC before or after  c/o pain to chest/left side and pain with movement  mild sob with pain     71-year-old female history of behavior health issues asthma chronic pain presents secondary to left-sided chest pain which has been present for the past 2 days  Patient states that it is positional worse when she moves or takes a deep breath  Patient states she did accidentally fall out of bed 2 days ago  Patient states she got caught in her blankets when she is trying to get out of bed and fell forward landing on her chest   Patient did not hit head is mostly complaining of pain left side of the chest   Patient denies any palpitations denies any shortness of breath patient denies any nausea vomiting  Patient denies any leg pain or swelling patient denies any URI symptoms cough or congestion          Prior to Admission Medications   Prescriptions Last Dose Informant Patient Reported? Taking?    ARIPiprazole (ABILIFY) 15 mg tablet   Yes No   Sig: Take 7 5 mg by mouth daily at bedtime   DULoxetine (CYMBALTA) 60 mg delayed release capsule   Yes No   Sig: Take 60 mg by mouth daily at bedtime   acetaminophen (TYLENOL) 325 mg tablet   No No   Sig: Take 3 tablets (975 mg total) by mouth every 8 (eight) hours   acetaminophen (TYLENOL) 500 mg tablet   No No   Sig: Take 2 tablets (1,000 mg total) by mouth every 6 (six) hours as needed for mild pain or moderate pain   albuterol (PROVENTIL HFA,VENTOLIN HFA) 90 mcg/act inhaler   No No   Sig: Inhale 2 puffs every 4 (four) hours as needed for wheezing   atorvastatin (LIPITOR) 20 mg tablet   Yes No   Sig: Take 20 mg by mouth daily   carisoprodol (SOMA) 350 mg tablet   Yes No   Sig: Take 350 mg by mouth 3 (three) times a day   clonazePAM (KlonoPIN) 1 mg tablet   Yes No   Sig: Take 0 5 mg by mouth 2 (two) times a day   hydrOXYzine HCL (ATARAX) 10 mg tablet Yes No   Sig: Take 25 mg by mouth 3 (three) times a day    traZODone (DESYREL) 100 mg tablet   No No   Sig: Take 1 tablet by mouth daily at bedtime as needed for sleep   Patient taking differently: Take 150 mg by mouth daily at bedtime as needed for sleep        Facility-Administered Medications: None       Past Medical History:   Diagnosis Date    Anxiety     Chronic pain     Back Pain    Depression     Hyperlipidemia     Migraine        Past Surgical History:   Procedure Laterality Date    OTHER SURGICAL HISTORY      cyst removed from left axila       Family History   Problem Relation Age of Onset    Heart failure Mother     Heart disease Father      I have reviewed and agree with the history as documented  E-Cigarette/Vaping    E-Cigarette Use Never User      E-Cigarette/Vaping Substances     Social History     Tobacco Use    Smoking status: Current Every Day Smoker     Packs/day: 0 50     Types: Cigarettes    Smokeless tobacco: Current User   Substance Use Topics    Alcohol use: Never     Frequency: Never    Drug use: Yes     Types: Methamphetamines, Marijuana     Comment: reports last use years ago       Review of Systems   Constitutional: Negative for chills and fever  HENT: Negative for congestion  Eyes: Negative for visual disturbance  Respiratory: Positive for chest tightness  Negative for shortness of breath  Cardiovascular: Positive for chest pain  Gastrointestinal: Negative for abdominal pain  Endocrine: Negative for cold intolerance  Genitourinary: Negative for frequency  Musculoskeletal: Negative for gait problem  Skin: Negative for rash  Neurological: Negative for dizziness  Psychiatric/Behavioral: Negative for behavioral problems and confusion  Physical Exam  Physical Exam  Vitals signs and nursing note reviewed  Constitutional:       Appearance: She is well-developed  HENT:      Head: Normocephalic and atraumatic        Right Ear: Tympanic membrane normal       Left Ear: Tympanic membrane normal       Nose: Nose normal       Mouth/Throat:      Mouth: Mucous membranes are moist    Eyes:      Conjunctiva/sclera: Conjunctivae normal       Pupils: Pupils are equal, round, and reactive to light  Neck:      Musculoskeletal: Normal range of motion and neck supple  Cardiovascular:      Rate and Rhythm: Normal rate and regular rhythm  Heart sounds: Normal heart sounds  Pulmonary:      Effort: Pulmonary effort is normal       Breath sounds: Normal breath sounds  Abdominal:      General: Bowel sounds are normal       Palpations: Abdomen is soft  Musculoskeletal: Normal range of motion  Skin:     General: Skin is warm and dry  Capillary Refill: Capillary refill takes less than 2 seconds  Neurological:      Mental Status: She is alert and oriented to person, place, and time     Psychiatric:         Behavior: Behavior normal          Vital Signs  ED Triage Vitals   Temperature Pulse Respirations Blood Pressure SpO2   05/25/21 1654 05/25/21 1654 05/25/21 1654 05/25/21 1654 05/25/21 1654   97 5 °F (36 4 °C) 90 18 144/76 98 %      Temp Source Heart Rate Source Patient Position - Orthostatic VS BP Location FiO2 (%)   05/25/21 1654 05/25/21 1654 05/25/21 1654 05/25/21 1654 --   Tympanic Monitor Lying Left arm       Pain Score       05/25/21 1713       5           Vitals:    05/25/21 1654   BP: 144/76   Pulse: 90   Patient Position - Orthostatic VS: Lying         Visual Acuity  Visual Acuity      Most Recent Value   L Pupil Size (mm)  3   R Pupil Size (mm)  3          ED Medications  Medications   ketorolac (TORADOL) injection 15 mg (15 mg Intravenous Given 5/25/21 1713)       Diagnostic Studies  Results Reviewed     Procedure Component Value Units Date/Time    Troponin I [[id number]]  (Normal) Collected: 05/25/21 1713    Lab Status: Final result Specimen: Blood from Arm, Left Updated: 05/25/21 1751     Troponin I <0 01 ng/mL     Comprehensive metabolic panel [[id number]]  (Abnormal) Collected: 05/25/21 1713    Lab Status: Final result Specimen: Blood from Arm, Left Updated: 05/25/21 1742     Sodium 135 mmol/L      Potassium 4 3 mmol/L      Chloride 101 mmol/L      CO2 31 mmol/L      ANION GAP 3 mmol/L      BUN 11 mg/dL      Creatinine 0 95 mg/dL      Glucose 98 mg/dL      Calcium 9 5 mg/dL      AST 20 U/L      ALT 12 U/L      Alkaline Phosphatase 101 U/L      Total Protein 7 7 g/dL      Albumin 4 0 g/dL      Total Bilirubin 0 68 mg/dL      eGFR 68 ml/min/1 73sq m     Narrative:      [place] guidelines for Chronic Kidney Disease (CKD):     Stage 1 with normal or high GFR (GFR > 90 mL/min/1 73 square meters)    Stage 2 Mild CKD (GFR = 60-89 mL/min/1 73 square meters)    Stage 3A Moderate CKD (GFR = 45-59 mL/min/1 73 square meters)    Stage 3B Moderate CKD (GFR = 30-44 mL/min/1 73 square meters)    Stage 4 Severe CKD (GFR = 15-29 mL/min/1 73 square meters)    Stage 5 End Stage CKD (GFR <15 mL/min/1 73 square meters)  Note: GFR calculation is accurate only with a steady state creatinine    CBC and differential [[id number]]  (Abnormal) Collected: 05/25/21 1713    Lab Status: Final result Specimen: Blood from Arm, Left Updated: 05/25/21 1732     WBC 7 80 Thousand/uL      RBC 4 08 Million/uL      Hemoglobin 12 9 g/dL      Hematocrit 37 8 %      MCV 93 fL      MCH 31 7 pg      MCHC 34 2 g/dL      RDW 14 4 %      MPV 8 2 fL      Platelets 603 Thousands/uL      Neutrophils Relative 66 %      Lymphocytes Relative 23 %      Monocytes Relative 9 %      Eosinophils Relative 1 %      Basophils Relative 1 %      Neutrophils Absolute 5 10 Thousands/µL      Lymphocytes Absolute 1 80 Thousands/µL      Monocytes Absolute 0 70 Thousand/µL      Eosinophils Absolute 0 10 Thousand/µL      Basophils Absolute 0 10 Thousands/µL                  XR chest 1 view portable   ED Interpretation by Samina Salcedo MD (05/25 1754)   CXR No infiltrate no effusion no rib fracture noted  Procedures  ECG 12 Lead Documentation Only    Date/Time: 5/25/2021 5:03 PM  Performed by: Alphonso Hung MD  Authorized by: Alphonso Hung MD     Indications / Diagnosis:  Left sided chest pain  Comments:      EKG demonstrates normal sinus rhythm no acute ST T wave abnormalities normal axis normal intervals             ED Course                             SBIRT 20yo+      Most Recent Value   SBIRT (25 yo +)   In order to provide better care to our patients, we are screening all of our patients for alcohol and drug use  Would it be okay to ask you these screening questions? No Filed at: 05/25/2021 1723                    [facility]  Number of Diagnoses or Management Options  Diagnosis management comments: Patient is monitored emergency department patient had EKG demonstrated normal sinus rhythm no acute ST T wave changes chest x-ray demonstrated no acute findings no signs of rib fracture no signs infiltrate or effusion  Patient's troponin is not elevated  Patient has reproducible pain to palpation over the left side of the chest wall and shoulder area  Impression is contusion chest wall plan will be to discharge patient home she was placed on naproxen 500 b i d  P r n  Chest pain return to the emergency department for severe symptoms follow-up with her primary care physician within a week for re-evaluation      Disposition  Final diagnoses:   Chest wall pain     Time reflects when diagnosis was documented in both MDM as applicable and the Disposition within this note     Time User Action Codes Description Comment    5/25/2021  5:56 PM Meche Johnson Add [R07 89] Chest wall pain       ED Disposition     ED Disposition Condition Date/Time Comment    Discharge Stable Tue May 25, 2021  5:55 PM Merline Ramon discharge to home/self care              Follow-up Information     Follow up With Specialties Details Why Aidlene Prajapati MD [facility] Schedule an appointment as soon as possible for a visit   [address] [place] [address]  [phone]            Patient's Medications   Discharge Prescriptions    NAPROXEN (NAPROSYN) 500 MG TABLET    Take 1 tablet (500 mg total) by mouth 2 (two) times a day with meals       Start Date: 5/25/2021 End Date: --       Order Dose: 500 mg       Quantity: 30 tablet    Refills: 0     No discharge procedures on file      PDMP Review     None          ED Provider  Electronically Signed by           Don Thompson MD  05/25/21 2294

## 2021-11-17 ENCOUNTER — HOSPITAL ENCOUNTER (EMERGENCY)
Facility: HOSPITAL | Age: 56
Discharge: HOME | End: 2021-11-17
Attending: EMERGENCY MEDICINE
Payer: MEDICARE

## 2021-11-17 VITALS
DIASTOLIC BLOOD PRESSURE: 58 MMHG | RESPIRATION RATE: 20 BRPM | OXYGEN SATURATION: 96 % | BODY MASS INDEX: 32.57 KG/M2 | HEART RATE: 102 BPM | SYSTOLIC BLOOD PRESSURE: 115 MMHG | TEMPERATURE: 98.3 F | WEIGHT: 183.86 LBS

## 2021-11-17 DIAGNOSIS — R11.10 VOMITING: ICD-10-CM

## 2021-11-17 DIAGNOSIS — J20.5 RSV BRONCHITIS: Primary | ICD-10-CM

## 2021-11-17 LAB
FLUAV RNA RESP QL NAA+PROBE: NEGATIVE
FLUBV RNA RESP QL NAA+PROBE: NEGATIVE
RSV RNA RESP QL NAA+PROBE: POSITIVE
SARS-COV-2 RNA RESP QL NAA+PROBE: NEGATIVE

## 2021-11-17 PROCEDURE — 99284 EMERGENCY DEPT VISIT MOD MDM: CPT | Performed by: EMERGENCY MEDICINE

## 2021-11-17 PROCEDURE — 0241U HB NFCT DS VIR RESP RNA 4 TRGT: CPT | Performed by: EMERGENCY MEDICINE

## 2021-11-17 PROCEDURE — 99283 EMERGENCY DEPT VISIT LOW MDM: CPT

## 2021-11-17 RX ORDER — ALBUTEROL SULFATE 90 UG/1
2 AEROSOL, METERED RESPIRATORY (INHALATION) EVERY 6 HOURS PRN
Qty: 18 G | Refills: 0 | Status: SHIPPED | OUTPATIENT
Start: 2021-11-17

## 2021-11-17 RX ORDER — ONDANSETRON 4 MG/1
4 TABLET, ORALLY DISINTEGRATING ORAL ONCE
Status: COMPLETED | OUTPATIENT
Start: 2021-11-17 | End: 2021-11-17

## 2021-11-17 RX ORDER — ONDANSETRON 4 MG/1
4 TABLET, ORALLY DISINTEGRATING ORAL EVERY 8 HOURS PRN
Qty: 20 TABLET | Refills: 0 | Status: SHIPPED | OUTPATIENT
Start: 2021-11-17

## 2021-11-17 RX ORDER — PREDNISONE 20 MG/1
TABLET ORAL
Qty: 15 TABLET | Refills: 0 | Status: SHIPPED | OUTPATIENT
Start: 2021-11-17

## 2021-11-17 RX ADMIN — ONDANSETRON 4 MG: 4 TABLET, ORALLY DISINTEGRATING ORAL at 20:20

## 2021-11-30 ENCOUNTER — OFFICE VISIT (OUTPATIENT)
Dept: URGENT CARE | Age: 56
End: 2021-11-30
Payer: MEDICARE

## 2021-11-30 VITALS — BODY MASS INDEX: 32.45 KG/M2 | OXYGEN SATURATION: 97 % | TEMPERATURE: 97.3 F | HEART RATE: 98 BPM | WEIGHT: 183.2 LBS

## 2021-11-30 DIAGNOSIS — J02.9 SORE THROAT: ICD-10-CM

## 2021-11-30 DIAGNOSIS — B34.9 ACUTE VIRAL SYNDROME: Primary | ICD-10-CM

## 2021-11-30 LAB — S PYO AG THROAT QL: NEGATIVE

## 2021-11-30 PROCEDURE — 87880 STREP A ASSAY W/OPTIC: CPT | Performed by: PHYSICIAN ASSISTANT

## 2021-11-30 PROCEDURE — 99213 OFFICE O/P EST LOW 20 MIN: CPT | Performed by: PHYSICIAN ASSISTANT

## 2021-11-30 PROCEDURE — 0241U HB NFCT DS VIR RESP RNA 4 TRGT: CPT | Performed by: PHYSICIAN ASSISTANT

## 2021-11-30 RX ORDER — FLUTICASONE PROPIONATE 50 MCG
SPRAY, SUSPENSION (ML) NASAL
COMMUNITY
Start: 2021-10-25

## 2021-11-30 RX ORDER — IBUPROFEN 600 MG/1
600 TABLET ORAL EVERY 6 HOURS PRN
COMMUNITY
Start: 2021-01-03 | End: 2022-01-03

## 2021-11-30 RX ORDER — GUAIFENESIN 600 MG/1
TABLET, EXTENDED RELEASE ORAL
COMMUNITY
Start: 2021-11-16

## 2021-11-30 RX ORDER — BENZTROPINE MESYLATE 1 MG/1
TABLET ORAL
COMMUNITY
Start: 2021-11-30

## 2021-11-30 RX ORDER — BUPROPION HYDROCHLORIDE 150 MG/1
TABLET ORAL
COMMUNITY

## 2021-11-30 RX ORDER — CARISOPRODOL 350 MG/1
350 TABLET ORAL 4 TIMES DAILY PRN
COMMUNITY

## 2021-12-01 LAB
FLUAV RNA RESP QL NAA+PROBE: NEGATIVE
FLUBV RNA RESP QL NAA+PROBE: NEGATIVE
RSV RNA RESP QL NAA+PROBE: NEGATIVE
SARS-COV-2 RNA RESP QL NAA+PROBE: NEGATIVE

## 2021-12-03 ENCOUNTER — TELEPHONE (OUTPATIENT)
Dept: URGENT CARE | Age: 56
End: 2021-12-03

## 2021-12-03 DIAGNOSIS — J02.0 STREP PHARYNGITIS: Primary | ICD-10-CM

## 2021-12-03 LAB — B-HEM STREP SPEC QL CULT: ABNORMAL

## 2021-12-03 RX ORDER — AZITHROMYCIN 250 MG/1
TABLET, FILM COATED ORAL
Qty: 6 TABLET | Refills: 0 | Status: SHIPPED | OUTPATIENT
Start: 2021-12-03 | End: 2021-12-07

## 2022-09-01 ENCOUNTER — TELEMEDICINE (OUTPATIENT)
Dept: PSYCHIATRY | Facility: CLINIC | Age: 57
End: 2022-09-01
Payer: COMMERCIAL

## 2022-09-01 DIAGNOSIS — F33.1 MAJOR DEPRESSIVE DISORDER, RECURRENT, MODERATE (HCC): Primary | ICD-10-CM

## 2022-09-01 DIAGNOSIS — F41.1 GENERALIZED ANXIETY DISORDER: ICD-10-CM

## 2022-09-01 PROCEDURE — 99212 OFFICE O/P EST SF 10 MIN: CPT | Performed by: NURSE PRACTITIONER

## 2022-09-01 NOTE — PSYCH
Virtual Regular Visit    Verification of patient location: At Home    Patient is located in the following state in which I hold an active license [place]      Assessment/Plan:    Problem List Items Addressed This Visit    None         Goals addressed in session: Goal 1   Good Health    Improve mood       Reason for visit is No chief complaint on file  Medication Management    Encounter provider FRANKLIN Ricks    Provider located at [address] [facility] [address]  [address] [facility]  [address]  [phone]      Recent Visits  No visits were found meeting these conditions  Showing recent visits within past 7 days and meeting all other requirements  Future Appointments  No visits were found meeting these conditions  Showing future appointments within next 150 days and meeting all other requirements       The patient was identified by name and date of birth  Lawerence Scheuermann was informed that this is a telemedicine visit and that the visit is being conducted throughEpic Embedded and patient was informed this is a secure, HIPAA-complaint platform  She agrees to proceed     My office door was closed  No one else was in the room  She acknowledged consent and understanding of privacy and security of the video platform  The patient has agreed to participate and understands they can discontinue the visit at any time  Patient is aware this is a billable service  Subjective  Lawerence Scheuermann is a 62 y o  female    sitting at home for her session  Her daughter is there too      HPI Mood up and down   States depressed but does not feel she needs a therapist  Tried to encourage her to do so  Anxiety manageable   Denies Psychotic Symptoms  No problems with medications  Appetite fair  Sleep fair   Health OK  Denies SI/HI  Denies Medical Changes  Denies falmily/social changes  Therapist suggested  Return 3 months    Past Medical History:   Diagnosis Date    Anxiety  Chronic pain     Back Pain    Depression     Hyperlipidemia     Migraine        Past Surgical History:   Procedure Laterality Date    OTHER SURGICAL HISTORY      cyst removed from left axila       Current Outpatient Medications   Medication Sig Dispense Refill    acetaminophen (TYLENOL) 325 mg tablet Take 3 tablets (975 mg total) by mouth every 8 (eight) hours (Patient not taking: Reported on 11/17/2021 ) 2 tablet 0    acetaminophen (TYLENOL) 500 mg tablet Take 2 tablets (1,000 mg total) by mouth every 6 (six) hours as needed for mild pain or moderate pain (Patient not taking: Reported on 11/17/2021 ) 30 tablet 0    albuterol (PROVENTIL HFA,VENTOLIN HFA) 90 mcg/act inhaler Inhale 2 puffs every 4 (four) hours as needed for wheezing (Patient not taking: Reported on 11/17/2021 ) 1 Inhaler 0    albuterol (PROVENTIL HFA,VENTOLIN HFA) 90 mcg/act inhaler Inhale 2 puffs every 6 (six) hours as needed for wheezing or shortness of breath 18 g 0    ARIPiprazole (ABILIFY) 15 mg tablet Take 7 5 mg by mouth daily at bedtime      atorvastatin (LIPITOR) 20 mg tablet Take 20 mg by mouth daily      benztropine (COGENTIN) 1 mg tablet       buPROPion (Wellbutrin XL) 150 mg 24 hr tablet Take 150 mg by mouth every morning        carisoprodol (SOMA) 350 mg tablet Take 350 mg by mouth 3 (three) times a day        carisoprodol (SOMA) 350 mg tablet Take 350 mg by mouth 4 (four) times a day as needed      clonazePAM (KlonoPIN) 1 mg tablet Take 0 5 mg by mouth 2 (two) times a day      DULoxetine (CYMBALTA) 60 mg delayed release capsule Take 60 mg by mouth daily at bedtime      fluticasone (FLONASE) 50 mcg/act nasal spray       hydrOXYzine HCL (ATARAX) 10 mg tablet Take 25 mg by mouth 3 (three) times a day       Mucus Relief 600 MG 12 hr tablet       naproxen (NAPROSYN) 500 mg tablet Take 1 tablet (500 mg total) by mouth 2 (two) times a day with meals (Patient not taking: Reported on 11/17/2021 ) 30 tablet 0    ondansetron (ZOFRAN-ODT) 4 mg disintegrating tablet Take 1 tablet (4 mg total) by mouth every 8 (eight) hours as needed for nausea or vomiting 20 tablet 0    predniSONE 20 mg tablet 60 mg p o  q day x5 days (Patient not taking: Reported on 11/30/2021 ) 15 tablet 0    traZODone (DESYREL) 100 mg tablet Take 1 tablet by mouth daily at bedtime as needed for sleep (Patient not taking: Reported on 11/30/2021 ) 30 tablet 1     No current facility-administered medications for this visit  Allergies   Allergen Reactions    Penicillins Anaphylaxis, Rash and Edema     Anaphylaxis       Review of Systems    Video Exam    There were no vitals filed for this visit      Physical Exam     I spent 15 minutes directly with the patient during this visit

## 2022-09-01 NOTE — BH TREATMENT PLAN
TREATMENT PLAN (Medication Management Only)        [facility]    Name and Date of Birth:  Ny Lazar 62 y o  1965  Date of Treatment Plan: September 1, 2022  Diagnosis/Diagnoses:  No diagnosis found  Strengths/Personal Resources for Self-Care: supportive family  Area/Areas of need (in own words): anxiety, depression  1  Long Term Goal: improve mood  Target Date:6 months - 3/1/2023  Person/Persons responsible for completion of goal: Mol  2  Short Term Objective (s) - How will we reach this goal?:   A  Provider new recommended medication/dosage changes and/or continue medication(s): continue current medications as prescribed Cymbalta, Wellbutrin XL, Abilify, Klonopin,  , Cogentin  B  N/A   C  N/A  Target Date:6 months - 3/1/2023  Person/Persons Responsible for Completion of Goal: Mol  Progress Towards Goals: progressing slowly  Treatment Modality: medication management every 3 months  Review due 180 days from date of this plan: 6 months - 3/1/2023  Expected length of service: maintenance  My Physician/PA/NP and I have developed this plan together and I agree to work on the goals and objectives  I understand the treatment goals that were developed for my treatment

## 2022-09-02 ENCOUNTER — TELEPHONE (OUTPATIENT)
Dept: PSYCHIATRY | Facility: CLINIC | Age: 57
End: 2022-09-02

## 2022-09-02 PROBLEM — H54.7 VISION LOSS: Status: ACTIVE | Noted: 2022-09-02

## 2022-09-02 PROBLEM — G89.29 CHRONIC PAIN: Status: ACTIVE | Noted: 2022-09-02

## 2022-09-02 PROBLEM — E78.00 HIGH CHOLESTEROL: Status: ACTIVE | Noted: 2022-09-02

## 2022-09-02 NOTE — TELEPHONE ENCOUNTER
Scripts provided for Hydroxyzine, Bupropion, Trazodone, Duloxetine, Abilify, Benztropine, and Clonazepam  Scripts processed manually

## 2022-09-07 ENCOUNTER — TELEPHONE (OUTPATIENT)
Dept: PSYCHIATRY | Facility: CLINIC | Age: 57
End: 2022-09-07

## 2022-09-07 ENCOUNTER — APPOINTMENT (EMERGENCY)
Dept: CT IMAGING | Facility: HOSPITAL | Age: 57
DRG: 751 | End: 2022-09-07
Payer: COMMERCIAL

## 2022-09-07 ENCOUNTER — HOSPITAL ENCOUNTER (INPATIENT)
Facility: HOSPITAL | Age: 57
LOS: 1 days | Discharge: HOME | DRG: 751 | End: 2022-09-08
Attending: EMERGENCY MEDICINE | Admitting: INTERNAL MEDICINE
Payer: COMMERCIAL

## 2022-09-07 ENCOUNTER — APPOINTMENT (OUTPATIENT)
Dept: RADIOLOGY | Facility: HOSPITAL | Age: 57
DRG: 751 | End: 2022-09-07
Payer: COMMERCIAL

## 2022-09-07 DIAGNOSIS — R40.4 TRANSIENT ALTERATION OF AWARENESS: Primary | ICD-10-CM

## 2022-09-07 DIAGNOSIS — R41.82 AMS (ALTERED MENTAL STATUS): ICD-10-CM

## 2022-09-07 DIAGNOSIS — N39.0 UTI (URINARY TRACT INFECTION): ICD-10-CM

## 2022-09-07 DIAGNOSIS — F32.A DEPRESSION, UNSPECIFIED DEPRESSION TYPE: ICD-10-CM

## 2022-09-07 DIAGNOSIS — F33.2 SEVERE EPISODE OF RECURRENT MAJOR DEPRESSIVE DISORDER, WITHOUT PSYCHOTIC FEATURES (HCC): ICD-10-CM

## 2022-09-07 DIAGNOSIS — B37.31 VULVOVAGINAL CANDIDIASIS: ICD-10-CM

## 2022-09-07 LAB
ALBUMIN SERPL BCP-MCNC: 4.1 G/DL (ref 3.5–5)
ALP SERPL-CCNC: 85 U/L (ref 43–122)
ALT SERPL W P-5'-P-CCNC: 18 U/L
AMMONIA PLAS-SCNC: <9 UMOL/L (ref 9–33)
AMPHETAMINES SERPL QL SCN: NEGATIVE
ANION GAP SERPL CALCULATED.3IONS-SCNC: 6 MMOL/L (ref 5–14)
APAP SERPL-MCNC: <10 UG/ML (ref 10–20)
AST SERPL W P-5'-P-CCNC: 17 U/L (ref 14–36)
ATRIAL RATE: 79 BPM
BACTERIA UR QL AUTO: ABNORMAL /HPF
BARBITURATES UR QL: NEGATIVE
BASOPHILS # BLD AUTO: 0.04 THOUSANDS/ΜL (ref 0–0.1)
BASOPHILS NFR BLD AUTO: 1 % (ref 0–1)
BENZODIAZ UR QL: NEGATIVE
BILIRUB SERPL-MCNC: 0.37 MG/DL (ref 0.2–1)
BILIRUB UR QL STRIP: NEGATIVE
BUN SERPL-MCNC: 9 MG/DL (ref 5–25)
CALCIUM SERPL-MCNC: 9.3 MG/DL (ref 8.4–10.2)
CARDIAC TROPONIN I PNL SERPL HS: 2 NG/L
CHLORIDE SERPL-SCNC: 102 MMOL/L (ref 96–108)
CLARITY UR: ABNORMAL
CO2 SERPL-SCNC: 33 MMOL/L (ref 21–32)
COCAINE UR QL: NEGATIVE
COLOR UR: ABNORMAL
CREAT SERPL-MCNC: 0.99 MG/DL (ref 0.6–1.2)
EOSINOPHIL # BLD AUTO: 0.16 THOUSAND/ΜL (ref 0–0.61)
EOSINOPHIL NFR BLD AUTO: 2 % (ref 0–6)
ERYTHROCYTE [DISTWIDTH] IN BLOOD BY AUTOMATED COUNT: 13.6 % (ref 11.6–15.1)
ETHANOL SERPL-MCNC: <10 MG/DL (ref 0–10)
GFR SERPL CREATININE-BSD FRML MDRD: 63 ML/MIN/1.73SQ M
GLUCOSE SERPL-MCNC: 98 MG/DL (ref 70–99)
GLUCOSE UR STRIP-MCNC: NEGATIVE MG/DL
HCT VFR BLD AUTO: 44.1 % (ref 34.8–46.1)
HGB BLD-MCNC: 13.9 G/DL (ref 11.5–15.4)
HGB UR QL STRIP.AUTO: 50
IMM GRANULOCYTES # BLD AUTO: 0.03 THOUSAND/UL (ref 0–0.2)
IMM GRANULOCYTES NFR BLD AUTO: 1 % (ref 0–2)
KETONES UR STRIP-MCNC: NEGATIVE MG/DL
LEUKOCYTE ESTERASE UR QL STRIP: 500
LYMPHOCYTES # BLD AUTO: 2.28 THOUSANDS/ΜL (ref 0.6–4.47)
LYMPHOCYTES NFR BLD AUTO: 34 % (ref 14–44)
MAGNESIUM SERPL-MCNC: 2.1 MG/DL (ref 1.6–2.3)
MCH RBC QN AUTO: 30.3 PG (ref 26.8–34.3)
MCHC RBC AUTO-ENTMCNC: 31.5 G/DL (ref 31.4–37.4)
MCV RBC AUTO: 96 FL (ref 82–98)
METHADONE UR QL: NEGATIVE
MONOCYTES # BLD AUTO: 0.5 THOUSAND/ΜL (ref 0.17–1.22)
MONOCYTES NFR BLD AUTO: 8 % (ref 4–12)
MUCOUS THREADS UR QL AUTO: ABNORMAL
NEUTROPHILS # BLD AUTO: 3.65 THOUSANDS/ΜL (ref 1.85–7.62)
NEUTS SEG NFR BLD AUTO: 54 % (ref 43–75)
NITRITE UR QL STRIP: NEGATIVE
NON-SQ EPI CELLS URNS QL MICRO: ABNORMAL /HPF
NRBC BLD AUTO-RTO: 0 /100 WBCS
OPIATES UR QL SCN: NEGATIVE
OTHER STN SPEC: ABNORMAL
OXYCODONE+OXYMORPHONE UR QL SCN: NEGATIVE
P AXIS: 49 DEGREES
PCP UR QL: NEGATIVE
PH UR STRIP.AUTO: 6 [PH]
PLATELET # BLD AUTO: 220 THOUSANDS/UL (ref 149–390)
PMV BLD AUTO: 9.2 FL (ref 8.9–12.7)
POTASSIUM SERPL-SCNC: 4.3 MMOL/L (ref 3.5–5.3)
PR INTERVAL: 166 MS
PROT SERPL-MCNC: 7.2 G/DL (ref 6.4–8.4)
PROT UR STRIP-MCNC: ABNORMAL MG/DL
QRS AXIS: 61 DEGREES
QRSD INTERVAL: 82 MS
QT INTERVAL: 352 MS
QTC INTERVAL: 403 MS
RBC # BLD AUTO: 4.58 MILLION/UL (ref 3.81–5.12)
RBC #/AREA URNS AUTO: ABNORMAL /HPF
SALICYLATES SERPL-MCNC: <1 MG/DL (ref 10–30)
SARS-COV-2 RNA RESP QL NAA+PROBE: NEGATIVE
SODIUM SERPL-SCNC: 141 MMOL/L (ref 135–147)
SP GR UR STRIP.AUTO: 1.01 (ref 1–1.04)
T WAVE AXIS: 68 DEGREES
THC UR QL: NEGATIVE
TSH SERPL DL<=0.05 MIU/L-ACNC: 1.22 UIU/ML (ref 0.45–4.5)
UROBILINOGEN UA: NEGATIVE MG/DL
VENTRICULAR RATE: 79 BPM
WBC # BLD AUTO: 6.66 THOUSAND/UL (ref 4.31–10.16)
WBC #/AREA URNS AUTO: ABNORMAL /HPF

## 2022-09-07 PROCEDURE — 87086 URINE CULTURE/COLONY COUNT: CPT | Performed by: EMERGENCY MEDICINE

## 2022-09-07 PROCEDURE — 96365 THER/PROPH/DIAG IV INF INIT: CPT

## 2022-09-07 PROCEDURE — 81001 URINALYSIS AUTO W/SCOPE: CPT | Performed by: EMERGENCY MEDICINE

## 2022-09-07 PROCEDURE — 80307 DRUG TEST PRSMV CHEM ANLYZR: CPT | Performed by: EMERGENCY MEDICINE

## 2022-09-07 PROCEDURE — G1004 CDSM NDSC: HCPCS

## 2022-09-07 PROCEDURE — 36415 COLL VENOUS BLD VENIPUNCTURE: CPT | Performed by: EMERGENCY MEDICINE

## 2022-09-07 PROCEDURE — U0005 INFEC AGEN DETEC AMPLI PROBE: HCPCS | Performed by: EMERGENCY MEDICINE

## 2022-09-07 PROCEDURE — 99285 EMERGENCY DEPT VISIT HI MDM: CPT | Performed by: EMERGENCY MEDICINE

## 2022-09-07 PROCEDURE — 71045 X-RAY EXAM CHEST 1 VIEW: CPT

## 2022-09-07 PROCEDURE — 84484 ASSAY OF TROPONIN QUANT: CPT | Performed by: EMERGENCY MEDICINE

## 2022-09-07 PROCEDURE — 85025 COMPLETE CBC W/AUTO DIFF WBC: CPT | Performed by: EMERGENCY MEDICINE

## 2022-09-07 PROCEDURE — 84443 ASSAY THYROID STIM HORMONE: CPT | Performed by: EMERGENCY MEDICINE

## 2022-09-07 PROCEDURE — 80179 DRUG ASSAY SALICYLATE: CPT | Performed by: EMERGENCY MEDICINE

## 2022-09-07 PROCEDURE — 81003 URINALYSIS AUTO W/O SCOPE: CPT | Performed by: EMERGENCY MEDICINE

## 2022-09-07 PROCEDURE — 70450 CT HEAD/BRAIN W/O DYE: CPT

## 2022-09-07 PROCEDURE — 99285 EMERGENCY DEPT VISIT HI MDM: CPT

## 2022-09-07 PROCEDURE — 82140 ASSAY OF AMMONIA: CPT | Performed by: EMERGENCY MEDICINE

## 2022-09-07 PROCEDURE — 93010 ELECTROCARDIOGRAM REPORT: CPT

## 2022-09-07 PROCEDURE — 80143 DRUG ASSAY ACETAMINOPHEN: CPT | Performed by: EMERGENCY MEDICINE

## 2022-09-07 PROCEDURE — 82077 ASSAY SPEC XCP UR&BREATH IA: CPT | Performed by: EMERGENCY MEDICINE

## 2022-09-07 PROCEDURE — 80053 COMPREHEN METABOLIC PANEL: CPT | Performed by: EMERGENCY MEDICINE

## 2022-09-07 PROCEDURE — 93005 ELECTROCARDIOGRAM TRACING: CPT

## 2022-09-07 PROCEDURE — 99223 1ST HOSP IP/OBS HIGH 75: CPT | Performed by: INTERNAL MEDICINE

## 2022-09-07 PROCEDURE — U0003 INFECTIOUS AGENT DETECTION BY NUCLEIC ACID (DNA OR RNA); SEVERE ACUTE RESPIRATORY SYNDROME CORONAVIRUS 2 (SARS-COV-2) (CORONAVIRUS DISEASE [COVID-19]), AMPLIFIED PROBE TECHNIQUE, MAKING USE OF HIGH THROUGHPUT TECHNOLOGIES AS DESCRIBED BY CMS-2020-01-R: HCPCS | Performed by: EMERGENCY MEDICINE

## 2022-09-07 PROCEDURE — 83735 ASSAY OF MAGNESIUM: CPT | Performed by: EMERGENCY MEDICINE

## 2022-09-07 RX ORDER — HYDROXYZINE HYDROCHLORIDE 25 MG/1
25 TABLET, FILM COATED ORAL 3 TIMES DAILY
Status: DISCONTINUED | OUTPATIENT
Start: 2022-09-07 | End: 2022-09-07

## 2022-09-07 RX ORDER — CEFTRIAXONE 1 G/50ML
1000 INJECTION, SOLUTION INTRAVENOUS EVERY 24 HOURS
Status: DISCONTINUED | OUTPATIENT
Start: 2022-09-08 | End: 2022-09-08 | Stop reason: HOSPADM

## 2022-09-07 RX ORDER — FLUCONAZOLE 100 MG/1
200 TABLET ORAL ONCE
Status: COMPLETED | OUTPATIENT
Start: 2022-09-07 | End: 2022-09-07

## 2022-09-07 RX ORDER — ATORVASTATIN CALCIUM 20 MG/1
20 TABLET, FILM COATED ORAL DAILY
Status: DISCONTINUED | OUTPATIENT
Start: 2022-09-07 | End: 2022-09-07

## 2022-09-07 RX ORDER — ALBUTEROL SULFATE 90 UG/1
2 AEROSOL, METERED RESPIRATORY (INHALATION) EVERY 6 HOURS PRN
Status: DISCONTINUED | OUTPATIENT
Start: 2022-09-07 | End: 2022-09-08 | Stop reason: HOSPADM

## 2022-09-07 RX ORDER — ONDANSETRON 2 MG/ML
4 INJECTION INTRAMUSCULAR; INTRAVENOUS EVERY 6 HOURS PRN
Status: DISCONTINUED | OUTPATIENT
Start: 2022-09-07 | End: 2022-09-08 | Stop reason: HOSPADM

## 2022-09-07 RX ORDER — SODIUM CHLORIDE 9 MG/ML
75 INJECTION, SOLUTION INTRAVENOUS CONTINUOUS
Status: DISCONTINUED | OUTPATIENT
Start: 2022-09-07 | End: 2022-09-08

## 2022-09-07 RX ORDER — CEFTRIAXONE 1 G/50ML
1000 INJECTION, SOLUTION INTRAVENOUS ONCE
Status: COMPLETED | OUTPATIENT
Start: 2022-09-07 | End: 2022-09-07

## 2022-09-07 RX ORDER — ACETAMINOPHEN 325 MG/1
650 TABLET ORAL EVERY 4 HOURS PRN
Status: DISCONTINUED | OUTPATIENT
Start: 2022-09-07 | End: 2022-09-08 | Stop reason: HOSPADM

## 2022-09-07 RX ORDER — NICOTINE 21 MG/24HR
1 PATCH, TRANSDERMAL 24 HOURS TRANSDERMAL DAILY
Status: DISCONTINUED | OUTPATIENT
Start: 2022-09-07 | End: 2022-09-08 | Stop reason: HOSPADM

## 2022-09-07 RX ORDER — ENOXAPARIN SODIUM 100 MG/ML
40 INJECTION SUBCUTANEOUS
Status: DISCONTINUED | OUTPATIENT
Start: 2022-09-07 | End: 2022-09-08 | Stop reason: HOSPADM

## 2022-09-07 RX ADMIN — NICOTINE 1 PATCH: 21 PATCH, EXTENDED RELEASE TRANSDERMAL at 16:15

## 2022-09-07 RX ADMIN — FLUCONAZOLE 200 MG: 100 TABLET ORAL at 13:15

## 2022-09-07 RX ADMIN — SODIUM CHLORIDE 75 ML/HR: 0.9 INJECTION, SOLUTION INTRAVENOUS at 16:38

## 2022-09-07 RX ADMIN — CEFTRIAXONE 1000 MG: 1 INJECTION, SOLUTION INTRAVENOUS at 13:09

## 2022-09-07 RX ADMIN — ENOXAPARIN SODIUM 40 MG: 100 INJECTION SUBCUTANEOUS at 16:15

## 2022-09-07 NOTE — ASSESSMENT & PLAN NOTE
· Urinalysis remarkable for pyuria  · May be causing symptomatology however less likely  · Ceftriaxone 1000 mg q24 hours  · Follow-up urine cultures  · Deescalate antibiotics as appropriate  · No evidence of systemic infection

## 2022-09-07 NOTE — ED PROVIDER NOTES
History  Chief Complaint   Patient presents with    Psychiatric Evaluation     Pt was dropped off by sister  Pt states she has a headache from not taking her meds  Her sister took medications away her  Pt states sister said she is not taking the medications the right way  Pt appears confused - today is Friday or Saturday, we are in [facility]  Pt also stared at hand  in waiting room and grabbed it to bring in  Flat affect, slow speech  No SI/HI but does has AH        Altered Mental Status     20-year-old female history of anxiety, depression, hyperlipidemia, migraine presenting for psychiatric evaluation  When asked why patient is here today, she states "my daughter and her boyfriend, he slipped and fell onto his butt, it was really funny"  Patient was dropped off by her sister, but the patient is unable to provide further history  She has staring episodes when you ask her a question and will not answer until she is redirected  Patient states she sometimes hears voices and invites her "friends" over  Patient is not oriented to time or place  She was unable to provide further history or answer review of systems questions  Per [facility] crisis: patient is sleeping too much and family found an empty bottle of clonazepam     Per her daughter Mendez Joyner, with whom the patient lives:  It's like she has dementia  Her daughter states the patient has jumping random thoughts and cannot perform daily tasks  She has been exhibiting odd behaviors like putting oatmeal in a coffee cup and then a sauce pan and stirring with a straw or breaking a glass and cleaning up the liquid but not the glass  She states the patient has been unable to care for herself and Mendez Joyner has been controlling her medications for last 2 weeks  However, Mendez Joyner feels her mother is misusing her clonazepam and possibly other substances  She states patient has a history of drug use but denies significant alcohol use    She is unsure if the patient is using other drugs at home at this time  She follows with [facility] who recently represcribed her medications  Her daughter does not feel she is safe to have at home  Prior to Admission Medications   Prescriptions Last Dose Informant Patient Reported? Taking? ARIPiprazole (ABILIFY) 15 mg tablet 9/6/2022 at Unknown time  Yes Yes   Sig: Take 7 5 mg by mouth daily at bedtime   DULoxetine (CYMBALTA) 60 mg delayed release capsule   Yes No   Sig: Take 60 mg by mouth daily at bedtime   Mucus Relief 600 MG 12 hr tablet   Yes No   acetaminophen (TYLENOL) 325 mg tablet Not Taking at Unknown time  No No   Sig: Take 3 tablets (975 mg total) by mouth every 8 (eight) hours   Patient not taking: No sig reported   acetaminophen (TYLENOL) 500 mg tablet Not Taking at Unknown time  No No   Sig: Take 2 tablets (1,000 mg total) by mouth every 6 (six) hours as needed for mild pain or moderate pain   Patient not taking: No sig reported   albuterol (PROVENTIL HFA,VENTOLIN HFA) 90 mcg/act inhaler Not Taking at Unknown time  No No   Sig: Inhale 2 puffs every 4 (four) hours as needed for wheezing   Patient not taking: No sig reported   albuterol (PROVENTIL HFA,VENTOLIN HFA) 90 mcg/act inhaler   No No   Sig: Inhale 2 puffs every 6 (six) hours as needed for wheezing or shortness of breath   atorvastatin (LIPITOR) 20 mg tablet   Yes No   Sig: Take 20 mg by mouth daily   benztropine (COGENTIN) 1 mg tablet   Yes No   buPROPion (Wellbutrin XL) 150 mg 24 hr tablet   Yes No   Sig: Take 150 mg by mouth every morning     carisoprodol (SOMA) 350 mg tablet   Yes No   Sig: Take 350 mg by mouth 3 (three) times a day     carisoprodol (SOMA) 350 mg tablet   Yes No   Sig: Take 350 mg by mouth 4 (four) times a day as needed   clonazePAM (KlonoPIN) 1 mg tablet   Yes No   Sig: Take 0 5 mg by mouth 2 (two) times a day   fluticasone (FLONASE) 50 mcg/act nasal spray   Yes No   hydrOXYzine HCL (ATARAX) 10 mg tablet   Yes No Sig: Take 25 mg by mouth 3 (three) times a day    naproxen (NAPROSYN) 500 mg tablet   No No   Sig: Take 1 tablet (500 mg total) by mouth 2 (two) times a day with meals   Patient not taking: Reported on 2021    ondansetron (ZOFRAN-ODT) 4 mg disintegrating tablet   No No   Sig: Take 1 tablet (4 mg total) by mouth every 8 (eight) hours as needed for nausea or vomiting   predniSONE 20 mg tablet   No No   Si mg p o  q day x5 days   Patient not taking: Reported on 2021    traZODone (DESYREL) 100 mg tablet   No No   Sig: Take 1 tablet by mouth daily at bedtime as needed for sleep   Patient not taking: Reported on 2021       Facility-Administered Medications: None       Past Medical History:   Diagnosis Date    Anxiety     Chronic pain     Back Pain    Depression     Hyperlipidemia     Migraine     Psychiatric disorder        Past Surgical History:   Procedure Laterality Date    OTHER SURGICAL HISTORY      cyst removed from left axila       Family History   Problem Relation Age of Onset    Heart failure Mother     Heart disease Father      I have reviewed and agree with the history as documented  E-Cigarette/Vaping    E-Cigarette Use Never User      E-Cigarette/Vaping Substances     Social History     Tobacco Use    Smoking status: Current Every Day Smoker     Packs/day: 1 00     Years: 45 00     Pack years: 45 00     Types: Cigarettes    Smokeless tobacco: Current User    Tobacco comment: Encouraged to stop smoking  Vaping Use    Vaping Use: Never used   Substance Use Topics    Drug use: Not Currently     Types: Methamphetamines, Marijuana     Comment: reports last use years ago       Review of Systems   Unable to perform ROS: Mental status change       Physical Exam  Physical Exam  Vitals and nursing note reviewed  Constitutional:       General: She is not in acute distress  HENT:      Head: Normocephalic and atraumatic        Nose: Nose normal       Mouth/Throat:      Mouth: Mucous membranes are moist    Eyes:      General: No scleral icterus  Right eye: No discharge  Left eye: No discharge  Extraocular Movements: Extraocular movements intact  Conjunctiva/sclera: Conjunctivae normal       Pupils: Pupils are equal, round, and reactive to light  Comments: No nystagmus   Cardiovascular:      Rate and Rhythm: Normal rate and regular rhythm  Pulmonary:      Effort: Pulmonary effort is normal  No respiratory distress  Breath sounds: No wheezing, rhonchi or rales  Abdominal:      General: There is no distension  Palpations: Abdomen is soft  Tenderness: There is no abdominal tenderness  There is no guarding or rebound  Musculoskeletal:         General: No swelling, tenderness or deformity  Cervical back: Neck supple  Skin:     General: Skin is warm and dry  Findings: No rash  Neurological:      Comments: Grossly moves all extremities  Not oriented to time or self   States we are in a hospital  Ataxic gait   Psychiatric:      Comments: Flat affect, abnormal thought content         Vital Signs  ED Triage Vitals   Temperature Pulse Respirations Blood Pressure SpO2   09/07/22 1125 09/07/22 1125 09/07/22 1125 09/07/22 1125 09/07/22 1125   98 3 °F (36 8 °C) 83 18 116/72 97 %      Temp Source Heart Rate Source Patient Position - Orthostatic VS BP Location FiO2 (%)   09/07/22 1448 -- 09/07/22 1448 09/07/22 1448 --   Temporal  Sitting Left arm       Pain Score       09/07/22 1505       7           Vitals:    09/07/22 1125 09/07/22 1448 09/07/22 1518   BP: 116/72 161/100 140/97   Pulse: 83 79 71   Patient Position - Orthostatic VS:  Sitting Lying         Visual Acuity      ED Medications  Medications   albuterol (PROVENTIL HFA,VENTOLIN HFA) inhaler 2 puff (has no administration in time range)   acetaminophen (TYLENOL) tablet 650 mg (has no administration in time range)   ondansetron (ZOFRAN) injection 4 mg (has no administration in time range)   nicotine (NICODERM CQ) 21 mg/24 hr TD 24 hr patch 1 patch (has no administration in time range)   cefTRIAXone (ROCEPHIN) IVPB (premix in dextrose) 1,000 mg 50 mL (has no administration in time range)   enoxaparin (LOVENOX) subcutaneous injection 40 mg (has no administration in time range)   cefTRIAXone (ROCEPHIN) IVPB (premix in dextrose) 1,000 mg 50 mL (0 mg Intravenous Stopped 9/7/22 1339)   fluconazole (DIFLUCAN) tablet 200 mg (200 mg Oral Given 9/7/22 1315)       Diagnostic Studies  Results Reviewed     Procedure Component Value Units Date/Time    Rapid drug screen, urine [[id number]]  (Normal) Collected: 09/07/22 1236    Lab Status: Final result Specimen: Urine, Clean Catch Updated: 09/07/22 1314     Amph/Meth UR Negative     Barbiturate Ur Negative     Benzodiazepine Urine Negative     Cocaine Urine Negative     Methadone Urine Negative     Opiate Urine Negative     PCP Ur Negative     THC Urine Negative     Oxycodone Urine Negative    Narrative:      FOR MEDICAL PURPOSES ONLY  IF CONFIRMATION NEEDED PLEASE CONTACT THE LAB WITHIN 5 DAYS  Drug Screen Cutoff Levels:  AMPHETAMINE/METHAMPHETAMINES  1000 ng/mL  BARBITURATES     200 ng/mL  BENZODIAZEPINES     200 ng/mL  COCAINE      300 ng/mL  METHADONE      300 ng/mL  OPIATES      300 ng/mL  PHENCYCLIDINE     25 ng/mL  THC       50 ng/mL  OXYCODONE      100 ng/mL    COVID only [[id number]]  (Normal) Collected: 09/07/22 1200    Lab Status: Final result Specimen: Nares from Nose Updated: 09/07/22 1302     SARS-CoV-2 Negative    Narrative:      FOR PEDIATRIC PATIENTS - copy/paste COVID Guidelines URL to browser: [email or web address]    SARS-CoV-2 assay is a Nucleic Acid Amplification assay intended for the  qualitative detection of nucleic acid from SARS-CoV-2 in nasopharyngeal  swabs  Results are for the presumptive identification of SARS-CoV-2 RNA      Positive results are indicative of infection with SARS-CoV-2, the virus  causing COVID-19, but do not rule out bacterial infection or co-infection  with other viruses  Laboratories within the [place] and its  territories are required to report all positive results to the appropriate  public health authorities  Negative results do not preclude SARS-CoV-2  infection and should not be used as the sole basis for treatment or other  patient management decisions  Negative results must be combined with  clinical observations, patient history, and epidemiological information  This test has not been FDA cleared or approved  This test has been authorized by FDA under an Emergency Use Authorization  (EUA)  This test is only authorized for the duration of time the  declaration that circumstances exist justifying the authorization of the  emergency use of an in vitro diagnostic tests for detection of SARS-CoV-2  virus and/or diagnosis of COVID-19 infection under section 564(b)(1) of  the Act, 21 U  S C  503MWD-3(G)(3), unless the authorization is terminated  or revoked sooner  The test has been validated but independent review by FDA  and CLIA is pending  Test performed using [employer] GeneXpert: This RT-PCR assay targets N2,  a region unique to SARS-CoV-2  A conserved region in the E-gene was chosen  for pan-Sarbecovirus detection which includes SARS-CoV-2  Urine Microscopic [[id number]]  (Abnormal) Collected: 09/07/22 1236    Lab Status: Final result Specimen: Urine, Clean Catch Updated: 09/07/22 1257     RBC, UA 1-2 /hpf      WBC, UA 10-20 /hpf      Epithelial Cells Moderate /hpf      Bacteria, UA Moderate /hpf      OTHER OBSERVATIONS Yeast Cells Present     MUCUS THREADS Moderate    Urine culture [[id number]] Collected: 09/07/22 1236    Lab Status:  In process Specimen: Urine, Clean Catch Updated: 09/07/22 1257    TSH [[id number]]  (Normal) Collected: 09/07/22 1157    Lab Status: Final result Specimen: Blood from Arm, Right Updated: 09/07/22 1252     TSH 3RD KRUNAL 1 220 uIU/mL     Narrative:      Patients undergoing fluorescein dye angiography may retain small amounts of fluorescein in the body for 48-72 hours post procedure  Samples containing fluorescein can produce falsely depressed TSH values  If the patient had this procedure,a specimen should be resubmitted post fluorescein clearance        UA w Reflex to Microscopic w Reflex to Culture [[id number]]  (Abnormal) Collected: 09/07/22 1236    Lab Status: Final result Specimen: Urine, Clean Catch Updated: 09/07/22 1245     Color, UA Mariya     Clarity, UA Cloudy     Specific Gravity, UA 1 015     pH, UA 6 0     Leukocytes,  0     Nitrite, UA Negative     Protein, UA 15 (Trace) mg/dl      Glucose, UA Negative mg/dl      Ketones, UA Negative mg/dl      Bilirubin, UA Negative     Occult Blood, UA 50 0     UROBILINOGEN UA Negative mg/dL     HS Troponin 0hr (reflex protocol) [[id number]]  (Normal) Collected: 09/07/22 1157    Lab Status: Final result Specimen: Blood from Arm, Right Updated: 09/07/22 1233     hs TnI 0hr 2 ng/L     Salicylate level [[id number]]  (Abnormal) Collected: 09/07/22 1157    Lab Status: Final result Specimen: Blood from Arm, Right Updated: 88/29/92 3538     Salicylate Lvl <8 2 mg/dL     Ethanol [[id number]]  (Normal) Collected: 09/07/22 1157    Lab Status: Final result Specimen: Blood from Arm, Right Updated: 09/07/22 1224     Ethanol Lvl <10 mg/dL     Magnesium [[id number]]  (Normal) Collected: 09/07/22 1157    Lab Status: Final result Specimen: Blood from Arm, Right Updated: 09/07/22 1223     Magnesium 2 1 mg/dL     Comprehensive metabolic panel [[id number]]  (Abnormal) Collected: 09/07/22 1157    Lab Status: Final result Specimen: Blood from Arm, Right Updated: 09/07/22 1223     Sodium 141 mmol/L      Potassium 4 3 mmol/L      Chloride 102 mmol/L      CO2 33 mmol/L      ANION GAP 6 mmol/L      BUN 9 mg/dL      Creatinine 0 99 mg/dL      Glucose 98 mg/dL      Calcium 9 3 mg/dL      AST 17 U/L ALT 18 U/L      Alkaline Phosphatase 85 U/L      Total Protein 7 2 g/dL      Albumin 4 1 g/dL      Total Bilirubin 0 37 mg/dL      eGFR 63 ml/min/1 73sq m     Narrative:      [place] guidelines for Chronic Kidney Disease (CKD):     Stage 1 with normal or high GFR (GFR > 90 mL/min/1 73 square meters)    Stage 2 Mild CKD (GFR = 60-89 mL/min/1 73 square meters)    Stage 3A Moderate CKD (GFR = 45-59 mL/min/1 73 square meters)    Stage 3B Moderate CKD (GFR = 30-44 mL/min/1 73 square meters)    Stage 4 Severe CKD (GFR = 15-29 mL/min/1 73 square meters)    Stage 5 End Stage CKD (GFR <15 mL/min/1 73 square meters)  Note: GFR calculation is accurate only with a steady state creatinine    Ammonia [[id number]]  (Abnormal) Collected: 09/07/22 1157    Lab Status: Final result Specimen: Blood from Arm, Right Updated: 09/07/22 1223     Ammonia <9 umol/L     Acetaminophen level-If concentration is detectable, please discuss with medical  on call   [[id number]]  (Abnormal) Collected: 09/07/22 1157    Lab Status: Final result Specimen: Blood from Arm, Right Updated: 09/07/22 1223     Acetaminophen Level <10 ug/mL     CBC and differential [[id number]] Collected: 09/07/22 1157    Lab Status: Final result Specimen: Blood from Arm, Right Updated: 09/07/22 1208     WBC 6 66 Thousand/uL      RBC 4 58 Million/uL      Hemoglobin 13 9 g/dL      Hematocrit 44 1 %      MCV 96 fL      MCH 30 3 pg      MCHC 31 5 g/dL      RDW 13 6 %      MPV 9 2 fL      Platelets 050 Thousands/uL      nRBC 0 /100 WBCs      Neutrophils Relative 54 %      Immat GRANS % 1 %      Lymphocytes Relative 34 %      Monocytes Relative 8 %      Eosinophils Relative 2 %      Basophils Relative 1 %      Neutrophils Absolute 3 65 Thousands/µL      Immature Grans Absolute 0 03 Thousand/uL      Lymphocytes Absolute 2 28 Thousands/µL      Monocytes Absolute 0 50 Thousand/µL      Eosinophils Absolute 0 16 Thousand/µL      Basophils Absolute 0 04 Thousands/µL                  XR chest 1 view portable   Final Result by Beba Bhatti MD (09/07 1232)      No acute cardiopulmonary disease  Workstation performed: [id number]         CT head without contrast   Final Result by Yessy Bass MD (09/07 1244)      No acute intracranial abnormality  Workstation performed: [id number]                    Procedures  ECG 12 Lead Documentation Only    Date/Time: 9/7/2022 12:50 PM  Performed by: Lynsey Saldaña MD  Authorized by: Lynsey Saldaña MD     Indications / Diagnosis:  Eval for dysrhythmia  ECG reviewed by me, the ED Provider: yes    Patient location:  ED  Previous ECG:     Previous ECG:  Compared to current    Comparison ECG info:  5/2021    Similarity:  No change  Interpretation:     Interpretation: normal    Rate:     ECG rate:  79    ECG rate assessment: normal    Rhythm:     Rhythm: sinus rhythm    Ectopy:     Ectopy: none    QRS:     QRS axis:  Normal    QRS intervals:  Normal  Conduction:     Conduction: normal    ST segments:     ST segments:  Normal  T waves:     T waves: normal               ED Course  ED Course as of 09/07/22 1525   Wed Sep 07, 2022   1225 MEDICAL ALCOHOL: <83   3522 SALICYLATE LEVEL(!): <1 5   1225 ACETAMINOPHEN LEVEL(!): <10   1225 Ammonia(!): <9   1225 Magnesium: 2 1   1225 Comprehensive metabolic panel(!)  Grossly normal   1240 hs TnI 0hr: 2   1241 XR chest 1 view portable  IMPRESSION:     No acute cardiopulmonary disease     1248 Leukocytes, UA(!): 500 0   1248 CT head without contrast  IMPRESSION:     No acute intracranial abnormality   1257 TSH 3RD GENERATON: 1 220   1257 WBC, UA(!): 10-20   1258 Epithelial Cells(!): Moderate   1258 Bacteria, UA(!): Moderate   1258 OTHER OBSERVATIONS: Yeast Cells Present   1302 SARS-COV-2: Negative   1307 Discussing admission with SLIM   1315 Rapid drug screen, urine  negative   1316 SLIM requests psychiatric consultation prior to accepting for admission  Consult placed   2310 1797 Psychiatry will not see patient until she is medically clear from an AMS/UTI standpoint; discussed with SLIM  Patient to be admitted to medicine                               SBIRT 22yo+    Flowsheet Row Most Recent Value   SBIRT (25 yo +)    In order to provide better care to our patients, we are screening all of our patients for alcohol and drug use  Would it be okay to ask you these screening questions? No Filed at: 09/07/2022 1141                    MDM  Number of Diagnoses or Management Options  Depression, unspecified depression type  Transient alteration of awareness  UTI (urinary tract infection)  Vulvovaginal candidiasis  Diagnosis management comments: 51-year-old female presenting for evaluation  Patient is unable to contribute to history  Family notes a general cognitive decline over the last 2 weeks and inability care for self at home  Extensive medical workup was obtained  EKG shows normal sinus rhythm without evidence of ischemia or malignant dysrhythmia  Initial troponin is negative  Low suspicion for acute coronary syndrome  Patient has grossly normal electrolytes and renal function  No evidence of leukocytosis or anemia  Thyroid function is normal   Urine drug screen is negative  Urinalysis is consistent with possible urinary tract infection; I am unable to ascertain whether patient has symptoms  Will begin empiric antibiotics for management  Yeast also noted on UA, so patient given Diflucan  CT of the head is negative, and chest x-ray is also negative for acute cardiopulmonary abnormality  It is unclear whether patient's alteration in mental status is secondary to delirium or encephalopathy from urinary tract infection  Could also be catatonia or psychosis and primarily psychiatric in nature  Discussed admission with Medicine, who recommended psychiatric consultation    Psychiatry states they are unable to assess the patient until she is medically cleared and it is determined that a urinary tract infection is not contributing to her symptoms  Therefore, patient will be admitted to medicine for further management  Disposition  Final diagnoses:   Transient alteration of awareness   UTI (urinary tract infection)   Vulvovaginal candidiasis   Depression, unspecified depression type     Time reflects when diagnosis was documented in both MDM as applicable and the Disposition within this note     Time User Action Codes Description Comment    9/7/2022  1:12 PM Lula, Samantha Add [R40 4] Transient alteration of awareness     9/7/2022  1:12 PM Lula, Samantha Add [N39 0] UTI (urinary tract infection)     9/7/2022  1:12 PM Lula, Samantha Add [N76 0] Vulvovaginitis     9/7/2022  1:12 PM Elgin, Samantha Remove [N76 0] Vulvovaginitis     9/7/2022  1:12 PM Lula, Samantha Add [B37 3] Vulvovaginal candidiasis     9/7/2022  1:58 PM Lula, Samantha Add Aspasia Junk  A] Depression, unspecified depression type     9/7/2022  2:05 PM Raleigh Barks Add [R41 82] AMS (altered mental status)     9/7/2022  2:06 PM Raleigh Barks Add [F33 2] Severe episode of recurrent major depressive disorder, without psychotic features [facility])       ED Disposition     ED Disposition   Admit    Condition   Stable    Date/Time   Wed Sep 7, 2022  1:58 PM    Comment   Kathleen Sanchez will be admitted to the service of Dr Thania Salse inpatient           Follow-up Information    None         Current Discharge Medication List      CONTINUE these medications which have NOT CHANGED    Details   ARIPiprazole (ABILIFY) 15 mg tablet Take 7 5 mg by mouth daily at bedtime      !! acetaminophen (TYLENOL) 325 mg tablet Take 3 tablets (975 mg total) by mouth every 8 (eight) hours  Qty: 2 tablet, Refills: 0    Associated Diagnoses: Acute bilateral low back pain with bilateral sciatica      !! acetaminophen (TYLENOL) 500 mg tablet Take 2 tablets (1,000 mg total) by mouth every 6 (six) hours as needed for mild pain or moderate pain  Qty: 30 tablet, Refills: 0    Associated Diagnoses: Right hip pain; Bilateral calf pain      !! albuterol (PROVENTIL HFA,VENTOLIN HFA) 90 mcg/act inhaler Inhale 2 puffs every 4 (four) hours as needed for wheezing  Qty: 1 Inhaler, Refills: 0    Comments: Substitution to a formulary equivalent within the same pharmaceutical class is authorized  Associated Diagnoses: Acute bronchitis, unspecified organism      !! albuterol (PROVENTIL HFA,VENTOLIN HFA) 90 mcg/act inhaler Inhale 2 puffs every 6 (six) hours as needed for wheezing or shortness of breath  Qty: 18 g, Refills: 0    Comments: Disp with spacer  Associated Diagnoses: RSV bronchitis      atorvastatin (LIPITOR) 20 mg tablet Take 20 mg by mouth daily      benztropine (COGENTIN) 1 mg tablet       buPROPion (Wellbutrin XL) 150 mg 24 hr tablet Take 150 mg by mouth every morning        !! carisoprodol (SOMA) 350 mg tablet Take 350 mg by mouth 3 (three) times a day        !! carisoprodol (SOMA) 350 mg tablet Take 350 mg by mouth 4 (four) times a day as needed      clonazePAM (KlonoPIN) 1 mg tablet Take 0 5 mg by mouth 2 (two) times a day      DULoxetine (CYMBALTA) 60 mg delayed release capsule Take 60 mg by mouth daily at bedtime      fluticasone (FLONASE) 50 mcg/act nasal spray       hydrOXYzine HCL (ATARAX) 10 mg tablet Take 25 mg by mouth 3 (three) times a day       Mucus Relief 600 MG 12 hr tablet       naproxen (NAPROSYN) 500 mg tablet Take 1 tablet (500 mg total) by mouth 2 (two) times a day with meals  Qty: 30 tablet, Refills: 0    Associated Diagnoses: Chest wall pain      ondansetron (ZOFRAN-ODT) 4 mg disintegrating tablet Take 1 tablet (4 mg total) by mouth every 8 (eight) hours as needed for nausea or vomiting  Qty: 20 tablet, Refills: 0    Associated Diagnoses: Vomiting      predniSONE 20 mg tablet 60 mg p o  q day x5 days  Qty: 15 tablet, Refills: 0    Associated Diagnoses: RSV bronchitis      traZODone (DESYREL) 100 mg tablet Take 1 tablet by mouth daily at bedtime as needed for sleep  Qty: 30 tablet, Refills: 1    Associated Diagnoses: Insomnia       !! - Potential duplicate medications found  Please discuss with provider  No discharge procedures on file      PDMP Review       Value Time User    PDMP Reviewed  Yes 9/7/2022 11:57 AM Arianna Glover MD          ED Provider  Electronically Signed by           Arianna Glover MD  09/07/22 5774

## 2022-09-07 NOTE — ED NOTES
This RN asked pt if she had any pain   Pt replied, "In my knee and my back " This RN asked pt how long she had had these pains and pt replied, "Chicken parm and rice "     Reba Page, KULDIP  09/07/22 4931

## 2022-09-07 NOTE — H&P
[facility]  H&P- Myrna Higginbotham 1965, 62 y o  female MRN: [id number]  Unit/Bed#: ED 09 Encounter: [id number]  Primary Care Provider: Micheal Miller MD   Date and time admitted to hospital: 9/7/2022 11:29 AM    * AMS (altered mental status)  Assessment & Plan  · Patient presents with acute change in mental status  · She is currently disoriented and is exhibiting staring spells  · Possibly related to urinary tract infection however less likely  · Afebrile and without leukocytosis  · CT head unremarkable  · Coma panel unremarkable  · Ammonia within normal limits  · Patient is currently on a number of antipsychotic medications ; possible medication noncompliance or overuse  · Psychiatry consult placed  · Will hold home antipsychotic medications for now  · Continue to monitor mental status    Severe episode of recurrent major depressive disorder, without psychotic features [facility]  Assessment & Plan  · Symptomatology possibly related to psychiatric illness  · Will defer antipsychotic medication regimen to Psychiatry    UTI (urinary tract infection)  Assessment & Plan  · Urinalysis remarkable for pyuria  · May be causing symptomatology however less likely  · Ceftriaxone 1000 mg q24 hours  · Follow-up urine cultures  · Deescalate antibiotics as appropriate  · No evidence of systemic infection      VTE Prophylaxis: Lovenox  Code Status:  Level 1 Full Code    Anticipated Length of Stay:  Patient will be admitted on an Inpatient basis with an anticipated length of stay of  2 midnights  Justification for Hospital Stay:  Altered mental status    Total Time for Visit, including Counseling / Coordination of Care: 60 minutes  Greater than 50% of this total time spent on direct patient counseling and coordination of care      Chief Complaint:  Altered mental status      History of Present Illness:    Myrna Higginbotham is a 62 y o  female with a past medical history significant for depressive disorder who presents with altered mental status  As per the patient's family, the patient became acutely altered and was noted to be staring and disoriented  In the ED, all vital signs were found to be stable  Laboratory analysis grossly unremarkable  Urinalysis with possible urinary tract infection  CT head unremarkable  The patient was admitted to the general medicine service for further evaluation and treatment of altered mental status of unknown etiology  Review of Systems:    Review of Systems   Unable to perform ROS: Psychiatric disorder       Past Medical and Surgical History:     Past Medical History:   Diagnosis Date    Anxiety     Chronic pain     Back Pain    Depression     Hyperlipidemia     Migraine        Past Surgical History:   Procedure Laterality Date    OTHER SURGICAL HISTORY      cyst removed from left axila       Meds/Allergies:    Prior to Admission medications    Medication Sig Start Date End Date Taking?  Authorizing Provider   acetaminophen (TYLENOL) 325 mg tablet Take 3 tablets (975 mg total) by mouth every 8 (eight) hours  Patient not taking: Reported on 11/17/2021 2/5/21   Sanjay Antunez MD   acetaminophen (TYLENOL) 500 mg tablet Take 2 tablets (1,000 mg total) by mouth every 6 (six) hours as needed for mild pain or moderate pain  Patient not taking: Reported on 11/17/2021 9/13/20   Raul Mclean PA-C   albuterol (PROVENTIL HFA,VENTOLIN HFA) 90 mcg/act inhaler Inhale 2 puffs every 4 (four) hours as needed for wheezing  Patient not taking: Reported on 11/17/2021  3/15/20   Shama Gracia MD   albuterol (PROVENTIL HFA,VENTOLIN HFA) 90 mcg/act inhaler Inhale 2 puffs every 6 (six) hours as needed for wheezing or shortness of breath 11/17/21   Layton Cantu MD   ARIPiprazole (ABILIFY) 15 mg tablet Take 7 5 mg by mouth daily at bedtime    Historical Provider, MD   atorvastatin (LIPITOR) 20 mg tablet Take 20 mg by mouth daily    Historical Provider, MD   benztropine (COGENTIN) 1 mg tablet  11/30/21   Historical Provider, MD   buPROPion (Wellbutrin XL) 150 mg 24 hr tablet Take 150 mg by mouth every morning  Historical Provider, MD   carisoprodol (SOMA) 350 mg tablet Take 350 mg by mouth 3 (three) times a day      Historical Provider, MD   carisoprodol (SOMA) 350 mg tablet Take 350 mg by mouth 4 (four) times a day as needed    Historical Provider, MD   clonazePAM (KlonoPIN) 1 mg tablet Take 0 5 mg by mouth 2 (two) times a day    Historical Provider, MD   DULoxetine (CYMBALTA) 60 mg delayed release capsule Take 60 mg by mouth daily at bedtime    Historical Provider, MD   fluticasone (FLONASE) 50 mcg/act nasal spray  10/25/21   Historical Provider, MD   hydrOXYzine HCL (ATARAX) 10 mg tablet Take 25 mg by mouth 3 (three) times a day     Historical Provider, MD   Mucus Relief 600 MG 12 hr tablet  11/16/21   Historical Provider, MD   naproxen (NAPROSYN) 500 mg tablet Take 1 tablet (500 mg total) by mouth 2 (two) times a day with meals  Patient not taking: Reported on 11/17/2021 5/25/21   Malissa Hutchins MD   ondansetron (ZOFRAN-ODT) 4 mg disintegrating tablet Take 1 tablet (4 mg total) by mouth every 8 (eight) hours as needed for nausea or vomiting 11/17/21   Jay Lopez MD   predniSONE 20 mg tablet 60 mg p o  q day x5 days  Patient not taking: Reported on 11/30/2021 11/17/21   Jay Lopez MD   traZODone (DESYREL) 100 mg tablet Take 1 tablet by mouth daily at bedtime as needed for sleep  Patient not taking: Reported on 11/30/2021 12/14/17   [facility]       Allergies:    Allergies   Allergen Reactions    Penicillins Anaphylaxis, Rash and Edema     Anaphylaxis (Tolerates IV ceftriaxone 9/7/22)       Social History:     Marital Status: Single   Substance Use History:   Social History     Substance and Sexual Activity   Alcohol Use Never     Social History     Tobacco Use   Smoking Status Current Every Day Smoker    Packs/day: 1 00    Types: Cigarettes   Smokeless Tobacco Current User     Social History     Substance and Sexual Activity   Drug Use Not Currently    Types: Methamphetamines, Marijuana    Comment: reports last use years ago       Family History:    Pertinent family history reviewed    Physical Exam:     Vitals:   Blood Pressure: 116/72 (09/07/22 1125)  Pulse: 83 (09/07/22 1125)  Temperature: 98 3 °F (36 8 °C) (09/07/22 1125)  Respirations: 18 (09/07/22 1125)  Weight - Scale: 77 3 kg (170 lb 6 4 oz) (09/07/22 1125)  SpO2: 97 % (09/07/22 1125)    Physical Exam  Vitals and nursing note reviewed  Constitutional:       General: She is not in acute distress  Appearance: She is well-developed  HENT:      Head: Normocephalic and atraumatic  Eyes:      Conjunctiva/sclera: Conjunctivae normal    Cardiovascular:      Rate and Rhythm: Normal rate and regular rhythm  Heart sounds: No murmur heard  Pulmonary:      Effort: Pulmonary effort is normal  No respiratory distress  Breath sounds: Normal breath sounds  Abdominal:      Palpations: Abdomen is soft  Tenderness: There is no abdominal tenderness  Musculoskeletal:      Cervical back: Neck supple  Skin:     General: Skin is warm and dry  Neurological:      Mental Status: She is alert  She is disoriented            Additional Data:     Lab Results: I have reviewed pertinent results     Results from last 7 days   Lab Units 09/07/22  1157   WBC Thousand/uL 6 66   HEMOGLOBIN g/dL 13 9   HEMATOCRIT % 44 1   PLATELETS Thousands/uL 220   NEUTROS PCT % 54   LYMPHS PCT % 34   MONOS PCT % 8   EOS PCT % 2     Results from last 7 days   Lab Units 09/07/22  1157   SODIUM mmol/L 141   POTASSIUM mmol/L 4 3   CHLORIDE mmol/L 102   CO2 mmol/L 33*   BUN mg/dL 9   CREATININE mg/dL 0 99   ANION GAP mmol/L 6   CALCIUM mg/dL 9 3   ALBUMIN g/dL 4 1   TOTAL BILIRUBIN mg/dL 0 37   ALK PHOS U/L 85   ALT U/L 18   AST U/L 17   GLUCOSE RANDOM mg/dL 98                       Imaging: I have reviewed pertinent imaging     XR chest 1 view portable   Final Result by Abdelrahman Johnson MD (09/07 1232)      No acute cardiopulmonary disease  Workstation performed: [id number]         CT head without contrast   Final Result by Jessica Gould MD (09/07 1244)      No acute intracranial abnormality  Workstation performed: [id number]             EKG, Pathology, and Other Studies Reviewed on Admission:   · EKG: Reviewed     Allscripts / Epic Records Reviewed    ** Please Note: This note has been constructed using a voice recognition system   **

## 2022-09-07 NOTE — TELEPHONE ENCOUNTER
Call received from Rajat at [facility] center  He reports he got a call from clients daughter yesterday again  He reports she has called before repeating the same things-"my mom doesn't take her medications right, I feel she overmedicates, I saw an empty bottle of Klonopin, she doesn't let me get involved " Rajat offered for crisis worker to go out to home again, or suggested ER and daughter declined  No AIMEE on file to speak to daughter, and daughter or client has not called office  Client was last seen by Jena Fox on 9/1/22  Message sent to [facility] as an [place]

## 2022-09-07 NOTE — ASSESSMENT & PLAN NOTE
· Patient presents with acute change in mental status  · She is currently disoriented and is exhibiting staring spells  · Possibly related to urinary tract infection however less likely  · Afebrile and without leukocytosis  · CT head unremarkable  · Coma panel unremarkable  · Ammonia within normal limits  · Patient is currently on a number of antipsychotic medications ; possible medication noncompliance or overuse  · Psychiatry consult placed  · Will hold home antipsychotic medications for now  · Continue to monitor mental status

## 2022-09-07 NOTE — ASSESSMENT & PLAN NOTE
· Symptomatology possibly related to psychiatric illness  · Will defer antipsychotic medication regimen to Psychiatry

## 2022-09-07 NOTE — ED NOTES
Insurance   EVS (Eligibility Verification System) called - [phone]    Automated system indicates: active with [facility]  ID# [id number]

## 2022-09-07 NOTE — PLAN OF CARE
Problem: Potential for Falls  Goal: Patient will remain free of falls  Description: INTERVENTIONS:  - Educate patient/family on patient safety including physical limitations  - Instruct patient to call for assistance with activity   - Consult OT/PT to assist with strengthening/mobility   - Keep Call bell within reach  - Keep bed low and locked with side rails adjusted as appropriate  - Keep care items and personal belongings within reach  - Initiate and maintain comfort rounds  - Make Fall Risk Sign visible to staff  - Consider moving patient to room near nurses station  Outcome: Progressing     Problem: PAIN - ADULT  Goal: Verbalizes/displays adequate comfort level or baseline comfort level  Description: Interventions:  - Encourage patient to monitor pain and request assistance  - Assess pain using appropriate pain scale  - Administer analgesics based on type and severity of pain and evaluate response  - Implement non-pharmacological measures as appropriate and evaluate response  - Consider cultural and social influences on pain and pain management  - Notify physician/advanced practitioner if interventions unsuccessful or patient reports new pain  Outcome: Progressing     Problem: SAFETY ADULT  Goal: Patient will remain free of falls  Description: INTERVENTIONS:  - Educate patient/family on patient safety including physical limitations  - Instruct patient to call for assistance with activity   - Consult OT/PT to assist with strengthening/mobility   - Keep Call bell within reach  - Keep bed low and locked with side rails adjusted as appropriate  - Keep care items and personal belongings within reach  - Initiate and maintain comfort rounds  - Make Fall Risk Sign visible to staff  - Apply yellow socks and bracelet for high fall risk patients  - Consider moving patient to room near nurses station  Outcome: Progressing  Goal: Maintain or return to baseline ADL function  Description: INTERVENTIONS:  -  Assess patient's ability to carry out ADLs; assess patient's baseline for ADL function and identify physical deficits which impact ability to perform ADLs (bathing, care of mouth/teeth, toileting, grooming, dressing, etc )  - Assess/evaluate cause of self-care deficits   - Assess range of motion  - Assess patient's mobility; develop plan if impaired  - Assess patient's need for assistive devices and provide as appropriate  - Encourage maximum independence but intervene and supervise when necessary  - Involve family in performance of ADLs  - Assess for home care needs following discharge   - Consider OT consult to assist with ADL evaluation and planning for discharge  - Provide patient education as appropriate  Outcome: Progressing  Goal: Maintains/Returns to pre admission functional level  Description: INTERVENTIONS:  - Perform BMAT or MOVE assessment daily    - Set and communicate daily mobility goal to care team and patient/family/caregiver     - Collaborate with rehabilitation services on mobility goals if consulted  -- Out of bed for toileting  - Record patient progress and toleration of activity level   Outcome: Progressing     Problem: DISCHARGE PLANNING  Goal: Discharge to home or other facility with appropriate resources  Description: INTERVENTIONS:  - Identify barriers to discharge w/patient and caregiver  - Arrange for needed discharge resources and transportation as appropriate  - Identify discharge learning needs (meds, wound care, etc )  - Arrange for interpretive services to assist at discharge as needed  - Refer to Case Management Department for coordinating discharge planning if the patient needs post-hospital services based on physician/advanced practitioner order or complex needs related to functional status, cognitive ability, or social support system  Outcome: Progressing     Problem: Knowledge Deficit  Goal: Patient/family/caregiver demonstrates understanding of disease process, treatment plan, medications, and discharge instructions  Description: Complete learning assessment and assess knowledge base  Interventions:  - Provide teaching at level of understanding  - Provide teaching via preferred learning methods  Outcome: Progressing     Problem: MOBILITY - ADULT  Goal: Maintain or return to baseline ADL function  Description: INTERVENTIONS:  -  Assess patient's ability to carry out ADLs; assess patient's baseline for ADL function and identify physical deficits which impact ability to perform ADLs (bathing, care of mouth/teeth, toileting, grooming, dressing, etc )  - Assess/evaluate cause of self-care deficits   - Assess range of motion  - Assess patient's mobility; develop plan if impaired  - Assess patient's need for assistive devices and provide as appropriate  - Encourage maximum independence but intervene and supervise when necessary  - Involve family in performance of ADLs  - Assess for home care needs following discharge   - Consider OT consult to assist with ADL evaluation and planning for discharge  - Provide patient education as appropriate  Outcome: Progressing  Goal: Maintains/Returns to pre admission functional level  Description: INTERVENTIONS:  - Perform BMAT or MOVE assessment daily    - Set and communicate daily mobility goal to care team and patient/family/caregiver     - Collaborate with rehabilitation services on mobility goals if consulted  - Record patient progress and toleration of activity level   Outcome: Progressing

## 2022-09-08 VITALS
SYSTOLIC BLOOD PRESSURE: 116 MMHG | TEMPERATURE: 96.6 F | OXYGEN SATURATION: 97 % | BODY MASS INDEX: 29.73 KG/M2 | HEIGHT: 63 IN | HEART RATE: 87 BPM | WEIGHT: 167.77 LBS | DIASTOLIC BLOOD PRESSURE: 80 MMHG | RESPIRATION RATE: 20 BRPM

## 2022-09-08 LAB
ANION GAP SERPL CALCULATED.3IONS-SCNC: 3 MMOL/L (ref 5–14)
BACTERIA UR CULT: NORMAL
BASOPHILS # BLD AUTO: 0.04 THOUSANDS/ΜL (ref 0–0.1)
BASOPHILS NFR BLD AUTO: 1 % (ref 0–1)
BUN SERPL-MCNC: 9 MG/DL (ref 5–25)
CALCIUM SERPL-MCNC: 8.5 MG/DL (ref 8.4–10.2)
CHLORIDE SERPL-SCNC: 107 MMOL/L (ref 96–108)
CO2 SERPL-SCNC: 28 MMOL/L (ref 21–32)
CREAT SERPL-MCNC: 0.82 MG/DL (ref 0.6–1.2)
EOSINOPHIL # BLD AUTO: 0.15 THOUSAND/ΜL (ref 0–0.61)
EOSINOPHIL NFR BLD AUTO: 3 % (ref 0–6)
ERYTHROCYTE [DISTWIDTH] IN BLOOD BY AUTOMATED COUNT: 13.4 % (ref 11.6–15.1)
GFR SERPL CREATININE-BSD FRML MDRD: 79 ML/MIN/1.73SQ M
GLUCOSE SERPL-MCNC: 90 MG/DL (ref 70–99)
HCT VFR BLD AUTO: 41.2 % (ref 34.8–46.1)
HGB BLD-MCNC: 12.7 G/DL (ref 11.5–15.4)
IMM GRANULOCYTES # BLD AUTO: 0.03 THOUSAND/UL (ref 0–0.2)
IMM GRANULOCYTES NFR BLD AUTO: 1 % (ref 0–2)
LYMPHOCYTES # BLD AUTO: 2.11 THOUSANDS/ΜL (ref 0.6–4.47)
LYMPHOCYTES NFR BLD AUTO: 39 % (ref 14–44)
MAGNESIUM SERPL-MCNC: 2.2 MG/DL (ref 1.6–2.3)
MCH RBC QN AUTO: 30.1 PG (ref 26.8–34.3)
MCHC RBC AUTO-ENTMCNC: 30.8 G/DL (ref 31.4–37.4)
MCV RBC AUTO: 98 FL (ref 82–98)
MONOCYTES # BLD AUTO: 0.51 THOUSAND/ΜL (ref 0.17–1.22)
MONOCYTES NFR BLD AUTO: 10 % (ref 4–12)
NEUTROPHILS # BLD AUTO: 2.53 THOUSANDS/ΜL (ref 1.85–7.62)
NEUTS SEG NFR BLD AUTO: 46 % (ref 43–75)
NRBC BLD AUTO-RTO: 0 /100 WBCS
PHOSPHATE SERPL-MCNC: 3.3 MG/DL (ref 2.5–4.8)
PLATELET # BLD AUTO: 178 THOUSANDS/UL (ref 149–390)
PMV BLD AUTO: 9.5 FL (ref 8.9–12.7)
POTASSIUM SERPL-SCNC: 4 MMOL/L (ref 3.5–5.3)
RBC # BLD AUTO: 4.22 MILLION/UL (ref 3.81–5.12)
SODIUM SERPL-SCNC: 138 MMOL/L (ref 135–147)
WBC # BLD AUTO: 5.37 THOUSAND/UL (ref 4.31–10.16)

## 2022-09-08 PROCEDURE — 97163 PT EVAL HIGH COMPLEX 45 MIN: CPT

## 2022-09-08 PROCEDURE — 83735 ASSAY OF MAGNESIUM: CPT | Performed by: INTERNAL MEDICINE

## 2022-09-08 PROCEDURE — 84100 ASSAY OF PHOSPHORUS: CPT | Performed by: INTERNAL MEDICINE

## 2022-09-08 PROCEDURE — 99239 HOSP IP/OBS DSCHRG MGMT >30: CPT | Performed by: INTERNAL MEDICINE

## 2022-09-08 PROCEDURE — 85025 COMPLETE CBC W/AUTO DIFF WBC: CPT | Performed by: INTERNAL MEDICINE

## 2022-09-08 PROCEDURE — 80048 BASIC METABOLIC PNL TOTAL CA: CPT | Performed by: INTERNAL MEDICINE

## 2022-09-08 PROCEDURE — 99253 IP/OBS CNSLTJ NEW/EST LOW 45: CPT | Performed by: PSYCHIATRY & NEUROLOGY

## 2022-09-08 RX ORDER — CIPROFLOXACIN 500 MG/1
500 TABLET, FILM COATED ORAL EVERY 12 HOURS SCHEDULED
Qty: 10 TABLET | Refills: 0 | Status: SHIPPED | OUTPATIENT
Start: 2022-09-08 | End: 2022-09-13

## 2022-09-08 RX ORDER — DULOXETIN HYDROCHLORIDE 30 MG/1
30 CAPSULE, DELAYED RELEASE ORAL DAILY
Qty: 60 CAPSULE | Refills: 2 | Status: SHIPPED | OUTPATIENT
Start: 2022-09-08 | End: 2022-10-25

## 2022-09-08 RX ORDER — CLONAZEPAM 1 MG/1
1 TABLET ORAL 2 TIMES DAILY
Status: DISCONTINUED | OUTPATIENT
Start: 2022-09-08 | End: 2022-09-08 | Stop reason: HOSPADM

## 2022-09-08 RX ORDER — ARIPIPRAZOLE 5 MG/1
5 TABLET ORAL DAILY
Qty: 60 TABLET | Refills: 2 | Status: SHIPPED | OUTPATIENT
Start: 2022-09-08 | End: 2022-10-25

## 2022-09-08 RX ORDER — CLONAZEPAM 1 MG/1
1 TABLET ORAL 2 TIMES DAILY
Qty: 30 TABLET | Refills: 2 | Status: SHIPPED | OUTPATIENT
Start: 2022-09-08 | End: 2022-10-24 | Stop reason: SDUPTHER

## 2022-09-08 RX ORDER — TRAZODONE HYDROCHLORIDE 100 MG/1
100 TABLET ORAL
Status: DISCONTINUED | OUTPATIENT
Start: 2022-09-08 | End: 2022-09-08 | Stop reason: HOSPADM

## 2022-09-08 RX ORDER — ARIPIPRAZOLE 5 MG/1
5 TABLET ORAL DAILY
Status: DISCONTINUED | OUTPATIENT
Start: 2022-09-08 | End: 2022-09-08 | Stop reason: HOSPADM

## 2022-09-08 RX ORDER — CLONAZEPAM 0.5 MG/1
0.5 TABLET ORAL 2 TIMES DAILY
Status: DISCONTINUED | OUTPATIENT
Start: 2022-09-08 | End: 2022-09-08

## 2022-09-08 RX ORDER — DULOXETIN HYDROCHLORIDE 30 MG/1
30 CAPSULE, DELAYED RELEASE ORAL DAILY
Status: DISCONTINUED | OUTPATIENT
Start: 2022-09-08 | End: 2022-09-08 | Stop reason: HOSPADM

## 2022-09-08 RX ADMIN — CEFTRIAXONE 1000 MG: 1 INJECTION, SOLUTION INTRAVENOUS at 12:22

## 2022-09-08 RX ADMIN — ENOXAPARIN SODIUM 40 MG: 100 INJECTION SUBCUTANEOUS at 08:09

## 2022-09-08 RX ADMIN — DULOXETINE 30 MG: 30 CAPSULE, DELAYED RELEASE ORAL at 15:01

## 2022-09-08 RX ADMIN — SODIUM CHLORIDE 75 ML/HR: 0.9 INJECTION, SOLUTION INTRAVENOUS at 05:57

## 2022-09-08 RX ADMIN — ARIPIPRAZOLE 5 MG: 5 TABLET ORAL at 15:01

## 2022-09-08 RX ADMIN — NICOTINE 1 PATCH: 21 PATCH, EXTENDED RELEASE TRANSDERMAL at 08:09

## 2022-09-08 NOTE — PROGRESS NOTES
[facility]  Progress Note Antwan Rosario 1965, 62 y o  female MRN: [id number]  Unit/Bed#: 7[facility] 707-01 Encounter: [id number]  Primary Care Provider: Fredy Colindres MD   Date and time admitted to hospital: 9/7/2022 11:29 AM    * AMS (altered mental status)  Assessment & Plan  · Patient presents with acute change in mental status  · She is currently disoriented and is exhibiting staring spells  · Possibly related to urinary tract infection however less likely  · Afebrile and without leukocytosis  · CT head unremarkable  · Coma panel unremarkable  · Ammonia within normal limits  · Patient is currently on a number of antipsychotic medications ; possible medication noncompliance or overuse ; patient's daughter states she is on a number of sedating medications and all of the bottles were empty  · Psychiatry consult placed  · Will hold home antipsychotic medications for now  · Continue to monitor mental status  · PT/OT evaluation and treatment    Severe episode of recurrent major depressive disorder, without psychotic features ([facility] )  Assessment & Plan  · Symptomatology possibly related to psychiatric illness versus medication overuse  · Will defer antipsychotic medication regimen to Psychiatry  · Patient's daughter would like patient to establish with a new psychiatrist    UTI (urinary tract infection)  Assessment & Plan  · Urinalysis remarkable for pyuria  · May be causing symptomatology however less likely  · Ceftriaxone 1000 mg q24 hours  · Follow-up urine cultures  · Deescalate antibiotics as appropriate  · No evidence of systemic infection    VTE Pharmacologic Prophylaxis: Lovenox    Patient Centered Rounds: Patient care rounds were performed with nursing    Discussions with Specialists or Other Care Team Provider: Psychiatry    Education and Discussions with Family / Patient: Spoke with patient's daughter    Time Spent for Care: 30  More than 50% of total time spent on counseling and coordination of care as described above  Current Length of Stay: 1 day(s)    Current Patient Status: Inpatient     Certification Statement: The patient will continue to require additional inpatient hospital stay due to altered mental status    Discharge Plan:  Pending Psychiatric evaluation    Code Status: Level 1 - Full Code      Subjective:   Patient seen and examined at bedside  No acute events overnight  Denies chest pain, SOB, diaphoresis, nausea/vomiting/diarrhea, fevers/chills  Objective:     Vitals:   Temp (24hrs), Av 2 °F (36 2 °C), Min:96 4 °F (35 8 °C), Max:98 3 °F (36 8 °C)    Temp:  [96 4 °F (35 8 °C)-98 3 °F (36 8 °C)] 96 7 °F (35 9 °C)  HR:  [71-83] 77  Resp:  [18-20] 20  BP: (116-161)/() 122/56  SpO2:  [94 %-97 %] 97 %  Body mass index is 29 72 kg/m²  Input and Output Summary (last 24 hours): Intake/Output Summary (Last 24 hours) at 2022 0745  Last data filed at 2022 0617  Gross per 24 hour   Intake 1376 25 ml   Output --   Net 1376 25 ml       Physical Exam:     Physical Exam  Vitals and nursing note reviewed  Constitutional:       General: She is not in acute distress  Appearance: She is well-developed  HENT:      Head: Normocephalic and atraumatic  Eyes:      Conjunctiva/sclera: Conjunctivae normal    Cardiovascular:      Rate and Rhythm: Normal rate and regular rhythm  Heart sounds: No murmur heard  Pulmonary:      Effort: Pulmonary effort is normal  No respiratory distress  Breath sounds: Normal breath sounds  Abdominal:      Palpations: Abdomen is soft  Tenderness: There is no abdominal tenderness  Musculoskeletal:      Cervical back: Neck supple  Skin:     General: Skin is warm and dry  Neurological:      Mental Status: She is alert  She is disoriented  Additional Data:     Labs:  I have reviewed pertinent results     Results from last 7 days   Lab Units 22  0451   WBC Thousand/uL 5 37   HEMOGLOBIN g/dL 12 7   HEMATOCRIT % 41 2   PLATELETS Thousands/uL 178   NEUTROS PCT % 46   LYMPHS PCT % 39   MONOS PCT % 10   EOS PCT % 3     Results from last 7 days   Lab Units 09/08/22  0451 09/07/22  1157   SODIUM mmol/L 138 141   POTASSIUM mmol/L 4 0 4 3   CHLORIDE mmol/L 107 102   CO2 mmol/L 28 33*   BUN mg/dL 9 9   CREATININE mg/dL 0 82 0 99   ANION GAP mmol/L 3* 6   CALCIUM mg/dL 8 5 9 3   ALBUMIN g/dL  --  4 1   TOTAL BILIRUBIN mg/dL  --  0 37   ALK PHOS U/L  --  85   ALT U/L  --  18   AST U/L  --  17   GLUCOSE RANDOM mg/dL 90 98                         Imaging: I have reviewed pertinent imaging       Recent Cultures (last 7 days):           Last 24 Hours Medication List:   Current Facility-Administered Medications   Medication Dose Route Frequency Provider Last Rate    acetaminophen  650 mg Oral Q4H PRN Watt Sorrel, DO      albuterol  2 puff Inhalation Q6H PRN Watt Sorrel, DO      cefTRIAXone  1,000 mg Intravenous Q24H Watt Sorrel, DO      enoxaparin  40 mg Subcutaneous Q24H [address] Watt Weldon, DO      nicotine  1 patch Transdermal Daily Watt Payamrel, DO      ondansetron  4 mg Intravenous Q6H PRN Watt Payamrel, DO          Today, Patient Was Seen By: Kathy Hutchins DO    ** Please Note: Dictation voice to text software may have been used in the creation of this document   **

## 2022-09-08 NOTE — DISCHARGE SUMMARY
[facility]  Discharge- Kathleen Sanchez 1965, 62 y o  female MRN: [id number]  Unit/Bed#: 7[facility] 707-01 Encounter: [id number]  Primary Care Provider: Tori Darnell MD   Date and time admitted to hospital: 9/7/2022 11:29 AM    * AMS (altered mental status)  Assessment & Plan  · Patient presents with acute change in mental status  · She is currently disoriented and is exhibiting staring spells  · Possibly related to urinary tract infection however less likely  · Afebrile and without leukocytosis  · CT head unremarkable  · Coma panel unremarkable  · Ammonia within normal limits  · Patient is currently on a number of antipsychotic medications ; possible medication noncompliance or overuse ; patient's daughter states she is on a number of sedating medications and all of the bottles were empty  · Psychiatry consult placed  · Will hold home antipsychotic medications for now  · Continue to monitor mental status  · PT/OT evaluation and treatment    Severe episode of recurrent major depressive disorder, without psychotic features ([facility] [address] )  Assessment & Plan  · Symptomatology possibly related to psychiatric illness versus medication overuse  · Will defer antipsychotic medication regimen to Psychiatry  · Patient's daughter would like patient to establish with a new psychiatrist    UTI (urinary tract infection)  Assessment & Plan  · Urinalysis remarkable for pyuria  · May be causing symptomatology however less likely  · Ceftriaxone 1000 mg q24 hours  · Follow-up urine cultures  · Deescalate antibiotics as appropriate  · No evidence of systemic infection      Discharging Physician / Practitioner: Chinyere Elena DO  PCP: Tori Darnell MD  Admission Date:   Admission Orders (From admission, onward)     Ordered        09/07/22 1358  1 [facility]  Once                      Discharge Date: 09/08/22    Medical Problems             Resolved Problems  Date Reviewed: 9/8/2022 None                 Consultations During Hospital Stay:  Psychiatry    Procedures Performed:  Not applicable    Significant Findings / Test Results:     XR chest 1 view portable    Result Date: 9/7/2022  Impression: No acute cardiopulmonary disease  Workstation performed: [id number]     CT head without contrast    Result Date: 9/7/2022  Impression: No acute intracranial abnormality  Workstation performed: [id number]       Incidental Findings:  Not applicable    Test Results Pending at Discharge (will require follow up): Not applicable     Outpatient Tests Requested:  Not applicable    Reason for Admission:  Altered mental status    Hospital Course:     Varun River is a 62 y o  female with a past medical history significant for depressive disorder who presents with altered mental status  As per the patient's family, the patient became acutely altered and was noted to be staring and disoriented  In the ED, all vital signs were found to be stable  Laboratory analysis grossly unremarkable  Urinalysis with possible urinary tract infection  CT head unremarkable  The patient was admitted to the general medicine service for further evaluation and treatment of altered mental status of unknown etiology  The patient was seen by psychiatry and a new antipsychotic medication regimen was set in place  The patient's altered mental status was thought to be likely secondary to polypharmacy  She will establish care with a new psychiatrist in the area  Her daughter was strongly encouraged to  the medications as prescribed by myself as her new antipsychotic medication regimen  She will also complete a 5 day course of ciprofloxacin in the setting of UTI  She was strongly encouraged to follow-up with her PCP within 7-14 days  She was also encouraged to call the psychiatry office provided to establish care  She was discharged to home with family support on 09/08/2022      Condition at Discharge: stable Discharge Day Visit / Exam:     Subjective: Patient seen and examined at bedside  No acute events overnight  Denies chest pain, SOB, diaphoresis, nausea/vomiting/diarrhea, fevers/chills  Vitals: Blood Pressure: 145/90 (09/08/22 0748)  Pulse: 75 (09/08/22 0748)  Temperature: (!) 96 5 °F (35 8 °C) (09/08/22 0748)  Temp Source: Temporal (09/08/22 0748)  Respirations: 20 (09/08/22 0748)  Height: 5' 3" (160 cm) (09/07/22 1448)  Weight - Scale: 76 1 kg (167 lb 12 3 oz) (09/07/22 1448)  SpO2: 96 % (09/08/22 0748)     Exam:   Physical Exam  Vitals and nursing note reviewed  Constitutional:       General: She is not in acute distress  Appearance: She is well-developed  HENT:      Head: Normocephalic and atraumatic  Eyes:      Conjunctiva/sclera: Conjunctivae normal    Cardiovascular:      Rate and Rhythm: Normal rate and regular rhythm  Heart sounds: No murmur heard  Pulmonary:      Effort: Pulmonary effort is normal  No respiratory distress  Breath sounds: Normal breath sounds  Abdominal:      Palpations: Abdomen is soft  Tenderness: There is no abdominal tenderness  Musculoskeletal:      Cervical back: Neck supple  Skin:     General: Skin is warm and dry  Neurological:      Mental Status: She is alert  Discharge instructions/Information to patient and family:   See after visit summary for information provided to patient and family  Provisions for Follow-Up Care:  See after visit summary for information related to follow-up care and any pertinent home health orders  Disposition:     Home with family support  Outpatient follow-up with Psychiatry  New antipsychotic medication regimen as prescribed     Discharge Statement:  I spent 60 minutes discharging the patient  This time was spent on the day of discharge  I had direct contact with the patient on the day of discharge   Greater than 50% of the total time was spent examining patient, answering all patient questions, arranging and discussing plan of care with patient as well as directly providing post-discharge instructions  Additional time then spent on discharge activities  Discharge Medications:  See after visit summary for reconciled discharge medications provided to patient and family        ** Please Note: This note has been constructed using a voice recognition system **

## 2022-09-08 NOTE — NURSING NOTE
IV removed with tip intact  Pressure held to control bleeding  Discharge instructions discussed with patient and she verbalizes understanding  Prescriptions sent to [address] [place] [address] Pharmacy next door  Patient left with all her belongings and discharge instructions

## 2022-09-08 NOTE — ASSESSMENT & PLAN NOTE
· Patient presents with acute change in mental status  · She is currently disoriented and is exhibiting staring spells  · Possibly related to urinary tract infection however less likely  · Afebrile and without leukocytosis  · CT head unremarkable  · Coma panel unremarkable  · Ammonia within normal limits  · Patient is currently on a number of antipsychotic medications ; possible medication noncompliance or overuse ; patient's daughter states she is on a number of sedating medications and all of the bottles were empty  · Psychiatry consult placed  · Will hold home antipsychotic medications for now  · Continue to monitor mental status  · PT/OT evaluation and treatment

## 2022-09-08 NOTE — ASSESSMENT & PLAN NOTE
Subjective:       Patient ID: Sanchez Zarate is a 41 y.o. male.    Chief Complaint: Post-op Evaluation    HPI status post right ureteroscopic stone extraction.  Stone analysis is pending.  His KUB looks negative I don't see any stones.  He's feeling fine and unable to urinate today.  He did not have his renal ultrasound yet and we'll get that scheduled on him.  I've encouraged him to drink plenty of water and eating things in moderation.  This is his first stone so this no need for metabolic stone workup at this time    Past Medical History:   Diagnosis Date    Chronic back pain     Chronic diarrhea     Hernia     TBI (traumatic brain injury) 2010    fake wall fell on head (no brain or skull injury per pt)       Past Surgical History:   Procedure Laterality Date    COLONOSCOPY N/A 5/29/2017    Procedure: COLONOSCOPY;  Surgeon: Sukhi Scanlon MD;  Location: [facility] ([address] [place] [address]);  Service: Endoscopy;  Laterality: N/A;    HERNIA REPAIR  2017    umbilical    LUMBAR EPIDURAL INJECTION      with steroid       Family History   Problem Relation Age of Onset    Diabetes Mother     Heart attack Neg Hx     Heart disease Neg Hx     Hypertension Neg Hx     Colon cancer Neg Hx     Esophageal cancer Neg Hx        Social History     Social History    Marital status:      Spouse name: N/A    Number of children: N/A    Years of education: N/A     Occupational History    self employed      Social History Main Topics    Smoking status: Former Smoker     Quit date: 1/1/2007    Smokeless tobacco: Never Used    Alcohol use Yes      Comment: occasional    Drug use: No    Sexual activity: Yes     Partners: Female     Other Topics Concern    Not on file     Social History Narrative    No narrative on file       Allergies:  Review of patient's allergies indicates no known allergies.    Medications:    Current Outpatient Prescriptions:     gabapentin (NEURONTIN) 600 MG tablet, Take 1 tablet (600 mg total) by  · Symptomatology possibly related to psychiatric illness versus medication overuse  · Will defer antipsychotic medication regimen to Psychiatry  · Patient's daughter would like patient to establish with a new psychiatrist mouth 3 (three) times daily., Disp: 90 tablet, Rfl: 2    meloxicam (MOBIC) 15 MG tablet, Take 1 tablet (15 mg total) by mouth once daily., Disp: , Rfl:     ondansetron (ZOFRAN) 4 MG tablet, Take 1 tablet (4 mg total) by mouth every 8 (eight) hours as needed for Nausea., Disp: 12 tablet, Rfl: 0    ondansetron (ZOFRAN-ODT) 8 MG TbDL, Take 1 tablet (8 mg total) by mouth every 6 (six) hours as needed (nausea)., Disp: 15 tablet, Rfl: 0    oxycodone-acetaminophen (PERCOCET) 5-325 mg per tablet, Take 1 tablet by mouth every 4 (four) hours as needed for Pain., Disp: 31 tablet, Rfl: 0    tamsulosin (FLOMAX) 0.4 mg Cp24, Take 1 capsule (0.4 mg total) by mouth once daily., Disp: 10 capsule, Rfl: 0    Review of Systems   Constitutional: Negative for activity change, appetite change, chills, diaphoresis, fatigue, fever and unexpected weight change.   HENT: Negative for congestion, dental problem, hearing loss, mouth sores, postnasal drip, rhinorrhea, sinus pressure and trouble swallowing.    Eyes: Negative for pain, discharge and itching.   Respiratory: Negative for apnea, cough, choking, chest tightness, shortness of breath and wheezing.    Cardiovascular: Negative for chest pain, palpitations and leg swelling.   Gastrointestinal: Negative for abdominal distention, abdominal pain, anal bleeding, blood in stool, constipation, diarrhea, nausea, rectal pain and vomiting.   Endocrine: Negative for polydipsia and polyuria.   Genitourinary: Negative for decreased urine volume, difficulty urinating, discharge, dysuria, enuresis, flank pain, frequency, genital sores, hematuria, penile pain, penile swelling, scrotal swelling, testicular pain and urgency.   Musculoskeletal: Negative for arthralgias, back pain and myalgias.   Skin: Negative for color change, rash and wound.   Neurological: Negative for dizziness, syncope, speech difficulty, light-headedness and headaches.   Hematological: Negative for adenopathy. Does not  bruise/bleed easily.   Psychiatric/Behavioral: Negative for behavioral problems, confusion, hallucinations and sleep disturbance.       Objective:      Physical Exam   Constitutional: He appears well-developed.   HENT:   Head: Normocephalic.   Cardiovascular: Normal rate.    Pulmonary/Chest: Effort normal.   Abdominal: Soft.   Neurological: He is alert.   Skin: Skin is warm.     Psychiatric: He has a normal mood and affect.       Assessment:       1. Renal calculi        Plan:       Sanchez was seen today for post-op evaluation.    Diagnoses and all orders for this visit:    Renal calculi        phone review renal ultrasound

## 2022-09-08 NOTE — ASSESSMENT & PLAN NOTE
· Symptomatology possibly related to psychiatric illness versus medication overuse  · Will defer antipsychotic medication regimen to Psychiatry  · Patient's daughter would like patient to establish with a new psychiatrist

## 2022-09-08 NOTE — UTILIZATION REVIEW
Initial Clinical Review    Admission: Date/Time/Statement:   Admission Orders (From admission, onward)     Ordered        09/07/22 1358  1 [facility],[address] [place]  Once                      Orders Placed This Encounter   Procedures    INPATIENT ADMISSION     Standing Status:   Standing     Number of Occurrences:   1     Order Specific Question:   Level of Care     Answer:   Med Surg [16]     Order Specific Question:   Estimated length of stay     Answer:   More than 2 Midnights     Order Specific Question:   Certification     Answer:   I certify that inpatient services are medically necessary for this patient for a duration of greater than two midnights  See H&P and MD Progress Notes for additional information about the patient's course of treatment  ED Arrival Information     Expected   -    Arrival   9/7/2022 11:05    Acuity   Emergent            Means of arrival   Walk-In    Escorted by   Self    Service   Hospitalist    Admission type   Emergency            Arrival complaint   mental health           Chief Complaint   Patient presents with    Psychiatric Evaluation     Pt was dropped off by sister  Pt states she has a headache from not taking her meds  Her sister took medications away her  Pt states sister said she is not taking the medications the right way  Pt appears confused - today is Friday or Saturday, we are in [place]  Pt also stared at hand  in waiting room and grabbed it to bring in  Flat affect, slow speech  No SI/HI but does has AH        Altered Mental Status       Initial Presentation: 62 y o  female who presented self from home to [address] [facility] [facility] ED  Inpatient admission for evaluation and treatment of AMS and UTI  PMHx: HLD, Migraine, Psychiatric disorder  Presented w/ AMS  UA w/ possible UTI  Imaging unremarkable  On exam, disoriented and staring spells  Plan: hold home psychiatric meds for now, IV ABX, follow urine culture  Psychiatry consulted        Date: 09/08/22 Day 2: Pt continues to be disoriented w/ staring spells  Per pt's daughter all home medication bottles were empty  Plan: PT/OT evals, monitor mental status, continue IV ABX  ED Triage Vitals   Temperature Pulse Respirations Blood Pressure SpO2   09/07/22 1125 09/07/22 1125 09/07/22 1125 09/07/22 1125 09/07/22 1125   98 3 °F (36 8 °C) 83 18 116/72 97 %      Temp Source Heart Rate Source Patient Position - Orthostatic VS BP Location FiO2 (%)   09/07/22 1448 -- 09/07/22 1448 09/07/22 1448 --   Temporal  Sitting Left arm       Pain Score       09/07/22 1505       7          Wt Readings from Last 1 Encounters:   09/07/22 76 1 kg (167 lb 12 3 oz)     Additional Vital Signs:   Date/Time Temp Pulse Resp BP MAP (mmHg) SpO2 O2 Device   09/08/22 0748 96 5 °F (35 8 °C) Abnormal  75 20 145/90 112 96 % None (Room air)   09/08/22 0719 96 7 °F (35 9 °C) Abnormal  77 20 122/56 64 Abnormal  97 % None (Room air)   09/07/22 2323 97 3 °F (36 3 °C) Abnormal  77 18 133/77 -- 96 % None (Room air)   09/07/22 1518 97 5 °F (36 4 °C) 71 18 140/97 107 97 % None (Room air)   09/07/22 1448 96 4 °F (35 8 °C) Abnormal  79 20 161/100 -- 94 % None (Room air)       Pertinent Labs/Diagnostic Test Results:   XR chest 1 view portable   Final Result by Gallo Austin MD (09/07 1232)      No acute cardiopulmonary disease  Workstation performed: [id number]         CT head without contrast   Final Result by Elysia Turner MD (09/07 1244)      No acute intracranial abnormality                    Workstation performed: [id number]           Results from last 7 days   Lab Units 09/07/22  1200   SARS-COV-2  Negative     Results from last 7 days   Lab Units 09/08/22  0451 09/07/22  1157   WBC Thousand/uL 5 37 6 66   HEMOGLOBIN g/dL 12 7 13 9   HEMATOCRIT % 41 2 44 1   PLATELETS Thousands/uL 178 220   NEUTROS ABS Thousands/µL 2 53 3 65         Results from last 7 days   Lab Units 09/08/22  0451 09/07/22  1157   SODIUM mmol/L 138 141 POTASSIUM mmol/L 4 0 4 3   CHLORIDE mmol/L 107 102   CO2 mmol/L 28 33*   ANION GAP mmol/L 3* 6   BUN mg/dL 9 9   CREATININE mg/dL 0 82 0 99   EGFR ml/min/1 73sq m 79 63   CALCIUM mg/dL 8 5 9 3   MAGNESIUM mg/dL 2 2 2 1   PHOSPHORUS mg/dL 3 3  --      Results from last 7 days   Lab Units 09/07/22  1157   AST U/L 17   ALT U/L 18   ALK PHOS U/L 85   TOTAL PROTEIN g/dL 7 2   ALBUMIN g/dL 4 1   TOTAL BILIRUBIN mg/dL 0 37   AMMONIA umol/L <9*         Results from last 7 days   Lab Units 09/08/22  0451 09/07/22  1157   GLUCOSE RANDOM mg/dL 90 98     Results from last 7 days   Lab Units 09/07/22  1157   HS TNI 0HR ng/L 2     Results from last 7 days   Lab Units 09/07/22  1157   TSH 3RD GENERATON uIU/mL 1 220     Results from last 7 days   Lab Units 09/07/22  1236   CLARITY UA  Cloudy*   COLOR UA  Mariya*   SPEC GRAV UA  1 015   PH UA  6 0   GLUCOSE UA mg/dl Negative   KETONES UA mg/dl Negative   BLOOD UA  50 0*   PROTEIN UA mg/dl 15 (Trace)*   NITRITE UA  Negative   BILIRUBIN UA  Negative   UROBILINOGEN UA mg/dL Negative   LEUKOCYTES UA  500 0*   WBC UA /hpf 10-20*   RBC UA /hpf 1-2   BACTERIA UA /hpf Moderate*   EPITHELIAL CELLS WET PREP /hpf Moderate*   MUCUS THREADS  Moderate*     Results from last 7 days   Lab Units 09/07/22  1236   AMPH/METH  Negative   BARBITURATE UR  Negative   BENZODIAZEPINE UR  Negative   COCAINE UR  Negative   METHADONE URINE  Negative   OPIATE UR  Negative   PCP UR  Negative   THC UR  Negative     Results from last 7 days   Lab Units 09/07/22  1157   ETHANOL LVL mg/dL <10   ACETAMINOPHEN LVL ug/mL <03*   SALICYLATE LVL mg/dL <2 2*         ED Treatment:   Medication Administration from 09/07/2022 1105 to 09/07/2022 1433       Date/Time Order Dose Route Action     09/07/2022 1309 cefTRIAXone (ROCEPHIN) IVPB (premix in dextrose) 1,000 mg 50 mL 1,000 mg Intravenous New Bag     09/07/2022 1315 fluconazole (DIFLUCAN) tablet 200 mg 200 mg Oral Given        Past Medical History:   Diagnosis Date    Anxiety     Chronic pain     Back Pain    Depression     Hyperlipidemia     Migraine     Psychiatric disorder      Present on Admission:   [facility]   AMS (altered mental status)   UTI (urinary tract infection)      Admitting Diagnosis: Transient alteration of awareness [R40 4]  UTI (urinary tract infection) [N39 0]  Vulvovaginal candidiasis [B37 3]  Encounter for psychological evaluation [Z00 8]  Severe episode of recurrent major depressive disorder, without psychotic features ([facility] ) [F33 2]  Depression, unspecified depression type [F32  A]  AMS (altered mental status) [R41 82]  Age/Sex: 62 y o  female  Admission Orders:   Regular Diet  Scheduled Medications:  cefTRIAXone, 1,000 mg, Intravenous, Q24H  enoxaparin, 40 mg, Subcutaneous, Q24H AV  nicotine, 1 patch, Transdermal, Daily    Continuous IV Infusions:  sodium chloride 0 9 %, 75 mL/hr, Intravenous, Continuous    PRN Meds:  acetaminophen, 650 mg, Oral, Q4H PRN  albuterol, 2 puff, Inhalation, Q6H PRN  fluconazole, 200 mg, Oral, 9/7 x1  ondansetron, 4 mg, Intravenous, Q6H PRN        IP CONSULT TO ED CRISIS WORKER  IP CONSULT TO PSYCHIATRY  IP CONSULT TO PSYCHIATRY  IP CONSULT TO CASE MANAGEMENT    Network Utilization Review Department  ATTENTION: Please call with any questions or concerns to [phone] and carefully listen to the prompts so that you are directed to the right person  All voicemails are confidential   Vadim Mcclure all requests for admission clinical reviews, approved or denied determinations and any other requests to dedicated fax number below belonging to the campus where the patient is receiving treatment   List of dedicated fax numbers for the Facilities:  [address] [facility] [address] DENIALS (Administrative/Medical Necessity) [phone]   [address] (Maternity/NICU/Pediatrics) [phone]   [address] [place] [address] [phone] [address] [phone]   [address] [place] [address] [place] [address] [id number] [place] [address] Shayna Appiah [phone] [place] [address] [phone]

## 2022-09-08 NOTE — CONSULTS
Psychiatry Consultation Note   Celestine Bales 62 y o  female MRN: [id number]  Unit/Bed#: 7T [facility] 707-01 Encounter: [id number]      Assessment and Plan     Assessment       Assessment:    Pt is a 62 y o  female with past psychiatric history of MDD, SAJAN, and polysubstance use disorder and past medical history of chronic pain, HLD, and migraines who was admitted due to altered mental status  Per chart review, pt resides with her daughter Rich Mejia who had told the ED provider that over the past 2 weeks, patient has been having difficulty performing daily tasks, exhibiting odd behaviors, and having random thoughts  As a result, patient's daughter Rich Mejia expressed concern as patient had been unable to care for herself in the setting of suspected inappropriate use of medications  On admission to the medical floor, patient was disoriented and exhibiting staring spells  Psychiatric consultation was requested by the primary team for a psychiatric evaluation  On initial evaluation, Pankaj Hector is alert and oriented to situation, self, and place though noted to be distracted at times and required redirection  Prior to her admission, patient expressed increasing anxiety due to current stressors including family conflicts and had taken 4 of her Klonopin instead of two the night before  At home, she reports she had been complaint with her medications though had been using more of the Atarax and Klonopin  She denied any suicidal, homicidal ideations or auditory hallucinations  However, did endorse visual hallucinations of seeing "people floating around at night "     Per discussion with patient's daughter, patient's daughter notes patient had been misusing her medications and had only been taking Atarax and Klonopin daily before stepping in and managing her medications a week ago  She reports patient has a history of addiction and is concerned that patient's psychiatrist had been overprescribing and interested in switching psychiatrists  Pt's medication regimen included Trazodone, Atarax, Cymbalta, Abilify, Klonopin, Bupropion, and Cogentin  Due to patient's acute mental status change in the setting of polypharmacy, discussed about stopping some of her medications and continuing trazodone as needed, Cymbalta, Abilify, and Klonopin  Pt shares she is agreeable to being referred to a new psychiatrist and will be provided resources upon discharge  She is future-oriented and goal-oriented  She reports she is looking forward to returning home to spend time with her family, taking her medications, and seeing her grandson  She adamantly denies any SI/HI/AH/VH  Pt is stable from a psychiatric standpoint for discharge once medically cleared  Principal Psychiatric Problem:  a  Delirium due to polypharmacy and infectious etiology    Principal Problem:    AMS (altered mental status)  Active Problems:    Severe episode of recurrent major depressive disorder, without psychotic features ([facility] [address] )    UTI (urinary tract infection)      Plan     Plan:   Discussed with primary team, with the following recommendations:    1  Treatment Recommendations:  a  No indication for inpatient psychiatry at this time as mental status change was likely due to delirium in the setting of polypharmacy and infectious etiology  b  The patient does not meet criteria for inpatient psychiatric hospitalization and will be discharged once medically cleared  The patient has capacity to understand the risks and benefits of the different types of psychiatric treatment available (including inpatient, outpatient, and partial hospitalization) and has verbalized understanding of the same  c  Medical management per primary team   i  Reviewed admission work-up  1  CT head and CXR negative  2  TSH function normal    3  UDS negative  4  CMP and Ammonia within normal limits  5  Urinalysis consistent with UTI   Continue treatment for UTI with Ceftriaxone and follow-up on urine cultures  d  Obtained collateral from patient's daughter Mahin Canales) after verbal consent by patient  i  Discussed about discarding old and  medications  2  Pharmacological:   a  Recommend the following medication changes:   i  Reviewed PDMP  Klonopin 30 tabs last filled on 2022  Due to risk of withdrawal, can restart home dose of Klonopin 1 mg BID (0 5 mg AM and 1 mg PM)   ii  Hold off on home Atarax due to anticholinergic side effects    iii  Can stop Bupropion  Can resume Cymbalta at 30 mg daily, Abilify 5 mg daily, and Trazodone 100 mg PRN  3  Observation level: Routine  4  Referrals: Discussed with primary team to set up patient with referrals and resources for a new outpatient psychiatrist     5  Psychiatry will sign off at this time  Please reach out to our service via [employer] for re-evaluation as needed  If contacting after hours, please call or TigerText the on-call team (AMWELL: [phone]) with any questions or concerns  Risks, benefits and possible side effects of Medications: No new medications at this time  HPI   History of Present Illness   Physician Requesting Consult: Héctor Lobo DO  Reason for Consult / Principal Problem: "Transient alteration of awareness"    Chief Complaint: "I was acting goofy"    Celestine Bales is a 62 y o  female, single, two adult children, domiciled with her daughter and family members, with past psychiatric history of MDD, SAJAN, and polysubstance use disorder and past medical history of chronic pain, HLD, and migraines who was admitted to [address] [place] [address] on 2022 due to altered mental status  Per chart review, patient was brought in by her sister to the ED due to concerns of misusing her Klonopin and possibly other substances  Per [facility] crisis, it had been noted that patient's family had found an empty bottle of Klonopin and patient had been sleeping too much   On arrival to the ED, she was disoriented and unable to provide further history or answer questions appropriately  Per chart review, patient resides with her daughter Lashonda Lieberman who had told the ED providers that patient has been having difficulty performing daily tasks, exhibiting odd behaviors, and having random thoughts  Over the last two weeks, patient's daughter Lashonda Lieberman had begun administerating patient's mediations due to concern that patient had been unable to care for herself with inappropriate use of medications  Psychiatric consultation was requested by the primary team for a psychiatric evaluation  On initial psychiatric evaluation, Shan John is alert and oriented to situation, self, and place though noted to be distracted at times and required redirection  When asked where she is, patient reports [place] [facility] hospital, "[facility]", and date, "September 2022 " She states she was brought to the hospital due to "acting goofy " Pt shares that prior to her admission, she had been feeling "frustrated" due to family conflicts and had taken 4 of her Klonopin instead of two the night before because of stress though denies any intention to harm herself  She reports she had been involved in a verbal argument with her sister as she felt her sister has been "controlling" and has been having issues living at her daughter's home with 9 others  Patient expresses she wants to move out due to too much commotion in the house as her daughter has multiple people residing in the house including patient's sister, daughter's boyfriend, his 3 kids, a friend, herself, pt's daughter, and her grandchildren   When asked about her medication use, patient denies misusing them and states, "I've been taking them and then my daughter started giving it to me a few weeks ago because I took them wrong once "     Over the last two weeks, patient describes that she had been noticing issues with her memory and mentions her mood has been "not bad" though endorses feelings of hopelessness, helplessness, and worthlessness  She notes her anxiety has been increasing and she has been using her Atarax and Klonopin more often  She notes experiencing panic attacks twice a month where she feels restless, shaking, sweating, and has racing thoughts  Patient mentions difficulty with sleeping despite use of Trazodone, she notes she sleeps approximately 3-4 hours a night with issues with falling asleep  She denies any changes in her interest, energy, appetite, weight, though when asked about any feelings of guilty, patient became tearful  Pt expressed she carries guilt about not being able to say goodbye to her father when he passed  She denies any symptoms of snehal including distractibility, risky behavior, grandiosity, pressured, or rapid speech, decreased need for sleep, flight of ideas, or increased goal-directed activity  She denies any auditory or visual hallucinations  She denies any paranoia or delusions  In regards to her psychiatric history, patient reports she has previously been diagnosed with MDD and SAJAN and was following with a psychiatrist at [facility]  She reports she was following with Dr Jovanna Lazaro who she sees three times a year and last saw him over a year ago  She was unable to recall whether she follows with a therapist  She denies any past suicide attempts or self-harm behaviors  When asked about previous psychiatric admissions, patient denied any recollection though per chart review, patient had been hospitalized in 2017 at [facility] due to [facility] with plan  Pt reports she has tried several different psychiatric medications including Abilify, Bupropion, Klonopin, Cymbalta, Atarax, Trazodone, Ativan, and Lexapro  She reports the Atarax, Abilify, Trazodone were the the most effective and reports she has been compliant with her current medication regimen  Pt states her medication is managed by her psychiatrist  She denies any intent or plan to hurt herself or others      After obtaining verbal consent from patient, this writer contacted patient's daughter, Pal Villatoro, who expresses concern that her mother's current psychiatrist had been overprescribing medications and as a result, patient had been misusing her medications  She notes that over the last two weeks, patient had been overusing her Atarax and Klonopin and the bottles are completely empty  Over the last one week, patient's daughter shares she began managing her mother's medication and would like her mother to switch to a new psychiatrist  She describes at home, patient acted as if she had "dementia" where she was constantly forgetting tasks and unable to care for herself though does not have any safety concerns  She reports that patient had not been taking her antidepressants and has old bottles from 2021 that are unused as well  We discussed that patient's symptoms are likely due to polypharmacy and case management will provide patient was referrals and resources for outpatient       Psychiatric ROS and PMHx     Psychiatric Review Of Systems:  Sleep changes: yes, decreased, sleeps approximately 3-4 hours a night  Appetite changes: no  Weight changes: no  Energy/anergy: no  Concentration: yes, decreased  Interest/pleasure/anhedonia: no  Somatic symptoms: no  Anxiety/panic: yes  Marizol: no  Guilty/hopeless: yes, feels guilty about her father passing away  Self injurious behavior/risky behavior: no    Suicidal ideation: no  Homicidal ideation: no  Auditory hallucinations: no  Visual hallucinations: no  Other hallucinations: no  Delusional thinking: no    Eating disorder history: no  Obsessive/compulsive symptoms: no  PTSD history: denies    Historical Information     Past Psychiatric History:   Past Inpatient Psychiatric Treatment:   One previous psychiatric admission in 2017 at [facility]  Past Outpatient Psychiatric Treatment:    Was in outpatient psychiatric treatment in the past with a psychiatrist Dr Ollie Espino at [facility]  Past Suicide Attempts: no  Past Violent Behavior: no  Past Psychiatric Medication Trials: multiple psychiatric medication trials    Substance Abuse History:  Social History     Tobacco History     Smoking Status  Current Every Day Smoker Smoking Frequency  1 pack/day for 45 years (39 pk yrs) Smoking Tobacco Type  Cigarettes    Smokeless Tobacco Use  Current User    Tobacco Comment  Encouraged to stop smoking            Alcohol History     Alcohol Use Status  Not Currently          Drug Use     Drug Use Status  Not Currently Types  Marijuana, Methamphetamines Comment  reports last use years ago          Sexual Activity     Sexually Active  Not Asked          Activities of Daily Living    Not Asked                 I have assessed this patient for substance use within the past 12 months    Alcohol use: denies current use, quit 3 years ago  Recreational drug use:   Cocaine:  denies current use  Heroin:  denies current use  Marijuana:  denies use  Other drugs: denies use   History of Inpatient/Outpatient rehabilitation program: no  Smoking history: 2 packs per day  Use of caffeine: unable to obtain    Family Psychiatric History:   Psychiatric Illness:  Unsure though reports her father may have had Bipolar disorder  Substance Abuse:  Brother used heroin   Suicide Attempts:  no family history of suicide attempts    Social History:  Education: 12th grade  Learning Disabilities: none  Marital History: single  Children: 2 adult children  Living Arrangement: lives in a home with her daughter, daughter's boyfriend, a friend, his 3 children, her grandchildren, and patient's sister  Occupational History: unemployed  Functioning Relationships: good support system  Legal History: none   History: None  Access to Firearms: no    Traumatic History:   Abuse: none  Other Traumatic Events: none     Past Medical History:  History of Seizures: no  History of Head injury with loss of consciousness: no    Past Medical History:   Diagnosis Date    Anxiety     Chronic pain     Back Pain    Depression     Hyperlipidemia     Migraine     Psychiatric disorder      Past Surgical History:   Procedure Laterality Date    OTHER SURGICAL HISTORY      cyst removed from left axila       Mental Status Evaluation and Medical ROS     Medical Review Of Systems:  Pertinent items are noted in HPI  Meds/Allergies   All current active medications have been reviewed    Allergies   Allergen Reactions    Penicillins Anaphylaxis, Rash and Edema     Anaphylaxis (Tolerates IV ceftriaxone 9/7/22)       Objective   Vital signs in last 24 hours:  Temp:  [96 4 °F (35 8 °C)-98 3 °F (36 8 °C)] 96 5 °F (35 8 °C)  HR:  [71-83] 75  Resp:  [18-20] 20  BP: (116-161)/() 145/90      Intake/Output Summary (Last 24 hours) at 9/8/2022 1041  Last data filed at 9/8/2022 0846  Gross per 24 hour   Intake 1873 75 ml   Output --   Net 1873 75 ml       Mental Status Evaluation:  Appearance:  age appropriate, dressed in hospital attire, looks stated age, overtly appearing  female, wearing hair in a braid, limited eye contact   Behavior:  pleasant, cooperative, calm, guarded, responds to redirection   Speech:  fluent, clear, normal volume, slow and scant at times   Mood:  "not bad"   Affect:  constricted   Language: naming objects   Thought Process:  Generally linear and goal-directed though circumstantial at times   Associations: intact associations   Thought Content:  normal   Perceptual Disturbances: denies auditory or visual hallucinations when asked, appears distracted at times   Risk Potential: Suicidal ideation - None  Homicidal ideation - None  Potential for aggression - No   Sensorium:  oriented to person, place, time/date and situation   Memory:  recent and remote memory grossly intact   Consciousness:  alert and awake   Attention/Concentration: attention span and concentration appear shorter than expected for age   Intellect: average   Fund of Knowledge: awareness of current events: yes   Insight:  limited   Judgment: limited   Muscle Strength Muscle Tone: moving extremities without any notable difficulty      Gait/Station: in bed   Motor Activity: no abnormal movements     Laboratory Results: I have personally reviewed all pertinent laboratory/tests results    Results from the past 24 hours:   Recent Results (from the past 24 hour(s))   CBC and differential    Collection Time: 09/07/22 11:57 AM   Result Value Ref Range    WBC 6 66 4 31 - 10 16 Thousand/uL    RBC 4 58 3 81 - 5 12 Million/uL    Hemoglobin 13 9 11 5 - 15 4 g/dL    Hematocrit 44 1 34 8 - 46 1 %    MCV 96 82 - 98 fL    MCH 30 3 26 8 - 34 3 pg    MCHC 31 5 31 4 - 37 4 g/dL    RDW 13 6 11 6 - 15 1 %    MPV 9 2 8 9 - 12 7 fL    Platelets 505 578 - 321 Thousands/uL    nRBC 0 /100 WBCs    Neutrophils Relative 54 43 - 75 %    Immat GRANS % 1 0 - 2 %    Lymphocytes Relative 34 14 - 44 %    Monocytes Relative 8 4 - 12 %    Eosinophils Relative 2 0 - 6 %    Basophils Relative 1 0 - 1 %    Neutrophils Absolute 3 65 1 85 - 7 62 Thousands/µL    Immature Grans Absolute 0 03 0 00 - 0 20 Thousand/uL    Lymphocytes Absolute 2 28 0 60 - 4 47 Thousands/µL    Monocytes Absolute 0 50 0 17 - 1 22 Thousand/µL    Eosinophils Absolute 0 16 0 00 - 0 61 Thousand/µL    Basophils Absolute 0 04 0 00 - 0 10 Thousands/µL   Comprehensive metabolic panel    Collection Time: 09/07/22 11:57 AM   Result Value Ref Range    Sodium 141 135 - 147 mmol/L    Potassium 4 3 3 5 - 5 3 mmol/L    Chloride 102 96 - 108 mmol/L    CO2 33 (H) 21 - 32 mmol/L    ANION GAP 6 5 - 14 mmol/L    BUN 9 5 - 25 mg/dL    Creatinine 0 99 0 60 - 1 20 mg/dL    Glucose 98 70 - 99 mg/dL    Calcium 9 3 8 4 - 10 2 mg/dL    AST 17 14 - 36 U/L    ALT 18 <35 U/L    Alkaline Phosphatase 85 43 - 122 U/L    Total Protein 7 2 6 4 - 8 4 g/dL    Albumin 4 1 3 5 - 5 0 g/dL    Total Bilirubin 0 37 0 20 - 1 00 mg/dL    eGFR 63 ml/min/1 73sq m   TSH    Collection Time: 09/07/22 11:57 AM   Result Value Ref Range    TSH 3RD GENERATON 1 220 0 450 - 4 500 uIU/mL   Ammonia    Collection Time: 09/07/22 11:57 AM   Result Value Ref Range    Ammonia <9 (L) 9 - 33 umol/L   Magnesium    Collection Time: 09/07/22 11:57 AM   Result Value Ref Range    Magnesium 2 1 1 6 - 2 3 mg/dL   HS Troponin 0hr (reflex protocol)    Collection Time: 09/07/22 11:57 AM   Result Value Ref Range    hs TnI 0hr 2 "Refer to ACS Flowchart"- see link ng/L   Ethanol    Collection Time: 09/07/22 11:57 AM   Result Value Ref Range    Ethanol Lvl <10 0 - 10 mg/dL   Salicylate level    Collection Time: 09/07/22 11:57 AM   Result Value Ref Range    Salicylate Lvl <1 8 (L) 10 - 30 mg/dL   Acetaminophen level-If concentration is detectable, please discuss with medical  on call      Collection Time: 09/07/22 11:57 AM   Result Value Ref Range    Acetaminophen Level <10 (L) 10 - 20 ug/mL   COVID only    Collection Time: 09/07/22 12:00 PM    Specimen: Nose; Nares   Result Value Ref Range    SARS-CoV-2 Negative Negative   ECG 12 lead    Collection Time: 09/07/22 12:03 PM   Result Value Ref Range    Ventricular Rate 79 BPM    Atrial Rate 79 BPM    HI Interval 166 ms    QRSD Interval 82 ms    QT Interval 352 ms    QTC Interval 403 ms    P [place] 49 degrees    QRS Axis 61 degrees    T Wave Axis 68 degrees   UA w Reflex to Microscopic w Reflex to Culture    Collection Time: 09/07/22 12:36 PM    Specimen: Urine, Clean Catch   Result Value Ref Range    Color, UA Mariya (A) Straw, Yellow, Pale Yellow    Clarity, UA Cloudy (A) Clear, Other    Specific Gravity, UA 1 015 1 003 - 1 040    pH, UA 6 0 4 5, 5 0, 5 5, 6 0, 6 5, 7 0, 7 5, 8 0    Leukocytes,  0 (A) Negative    Nitrite, UA Negative Negative    Protein, UA 15 (Trace) (A) Negative mg/dl    Glucose, UA Negative Negative mg/dl    Ketones, UA Negative Negative mg/dl    Bilirubin, UA Negative Negative    Occult Blood, UA 50 0 (A) Negative    UROBILINOGEN UA Negative 1 0, Negative mg/dL   Rapid drug screen, urine    Collection Time: 09/07/22 12:36 PM   Result Value Ref Range    Amph/Meth UR Negative Negative    Barbiturate Ur Negative Negative    Benzodiazepine Urine Negative Negative    Cocaine Urine Negative Negative    Methadone Urine Negative Negative    Opiate Urine Negative Negative    PCP Ur Negative Negative    THC Urine Negative Negative    Oxycodone Urine Negative Negative   Urine Microscopic    Collection Time: 09/07/22 12:36 PM   Result Value Ref Range    RBC, UA 1-2 None Seen, 0-1, 1-2, 2-4, 0-5 /hpf    WBC, UA 10-20 (A) None Seen, 0-1, 1-2, 0-5, 2-4 /hpf    Epithelial Cells Moderate (A) None Seen, Occasional /hpf    Bacteria, UA Moderate (A) None Seen, Occasional /hpf    OTHER OBSERVATIONS Yeast Cells Present     MUCUS THREADS Moderate (A) None Seen   Basic metabolic panel    Collection Time: 09/08/22  4:51 AM   Result Value Ref Range    Sodium 138 135 - 147 mmol/L    Potassium 4 0 3 5 - 5 3 mmol/L    Chloride 107 96 - 108 mmol/L    CO2 28 21 - 32 mmol/L    ANION GAP 3 (L) 5 - 14 mmol/L    BUN 9 5 - 25 mg/dL    Creatinine 0 82 0 60 - 1 20 mg/dL    Glucose 90 70 - 99 mg/dL    Calcium 8 5 8 4 - 10 2 mg/dL    eGFR 79 ml/min/1 73sq m   CBC and differential    Collection Time: 09/08/22  4:51 AM   Result Value Ref Range    WBC 5 37 4 31 - 10 16 Thousand/uL    RBC 4 22 3 81 - 5 12 Million/uL    Hemoglobin 12 7 11 5 - 15 4 g/dL    Hematocrit 41 2 34 8 - 46 1 %    MCV 98 82 - 98 fL    MCH 30 1 26 8 - 34 3 pg    MCHC 30 8 (L) 31 4 - 37 4 g/dL    RDW 13 4 11 6 - 15 1 %    MPV 9 5 8 9 - 12 7 fL    Platelets 072 905 - 879 Thousands/uL    nRBC 0 /100 WBCs    Neutrophils Relative 46 43 - 75 %    Immat GRANS % 1 0 - 2 %    Lymphocytes Relative 39 14 - 44 %    Monocytes Relative 10 4 - 12 %    Eosinophils Relative 3 0 - 6 %    Basophils Relative 1 0 - 1 %    Neutrophils Absolute 2 53 1 85 - 7 62 Thousands/µL    Immature Grans Absolute 0 03 0 00 - 0 20 Thousand/uL    Lymphocytes Absolute 2 11 0 60 - 4 47 Thousands/µL Monocytes Absolute 0 51 0 17 - 1 22 Thousand/µL    Eosinophils Absolute 0 15 0 00 - 0 61 Thousand/µL    Basophils Absolute 0 04 0 00 - 0 10 Thousands/µL   Magnesium    Collection Time: 09/08/22  4:51 AM   Result Value Ref Range    Magnesium 2 2 1 6 - 2 3 mg/dL   Phosphorus    Collection Time: 09/08/22  4:51 AM   Result Value Ref Range    Phosphorus 3 3 2 5 - 4 8 mg/dL       Imaging Studies: XR chest 1 view portable    Result Date: 9/7/2022  Narrative: CHEST INDICATION:   eval for pneumonia  COMPARISON:  CXR from 8/25/2021 EXAM PERFORMED/VIEWS:  XR CHEST PORTABLE FINDINGS: Cardiomediastinal silhouette appears unremarkable  The lungs are clear  No pneumothorax or pleural effusion  Osseous structures appear within normal limits for patient age  Impression: No acute cardiopulmonary disease  Workstation performed: [id number]     CT head without contrast    Result Date: 9/7/2022  Narrative: CT BRAIN - WITHOUT CONTRAST INDICATION:   Mental status change, unknown cause altered mental status  COMPARISON:  2/27/2021 TECHNIQUE:  CT examination of the brain was performed  In addition to axial images, sagittal and coronal 2D reformatted images were created and submitted for interpretation  Radiation dose length product (DLP) for this visit:  821 mGy-cm   This examination, like all CT scans performed in the [facility], was performed utilizing techniques to minimize radiation dose exposure, including the use of iterative reconstruction and automated exposure control  IMAGE QUALITY:  Diagnostic  FINDINGS: PARENCHYMA:  No intracranial mass, mass effect or midline shift  No CT signs of acute infarction  No acute parenchymal hemorrhage  VENTRICLES AND EXTRA-AXIAL SPACES:  Normal for the patient's age  VISUALIZED ORBITS AND PARANASAL SINUSES:  No acute abnormality involving the orbits  Mild scattered sinus mucosal thickening is noted  No fluid levels are seen   CALVARIUM AND EXTRACRANIAL SOFT TISSUES: Normal      Impression: No acute intracranial abnormality  Workstation performed: [id number]     EKG: [id number] on 09/07/2022    Code Status: Level 1 - Full Code  Advance Directive and Living Will:       Power of :      Suicide/Homicide Risk Assessment:  Risk of Harm to Self:   The following ratings are based on assessment at the time of the interview and review of records   Demographic risk factors include: , age: over 48 or older   Historical Risk Factors include: history of depression, history of anxiety, history of substance use   Recent Specific Risk Factors include: diagnosis of depression, mental illness diagnosis, feelings of guilt or self blame, unemployed   Protective Factors: no current suicidal ideation, ability to adapt to change, able to manage anger well, access to mental health treatment, being a parent, connection to own children, having a desire to be alive, having a sense of purpose or meaning in life, restricted access to lethal means, strong relationships, supportive family   Weapons: none  The following steps have been taken to ensure weapons are properly secured: not applicable   Based on today's assessment, Britton Hernandez presents the following risk of harm to self: minimal    Risk of Harm to Others:   The following ratings are based on assessment at the time of the interview and review of records   Demographic Risk Factors include: unemployed   Historical Risk Factors include: drug abuse, alcohol abuse   Recent Specific Risk Factors include: abusing substances, multiple stressors   Protective Factors: no current homicidal ideation, ability to adapt to change, able to manage anger well, access to mental health treatment, being a parent, connection to own children, restricted access to lethal means, safe and stable living environment, strong relationships, supportive family   Weapons: none   The following steps have been taken to ensure weapons are properly secured: not applicable   Based on today's assessment, Susan Jane presents the following risk of harm to others: none      Cherylene Rondo, DO 09/08/22  Psychiatry Resident, PGY-II    This note was completed in part utilizing [employer] Software  Grammatical, translation, syntax errors, random word insertions, spelling mistakes, and incomplete sentences may be an occasional consequence of this system secondary to software limitations with voice recognition, ambient noise, and hardware issues  If you have any questions or concerns about the content, text, or information contained within the body of this dictation, please contact the provider for clarification

## 2022-09-08 NOTE — OCCUPATIONAL THERAPY NOTE
Occupational Therapy Evaluation     Patient Name: Misti Perry  VJRNO'U Date: 9/8/2022  Problem List  Principal Problem:    AMS (altered mental status)  Active Problems:    Severe episode of recurrent major depressive disorder, without psychotic features ([address] )    UTI (urinary tract infection)    Past Medical History  Past Medical History:   Diagnosis Date    Anxiety     Chronic pain     Back Pain    Depression     Hyperlipidemia     Migraine     Psychiatric disorder      Past Surgical History  Past Surgical History:   Procedure Laterality Date    OTHER SURGICAL HISTORY      cyst removed from left axila        09/08/22 1445   OT Last Visit   OT Visit Date 09/08/22   Note Type   Note type Evaluation   Restrictions/Precautions   Weight Bearing Precautions Per Order No   Pain Assessment   Pain Assessment Tool 0-10   Pain Score No Pain   Home Living   Type of Home House   Home Layout Multi-level; Laundry in basement;Stairs to enter with rails   Bathroom Shower/Tub Walk-in shower   Bathroom Toilet Standard   Bathroom Equipment Grab bars in shower   Additional Comments Per pt report, however pt is poor historian   Prior Function   Level of [place] Independent with ADLs and functional mobility; Needs assistance with IADLs  (Pt reports daughter Lilliana Leger assists with financial management and household management)   Lives With [employer] Help From Family   Comments Lives with daughter   Lifestyle   Reciprocal Relationships daughter   Psychosocial   Psychosocial (WDL) WDL   Patient Behaviors/Mood Pleasant; Cooperative  (paranoid/suspicious)   Psychosocial Additional Assessments No   ADL   Where Assessed Edge of bed   Eating Assistance [phone] [facility] [phone]  Supervision/Setup   Functional Deficit Supervision/safety   Additional Comments Pt walked approx 300 feet without AD, SUP for safety, pt with 1-2 slight LOB during ambulation, with self-correction   Bed Mobility   Supine to Sit 7  Independent   Transfers   Sit to Stand 7  Independent   Stand to Sit 7  Independent   Functional Mobility   Functional Mobility 5  Supervision   Additional Comments SUP for safety, no AD, approx 300 feet, 1-2 slight LOB self-corrected   Balance   Static Sitting Good   Dynamic Sitting Good   Static Standing Good   Dynamic Standing Good   Ambulatory Fair +   Activity Tolerance   Activity Tolerance Patient tolerated treatment well   Nurse Made Aware yes, cleared to see pt   Cognition   Attention Difficulty attending to directions   Orientation Level Oriented to person;Oriented to place; Disoriented to time;Disoriented to situation   Following Commands Follows one step commands with increased time or repetition   Comments Pt with difficulty maintaining train of thought during conversation, tangential, inappropriate responses to questions at times, likely 2* psych hx   Assessment   Assessment Pt is a 63 y/o female admitted to [facility] on 9/8/22 w/ altered mental status and UTI   has a past medical history of Anxiety, Chronic pain, Depression, Hyperlipidemia, Migraine, and Psychiatric disorder     Pt with active OT evaluation orders  Pt is a poor historian with tangential conversation, flat affect, and increased suspicion/paranoia at times as evidenced by stating "I won't let you trick me" when encouraged to participate in ambulation  As per pt report, pta, resides in a multi-story home with approx 5 LEAH with R HR, laundry/storage in basement, walk-in shower with grab bar, STS  Pt  Is I with ADLS and reports that she and her daughter, whom she lives with, share responsibility for household IADLS  Pt reports daughter assists with financial management and med management at baseline   Upon evaluation, pt is currently I with all ADLs, SUP for functional mobility for safety without AD  Pt with 1-2 slight LOB during ambulation in hallway, able to self-correct  Pt with some confusion/disorientation likely 2* psych hx  Pt with no acute inpatient rehab needs at this time  The patient's raw score on the AM-PAC Daily Activity inpatient short form is 24, standardized score is 57 54, greater than 39 4  Patients at this level are likely to benefit from discharge to home  Please refer to the recommendation of the Occupational Therapist for safe discharge planning  From OT standpoint, recommendation at time of d/c would be home with outpatient services to address balance deficits and continued social support  Pt was left seated in recliner after session with all current needs met     Recommendation   OT Discharge Recommendation Home with outpatient rehabilitation   Additional Comments  For balance training   AM-[facility] Daily Activity Inpatient   Lower Body Dressing 4   Bathing 4   Toileting 4   Upper Body Dressing 4   Grooming 4   Eating 4   Daily Activity Raw Score 24   Daily Activity Standardized Score (Calc for Raw Score >=11) 57 54   AM-[facility] Applied Cognition Inpatient   Following a Speech/Presentation 2   Understanding Ordinary Conversation 3   Taking Medications 2   Remembering Where Things Are Placed or Put Away 3   Remembering List of 4-5 Errands 2   Taking Care of Complicated Tasks 1   Applied Cognition Raw Score 13   Applied Cognition Standardized Score 30 46     Nancy Dewey, OTR/L

## 2022-09-12 NOTE — PHYSICAL THERAPY NOTE
Physical Therapy Evaluation    Patient's Name: Jose J Sanches    Admitting Diagnosis  Transient alteration of awareness [R40 4]  UTI (urinary tract infection) [N39 0]  Vulvovaginal candidiasis [B37 3]  Encounter for psychological evaluation [Z00 8]  Severe episode of recurrent major depressive disorder, without psychotic features ([facility] [place] [address] ) [F33 2]  Depression, unspecified depression type [F32  A]  AMS (altered mental status) [R41 82]    Problem List  Patient Active Problem List   Diagnosis    Severe episode of recurrent major depressive disorder, without psychotic features (HCC)    Generalized anxiety disorder    Alcohol abuse    Cannabis abuse    Stroke-like symptoms    Acute bilateral low back pain with bilateral sciatica    Tobacco abuse    High cholesterol    Chronic pain    Vision loss    AMS (altered mental status)    UTI (urinary tract infection)       Past Medical History  Past Medical History:   Diagnosis Date    Anxiety     Chronic pain     Back Pain    Depression     Hyperlipidemia     Migraine     Psychiatric disorder        Past Surgical History  Past Surgical History:   Procedure Laterality Date    OTHER SURGICAL HISTORY      cyst removed from left axila       Recent Imaging  XR chest 1 view portable   Final Result by Beba Bhatti MD (09/07 1232)      No acute cardiopulmonary disease  Workstation performed: [id number]         CT head without contrast   Final Result by Yessy Bass MD (09/07 1244)      No acute intracranial abnormality                    Workstation performed: [id number]             Recent Vital Signs  Vitals:    09/07/22 2323 09/08/22 0719 09/08/22 0748 09/08/22 1521   BP: 133/77 122/56 145/90 116/80   BP Location: Left arm Left arm Left arm Left arm   Pulse: 77 77 75 87   Resp: 18 20 20 20   Temp: (!) 97 3 °F (36 3 °C) (!) 96 7 °F (35 9 °C) (!) 96 5 °F (35 8 °C) (!) 96 6 °F (35 9 °C)   TempSrc: Temporal Temporal Temporal Temporal   SpO2: 96% 97% 96% 97% Weight:       Height:            09/08/22 1446   PT Last Visit   PT Visit Date 09/08/22   Note Type   Note type Evaluation   Pain Assessment   Pain Assessment Tool 0-10   Pain Score No Pain   Restrictions/Precautions   Weight Bearing Precautions Per Order No   Home Living   Type of Home House   Home Layout Multi-level; Laundry in basement;Stairs to enter with rails   Bathroom Shower/Tub Walk-in shower   Bathroom Toilet Standard   Bathroom Equipment Grab bars in shower   Bathroom Accessibility Accessible   Prior Function   Level of [place] Independent with ADLs and functional mobility; Needs assistance with IADLs   Lives With Family   Receives Help From Family   ADL Assistance Independent   IADLs Needs assistance   Falls in the last 6 months 1 to 4   Vocational Unemployed   General   Family/Caregiver Present No   Cognition   Overall Cognitive Status Impaired   Arousal/Participation Alert   Attention Difficulty attending to directions   Orientation Level Oriented to person;Oriented to place   Following Commands Follows one step commands with increased time or repetition   RLE Assessment   RLE Assessment   (4/5)   LLE Assessment   LLE Assessment   (4/5)   Coordination   Movements are Fluid and Coordinated 0   Coordination and Movement Description mild unsteadiness with gait and mild LE ataxia   Sensation WFL   Light Touch   RLE Light Touch Grossly intact   LLE Light Touch Grossly intact   Bed Mobility   Supine to Sit 7  Independent   Transfers   Sit to Stand 7  Independent   Stand to Sit 7  Independent   Ambulation/Elevation   Gait pattern Decreased foot clearance; Step through pattern;Decreased toe off;Decreased heel strike; Improper Weight shift;Narrow JOEL   Gait Assistance 5  Supervision   Additional items Verbal cues   Assistive Device None   Distance 200ft   Balance   Static Sitting Good   Dynamic Sitting Good   Static Standing Fair +   Dynamic Standing Fair   Ambulatory Fair -   Endurance Deficit   Endurance Deficit Yes   Endurance Deficit Description reduced from baseline per pt report   Activity Tolerance   Activity Tolerance Patient tolerated treatment well   Medical Staff Made Aware spoke to CM   Nurse Made Aware spoke to RN   Assessment   Prognosis Good   Problem List Decreased strength;Decreased endurance; Impaired balance;Decreased coordination;Decreased mobility; Decreased cognition; Impaired judgement;Decreased safety awareness   Barriers to Discharge Inaccessible home environment   Goals   Patient Goals to go home   Recommendation   PT Discharge Recommendation No rehabilitation needs   AM-PAC Basic Mobility Inpatient   Turning in Bed Without Bedrails 4   Lying on Back to Sitting on Edge of Flat Bed 4   Moving Bed to Chair 4   Standing Up From Chair 4   Walk in Room 3   Climb 3-5 Stairs 3   Basic Mobility Inpatient Raw Score 22   Basic Mobility Standardized Score 47 4   Highest Level Of Mobility   JH-HLM Goal 7: Walk 25 feet or more   JH-HLM Achieved 7: Walk 25 feet or more   End of Consult   Patient Position at End of Consult Bedside chair; All needs within reach         ASSESSMENT                                                                                                                     Daiana Raman is a 62 y o  female admitted to [facility] on 9/7/2022 for AMS (altered mental status)  Pt  has a past medical history of Anxiety, Chronic pain, Depression, Hyperlipidemia, Migraine, and Psychiatric disorder    PT was consulted and pt was seen on 9/12/2022 for mobility assessment and d/c planning  Pt presents supine in bed alert and agreeable to therapy  Impairments limiting pt at this time include impaired balance, decreased endurance, decreased coordination, increased fall risk, new onset of impairment of functional mobility, decreased safety awareness, impaired judgement, decreased cognition, and decreased strength   Pt is currently functioning at a independent level for bed mobility, independent level for transfers, supervision assistance x1 level for ambulation with no assistive device  The patient's AM-PAC Basic Mobility Inpatient Short Form Raw Score is    A Raw score of greater than 16 suggests the patient may benefit from discharge to home  Please also refer to the recommendation of the Physical Therapist for safe discharge planning      Recommendations                                                                                                                  DME: None    Discharge Disposition:  Home with no needs      Jose Alexandre PT, DPT

## 2022-09-19 ENCOUNTER — TELEPHONE (OUTPATIENT)
Dept: PSYCHIATRY | Facility: CLINIC | Age: 57
End: 2022-09-19

## 2022-09-19 NOTE — TELEPHONE ENCOUNTER
Client left message reporting "issue with a medication" at her pharmacy  Left no other information  Attempted to reach client to obtain more information-no answer, left message  Attempted to call pharmacy-they are unaware of any issues   Will wait for call back from patient

## 2022-09-26 ENCOUNTER — TELEPHONE (OUTPATIENT)
Dept: PSYCHIATRY | Facility: CLINIC | Age: 57
End: 2022-09-26

## 2022-09-26 NOTE — TELEPHONE ENCOUNTER
Client calling to inform that she was in the hospital a few weeks ago, and medications that Isabel Cates usually prescribes "have been changed around  Also, reports "my daughter fills my pillbox now and controls my meds " Client states "my family doctor is going to refill my meds, but I would like to make my follow up appointment with Isabel Cates for Dec/Jan as he said in our last visit in September  She is also looking for a psychiatrist closer to where she lives  Message sent to [facility] as Elaine Howell and to Emanuel in scheduling

## 2022-09-28 ENCOUNTER — HOSPITAL ENCOUNTER (EMERGENCY)
Facility: HOSPITAL | Age: 57
Discharge: HOME | End: 2022-09-28
Attending: EMERGENCY MEDICINE
Payer: COMMERCIAL

## 2022-09-28 VITALS
DIASTOLIC BLOOD PRESSURE: 93 MMHG | SYSTOLIC BLOOD PRESSURE: 159 MMHG | OXYGEN SATURATION: 98 % | WEIGHT: 173.2 LBS | TEMPERATURE: 98.2 F | RESPIRATION RATE: 16 BRPM | HEART RATE: 90 BPM | BODY MASS INDEX: 30.68 KG/M2

## 2022-09-28 DIAGNOSIS — S61.012A THUMB LACERATION, LEFT, INITIAL ENCOUNTER: Primary | ICD-10-CM

## 2022-09-28 PROCEDURE — 99282 EMERGENCY DEPT VISIT SF MDM: CPT | Performed by: EMERGENCY MEDICINE

## 2022-09-28 PROCEDURE — 99283 EMERGENCY DEPT VISIT LOW MDM: CPT

## 2022-09-28 NOTE — ED PROVIDER NOTES
History  Chief Complaint   Patient presents with    Thumb Laceration     Left thumb laceration  Patient states it happened when she was cleaning her stove  Patient states she had a tetanus shot a couple of weeks ago  HPI  Patient is a 25-year-old female past medical history of depression, anxiety, HLD, HTN presenting with left thumb laceration  Patient reports that she was cleaning her stove with a knife when she accidentally cut her left thumb  She reports that she did not sustain any other injuries  Reports pain in the left thumb  Patient reports that her tetanus was just updated 2 weeks ago  Denies any cleaning of the wound prior to arrival   Prior to Admission Medications   Prescriptions Last Dose Informant Patient Reported? Taking?    ARIPiprazole (ABILIFY) 5 mg tablet   No No   Sig: Take 1 tablet (5 mg total) by mouth daily   DULoxetine (CYMBALTA) 30 mg delayed release capsule   No No   Sig: Take 1 capsule (30 mg total) by mouth daily   albuterol (PROVENTIL HFA,VENTOLIN HFA) 90 mcg/act inhaler   No No   Sig: Inhale 2 puffs every 6 (six) hours as needed for wheezing or shortness of breath   atorvastatin (LIPITOR) 20 mg tablet   Yes No   Sig: Take 20 mg by mouth daily   clonazePAM (KlonoPIN) 1 mg tablet   No No   Sig: Take 1 tablet (1 mg total) by mouth 2 (two) times a day   fluticasone (FLONASE) 50 mcg/act nasal spray   Yes No   traZODone (DESYREL) 100 mg tablet   No No   Sig: Take 1 tablet by mouth daily at bedtime as needed for sleep   Patient not taking: Reported on 11/30/2021       Facility-Administered Medications: None       Past Medical History:   Diagnosis Date    Anxiety     Chronic pain     Back Pain    Depression     Hyperlipidemia     Migraine     Psychiatric disorder        Past Surgical History:   Procedure Laterality Date    OTHER SURGICAL HISTORY      cyst removed from left axila       Family History   Problem Relation Age of Onset    Heart failure Mother     Heart disease Father      I have reviewed and agree with the history as documented  E-Cigarette/Vaping    E-Cigarette Use Never User      E-Cigarette/Vaping Substances     Social History     Tobacco Use    Smoking status: Current Every Day Smoker     Packs/day: 1 00     Years: 45 00     Pack years: 45 00     Types: Cigarettes    Smokeless tobacco: Current User    Tobacco comment: Encouraged to stop smoking  Vaping Use    Vaping Use: Never used   Substance Use Topics    Alcohol use: Not Currently    Drug use: Not Currently     Types: Methamphetamines, Marijuana     Comment: reports last use years ago       Review of Systems   Constitutional: Negative for chills and fever  HENT: Negative for congestion and sore throat  Eyes: Negative for redness and visual disturbance  Respiratory: Negative for cough and shortness of breath  Cardiovascular: Negative for chest pain and palpitations  Gastrointestinal: Negative for constipation, diarrhea, nausea and vomiting  Genitourinary: Negative for dysuria, hematuria, vaginal bleeding and vaginal discharge  Musculoskeletal: Negative for myalgias  Skin: Positive for wound  Negative for rash  Allergic/Immunologic: Negative for immunocompromised state  Neurological: Negative for seizures and syncope  Psychiatric/Behavioral: Negative for confusion, self-injury and suicidal ideas  Physical Exam  Physical Exam  Vitals and nursing note reviewed  Constitutional:       General: She is not in acute distress  Appearance: Normal appearance  She is well-developed  She is not ill-appearing  HENT:      Head: Normocephalic and atraumatic  No raccoon eyes  Right Ear: External ear normal       Left Ear: External ear normal       Nose: Nose normal  No congestion  Mouth/Throat:      Lips: Pink  Mouth: Mucous membranes are moist    Eyes:      General: Lids are normal  No scleral icterus       Conjunctiva/sclera: Conjunctivae normal    Cardiovascular: Rate and Rhythm: Normal rate and regular rhythm  Heart sounds: No murmur heard  No friction rub  Pulmonary:      Effort: Pulmonary effort is normal  No respiratory distress  Breath sounds: No wheezing or rales  Abdominal:      General: Abdomen is flat  Tenderness: There is no abdominal tenderness  There is no guarding or rebound  Musculoskeletal:         General: No swelling or signs of injury  Cervical back: Normal range of motion  No rigidity or tenderness  Skin:     General: Skin is warm and dry  Coloration: Skin is not jaundiced  Findings: No rash  Comments: Abrasion/laceration to dorsal left thumb please see photos below  Intact ROM, sensation and cap refill   Neurological:      Mental Status: She is alert and oriented to person, place, and time  Mental status is at baseline  Psychiatric:         Behavior: Behavior normal  Behavior is cooperative  Vital Signs  ED Triage Vitals [09/28/22 1143]   Temperature Pulse Respirations Blood Pressure SpO2   98 2 °F (36 8 °C) 90 16 159/93 98 %      Temp Source Heart Rate Source Patient Position - Orthostatic VS BP Location FiO2 (%)   Oral Monitor Sitting Right arm --      Pain Score       --           Vitals:    09/28/22 1143   BP: 159/93   Pulse: 90   Patient Position - Orthostatic VS: Sitting         Visual Acuity      ED Medications  Medications - No data to display    Diagnostic Studies  Results Reviewed     None                 No orders to display              Procedures  Procedures         ED Course                               SBIRT 22yo+    Flowsheet Row Most Recent Value   SBIRT (25 yo +)    In order to provide better care to our patients, we are screening all of our patients for alcohol and drug use  Would it be okay to ask you these screening questions?  No Filed at: 09/28/2022 1208                    [facility]  Patient is a 75-year-old female past medical history of depression, anxiety, HLD, HTN presenting with left thumb laceration  Patient on arrival is ambulatory to room is in no acute distress, vital signs stable, afebrile  On exam lungs clear auscultation, heart without murmurs rubs or gallops abdomen soft nontender  Patient is requesting sutures however wound does not requiring sutures at this time  Will clean and dress and will follow PCP  Will discharge in stable condition return precautions discussed  Disposition  Final diagnoses:   Thumb laceration, left, initial encounter     Time reflects when diagnosis was documented in both MDM as applicable and the Disposition within this note     Time User Action Codes Description Comment    9/28/2022 12:02 PM Francheska Davis Add [S61 012A] Thumb laceration, left, initial encounter       ED Disposition     ED Disposition   Discharge    Condition   Stable    Date/Time   Wed Sep 28, 2022 [address] [place] discharge to home/self care                 Follow-up Information    None         Discharge Medication List as of 9/28/2022 12:02 PM      CONTINUE these medications which have NOT CHANGED    Details   albuterol (PROVENTIL HFA,VENTOLIN HFA) 90 mcg/act inhaler Inhale 2 puffs every 6 (six) hours as needed for wheezing or shortness of breath, Starting Wed 11/17/2021, Normal      ARIPiprazole (ABILIFY) 5 mg tablet Take 1 tablet (5 mg total) by mouth daily, Starting Thu 9/8/2022, Normal      atorvastatin (LIPITOR) 20 mg tablet Take 20 mg by mouth daily, Historical Med      clonazePAM (KlonoPIN) 1 mg tablet Take 1 tablet (1 mg total) by mouth 2 (two) times a day, Starting Thu 9/8/2022, Normal      DULoxetine (CYMBALTA) 30 mg delayed release capsule Take 1 capsule (30 mg total) by mouth daily, Starting Thu 9/8/2022, Normal      fluticasone (FLONASE) 50 mcg/act nasal spray Starting Mon 10/25/2021, Historical Med      traZODone (DESYREL) 100 mg tablet Take 1 tablet by mouth daily at bedtime as needed for sleep, Starting Thu 12/14/2017, Print             No discharge procedures on file      PDMP Review       Value Time User    PDMP Reviewed  Yes 9/8/2022  3:00 PM Cherylene Rondo, DO          ED Provider  Electronically Signed by           Gustabo Brown MD  09/28/22 7341

## 2022-10-06 ENCOUNTER — APPOINTMENT (OUTPATIENT)
Dept: RADIOLOGY | Facility: HOSPITAL | Age: 57
End: 2022-10-06
Payer: COMMERCIAL

## 2022-10-06 ENCOUNTER — HOSPITAL ENCOUNTER (EMERGENCY)
Facility: HOSPITAL | Age: 57
Discharge: HOME | End: 2022-10-06
Attending: EMERGENCY MEDICINE
Payer: COMMERCIAL

## 2022-10-06 VITALS
OXYGEN SATURATION: 96 % | SYSTOLIC BLOOD PRESSURE: 97 MMHG | DIASTOLIC BLOOD PRESSURE: 64 MMHG | WEIGHT: 173.72 LBS | RESPIRATION RATE: 16 BRPM | BODY MASS INDEX: 30.77 KG/M2 | HEART RATE: 90 BPM | TEMPERATURE: 98 F
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
## 2022-10-06 DIAGNOSIS — M25.531 WRIST PAIN, ACUTE, RIGHT: ICD-10-CM

## 2022-10-06 DIAGNOSIS — M79.644 THUMB PAIN, RIGHT: ICD-10-CM

## 2022-10-06 DIAGNOSIS — M25.571 ANKLE PAIN, RIGHT: ICD-10-CM

## 2022-10-06 PROCEDURE — 99282 EMERGENCY DEPT VISIT SF MDM: CPT | Performed by: PHYSICIAN ASSISTANT

## 2022-10-06 PROCEDURE — 73610 X-RAY EXAM OF ANKLE: CPT

## 2022-10-06 PROCEDURE — 73140 X-RAY EXAM OF FINGER(S): CPT

## 2022-10-06 PROCEDURE — 73110 X-RAY EXAM OF WRIST: CPT

## 2022-10-06 PROCEDURE — 99283 EMERGENCY DEPT VISIT LOW MDM: CPT

## 2022-10-06 NOTE — ED PROVIDER NOTES
History  Chief Complaint   Patient presents with   William Craig Fall     States she climbed onto her dresser to clean and fell off injuring her rt thumb, rt index finger and rt ankle     80-year-old female with past medical history of anxiety, depression, hyperlipidemia, migraines presenting for evaluation after fall  Patient reports she was on top of a 6 drawer dresser attempting to clean picture frames when she lost her balance and fell  She denies hitting her head or loss of consciousness  She reports she fell forward onto her right wrist and right foot  She is presenting today with right wrist pain and right thumb pain and right ankle pain  She reports ankle pain is worse with weight-bearing  The pain in her thumb is worse with movement  She did not take anything for pain prior to arrival  No numbness, tingling or weakness of RUE or RLE  Prior to Admission Medications   Prescriptions Last Dose Informant Patient Reported? Taking?    ARIPiprazole (ABILIFY) 5 mg tablet   No No   Sig: Take 1 tablet (5 mg total) by mouth daily   DULoxetine (CYMBALTA) 30 mg delayed release capsule   No No   Sig: Take 1 capsule (30 mg total) by mouth daily   albuterol (PROVENTIL HFA,VENTOLIN HFA) 90 mcg/act inhaler   No No   Sig: Inhale 2 puffs every 6 (six) hours as needed for wheezing or shortness of breath   atorvastatin (LIPITOR) 20 mg tablet   Yes No   Sig: Take 20 mg by mouth daily   clonazePAM (KlonoPIN) 1 mg tablet   No No   Sig: Take 1 tablet (1 mg total) by mouth 2 (two) times a day   fluticasone (FLONASE) 50 mcg/act nasal spray   Yes No   traZODone (DESYREL) 100 mg tablet   No No   Sig: Take 1 tablet by mouth daily at bedtime as needed for sleep   Patient not taking: Reported on 11/30/2021       Facility-Administered Medications: None       Past Medical History:   Diagnosis Date    Anxiety     Chronic pain     Back Pain    Depression     Hyperlipidemia     Migraine     Psychiatric disorder        Past Surgical History:   Procedure Laterality Date    OTHER SURGICAL HISTORY      cyst removed from left axila       Family History   Problem Relation Age of Onset    Heart failure Mother     Heart disease Father      I have reviewed and agree with the history as documented  E-Cigarette/Vaping    E-Cigarette Use Never User      E-Cigarette/Vaping Substances     Social History     Tobacco Use    Smoking status: Current Every Day Smoker     Packs/day: 0 25     Years: 45 00     Pack years: 11 25     Types: Cigarettes    Smokeless tobacco: Current User    Tobacco comment: Encouraged to stop smoking  Vaping Use    Vaping Use: Never used   Substance Use Topics    Alcohol use: Not Currently    Drug use: Not Currently     Types: Methamphetamines, Marijuana     Comment: reports last use years ago       Review of Systems   All other systems reviewed and are negative  Physical Exam  Physical Exam  Vitals and nursing note reviewed  Constitutional:       General: She is not in acute distress  Appearance: Normal appearance  She is well-developed  HENT:      Head: Normocephalic and atraumatic  Eyes:      Conjunctiva/sclera: Conjunctivae normal       Comments: EOM grossly intact   Neck:      Vascular: No JVD  Cardiovascular:      Rate and Rhythm: Normal rate  Pulmonary:      Effort: Pulmonary effort is normal    Abdominal:      Palpations: Abdomen is soft  Musculoskeletal:        Hands:       Cervical back: Normal range of motion and neck supple  Comments: Radial and ulnar pulses intact   Skin:     General: Skin is warm and dry  Capillary Refill: Capillary refill takes less than 2 seconds  Neurological:      Mental Status: She is alert and oriented to person, place, and time     Psychiatric:         Behavior: Behavior normal          Vital Signs  ED Triage Vitals [10/06/22 1330]   Temperature Pulse Respirations Blood Pressure SpO2   98 °F (36 7 °C) 90 16 97/64 96 %      Temp Source Heart Rate Source Patient Position - Orthostatic VS BP Location FiO2 (%)   Tympanic Monitor Sitting Left arm --      Pain Score       --           Vitals:    10/06/22 1330   BP: 97/64   Pulse: 90   Patient Position - Orthostatic VS: Sitting         Visual Acuity      ED Medications  Medications - No data to display    Diagnostic Studies  Results Reviewed     None                 XR ankle 3+ views RIGHT    (Results Pending)   XR wrist 3+ views RIGHT    (Results Pending)   XR thumb first digit-min 2 views RIGHT    (Results Pending)              Procedures  Procedures         ED Course                                             MDM  Number of Diagnoses or Management Options  Diagnosis management comments: 78-year-old female presenting for evaluation after a fall off 6 drawer dresser complaining of right wrist, right thumb, right ankle pain  X-rays completed which were unremarkable for acute traumatic injury  Will apply Ace bandage to the wrist and the ankle  Advised her to use ice, elevate, Motrin and Tylenol at home for pain  All imaging discussed with patient, strict return to ED precautions discussed  Pt verbalizes understanding and agrees with plan  Pt is stable for discharge    Portions of the record may have been created with voice recognition software  Occasional wrong word or "sound a like" substitutions may have occurred due to the inherent limitations of voice recognition software  Read the chart carefully and recognize, using context, where substitutions have occurred  Disposition  Final diagnoses:   Fall, initial encounter   Thumb pain, right   Wrist pain, acute, right   Ankle pain, right     Time reflects when diagnosis was documented in both MDM as applicable and the Disposition within this note     Time User Action Codes Description Comment    10/6/2022  2:49 PM Mayito Hanley [U33  VZJO] Fall, initial encounter     10/6/2022  2:49 PM Mayito Hanley [E96 127] Thumb pain, right     10/6/2022 2:50 PM Paulina Hashimoto Add [M25 531] Wrist pain, acute, right     10/6/2022  2:50 PM Paulina Hashimoto Add [M25 571] Ankle pain, right       ED Disposition     ED Disposition   Discharge    Condition   Stable    Date/Time   Thu Oct 6, 2022  2:49 PM    Comment   Lawerence Scheuermann discharge to home/self care  Follow-up Information     Follow up With Specialties Details Why Contact Info Additional Ul  Vanessa Colmenares MD Family Medicine   [facility] 72 [id number] [phone]       [facility] Emergency Department Emergency Medicine Go to  If symptoms worsen [phone] [facility] [id number] [address] [facility] Heart Emergency Department          Patient's Medications   Discharge Prescriptions    No medications on file       No discharge procedures on file      PDMP Review       Value Time User    PDMP Reviewed  Yes 9/8/2022  3:00 PM Lupe Noble DO          ED Provider  Electronically Signed by           Latasha Maza PA-C  10/06/22 7680

## 2022-10-24 DIAGNOSIS — F32.A DEPRESSION, UNSPECIFIED DEPRESSION TYPE: ICD-10-CM

## 2022-10-24 NOTE — TELEPHONE ENCOUNTER
VM from [facility] requesting refill on Klonopin  Will refer to Kalpana Ni for review

## 2022-10-25 PROBLEM — F33.1 MAJOR DEPRESSIVE DISORDER, RECURRENT EPISODE, MODERATE (HCC): Status: ACTIVE | Noted: 2022-10-25

## 2022-10-25 PROBLEM — F17.200 TOBACCO USE DISORDER: Status: ACTIVE | Noted: 2021-02-04

## 2022-10-25 RX ORDER — CLONAZEPAM 1 MG/1
1 TABLET ORAL 2 TIMES DAILY
Qty: 30 TABLET | Refills: 2 | Status: SHIPPED | OUTPATIENT
Start: 2022-10-25 | End: 2022-10-25

## 2022-11-02 DIAGNOSIS — F17.200 TOBACCO USE DISORDER: ICD-10-CM

## 2022-11-02 DIAGNOSIS — F33.1 MAJOR DEPRESSIVE DISORDER, RECURRENT EPISODE, MODERATE (HCC): Primary | ICD-10-CM

## 2022-11-02 DIAGNOSIS — F41.1 GENERALIZED ANXIETY DISORDER: ICD-10-CM

## 2022-11-02 RX ORDER — CLONAZEPAM 1 MG/1
TABLET ORAL
Qty: 45 TABLET | Refills: 1 | Status: SHIPPED | OUTPATIENT
Start: 2022-11-02 | End: 2022-11-16 | Stop reason: SDUPTHER

## 2022-11-06 PROBLEM — N39.0 UTI (URINARY TRACT INFECTION): Status: RESOLVED | Noted: 2022-09-07 | Resolved: 2022-11-06

## 2022-11-08 DIAGNOSIS — F41.1 GENERALIZED ANXIETY DISORDER: ICD-10-CM

## 2022-11-08 DIAGNOSIS — F33.1 MAJOR DEPRESSIVE DISORDER, RECURRENT EPISODE, MODERATE (HCC): Primary | ICD-10-CM

## 2022-11-08 NOTE — TELEPHONE ENCOUNTER
VM from [facility] requesting refills for her Abilify and her Cymbalta  Will refer to Ghazala Bose for review

## 2022-11-09 RX ORDER — ARIPIPRAZOLE 10 MG/1
10 TABLET ORAL DAILY
Qty: 90 TABLET | Refills: 1 | Status: SHIPPED | OUTPATIENT
Start: 2022-11-09 | End: 2022-11-16 | Stop reason: SDUPTHER

## 2022-11-09 RX ORDER — DULOXETIN HYDROCHLORIDE 60 MG/1
60 CAPSULE, DELAYED RELEASE ORAL DAILY
Qty: 90 CAPSULE | Refills: 1 | Status: SHIPPED | OUTPATIENT
Start: 2022-11-09 | End: 2022-11-16 | Stop reason: SDUPTHER

## 2022-11-14 RX ORDER — TRAZODONE HYDROCHLORIDE 100 MG/1
100 TABLET ORAL
COMMUNITY
End: 2022-11-16 | Stop reason: SDUPTHER

## 2022-11-16 ENCOUNTER — TELEMEDICINE (OUTPATIENT)
Dept: PSYCHIATRY | Facility: CLINIC | Age: 57
End: 2022-11-16

## 2022-11-16 DIAGNOSIS — F33.1 MAJOR DEPRESSIVE DISORDER, RECURRENT EPISODE, MODERATE (HCC): Primary | ICD-10-CM

## 2022-11-16 DIAGNOSIS — F17.200 TOBACCO USE DISORDER: ICD-10-CM

## 2022-11-16 DIAGNOSIS — F41.1 GENERALIZED ANXIETY DISORDER: ICD-10-CM

## 2022-11-16 RX ORDER — TRAZODONE HYDROCHLORIDE 100 MG/1
TABLET ORAL
Qty: 225 TABLET | Refills: 1 | Status: SHIPPED | OUTPATIENT
Start: 2022-11-16

## 2022-11-16 RX ORDER — CLONAZEPAM 1 MG/1
TABLET ORAL
Qty: 45 TABLET | Refills: 3 | Status: SHIPPED | OUTPATIENT
Start: 2022-11-16

## 2022-11-16 RX ORDER — ARIPIPRAZOLE 10 MG/1
TABLET ORAL
Qty: 90 TABLET | Refills: 1 | Status: SHIPPED | OUTPATIENT
Start: 2022-11-16

## 2022-11-16 RX ORDER — DULOXETIN HYDROCHLORIDE 60 MG/1
CAPSULE, DELAYED RELEASE ORAL
Qty: 180 CAPSULE | Refills: 1 | Status: SHIPPED | OUTPATIENT
Start: 2022-11-16

## 2022-11-16 RX ORDER — HYDROXYZINE 50 MG/1
TABLET, FILM COATED ORAL
Qty: 90 TABLET | Refills: 3 | Status: SHIPPED | OUTPATIENT
Start: 2022-11-16

## 2022-11-16 NOTE — PSYCH
Virtual Regular Visit    Verification of patient location: at home    Patient is located in the following state in which I hold an active license [place]      Assessment/Plan:       Diagnoses and all orders for this visit:    Major depressive disorder, recurrent episode, moderate (HCC)  -     traZODone (DESYREL) 100 mg tablet; Take 2 1/2 P HS PRN  -     hydrOXYzine HCL (ATARAX) 50 mg tablet; Take 1 PO TID PRN  -     DULoxetine (CYMBALTA) 60 mg delayed release capsule; Take 1 Po BID  -     clonazePAM (KlonoPIN) 1 mg tablet; Take 1/2 PO Q AM and 1 HS  -     ARIPiprazole (ABILIFY) 10 mg tablet; Take 1 Po Q HS    Generalized anxiety disorder  -     traZODone (DESYREL) 100 mg tablet; Take 2 1/2 P HS PRN  -     hydrOXYzine HCL (ATARAX) 50 mg tablet; Take 1 PO TID PRN  -     DULoxetine (CYMBALTA) 60 mg delayed release capsule; Take 1 Po BID  -     clonazePAM (KlonoPIN) 1 mg tablet; Take 1/2 PO Q AM and 1 HS    Tobacco use disorder    Other orders  -     Discontinue: traZODone (DESYREL) 100 mg tablet; Take 100 mg by mouth daily at bedtime Take 2 1/2 P HS PRN          Goals addressed in session:   Good Health  Counseling provided:      Treatment Recommendations- Risks Benefits       Immediate Medical/Psychiatric/Psychotherapy Treatments and Any Precautions:     Risks, Benefits And Possible Side Effects Of Medications:  Risks, benefits, and possible side effects of medications explained to patient and patient verbalizes understanding    Controlled Medication Discussion: The patient has been filling controlled prescriptions on time as prescribed to [facility]      Reason for visit is No chief complaint on file    Medication Management      Encounter provider FRANKLIN Dudley    Provider [place] [address] [place] [address]  [phone]      Recent Visits  No visits were found meeting these conditions  Showing recent visits within past 7 days and meeting all other requirements  Today's Visits  Date Type Provider Dept   11/16/22 Telemedicine [facility] today's visits and meeting all other requirements  Future Appointments  No visits were found meeting these conditions  Showing future appointments within next 150 days and meeting all other requirements       The patient was identified by name and date of birth  Berto Hancock was informed that this is a telemedicine visit and that the visit is being conducted throughthe [employer]agin[employer] platform  She agrees to proceed     My office door was closed  No one else was in the room  She acknowledged consent and understanding of privacy and security of the video platform  The patient has agreed to participate and understands they can discontinue the visit at any time  Patient is aware this is a billable service  Subjective    Berto Hancock is a 62 y o  female    Here today for a med check  This was via [address] Now     normal appetite      HPI  Recent [facility]  Does not remember why   Mood fair  Pends on the day  Has been in her room most of the day  Anxiety fairly manageable  Has not been talking to a therapist  No problems with medication  Appetite good  Sleep fair  Health Fair    Denies SI/HI    Past Medical History:   Diagnosis Date   • Anxiety    • Chronic pain     Back Pain   • Depression    • Hyperlipidemia    • Migraine    • Psychiatric disorder        Past Surgical History:   Procedure Laterality Date   • OTHER SURGICAL HISTORY      cyst removed from left axila       Current Outpatient Medications   Medication Sig Dispense Refill   • ARIPiprazole (ABILIFY) 10 mg tablet Take 1 Po Q HS 90 tablet 1   • clonazePAM (KlonoPIN) 1 mg tablet Take 1/2 PO Q AM and 1 HS 45 tablet 3   • DULoxetine (CYMBALTA) 60 mg delayed release capsule Take 1 Po  capsule 1   • hydrOXYzine HCL (ATARAX) 50 mg tablet Take 1 PO TID PRN 90 tablet 3   • traZODone (DESYREL) 100 mg tablet Take 2 1/2 P HS  tablet 1   • albuterol (PROVENTIL HFA,VENTOLIN HFA) 90 mcg/act inhaler Inhale 2 puffs every 6 (six) hours as needed for wheezing or shortness of breath 18 g 0   • atorvastatin (LIPITOR) 20 mg tablet Take 20 mg by mouth daily     • fluticasone (FLONASE) 50 mcg/act nasal spray        No current facility-administered medications for this visit  Allergies   Allergen Reactions   • Penicillins Anaphylaxis, Rash and Edema     Anaphylaxis (Tolerates IV ceftriaxone 9/7/22)       Social History     Substance and Sexual Activity   Drug Use Not Currently   • Types: Methamphetamines, Marijuana    Comment: reports last use years ago       Family History   Problem Relation Age of Onset   • Heart failure Mother    • Heart disease Father            Objective    Mental status:  Appearance calm and cooperative  and adequate hygiene and grooming   Mood mood appropriate   Affect affect appropriate    Speech a normal rate and fluent   Thought Processes normal thought processes   Hallucinations no hallucinations present    Thought Content no delusions   Abnormal Thoughts no suicidal thoughts  and no homicidal thoughts    Orientation  oriented to person and place and time   Remote Memory short term memory intact and long term memory intact   Attention Span concentration intact   Intellect Appears to be of Average Intelligence   Insight Limited insight   Judgement judgment was limited   Muscle Strength Muscle strength and tone were normal and Normal gait    Language no difficulty naming common objects   Fund of Knowledge displays adequate knowledge of current events   Pain none   Pain Scale 0       Video Exam    There were no vitals filed for this visit      I spent  minutes directly with the patient during this visit    Patient Instructions   Continue Current Tx  This was via [facility]  Needed my phone to hear her  Report Problems  See a Therapist  Return 3 months       Visit Time    Visit Start Time: 9043  Visit Stop Time: 3122  Total Visit Duration: 20 min

## 2022-11-16 NOTE — PATIENT INSTRUCTIONS
Continue Current Tx  This was via [address] [place] [address] Now  Needed my phone to hear her  Report Problems  See a Therapist  Return 3 months

## 2022-11-16 NOTE — BH TREATMENT PLAN
TREATMENT PLAN (Medication Management Only)        [facility]    Name and Date of Birth:  Jose J Sanches 62 y o  1965  Date of Treatment Plan: November 16, 2022  Diagnosis/Diagnoses:    1  Major depressive disorder, recurrent episode, moderate ([facility] [address] )    2  Generalized anxiety disorder    3  Tobacco use disorder      Strengths/Personal Resources for Self-Care: supportive family, supportive friends  Area/Areas of need (in own words): depression  1  Long Term Goal: improve mood  Target Date:6 months - 5/16/2023  Person/Persons responsible for completion of goal: Mol  2  Short Term Objective (s) - How will we reach this goal?:   A  Provider new recommended medication/dosage changes and/or continue medication(s): continue current medications as prescribed Cymbalta, Trazodone, Abilify, Atarax, Klonopin  B  N/A   C  N/A  Target Date:6 months - 5/16/2023  Person/Persons Responsible for Completion of Goal: Mol  Progress Towards Goals: continuing treatment  Treatment Modality: medication management every 3 months  Review due 180 days from date of this plan: 6 months - 5/16/2023  Expected length of service: ongoing treatment  My Physician/PA/NP and I have developed this plan together and I agree to work on the goals and objectives  I understand the treatment goals that were developed for my treatment

## 2022-12-28 ENCOUNTER — HOSPITAL ENCOUNTER (EMERGENCY)
Facility: HOSPITAL | Age: 57
Discharge: HOME | End: 2022-12-28
Attending: EMERGENCY MEDICINE

## 2022-12-28 VITALS
OXYGEN SATURATION: 96 % | HEIGHT: 63 IN | BODY MASS INDEX: 32.71 KG/M2 | RESPIRATION RATE: 16 BRPM | HEART RATE: 108 BPM | WEIGHT: 184.6 LBS | TEMPERATURE: 98.1 F | SYSTOLIC BLOOD PRESSURE: 144 MMHG | DIASTOLIC BLOOD PRESSURE: 78 MMHG

## 2022-12-28 DIAGNOSIS — N39.0 UTI (URINARY TRACT INFECTION): Primary | ICD-10-CM

## 2022-12-28 LAB
BACTERIA UR QL AUTO: ABNORMAL /HPF
BILIRUB UR QL STRIP: NEGATIVE
CLARITY UR: CLEAR
COLOR UR: ABNORMAL
GLUCOSE UR STRIP-MCNC: NEGATIVE MG/DL
HGB UR QL STRIP.AUTO: 25
KETONES UR STRIP-MCNC: NEGATIVE MG/DL
LEUKOCYTE ESTERASE UR QL STRIP: 100
NITRITE UR QL STRIP: NEGATIVE
NON-SQ EPI CELLS URNS QL MICRO: ABNORMAL /HPF
PH UR STRIP.AUTO: 7 [PH]
PROT UR STRIP-MCNC: ABNORMAL MG/DL
RBC #/AREA URNS AUTO: ABNORMAL /HPF
SP GR UR STRIP.AUTO: 1.01 (ref 1–1.04)
UROBILINOGEN UA: NEGATIVE MG/DL
WBC #/AREA URNS AUTO: ABNORMAL /HPF

## 2022-12-28 RX ORDER — MELOXICAM 7.5 MG/1
7.5 TABLET ORAL DAILY
COMMUNITY
Start: 2022-09-23 | End: 2023-09-23

## 2022-12-28 RX ORDER — NITROFURANTOIN 25; 75 MG/1; MG/1
100 CAPSULE ORAL 2 TIMES DAILY
Qty: 14 CAPSULE | Refills: 0 | Status: SHIPPED | OUTPATIENT
Start: 2022-12-28 | End: 2023-01-04

## 2022-12-28 RX ORDER — CYCLOBENZAPRINE HCL 5 MG
5 TABLET ORAL DAILY PRN
COMMUNITY
Start: 2022-11-03

## 2022-12-28 RX ORDER — FLUTICASONE PROPIONATE AND SALMETEROL 100; 50 UG/1; UG/1
1 POWDER RESPIRATORY (INHALATION) 2 TIMES DAILY
COMMUNITY

## 2022-12-28 NOTE — TELEPHONE ENCOUNTER
Client calling for RF of Clonazepam  Rx showing 3 refills and last filled per PDMP on 11/28/22  Call to pharmacy, 3 refills on file, and they will get refill ready for client  Call to client to inform

## 2022-12-28 NOTE — ED PROVIDER NOTES
History  Chief Complaint   Patient presents with   • Possible UTI     States urine is dark and smelly with frequent urination and sometimes nothing comes out  Patient is a 14-year-old female who presents today for evaluation of malodorous urine, urinary frequency and dysuria ongoing over the past 2 days  Patient reports she had an infection of her urine at the beginning of this past month and reports this feels similar  Patient denies a history of kidney stones however states she has had a kidney infection the past   Patient reports this does not feel similar and denies any fevers measured at home  Patient denies any vaginal discharge, vaginal bleeding, nausea, vomiting, diarrhea, upper abdominal pain  Patient denies any alleviating factors  History provided by:  Patient      Prior to Admission Medications   Prescriptions Last Dose Informant Patient Reported? Taking? ARIPiprazole (ABILIFY) 10 mg tablet 12/27/2022  No Yes   Sig: Take 1 Po Q HS   DULoxetine (CYMBALTA) 60 mg delayed release capsule 12/28/2022  No Yes   Sig: Take 1 Po BID   Fluticasone-Salmeterol (Advair) 100-50 mcg/dose inhaler 12/28/2022  Yes Yes   Sig: Inhale 1 puff 2 (two) times a day Rinse mouth after use     albuterol (PROVENTIL HFA,VENTOLIN HFA) 90 mcg/act inhaler 12/28/2022  No Yes   Sig: Inhale 2 puffs every 6 (six) hours as needed for wheezing or shortness of breath   atorvastatin (LIPITOR) 20 mg tablet Not Taking  Yes No   Sig: Take 20 mg by mouth daily   Patient not taking: Reported on 12/28/2022   clonazePAM (KlonoPIN) 1 mg tablet 12/28/2022  No Yes   Sig: Take 1/2 PO Q AM and 1 HS   cyclobenzaprine (FLEXERIL) 5 mg tablet 12/28/2022  Yes Yes   Sig: Take 5 mg by mouth daily as needed   fluticasone (FLONASE) 50 mcg/act nasal spray Not Taking  Yes No   Patient not taking: Reported on 12/28/2022   hydrOXYzine HCL (ATARAX) 50 mg tablet 12/28/2022  No Yes   Sig: Take 1 PO TID PRN   meloxicam (MOBIC) 7 5 mg tablet 12/28/2022  Yes Yes Sig: Take 7 5 mg by mouth daily   traZODone (DESYREL) 100 mg tablet 12/27/2022  No Yes   Sig: Take 2 1/2 P HS PRN      Facility-Administered Medications: None       Past Medical History:   Diagnosis Date   • Anxiety    • Chronic pain     Back Pain   • Depression    • Hyperlipidemia    • Migraine    • Psychiatric disorder        Past Surgical History:   Procedure Laterality Date   • OTHER SURGICAL HISTORY      cyst removed from left axila       Family History   Problem Relation Age of Onset   • Heart failure Mother    • Heart disease Father      I have reviewed and agree with the history as documented  E-Cigarette/Vaping   • E-Cigarette Use Never User      E-Cigarette/Vaping Substances     Social History     Tobacco Use   • Smoking status: Every Day     Packs/day: 0 25     Years: 45 00     Pack years: 11 25     Types: Cigarettes   • Smokeless tobacco: Current   • Tobacco comments:     Encouraged to stop smoking  Vaping Use   • Vaping Use: Never used   Substance Use Topics   • Alcohol use: Not Currently   • Drug use: Not Currently     Types: Methamphetamines, Marijuana     Comment: reports last use years ago       Review of Systems   Constitutional: Negative for chills, fatigue and fever  HENT: Negative for congestion, ear pain, rhinorrhea and sore throat  Eyes: Negative for redness  Respiratory: Negative for chest tightness and shortness of breath  Cardiovascular: Negative for chest pain and palpitations  Gastrointestinal: Negative for abdominal pain, nausea and vomiting  Genitourinary: Positive for dysuria and frequency  Negative for hematuria  Malodorous urine   Musculoskeletal: Negative  Skin: Negative for rash  Neurological: Negative for dizziness, syncope, light-headedness and numbness  Physical Exam  Physical Exam  Vitals and nursing note reviewed  Constitutional:       Appearance: She is well-developed  HENT:      Head: Normocephalic     Eyes:      General: No scleral icterus  Cardiovascular:      Rate and Rhythm: Normal rate and regular rhythm  Pulmonary:      Effort: Pulmonary effort is normal       Breath sounds: Normal breath sounds  No stridor  Abdominal:      General: There is no distension  Palpations: Abdomen is soft  Tenderness: There is no abdominal tenderness  Musculoskeletal:         General: Normal range of motion  Skin:     General: Skin is warm and dry  Capillary Refill: Capillary refill takes less than 2 seconds  Neurological:      Mental Status: She is alert and oriented to person, place, and time  Vital Signs  ED Triage Vitals [12/28/22 1459]   Temperature Pulse Respirations Blood Pressure SpO2   98 1 °F (36 7 °C) (!) 108 16 144/78 96 %      Temp Source Heart Rate Source Patient Position - Orthostatic VS BP Location FiO2 (%)   Oral Monitor Sitting Left arm --      Pain Score       No Pain           Vitals:    12/28/22 1459   BP: 144/78   Pulse: (!) 108   Patient Position - Orthostatic VS: Sitting         Visual Acuity      ED Medications  Medications - No data to display    Diagnostic Studies  Results Reviewed     Procedure Component Value Units Date/Time    Urine Microscopic [[id number]]  (Abnormal) Collected: 12/28/22 1511    Lab Status: Final result Specimen: Urine, Clean Catch Updated: 12/28/22 1550     RBC, UA 2-4 /hpf      WBC, UA 20-30 /hpf      Epithelial Cells Occasional /hpf      Bacteria, UA Innumerable /hpf     Urine culture [[id number]] Collected: 12/28/22 1511    Lab Status:  In process Specimen: Urine, Clean Catch Updated: 12/28/22 1550    UA w Reflex to Microscopic w Reflex to Culture [[id number]]  (Abnormal) Collected: 12/28/22 1511    Lab Status: Final result Specimen: Urine, Clean Catch Updated: 12/28/22 1523     Color, UA Mariya     Clarity, UA Clear     Specific Gravity, UA 1 015     pH, UA 7 0     Leukocytes,  0     Nitrite, UA Negative     Protein, UA 30 (1+) mg/dl      Glucose, UA Negative mg/dl Ketones, UA Negative mg/dl      Bilirubin, UA Negative     Occult Blood, UA 25 0     UROBILINOGEN UA Negative mg/dL                  No orders to display              Procedures  Procedures         ED Course  ED Course as of 12/28/22 1556   Wed Dec 28, 2022   1526 Leukocytes, UA(!): 100 0   1526 POCT URINE PROTEIN(!): 30 (1+)   1526 Blood, UA(!): 25 0   1553 Bacteria, UA(!): Innumerable  Patient was reexamined at this time and informed of laboratory and/or imaging results and was found to be stable for discharge  Return to emergency department criteria was reviewed with the patient who verbalized understanding and was agreeable to discharge and the treatment plan at this time  SBIRT 22yo+    Flowsheet Row Most Recent Value   SBIRT (25 yo +)    In order to provide better care to our patients, we are screening all of our patients for alcohol and drug use  Would it be okay to ask you these screening questions? No Filed at: 12/28/2022 1511                    MDM  Number of Diagnoses or Management Options  Diagnosis management comments: All imaging and/or lab testing discussed with patient, strict return to ED precautions discussed  Patient recommended to follow up promptly with appropriate outpatient provider  Patient and/or family members verbalizes understanding and agrees with plan  Patient is stable for discharge      Portions of the record may have been created with voice recognition software  Occasional wrong word or "sound a like" substitutions may have occurred due to the inherent limitations of voice recognition software  Read the chart carefully and recognize, using context, where substitutions have occurred          Disposition  Final diagnoses:   UTI (urinary tract infection)     Time reflects when diagnosis was documented in both MDM as applicable and the Disposition within this note     Time User Action Codes Description Comment    12/28/2022  3:54 PM Porfirio Tran Add [N39 0] UTI (urinary tract infection)       ED Disposition     ED Disposition   Discharge    Condition   Good    Date/Time   Wed Dec 28, 2022  3:55 PM    Padilla Francis discharge to home/self care  Follow-up Information     Follow up With Specialties Details Why [address] Hetal Guerrero MD Family Medicine   [facility] [id number] [phone]            Patient's Medications   Discharge Prescriptions    NITROFURANTOIN (MACROBID) 100 MG CAPSULE    Take 1 capsule (100 mg total) by mouth 2 (two) times a day for 7 days       Start Date: 12/28/2022End Date: 1/4/2023       Order Dose: 100 mg       Quantity: 14 capsule    Refills: 0       No discharge procedures on file      PDMP Review       Value Time User    PDMP Reviewed  Yes 9/8/2022  3:00 PM Russel Hernandez DO          ED Provider  Electronically Signed by           Agnes Mariscal PA-C  12/28/22 0441

## 2022-12-30 LAB — BACTERIA UR CULT: ABNORMAL

## 2023-01-11 ENCOUNTER — TELEPHONE (OUTPATIENT)
Dept: PSYCHIATRY | Facility: CLINIC | Age: 58
End: 2023-01-11

## 2023-01-11 NOTE — TELEPHONE ENCOUNTER
Patient calling for RF of trazodone  Spoke to and made aware 6 month supply sent to pharmacy on 11/16

## 2023-02-08 ENCOUNTER — APPOINTMENT (EMERGENCY)
Dept: RADIOLOGY | Facility: HOSPITAL | Age: 58
End: 2023-02-08

## 2023-02-08 ENCOUNTER — HOSPITAL ENCOUNTER (EMERGENCY)
Facility: HOSPITAL | Age: 58
Discharge: HOME | End: 2023-02-08
Attending: EMERGENCY MEDICINE

## 2023-02-08 VITALS
TEMPERATURE: 97.8 F | DIASTOLIC BLOOD PRESSURE: 104 MMHG | BODY MASS INDEX: 32.17 KG/M2 | SYSTOLIC BLOOD PRESSURE: 155 MMHG | RESPIRATION RATE: 18 BRPM | HEART RATE: 91 BPM | WEIGHT: 181.6 LBS | OXYGEN SATURATION: 94 %

## 2023-02-08 DIAGNOSIS — R07.9 CHEST PAIN, UNSPECIFIED TYPE: Primary | ICD-10-CM

## 2023-02-08 LAB
2HR DELTA HS TROPONIN: 0 NG/L
ANION GAP SERPL CALCULATED.3IONS-SCNC: 5 MMOL/L (ref 5–14)
ATRIAL RATE: 93 BPM
BASOPHILS # BLD AUTO: 0.05 THOUSANDS/ÂΜL (ref 0–0.1)
BASOPHILS NFR BLD AUTO: 1 % (ref 0–1)
BUN SERPL-MCNC: 8 MG/DL (ref 5–25)
CALCIUM SERPL-MCNC: 10.3 MG/DL (ref 8.4–10.2)
CARDIAC TROPONIN I PNL SERPL HS: 5 NG/L
CARDIAC TROPONIN I PNL SERPL HS: 5 NG/L
CHLORIDE SERPL-SCNC: 103 MMOL/L (ref 96–108)
CO2 SERPL-SCNC: 33 MMOL/L (ref 21–32)
CREAT SERPL-MCNC: 0.95 MG/DL (ref 0.6–1.2)
EOSINOPHIL # BLD AUTO: 0.05 THOUSAND/ÂΜL (ref 0–0.61)
EOSINOPHIL NFR BLD AUTO: 1 % (ref 0–6)
ERYTHROCYTE [DISTWIDTH] IN BLOOD BY AUTOMATED COUNT: 12.6 % (ref 11.6–15.1)
GFR SERPL CREATININE-BSD FRML MDRD: 66 ML/MIN/1.73SQ M
GLUCOSE SERPL-MCNC: 113 MG/DL (ref 70–99)
HCT VFR BLD AUTO: 43.9 % (ref 34.8–46.1)
HGB BLD-MCNC: 14.3 G/DL (ref 11.5–15.4)
IMM GRANULOCYTES # BLD AUTO: 0.04 THOUSAND/UL (ref 0–0.2)
IMM GRANULOCYTES NFR BLD AUTO: 1 % (ref 0–2)
LYMPHOCYTES # BLD AUTO: 1.46 THOUSANDS/ÂΜL (ref 0.6–4.47)
LYMPHOCYTES NFR BLD AUTO: 20 % (ref 14–44)
MCH RBC QN AUTO: 31.2 PG (ref 26.8–34.3)
MCHC RBC AUTO-ENTMCNC: 32.6 G/DL (ref 31.4–37.4)
MCV RBC AUTO: 96 FL (ref 82–98)
MONOCYTES # BLD AUTO: 0.42 THOUSAND/ÂΜL (ref 0.17–1.22)
MONOCYTES NFR BLD AUTO: 6 % (ref 4–12)
NEUTROPHILS # BLD AUTO: 5.37 THOUSANDS/ÂΜL (ref 1.85–7.62)
NEUTS SEG NFR BLD AUTO: 71 % (ref 43–75)
NRBC BLD AUTO-RTO: 0 /100 WBCS
P AXIS: 63 DEGREES
PLATELET # BLD AUTO: 254 THOUSANDS/UL (ref 149–390)
PMV BLD AUTO: 9.3 FL (ref 8.9–12.7)
POTASSIUM SERPL-SCNC: 4.2 MMOL/L (ref 3.5–5.3)
PR INTERVAL: 154 MS
QRS AXIS: 79 DEGREES
QRSD INTERVAL: 86 MS
QT INTERVAL: 366 MS
QTC INTERVAL: 455 MS
RBC # BLD AUTO: 4.58 MILLION/UL (ref 3.81–5.12)
SODIUM SERPL-SCNC: 141 MMOL/L (ref 135–147)
T WAVE AXIS: 68 DEGREES
VENTRICULAR RATE: 93 BPM
WBC # BLD AUTO: 7.39 THOUSAND/UL (ref 4.31–10.16)

## 2023-02-08 RX ORDER — ASPIRIN 325 MG
325 TABLET ORAL ONCE
Status: COMPLETED | OUTPATIENT
Start: 2023-02-08 | End: 2023-02-08

## 2023-02-08 RX ORDER — MAGNESIUM HYDROXIDE/ALUMINUM HYDROXICE/SIMETHICONE 120; 1200; 1200 MG/30ML; MG/30ML; MG/30ML
30 SUSPENSION ORAL ONCE
Status: COMPLETED | OUTPATIENT
Start: 2023-02-08 | End: 2023-02-08

## 2023-02-08 RX ADMIN — ALUMINUM HYDROXIDE, MAGNESIUM HYDROXIDE, AND SIMETHICONE 30 ML: 200; 200; 20 SUSPENSION ORAL at 15:36

## 2023-02-08 RX ADMIN — ASPIRIN 325 MG ORAL TABLET 325 MG: 325 PILL ORAL at 15:38

## 2023-02-08 NOTE — ED PROVIDER NOTES
History  Chief Complaint   Patient presents with   • Chest Pain     Patient states she had chest pain since 230 this morning, she thought it was GERD at first but the tums and heating pad did not work  She is also complaining of nausea     Patient is a 80-year-old female with a h/o HL who presents with a 1 day h/o sternal cp  Burning sensation with burping  Started a 2:30 AM while in bed  No associated SOB, dizziness, or HA   +Nausea  No relief with tums  Does smoke 1/2 ppd  No h/o htn but pressure elevated on triage  Prior to Admission Medications   Prescriptions Last Dose Informant Patient Reported? Taking? ARIPiprazole (ABILIFY) 10 mg tablet   No No   Sig: Take 1 Po Q HS   DULoxetine (CYMBALTA) 60 mg delayed release capsule   No No   Sig: Take 1 Po BID   Fluticasone-Salmeterol (Advair) 100-50 mcg/dose inhaler   Yes No   Sig: Inhale 1 puff 2 (two) times a day Rinse mouth after use     albuterol (PROVENTIL HFA,VENTOLIN HFA) 90 mcg/act inhaler   No No   Sig: Inhale 2 puffs every 6 (six) hours as needed for wheezing or shortness of breath   atorvastatin (LIPITOR) 20 mg tablet   Yes No   Sig: Take 20 mg by mouth daily   Patient not taking: Reported on 12/28/2022   clonazePAM (KlonoPIN) 1 mg tablet   No No   Sig: Take 1/2 PO Q AM and 1 HS   cyclobenzaprine (FLEXERIL) 5 mg tablet   Yes No   Sig: Take 5 mg by mouth daily as needed   fluticasone (FLONASE) 50 mcg/act nasal spray   Yes No   Patient not taking: Reported on 12/28/2022   hydrOXYzine HCL (ATARAX) 50 mg tablet   No No   Sig: Take 1 PO TID PRN   meloxicam (MOBIC) 7 5 mg tablet   Yes No   Sig: Take 7 5 mg by mouth daily   traZODone (DESYREL) 100 mg tablet   No No   Sig: Take 2 1/2 P HS PRN      Facility-Administered Medications: None       Past Medical History:   Diagnosis Date   • Anxiety    • Chronic pain     Back Pain   • Depression    • Hyperlipidemia    • Migraine    • Psychiatric disorder        Past Surgical History:   Procedure Laterality Date   • OTHER SURGICAL HISTORY      cyst removed from left axila       Family History   Problem Relation Age of Onset   • Heart failure Mother    • Heart disease Father      I have reviewed and agree with the history as documented  E-Cigarette/Vaping   • E-Cigarette Use Never User      E-Cigarette/Vaping Substances     Social History     Tobacco Use   • Smoking status: Every Day     Packs/day: 0 50     Years: 45 00     Pack years: 22 50     Types: Cigarettes   • Smokeless tobacco: Current   • Tobacco comments:     Encouraged to stop smoking  Vaping Use   • Vaping Use: Never used   Substance Use Topics   • Alcohol use: Not Currently   • Drug use: Not Currently     Types: Methamphetamines, Marijuana     Comment: reports last use years ago       Review of Systems   Constitutional: Negative  HENT: Negative  Eyes: Negative  Respiratory: Negative  Cardiovascular: Positive for chest pain  Gastrointestinal: Positive for nausea  Endocrine: Negative  Genitourinary: Negative  Musculoskeletal: Negative  Skin: Negative  Allergic/Immunologic: Negative  Neurological: Negative  Hematological: Negative  Psychiatric/Behavioral: Negative  All other systems reviewed and are negative  Physical Exam  Physical Exam  Vitals and nursing note reviewed  Constitutional:       Appearance: She is well-developed  She is obese  HENT:      Head: Normocephalic and atraumatic  Eyes:      Pupils: Pupils are equal, round, and reactive to light  Cardiovascular:      Rate and Rhythm: Normal rate and regular rhythm  Heart sounds: Normal heart sounds  Pulmonary:      Effort: Pulmonary effort is normal       Breath sounds: Normal breath sounds  Abdominal:      Palpations: Abdomen is soft  Musculoskeletal:         General: Normal range of motion  Cervical back: Normal range of motion and neck supple  Right lower leg: No tenderness  No edema  Left lower leg: No tenderness   No edema    Skin:     General: Skin is warm and dry  Capillary Refill: Capillary refill takes less than 2 seconds  Neurological:      General: No focal deficit present  Mental Status: She is alert and oriented to person, place, and time     Psychiatric:         Mood and Affect: Mood normal          Behavior: Behavior normal          Vital Signs  ED Triage Vitals [02/08/23 1518]   Temperature Pulse Respirations Blood Pressure SpO2   97 8 °F (36 6 °C) 91 20 (!) 184/104 95 %      Temp Source Heart Rate Source Patient Position - Orthostatic VS BP Location FiO2 (%)   Oral Monitor Lying Left arm --      Pain Score       --           Vitals:    02/08/23 1518 02/08/23 1648   BP: (!) 184/104 (!) 155/104   Pulse: 91 91   Patient Position - Orthostatic VS: Lying Sitting         Visual Acuity      ED Medications  Medications   aspirin tablet 325 mg (325 mg Oral Given 2/8/23 1538)   aluminum-magnesium hydroxide-simethicone (MYLANTA) oral suspension 30 mL (30 mL Oral Given 2/8/23 1536)       Diagnostic Studies  Results Reviewed     Procedure Component Value Units Date/Time    HS Troponin I 2hr [[id number]]  (Normal) Collected: 02/08/23 1750    Lab Status: Final result Specimen: Blood from Line, Venous Updated: 02/08/23 1819     hs TnI 2hr 5 ng/L      Delta 2hr hsTnI 0 ng/L     HS Troponin 0hr (reflex protocol) [[id number]]  (Normal) Collected: 02/08/23 1551    Lab Status: Final result Specimen: Blood from Line, Venous Updated: 02/08/23 1624     hs TnI 0hr 5 ng/L     HS Troponin I 4hr [[id number]]     Lab Status: No result Specimen: Blood     Basic metabolic panel [[id number]]  (Abnormal) Collected: 02/08/23 1551    Lab Status: Final result Specimen: Blood from Line, Venous Updated: 02/08/23 1615     Sodium 141 mmol/L      Potassium 4 2 mmol/L      Chloride 103 mmol/L      CO2 33 mmol/L      ANION GAP 5 mmol/L      BUN 8 mg/dL      Creatinine 0 95 mg/dL      Glucose 113 mg/dL      Calcium 10 3 mg/dL      eGFR 66 ml/min/1 73sq m     Narrative:      National Kidney Disease Foundation guidelines for Chronic Kidney Disease (CKD):   •  Stage 1 with normal or high GFR (GFR > 90 mL/min/1 73 square meters)  •  Stage 2 Mild CKD (GFR = 60-89 mL/min/1 73 square meters)  •  Stage 3A Moderate CKD (GFR = 45-59 mL/min/1 73 square meters)  •  Stage 3B Moderate CKD (GFR = 30-44 mL/min/1 73 square meters)  •  Stage 4 Severe CKD (GFR = 15-29 mL/min/1 73 square meters)  •  Stage 5 End Stage CKD (GFR <15 mL/min/1 73 square meters)  Note: GFR calculation is accurate only with a steady state creatinine    CBC and differential [[id number]] Collected: 02/08/23 1551    Lab Status: Final result Specimen: Blood from Line, Venous Updated: 02/08/23 1601     WBC 7 39 Thousand/uL      RBC 4 58 Million/uL      Hemoglobin 14 3 g/dL      Hematocrit 43 9 %      MCV 96 fL      MCH 31 2 pg      MCHC 32 6 g/dL      RDW 12 6 %      MPV 9 3 fL      Platelets 804 Thousands/uL      nRBC 0 /100 WBCs      Neutrophils Relative 71 %      Immat GRANS % 1 %      Lymphocytes Relative 20 %      Monocytes Relative 6 %      Eosinophils Relative 1 %      Basophils Relative 1 %      Neutrophils Absolute 5 37 Thousands/µL      Immature Grans Absolute 0 04 Thousand/uL      Lymphocytes Absolute 1 46 Thousands/µL      Monocytes Absolute 0 42 Thousand/µL      Eosinophils Absolute 0 05 Thousand/µL      Basophils Absolute 0 05 Thousands/µL                  XR chest portable    (Results Pending)              Procedures  Procedures         ED Course  ED Course as of 02/08/23 1855   Wed Feb 08, 2023   1637 First trop back  5      1654 Went over results with patient  Heart score 3  Bp improving  Pain improving  Will check second set of enzymes  Re-eval     1820 Delta 0  Will dc                HEART Risk Score    Flowsheet Row Most Recent Value   Heart Score Risk Calculator    History 1 Filed at: 02/08/2023 1637   ECG 0 Filed at: 02/08/2023 1637   Age 1 Filed at: 02/08/2023 1637   Risk Factors 1 Filed at: 02/08/2023 1637   Troponin 0 Filed at: 02/08/2023 1637   HEART Score 3 Filed at: 02/08/2023 1637                                      Medical Decision Making  Chest pain, unspecified type: acute illness or injury     Details: h/o cardiac history aside of HL  heart score of 3   detla 0   other labs wnl   ekg wnl  Amount and/or Complexity of Data Reviewed  Labs: ordered  Decision-making details documented in ED Course  Radiology: ordered and independent interpretation performed  Decision-making details documented in ED Course  ECG/medicine tests: ordered and independent interpretation performed  Decision-making details documented in ED Course  Risk  OTC drugs  Disposition  Final diagnoses:   Chest pain, unspecified type     Time reflects when diagnosis was documented in both MDM as applicable and the Disposition within this note     Time User Action Codes Description Comment    2/8/2023  6:20 PM Yair Springer Add [R07 9] Chest pain, unspecified type       ED Disposition     ED Disposition   Discharge    Condition   Stable    Date/Time   Wed Feb 8, 2023  6:20 PM    Comment   Poppy Lunch discharge to home/self care                 Follow-up Information     Follow up With Specialties Details Why 95 Hetal Guerrero MD Family Medicine   [address] [place] [phone]            Discharge Medication List as of 2/8/2023  6:20 PM      CONTINUE these medications which have NOT CHANGED    Details   albuterol (PROVENTIL HFA,VENTOLIN HFA) 90 mcg/act inhaler Inhale 2 puffs every 6 (six) hours as needed for wheezing or shortness of breath, Starting Wed 11/17/2021, Normal      ARIPiprazole (ABILIFY) 10 mg tablet Take 1 Po Q HS, Normal      atorvastatin (LIPITOR) 20 mg tablet Take 20 mg by mouth daily, Historical Med      clonazePAM (KlonoPIN) 1 mg tablet Take 1/2 PO Q AM and 1 HS, Normal      cyclobenzaprine (FLEXERIL) 5 mg tablet Take 5 mg by mouth daily as needed, Starting Thu 11/3/2022, Historical Med      DULoxetine (CYMBALTA) 60 mg delayed release capsule Take 1 Po BID, Normal      fluticasone (FLONASE) 50 mcg/act nasal spray Starting Mon 10/25/2021, Historical Med      Fluticasone-Salmeterol (Advair) 100-50 mcg/dose inhaler Inhale 1 puff 2 (two) times a day Rinse mouth after use , Historical Med      hydrOXYzine HCL (ATARAX) 50 mg tablet Take 1 PO TID PRN, Normal      meloxicam (MOBIC) 7 5 mg tablet Take 7 5 mg by mouth daily, Starting Fri 9/23/2022, Until Sat 9/23/2023, Historical Med      traZODone (DESYREL) 100 mg tablet Take 2 1/2 P HS PRN, Normal             No discharge procedures on file      PDMP Review       Value Time User    PDMP Reviewed  Yes 9/8/2022  3:00 PM Thalia Urrutia DO          ED Provider  Electronically Signed by           Jas Pace MD  02/08/23 5678

## 2023-02-08 NOTE — ED PROCEDURE NOTE
PROCEDURE  ECG 12 Lead Documentation Only    Date/Time: 2/8/2023 3:10 PM  Performed by: Sherwin Maxwell MD  Authorized by: Sherwin Maxwell MD     Indications / Diagnosis:  Cp  ECG reviewed by me, the ED Provider: yes    Interpretation:     Interpretation: normal    Rate:     ECG rate:  93    ECG rate assessment: normal    Rhythm:     Rhythm: sinus rhythm    Ectopy:     Ectopy: none    QRS:     QRS axis:  Normal    QRS intervals:  Normal  Conduction:     Conduction: normal    ST segments:     ST segments:  Normal  T waves:     T waves: normal           Sherwin Maxwell MD  02/08/23 1537

## 2023-04-06 ENCOUNTER — HOSPITAL ENCOUNTER (EMERGENCY)
Facility: HOSPITAL | Age: 58
Discharge: HOME | End: 2023-04-06
Attending: EMERGENCY MEDICINE

## 2023-04-06 ENCOUNTER — APPOINTMENT (EMERGENCY)
Dept: RADIOLOGY | Facility: HOSPITAL | Age: 58
End: 2023-04-06

## 2023-04-06 VITALS
SYSTOLIC BLOOD PRESSURE: 138 MMHG | TEMPERATURE: 97.5 F | OXYGEN SATURATION: 95 % | WEIGHT: 186.51 LBS | BODY MASS INDEX: 33.04 KG/M2 | DIASTOLIC BLOOD PRESSURE: 74 MMHG | HEART RATE: 96 BPM | RESPIRATION RATE: 20 BRPM

## 2023-04-06 DIAGNOSIS — B34.9 VIRAL SYNDROME: Primary | ICD-10-CM

## 2023-04-06 LAB — SARS-COV-2 RNA RESP QL NAA+PROBE: NEGATIVE

## 2023-04-06 RX ORDER — NAPROXEN 500 MG/1
500 TABLET ORAL 2 TIMES DAILY WITH MEALS
Qty: 30 TABLET | Refills: 0 | Status: SHIPPED | OUTPATIENT
Start: 2023-04-06

## 2023-04-06 RX ORDER — GUAIFENESIN 200 MG/10ML
100-200 LIQUID ORAL EVERY 4 HOURS PRN
Qty: 60 ML | Refills: 0 | Status: SHIPPED | OUTPATIENT
Start: 2023-04-06

## 2023-04-06 RX ORDER — ONDANSETRON 4 MG/1
4 TABLET, ORALLY DISINTEGRATING ORAL EVERY 6 HOURS PRN
Qty: 20 TABLET | Refills: 0 | Status: SHIPPED | OUTPATIENT
Start: 2023-04-06

## 2023-04-06 RX ORDER — ONDANSETRON 4 MG/1
4 TABLET, ORALLY DISINTEGRATING ORAL ONCE
Status: COMPLETED | OUTPATIENT
Start: 2023-04-06 | End: 2023-04-06

## 2023-04-06 RX ORDER — NAPROXEN 500 MG/1
500 TABLET ORAL ONCE
Status: COMPLETED | OUTPATIENT
Start: 2023-04-06 | End: 2023-04-06

## 2023-04-06 RX ADMIN — NAPROXEN 500 MG: 500 TABLET ORAL at 01:44

## 2023-04-06 RX ADMIN — ONDANSETRON 4 MG: 4 TABLET, ORALLY DISINTEGRATING ORAL at 01:44

## 2023-04-06 NOTE — ED PROVIDER NOTES
"History  Chief Complaint   Patient presents with   • Flu Symptoms     Patient arrives reporting sore throat and cough that started about 2 days ago  States then started with vomiting and \"mushy\" BMs this evening  Robitussin taken today without relief from coughing  29-year-old female with out significant past medical history presents complaining of cough, body aches and sore throat for the past 2 days  Patient requesting COVID testing  Patient states she has Robitussin at home without relief  Also admits to 1 episode of vomiting but denies any abdominal pain or decreased appetite  Able to tolerate p o  intake  Denies shortness of breath or chest pain  Denies painful breathing  Denies any other complaints  History provided by:  Patient   used: No        Prior to Admission Medications   Prescriptions Last Dose Informant Patient Reported? Taking? ARIPiprazole (ABILIFY) 10 mg tablet   No No   Sig: Take 1 Po Q HS   DULoxetine (CYMBALTA) 60 mg delayed release capsule   No No   Sig: Take 1 Po BID   Fluticasone-Salmeterol (Advair) 100-50 mcg/dose inhaler   Yes No   Sig: Inhale 1 puff 2 (two) times a day Rinse mouth after use     albuterol (PROVENTIL HFA,VENTOLIN HFA) 90 mcg/act inhaler   No No   Sig: Inhale 2 puffs every 6 (six) hours as needed for wheezing or shortness of breath   atorvastatin (LIPITOR) 20 mg tablet   Yes No   Sig: Take 20 mg by mouth daily   Patient not taking: Reported on 12/28/2022   clonazePAM (KlonoPIN) 1 mg tablet   No No   Sig: Take 1/2 PO Q AM and 1 HS   cyclobenzaprine (FLEXERIL) 5 mg tablet   Yes No   Sig: Take 5 mg by mouth daily as needed   fluticasone (FLONASE) 50 mcg/act nasal spray   Yes No   Patient not taking: Reported on 12/28/2022   hydrOXYzine HCL (ATARAX) 50 mg tablet   No No   Sig: Take 1 PO TID PRN   meloxicam (MOBIC) 7 5 mg tablet   Yes No   Sig: Take 7 5 mg by mouth daily   traZODone (DESYREL) 100 mg tablet   No No   Sig: Take 2 1/2 P HS PRN    " Facility-Administered Medications: None       Past Medical History:   Diagnosis Date   • Anxiety    • Chronic pain     Back Pain   • Depression    • Hyperlipidemia    • Migraine    • Psychiatric disorder        Past Surgical History:   Procedure Laterality Date   • OTHER SURGICAL HISTORY      cyst removed from left axila       Family History   Problem Relation Age of Onset   • Heart failure Mother    • Heart disease Father      I have reviewed and agree with the history as documented  E-Cigarette/Vaping   • E-Cigarette Use Never User      E-Cigarette/Vaping Substances     Social History     Tobacco Use   • Smoking status: Every Day     Packs/day: 0 50     Years: 45 00     Pack years: 22 50     Types: Cigarettes   • Smokeless tobacco: Current   • Tobacco comments:     Encouraged to stop smoking  Vaping Use   • Vaping Use: Never used   Substance Use Topics   • Alcohol use: Not Currently   • Drug use: Not Currently     Types: Methamphetamines, Marijuana     Comment: reports last use years ago       Review of Systems   Constitutional: Negative  Negative for chills and fatigue  HENT: Positive for sore throat  Negative for ear pain  Eyes: Negative for photophobia and redness  Respiratory: Positive for cough  Negative for apnea and shortness of breath  Cardiovascular: Negative for chest pain  Gastrointestinal: Positive for vomiting  Negative for abdominal pain and nausea  Genitourinary: Negative for dysuria  Musculoskeletal: Negative for arthralgias, neck pain and neck stiffness  Skin: Negative for rash  Neurological: Negative for dizziness, tremors, syncope and weakness  Psychiatric/Behavioral: Negative for suicidal ideas  Physical Exam  Physical Exam  Constitutional:       General: She is not in acute distress  Appearance: She is well-developed  She is not diaphoretic  Eyes:      Pupils: Pupils are equal, round, and reactive to light     Cardiovascular:      Rate and Rhythm: Normal rate and regular rhythm  Pulmonary:      Effort: Pulmonary effort is normal  No respiratory distress  Breath sounds: Rhonchi (Mild Bilateral with cough ) present  No wheezing or rales  Abdominal:      General: Bowel sounds are normal  There is no distension  Palpations: Abdomen is soft  Musculoskeletal:         General: Normal range of motion  Cervical back: Normal range of motion and neck supple  Skin:     General: Skin is warm and dry  Neurological:      Mental Status: She is alert and oriented to person, place, and time  Vital Signs  ED Triage Vitals   Temperature Pulse Respirations Blood Pressure SpO2   04/06/23 0120 04/06/23 0120 04/06/23 0120 04/06/23 0120 04/06/23 0120   97 5 °F (36 4 °C) 96 20 138/74 93 %      Temp Source Heart Rate Source Patient Position - Orthostatic VS BP Location FiO2 (%)   04/06/23 0120 04/06/23 0120 04/06/23 0120 04/06/23 0120 --   Tympanic Monitor Sitting Left arm       Pain Score       04/06/23 0144       7           Vitals:    04/06/23 0120   BP: 138/74   Pulse: 96   Patient Position - Orthostatic VS: Sitting         Visual Acuity      ED Medications  Medications   ondansetron (ZOFRAN-ODT) dispersible tablet 4 mg (4 mg Oral Given 4/6/23 0144)   naproxen (NAPROSYN) tablet 500 mg (500 mg Oral Given 4/6/23 0144)       Diagnostic Studies  Results Reviewed     Procedure Component Value Units Date/Time    COVID only [[id number]]  (Normal) Collected: 04/06/23 0144    Lab Status: Final result Specimen: Nares from Nose Updated: 04/06/23 0223     SARS-CoV-2 Negative    Narrative:      FOR PEDIATRIC PATIENTS - copy/paste COVID Guidelines URL to browser: [email or web address] [email or web address]  [email or web address]    SARS-CoV-2 assay is a Nucleic Acid Amplification assay intended for the  qualitative detection of nucleic acid from SARS-CoV-2 in nasopharyngeal  swabs   Results are for the presumptive identification of SARS-CoV-2 RNA     Positive results are indicative of infection with SARS-CoV-2, the virus  causing COVID-19, but do not rule out bacterial infection or co-infection  with other viruses  Laboratories within the [place] and its  territories are required to report all positive results to the appropriate  public health authorities  Negative results do not preclude SARS-CoV-2  infection and should not be used as the sole basis for treatment or other  patient management decisions  Negative results must be combined with  clinical observations, patient history, and epidemiological information  This test has not been FDA cleared or approved  This test has been authorized by FDA under an Emergency Use Authorization  (EUA)  This test is only authorized for the duration of time the  declaration that circumstances exist justifying the authorization of the  emergency use of an in vitro diagnostic tests for detection of SARS-CoV-2  virus and/or diagnosis of COVID-19 infection under section 564(b)(1) of  the Act, 21 U  S C  993OVN-1(K)(8), unless the authorization is terminated  or revoked sooner  The test has been validated but independent review by FDA  and CLIA is pending  Test performed using [employer] GeneXpert: This RT-PCR assay targets N2,  a region unique to SARS-CoV-2  A conserved region in the E-gene was chosen  for pan-Sarbecovirus detection which includes SARS-CoV-2  According to CMS-2020-01-R, this platform meets the definition of high-throughput technology  XR chest 1 view portable   ED Interpretation by Tamia Giron PA-C (04/06 0154)   No obvious focal consolidation                 Procedures  Procedures         ED Course                               SBIRT 22yo+    Flowsheet Row Most Recent Value   SBIRT (25 yo +)    In order to provide better care to our patients, we are screening all of our patients for alcohol and drug use  Would it be okay to ask you these screening questions?  No Filed at: 04/06/2023 0145                    Medical Decision Making  Pt had hx and physical exam consistent with acute viral infection  COVID test pending  No focal signs of infection on exam warranting antibiotics  Patient denies chest pain or shortness of breath  Appears well on exam   Hemodynamically stable  Educated extensively on supportive care and return precautions and demonstrates understanding  Stable for discharge home  Amount and/or Complexity of Data Reviewed  Radiology: ordered and independent interpretation performed  Risk  Prescription drug management  Disposition  Final diagnoses:   Viral syndrome     Time reflects when diagnosis was documented in both MDM as applicable and the Disposition within this note     Time User Action Codes Description Comment    4/6/2023  2:23 AM Laura Mcnulty Add [B34 9] Viral syndrome       ED Disposition     ED Disposition   Discharge    Condition   Stable    Date/Time   Thu Apr 6, 2023  2:22 AM    Comment   Tamar Silveira discharge to home/self care                 Follow-up Information     Follow up With Specialties Details Why Contact Info Additional [address] [facility] Emergency Department Emergency Medicine Go to  If symptoms worsen [address] [facility] [id number]  [facility] Emergency Department    Nargis Adam MD Family Medicine Call  As needed [facility] 79 [phone]             Patient's Medications   Discharge Prescriptions    GUAIFENESIN (ROBITUSSIN) 100 MG/5ML ORAL LIQUID    Take 5-10 mL (100-200 mg total) by mouth every 4 (four) hours as needed for cough       Start Date: 4/6/2023  End Date: --       Order Dose: 100-200 mg       Quantity: 60 mL    Refills: 0    NAPROXEN (NAPROSYN) 500 MG TABLET    Take 1 tablet (500 mg total) by mouth 2 (two) times a day with meals       Start Date: 4/6/2023  End Date: -- Order Dose: 500 mg       Quantity: 30 tablet    Refills: 0    ONDANSETRON (ZOFRAN ODT) 4 MG DISINTEGRATING TABLET    Take 1 tablet (4 mg total) by mouth every 6 (six) hours as needed for nausea or vomiting       Start Date: 4/6/2023  End Date: --       Order Dose: 4 mg       Quantity: 20 tablet    Refills: 0       No discharge procedures on file      PDMP Review       Value Time User    PDMP Reviewed  Yes 9/8/2022  3:00 PM Shaneka Cardoso DO          ED Provider  Electronically Signed by           Chaim Domínguez PA-C  04/06/23 0225

## 2023-04-26 ENCOUNTER — TELEMEDICINE (OUTPATIENT)
Dept: PSYCHIATRY | Facility: CLINIC | Age: 58
End: 2023-04-26

## 2023-04-26 DIAGNOSIS — F41.1 GENERALIZED ANXIETY DISORDER: ICD-10-CM

## 2023-04-26 DIAGNOSIS — F17.200 TOBACCO USE DISORDER: ICD-10-CM

## 2023-04-26 DIAGNOSIS — F33.1 MAJOR DEPRESSIVE DISORDER, RECURRENT EPISODE, MODERATE (HCC): Primary | ICD-10-CM

## 2023-04-26 RX ORDER — DULOXETIN HYDROCHLORIDE 60 MG/1
CAPSULE, DELAYED RELEASE ORAL
Qty: 180 CAPSULE | Refills: 1 | Status: SHIPPED | OUTPATIENT
Start: 2023-04-26

## 2023-04-26 RX ORDER — ARIPIPRAZOLE 10 MG/1
TABLET ORAL
Qty: 90 TABLET | Refills: 1 | Status: SHIPPED | OUTPATIENT
Start: 2023-04-26

## 2023-04-26 RX ORDER — HYDROXYZINE 50 MG/1
TABLET, FILM COATED ORAL
Qty: 90 TABLET | Refills: 3 | Status: SHIPPED | OUTPATIENT
Start: 2023-04-26

## 2023-04-26 RX ORDER — CLONAZEPAM 1 MG/1
TABLET ORAL
Qty: 45 TABLET | Refills: 3 | Status: SHIPPED | OUTPATIENT
Start: 2023-04-26

## 2023-04-26 RX ORDER — TRAZODONE HYDROCHLORIDE 100 MG/1
TABLET ORAL
Qty: 225 TABLET | Refills: 1 | Status: SHIPPED | OUTPATIENT
Start: 2023-04-26

## 2023-04-26 NOTE — PSYCH
Virtual Regular Visit    Verification of patient location: at home     Patient is located in the following state in which I hold an active license [place]      Assessment/Plan:       Diagnoses and all orders for this visit:    Major depressive disorder, recurrent episode, moderate (HCC)  -     ARIPiprazole (ABILIFY) 10 mg tablet; Take 1 Po Q HS  -     clonazePAM (KlonoPIN) 1 mg tablet; Take 1/2 PO Q AM and 1 HS  -     DULoxetine (CYMBALTA) 60 mg delayed release capsule; Take 1 Po BID  -     hydrOXYzine HCL (ATARAX) 50 mg tablet; Take 1 PO TID PRN  -     traZODone (DESYREL) 100 mg tablet; Take 2 1/2 P HS PRN    Generalized anxiety disorder  -     clonazePAM (KlonoPIN) 1 mg tablet; Take 1/2 PO Q AM and 1 HS  -     DULoxetine (CYMBALTA) 60 mg delayed release capsule; Take 1 Po BID  -     hydrOXYzine HCL (ATARAX) 50 mg tablet; Take 1 PO TID PRN  -     traZODone (DESYREL) 100 mg tablet; Take 2 1/2 P HS PRN    Tobacco use disorder          Goals addressed in session:   Good Health  Counseling provided:      Treatment Recommendations- Risks Benefits       Immediate Medical/Psychiatric/Psychotherapy Treatments and Any Precautions:     Risks, Benefits And Possible Side Effects Of Medications:  Risks, benefits, and possible side effects of medications explained to patient and patient verbalizes understanding    Controlled Medication Discussion: The patient has been filling controlled prescriptions on time as prescribed to [facility]      Reason for visit is No chief complaint on file  Medication Management     Encounter provider FRANKLIN Granger    Provider [place] at [address] [place] [address]  [phone]      Recent Visits  No visits were found meeting these conditions    Showing recent visits within past 7 days and meeting all other requirements  Today's Visits  Date Type Provider Dept   04/26/23 "Telemedicine Lucille Villarreal [facility] today's visits and meeting all other requirements  Future Appointments  No visits were found meeting these conditions  Showing future appointments within next 150 days and meeting all other requirements       The patient was identified by name and date of birth  David Aleman was informed that this is a telemedicine visit and that the visit is being conducted throughTelephone  My office door was closed  No one else was in the room  She acknowledged consent and understanding of privacy and security of the video platform  The patient has agreed to participate and understands they can discontinue the visit at any time  Patient is aware this is a billable service  Subjective    David Aleman is a 62 y o  female    Here today for a med check   This was via phone as she had difficulty with Amwell    normal appetite      HPI Mood \"pretty sad\"  C/O problems with allergy season  Anxiety manageable some days  + \"pretty much caffeine\"    No problems with medication  Appetite Sleep [facility]5 for COPD \"I think\"  Denies SI/HI  Does not talk with a therapist     Past Medical History:   Diagnosis Date   • Anxiety    • Chronic pain     Back Pain   • Depression    • Hyperlipidemia    • Migraine    • Psychiatric disorder        Past Surgical History:   Procedure Laterality Date   • OTHER SURGICAL HISTORY      cyst removed from left axila       Current Outpatient Medications   Medication Sig Dispense Refill   • ARIPiprazole (ABILIFY) 10 mg tablet Take 1 Po Q HS 90 tablet 1   • clonazePAM (KlonoPIN) 1 mg tablet Take 1/2 PO Q AM and 1 HS 45 tablet 3   • DULoxetine (CYMBALTA) 60 mg delayed release capsule Take 1 Po  capsule 1   • hydrOXYzine HCL (ATARAX) 50 mg tablet Take 1 PO TID PRN 90 tablet 3   • traZODone (DESYREL) 100 mg tablet Take 2 1/2 P HS  tablet 1   • albuterol (PROVENTIL HFA,VENTOLIN HFA) 90 mcg/act inhaler " Inhale 2 puffs every 6 (six) hours as needed for wheezing or shortness of breath 18 g 0   • atorvastatin (LIPITOR) 20 mg tablet Take 20 mg by mouth daily (Patient not taking: Reported on 12/28/2022)     • cyclobenzaprine (FLEXERIL) 5 mg tablet Take 5 mg by mouth daily as needed     • fluticasone (FLONASE) 50 mcg/act nasal spray  (Patient not taking: Reported on 12/28/2022)     • Fluticasone-Salmeterol (Advair) 100-50 mcg/dose inhaler Inhale 1 puff 2 (two) times a day Rinse mouth after use  • guaiFENesin (ROBITUSSIN) 100 MG/5ML oral liquid Take 5-10 mL (100-200 mg total) by mouth every 4 (four) hours as needed for cough 60 mL 0   • meloxicam (MOBIC) 7 5 mg tablet Take 7 5 mg by mouth daily     • naproxen (Naprosyn) 500 mg tablet Take 1 tablet (500 mg total) by mouth 2 (two) times a day with meals 30 tablet 0   • ondansetron (Zofran ODT) 4 mg disintegrating tablet Take 1 tablet (4 mg total) by mouth every 6 (six) hours as needed for nausea or vomiting 20 tablet 0     No current facility-administered medications for this visit          Allergies   Allergen Reactions   • Penicillins Anaphylaxis, Rash and Edema     Anaphylaxis (Tolerates IV ceftriaxone 9/7/22)       Social History     Substance and Sexual Activity   Drug Use Not Currently   • Types: Methamphetamines, Marijuana    Comment: reports last use years ago       Family History   Problem Relation Age of Onset   • Heart failure Mother    • Heart disease Father            Objective    Mental status:  Appearance calm and cooperative  and adequate hygiene and grooming   Mood mood appropriate   Affect affect appropriate    Speech a normal rate and fluent   Thought Processes normal thought processes   Hallucinations no hallucinations present    Thought Content no delusions   Abnormal Thoughts no suicidal thoughts  and no homicidal thoughts    Orientation  oriented to person and place and time   Remote Memory short term memory intact and long term memory intact Attention Span concentration intact   Intellect Appears to be of Average Intelligence   Insight Limited insight   Judgement judgment was limited   Muscle Strength Muscle strength and tone were normal and Normal gait    Language no difficulty naming common objects   Fund of Knowledge displays adequate knowledge of current events   Pain none   Pain Scale 0       Video Exam    There were no vitals filed for this visit      I spent 15 minutes directly with the patient during this visit    Patient Instructions   Continue Current Tx  Report Problems  See a Therapist  This was via phone  Given M team # for assistance   Return 3 months  Decrease caffeine        Visit Time    Visit Start Time: 7344  Visit Stop Time: 9205  Total Visit Duration: 17 min

## 2023-04-26 NOTE — BH TREATMENT PLAN
TREATMENT PLAN (Medication Management Only)        [facility]    Name and Date of Birth:  Sylvia Raygoza 62 y o  1965  Date of Treatment Plan: April 26, 2023  Diagnosis/Diagnoses:    1  Major depressive disorder, recurrent episode, moderate ([address] )    2  Generalized anxiety disorder    3  Tobacco use disorder      Strengths/Personal Resources for Self-Care: supportive family, supportive friends, taking medications as prescribed  Area/Areas of need (in own words): depression  1  Long Term Goal: maintain control of depression  Target Date:6 months - 10/26/2023  Person/Persons responsible for completion of goal: Mol  2  Short Term Objective (s) - How will we reach this goal?:   A  Provider new recommended medication/dosage changes and/or continue medication(s): continue current medications as prescribed Cymbalta, Trazodone, Abilify, Atarax, Klonopin  B  N/A   C  N/A  Target Date:6 months - 10/26/2023  Person/Persons Responsible for Completion of Goal: Mol  Progress Towards Goals: stable  Treatment Modality: medication management every 3 months  Review due 180 days from date of this plan: 6 months - 10/26/2023  Expected length of service: ongoing treatment  My Physician/PA/NP and I have developed this plan together and I agree to work on the goals and objectives  I understand the treatment goals that were developed for my treatment

## 2023-04-26 NOTE — PATIENT INSTRUCTIONS
Continue Current Tx  Report Problems  See a Therapist  This was via phone  Given M team # for assistance   Return 3 months  Decrease caffeine

## 2023-05-20 ENCOUNTER — APPOINTMENT (EMERGENCY)
Dept: RADIOLOGY | Facility: HOSPITAL | Age: 58
End: 2023-05-20

## 2023-05-20 ENCOUNTER — HOSPITAL ENCOUNTER (INPATIENT)
Facility: HOSPITAL | Age: 58
LOS: 3 days | Discharge: HOME | End: 2023-05-23
Attending: EMERGENCY MEDICINE | Admitting: HOSPITALIST

## 2023-05-20 ENCOUNTER — APPOINTMENT (EMERGENCY)
Dept: CT IMAGING | Facility: HOSPITAL | Age: 58
End: 2023-05-20

## 2023-05-20 DIAGNOSIS — J44.1 COPD EXACERBATION (HCC): Primary | ICD-10-CM

## 2023-05-20 DIAGNOSIS — J20.5 RSV BRONCHITIS: ICD-10-CM

## 2023-05-20 PROBLEM — J96.00 ACUTE RESPIRATORY FAILURE (HCC): Status: ACTIVE | Noted: 2023-05-20

## 2023-05-20 LAB
ANION GAP SERPL CALCULATED.3IONS-SCNC: 3 MMOL/L (ref 4–13)
ARTERIAL PATENCY WRIST A: YES
ATRIAL RATE: 96 BPM
BASE EX.OXY STD BLDV CALC-SCNC: 93.5 % (ref 60–80)
BASE EXCESS BLDA CALC-SCNC: 2 MMOL/L
BASE EXCESS BLDV CALC-SCNC: 5.2 MMOL/L
BASOPHILS # BLD AUTO: 0.09 THOUSANDS/ÂΜL (ref 0–0.1)
BASOPHILS NFR BLD AUTO: 1 % (ref 0–1)
BUN SERPL-MCNC: 4 MG/DL (ref 5–25)
CALCIUM SERPL-MCNC: 8.9 MG/DL (ref 8.4–10.2)
CARDIAC TROPONIN I PNL SERPL HS: 9 NG/L
CHLORIDE SERPL-SCNC: 100 MMOL/L (ref 96–108)
CO2 SERPL-SCNC: 33 MMOL/L (ref 21–32)
CREAT SERPL-MCNC: 1.04 MG/DL (ref 0.6–1.3)
D DIMER PPP FEU-MCNC: 1.4 UG/ML FEU
EOSINOPHIL # BLD AUTO: 0.05 THOUSAND/ÂΜL (ref 0–0.61)
EOSINOPHIL NFR BLD AUTO: 0 % (ref 0–6)
ERYTHROCYTE [DISTWIDTH] IN BLOOD BY AUTOMATED COUNT: 14.9 % (ref 11.6–15.1)
GFR SERPL CREATININE-BSD FRML MDRD: 59 ML/MIN/1.73SQ M
GLUCOSE SERPL-MCNC: 115 MG/DL (ref 65–140)
HCO3 BLDA-SCNC: 30.4 MMOL/L (ref 22–28)
HCO3 BLDV-SCNC: 31.3 MMOL/L (ref 24–30)
HCT VFR BLD AUTO: 37.4 % (ref 34.8–46.1)
HGB BLD-MCNC: 11.8 G/DL (ref 11.5–15.4)
IMM GRANULOCYTES # BLD AUTO: 0.28 THOUSAND/UL (ref 0–0.2)
IMM GRANULOCYTES NFR BLD AUTO: 2 % (ref 0–2)
LYMPHOCYTES # BLD AUTO: 1.53 THOUSANDS/ÂΜL (ref 0.6–4.47)
LYMPHOCYTES NFR BLD AUTO: 10 % (ref 14–44)
MCH RBC QN AUTO: 30.9 PG (ref 26.8–34.3)
MCHC RBC AUTO-ENTMCNC: 31.6 G/DL (ref 31.4–37.4)
MCV RBC AUTO: 98 FL (ref 82–98)
MONOCYTES # BLD AUTO: 0.84 THOUSAND/ÂΜL (ref 0.17–1.22)
MONOCYTES NFR BLD AUTO: 5 % (ref 4–12)
NASAL CANNULA: ABNORMAL
NEUTROPHILS # BLD AUTO: 13.16 THOUSANDS/ÂΜL (ref 1.85–7.62)
NEUTS SEG NFR BLD AUTO: 82 % (ref 43–75)
NRBC BLD AUTO-RTO: 0 /100 WBCS
O2 CT BLDA-SCNC: 17.2 ML/DL (ref 16–23)
O2 CT BLDV-SCNC: 17.3 ML/DL
OXYHGB MFR BLDA: 94 % (ref 94–97)
P AXIS: 71 DEGREES
PCO2 BLDA: 65.8 MM HG (ref 36–44)
PCO2 BLDV: 52 MM HG (ref 42–50)
PH BLDA: 7.28 [PH] (ref 7.35–7.45)
PH BLDV: 7.4 [PH] (ref 7.3–7.4)
PLATELET # BLD AUTO: 244 THOUSANDS/UL (ref 149–390)
PMV BLD AUTO: 9.5 FL (ref 8.9–12.7)
PO2 BLDA: 83.5 MM HG (ref 75–129)
PO2 BLDV: 80.8 MM HG (ref 35–45)
POTASSIUM SERPL-SCNC: 4.5 MMOL/L (ref 3.5–5.3)
PR INTERVAL: 146 MS
PROCALCITONIN SERPL-MCNC: 0.09 NG/ML
PROCALCITONIN SERPL-MCNC: 0.09 NG/ML
QRS AXIS: 73 DEGREES
QRSD INTERVAL: 80 MS
QT INTERVAL: 352 MS
QTC INTERVAL: 444 MS
RBC # BLD AUTO: 3.82 MILLION/UL (ref 3.81–5.12)
SODIUM SERPL-SCNC: 136 MMOL/L (ref 135–147)
SPECIMEN SOURCE: ABNORMAL
T WAVE AXIS: 60 DEGREES
VENTRICULAR RATE: 96 BPM
WBC # BLD AUTO: 15.95 THOUSAND/UL (ref 4.31–10.16)

## 2023-05-20 RX ORDER — IPRATROPIUM BROMIDE AND ALBUTEROL SULFATE 2.5; .5 MG/3ML; MG/3ML
3 SOLUTION RESPIRATORY (INHALATION)
Status: DISCONTINUED | OUTPATIENT
Start: 2023-05-20 | End: 2023-05-23 | Stop reason: HOSPADM

## 2023-05-20 RX ORDER — SODIUM CHLORIDE FOR INHALATION 0.9 %
3 VIAL, NEBULIZER (ML) INHALATION ONCE
Status: COMPLETED | OUTPATIENT
Start: 2023-05-20 | End: 2023-05-20

## 2023-05-20 RX ORDER — FLUTICASONE FUROATE AND VILANTEROL 200; 25 UG/1; UG/1
1 POWDER RESPIRATORY (INHALATION) DAILY
Status: DISCONTINUED | OUTPATIENT
Start: 2023-05-21 | End: 2023-05-23 | Stop reason: HOSPADM

## 2023-05-20 RX ORDER — NICOTINE 21 MG/24HR
1 PATCH, TRANSDERMAL 24 HOURS TRANSDERMAL DAILY
Status: DISCONTINUED | OUTPATIENT
Start: 2023-05-21 | End: 2023-05-23 | Stop reason: HOSPADM

## 2023-05-20 RX ORDER — DULOXETIN HYDROCHLORIDE 60 MG/1
60 CAPSULE, DELAYED RELEASE ORAL EVERY 12 HOURS SCHEDULED
Status: DISCONTINUED | OUTPATIENT
Start: 2023-05-20 | End: 2023-05-23 | Stop reason: HOSPADM

## 2023-05-20 RX ORDER — METHYLPREDNISOLONE SOD SUCC 125 MG
1 VIAL (EA) INJECTION ONCE
Status: COMPLETED | OUTPATIENT
Start: 2023-05-20 | End: 2023-05-20

## 2023-05-20 RX ORDER — METHYLPREDNISOLONE SODIUM SUCCINATE 40 MG/ML
60 INJECTION, POWDER, LYOPHILIZED, FOR SOLUTION INTRAMUSCULAR; INTRAVENOUS EVERY 8 HOURS SCHEDULED
Status: DISCONTINUED | OUTPATIENT
Start: 2023-05-20 | End: 2023-05-21

## 2023-05-20 RX ORDER — MAGNESIUM SULFATE HEPTAHYDRATE 40 MG/ML
2 INJECTION, SOLUTION INTRAVENOUS ONCE
Status: COMPLETED | OUTPATIENT
Start: 2023-05-20 | End: 2023-05-20

## 2023-05-20 RX ORDER — ENOXAPARIN SODIUM 100 MG/ML
40 INJECTION SUBCUTANEOUS DAILY
Status: DISCONTINUED | OUTPATIENT
Start: 2023-05-21 | End: 2023-05-23 | Stop reason: HOSPADM

## 2023-05-20 RX ORDER — ALBUTEROL SULFATE 2.5 MG/3ML
2.5 SOLUTION RESPIRATORY (INHALATION) EVERY 4 HOURS PRN
Status: DISCONTINUED | OUTPATIENT
Start: 2023-05-20 | End: 2023-05-23 | Stop reason: HOSPADM

## 2023-05-20 RX ORDER — ARIPIPRAZOLE 10 MG/1
10 TABLET ORAL DAILY
Status: DISCONTINUED | OUTPATIENT
Start: 2023-05-21 | End: 2023-05-23 | Stop reason: HOSPADM

## 2023-05-20 RX ORDER — ALBUTEROL SULFATE 2.5 MG/3ML
2 SOLUTION RESPIRATORY (INHALATION) ONCE
Status: COMPLETED | OUTPATIENT
Start: 2023-05-20 | End: 2023-05-20

## 2023-05-20 RX ORDER — DEXAMETHASONE SODIUM PHOSPHATE 10 MG/ML
10 INJECTION, SOLUTION INTRAMUSCULAR; INTRAVENOUS ONCE
Status: DISCONTINUED | OUTPATIENT
Start: 2023-05-20 | End: 2023-05-20

## 2023-05-20 RX ADMIN — SODIUM CHLORIDE 1000 ML: 0.9 INJECTION, SOLUTION INTRAVENOUS at 14:25

## 2023-05-20 RX ADMIN — METHYLPREDNISOLONE SODIUM SUCCINATE 60 MG: 40 INJECTION, POWDER, FOR SOLUTION INTRAMUSCULAR; INTRAVENOUS at 21:46

## 2023-05-20 RX ADMIN — ALBUTEROL SULFATE 10 MG: 2.5 SOLUTION RESPIRATORY (INHALATION) at 13:18

## 2023-05-20 RX ADMIN — ISODIUM CHLORIDE 3 ML: 0.03 SOLUTION RESPIRATORY (INHALATION) at 13:18

## 2023-05-20 RX ADMIN — IPRATROPIUM BROMIDE 1 MG: 0.5 SOLUTION RESPIRATORY (INHALATION) at 13:18

## 2023-05-20 RX ADMIN — IPRATROPIUM BROMIDE AND ALBUTEROL SULFATE 3 ML: 2.5; .5 SOLUTION RESPIRATORY (INHALATION) at 19:32

## 2023-05-20 RX ADMIN — MAGNESIUM SULFATE HEPTAHYDRATE 2 G: 40 INJECTION, SOLUTION INTRAVENOUS at 13:37

## 2023-05-20 RX ADMIN — IOHEXOL 85 ML: 350 INJECTION, SOLUTION INTRAVENOUS at 14:33

## 2023-05-20 RX ADMIN — DULOXETINE HYDROCHLORIDE 60 MG: 60 CAPSULE, DELAYED RELEASE ORAL at 21:44

## 2023-05-20 RX ADMIN — DOXYCYCLINE 100 MG: 100 INJECTION, POWDER, LYOPHILIZED, FOR SOLUTION INTRAVENOUS at 18:15

## 2023-05-20 NOTE — ED NOTES
[facility]       Anjel Swanson RN  05/20/23 8386

## 2023-05-20 NOTE — ASSESSMENT & PLAN NOTE
Will place on solumedrol, nebtx and doxy    She is requiring 4 liters oxygen   (does not wear any at home)    Check ABG

## 2023-05-20 NOTE — NURSING NOTE
Patient presented with lethargy upon coming up from ED  Awakens to voice but quickly falls back asleep  Dr Magdalena Mack made aware  ABG drawn and RT came to place patient on BIPAP  Patient tolerating BIPAP well and in no visible distress      Reba Crandall Backer 5/20/2023 6:39 PM

## 2023-05-20 NOTE — PLAN OF CARE
Problem: RESPIRATORY - ADULT  Goal: Achieves optimal ventilation and oxygenation  Description: INTERVENTIONS:  - Assess for changes in respiratory status  - Assess for changes in mentation and behavior  - Position to facilitate oxygenation and minimize respiratory effort  - Oxygen administered by appropriate delivery if ordered  - Initiate smoking cessation education as indicated  - Encourage broncho-pulmonary hygiene including cough, deep breathe, Incentive Spirometry  - Assess the need for suctioning and aspirate as needed  - Assess and instruct to report SOB or any respiratory difficulty  - Respiratory Therapy support as indicated  Outcome: Progressing     Problem: PAIN - ADULT  Goal: Verbalizes/displays adequate comfort level or baseline comfort level  Description: Interventions:  - Encourage patient to monitor pain and request assistance  - Assess pain using appropriate pain scale  - Administer analgesics based on type and severity of pain and evaluate response  - Implement non-pharmacological measures as appropriate and evaluate response  - Consider cultural and social influences on pain and pain management  - Notify physician/advanced practitioner if interventions unsuccessful or patient reports new pain  Outcome: Progressing     Problem: INFECTION - ADULT  Goal: Absence or prevention of progression during hospitalization  Description: INTERVENTIONS:  - Assess and monitor for signs and symptoms of infection  - Monitor lab/diagnostic results  - Monitor all insertion sites, i e  indwelling lines, tubes, and drains  - Monitor endotracheal if appropriate and nasal secretions for changes in amount and color  - [place] appropriate cooling/warming therapies per order  - Administer medications as ordered  - Instruct and encourage patient and family to use good hand hygiene technique  - Identify and instruct in appropriate isolation precautions for identified infection/condition  Outcome: Progressing     Problem: SAFETY ADULT  Goal: Patient will remain free of falls  Description: INTERVENTIONS:  - Educate patient/family on patient safety including physical limitations  - Instruct patient to call for assistance with activity   - Consult OT/PT to assist with strengthening/mobility   - Keep Call bell within reach  - Keep bed low and locked with side rails adjusted as appropriate  - Keep care items and personal belongings within reach  - Initiate and maintain comfort rounds  - Make Fall Risk Sign visible to staff  - Apply yellow socks and bracelet for high fall risk patients  - Consider moving patient to room near nurses station  Outcome: Progressing  Goal: Maintain or return to baseline ADL function  Description: INTERVENTIONS:  -  Assess patient's ability to carry out ADLs; assess patient's baseline for ADL function and identify physical deficits which impact ability to perform ADLs (bathing, care of mouth/teeth, toileting, grooming, dressing, etc )  - Assess/evaluate cause of self-care deficits   - Assess range of motion  - Assess patient's mobility; develop plan if impaired  - Assess patient's need for assistive devices and provide as appropriate  - Encourage maximum independence but intervene and supervise when necessary  - Involve family in performance of ADLs  - Assess for home care needs following discharge   - Consider OT consult to assist with ADL evaluation and planning for discharge  - Provide patient education as appropriate  Outcome: Progressing  Goal: Maintains/Returns to pre admission functional level  Description: INTERVENTIONS:  - Perform BMAT or MOVE assessment daily    - Set and communicate daily mobility goal to care team and patient/family/caregiver     - Collaborate with rehabilitation services on mobility goals if consulted  - Out of bed for toileting  - Record patient progress and toleration of activity level   Outcome: Progressing     Problem: DISCHARGE PLANNING  Goal: Discharge to home or other facility with appropriate resources  Description: INTERVENTIONS:  - Identify barriers to discharge w/patient and caregiver  - Arrange for needed discharge resources and transportation as appropriate  - Identify discharge learning needs (meds, wound care, etc )  - Arrange for interpretive services to assist at discharge as needed  - Refer to Case Management Department for coordinating discharge planning if the patient needs post-hospital services based on physician/advanced practitioner order or complex needs related to functional status, cognitive ability, or social support system  Outcome: Progressing     Problem: Knowledge Deficit  Goal: Patient/family/caregiver demonstrates understanding of disease process, treatment plan, medications, and discharge instructions  Description: Complete learning assessment and assess knowledge base    Interventions:  - Provide teaching at level of understanding  - Provide teaching via preferred learning methods  Outcome: Progressing

## 2023-05-20 NOTE — H&P
[facility]  H&P  Name: Sherwin Maria 62 y o  female I MRN: [id number]  Unit/Bed#: [facility] 2 -01 I Date of Admission: 5/20/2023   Date of Service: 5/20/2023 I Hospital Day: 0      Assessment/Plan   * COPD exacerbation ([address] )  Assessment & Plan  Will place on solumedrol, nebtx and doxy    She is requiring 4 liters oxygen   (does not wear any at home)    Check ABG    Acute respiratory failure (HCC)  Assessment & Plan  Due to COPD exacerbation  Currently requiring 4 liters oxygen  She is a little somnolent, so we will check ABG to see if she would benefit from BIPAP  Does not wear oxygen at home  Tobacco abuse  Assessment & Plan  Nicotine patch    Alcohol abuse  Assessment & Plan  Place on CIWA               Chief Complaint:   Shortness of breath and cough        History of Present Illness:    Sherwin Maria is a 62 y o  female who presents with shortness of breath and cough  Its been getting worse for 3 days  Having significant wheezing  No recent steroids  No recent antibiotics  No sick contacts  Does feel better with some oxygen which was started in the emergency room  She is currently requiring 4 L oxygen  She does not usually use oxygen  No chest pain  No recent changes to her medications  She states she is compliant with her meds including her inhalers         Review of Systems:    Review of Systems   Constitutional: Negative for chills and fever  HENT: Negative for ear pain and sore throat  Eyes: Negative for pain and visual disturbance  Respiratory: Positive for cough, shortness of breath and wheezing  Cardiovascular: Negative for chest pain and palpitations  Gastrointestinal: Negative for abdominal pain and vomiting  Genitourinary: Negative for dysuria and hematuria  Musculoskeletal: Negative for arthralgias and back pain  Skin: Negative for color change and rash  Neurological: Negative for seizures and syncope     All other systems reviewed and are negative  Past Medical and Surgical History:     Past Medical History:   Diagnosis Date   • Anxiety    • Chronic pain     Back Pain   • Depression    • Hyperlipidemia    • Migraine    • Psychiatric disorder        Past Surgical History:   Procedure Laterality Date   • OTHER SURGICAL HISTORY      cyst removed from left axila         Home Medications:    Prior to Admission medications    Medication Sig Start Date End Date Taking? Authorizing Provider   albuterol (PROVENTIL HFA,VENTOLIN HFA) 90 mcg/act inhaler Inhale 2 puffs every 6 (six) hours as needed for wheezing or shortness of breath 11/17/21   Elisabeth Cerda MD   ARIPiprazole (ABILIFY) 10 mg tablet Take 1 Po Q HS 4/26/23   FRANKLIN Rai   atorvastatin (LIPITOR) 20 mg tablet Take 20 mg by mouth daily  Patient not taking: Reported on 12/28/2022    Historical Provider, MD   clonazePAM (KlonoPIN) 1 mg tablet Take 1/2 PO Q AM and 1 HS 4/26/23   FRANKLIN Rai   cyclobenzaprine (FLEXERIL) 5 mg tablet Take 5 mg by mouth daily as needed 11/3/22   Historical Provider, MD   DULoxetine (CYMBALTA) 60 mg delayed release capsule Take 1 Po BID 4/26/23   FRANKLIN Rai   fluticasone [facility]) 50 mcg/act nasal spray  10/25/21   Historical Provider, MD   Fluticasone-Salmeterol (Advair) 100-50 mcg/dose inhaler Inhale 1 puff 2 (two) times a day Rinse mouth after use      Historical Provider, MD   guaiFENesin (ROBITUSSIN) 100 MG/5ML oral liquid Take 5-10 mL (100-200 mg total) by mouth every 4 (four) hours as needed for cough 4/6/23   Brenda Del Valle PA-C   hydrOXYzine HCL (ATARAX) 50 mg tablet Take 1 PO TID PRN 4/26/23   FRANKLIN Rai   meloxicam (MOBIC) 7 5 mg tablet Take 7 5 mg by mouth daily 9/23/22 9/23/23  Historical Provider, MD   naproxen (Naprosyn) 500 mg tablet Take 1 tablet (500 mg total) by mouth 2 (two) times a day with meals 4/6/23   Brenda Del Valle PA-C   ondansetron (Zofran ODT) 4 mg disintegrating tablet Take 1 tablet (4 mg total) by mouth every 6 (six) hours as needed for nausea or vomiting 4/6/23   Jhon Silveira PA-C   traZODone (DESYREL) 100 mg tablet Take 2 1/2 P HS PRN 4/26/23   FRANKLIN Hawley     I have reviewed home medications with patient personally  Allergies: Allergies   Allergen Reactions   • Penicillins Anaphylaxis, Rash and Edema     Anaphylaxis (Tolerates IV ceftriaxone 9/7/22)         Social History:    Substance Use History:   Social History     Substance and Sexual Activity   Alcohol Use Not Currently     Social History     Tobacco Use   Smoking Status Every Day   • Packs/day: 0 50   • Years: 45 00   • Pack years: 22 50   • Types: Cigarettes   Smokeless Tobacco Former   Tobacco Comments    Encouraged to stop smoking  Social History     Substance and Sexual Activity   Drug Use Not Currently   • Types: Methamphetamines, Marijuana    Comment: reports last use years ago         Family History:    non-contributory      Physical Exam:     Vitals:   Blood Pressure: 105/70 (05/20/23 1638)  Pulse: 89 (05/20/23 1638)  Temperature: 98 1 °F (36 7 °C) (05/20/23 1638)  Temp Source: Oral (05/20/23 1302)  Respirations: 22 (05/20/23 1638)  Weight - Scale: 78 5 kg (173 lb 1 oz) (05/20/23 1302)  SpO2: 96 % (05/20/23 1638)    Physical Exam  Vitals and nursing note reviewed  HENT:      Head: Normocephalic and atraumatic  Eyes:      Pupils: Pupils are equal, round, and reactive to light  Cardiovascular:      Rate and Rhythm: Normal rate and regular rhythm  Heart sounds: No murmur heard  No friction rub  No gallop  Pulmonary:      Effort: Respiratory distress present  Breath sounds: Wheezing (bilateral exp wheeze) present  No rales  Abdominal:      General: Bowel sounds are normal       Palpations: Abdomen is soft  Tenderness: There is no abdominal tenderness  Musculoskeletal:      Right lower leg: No edema  Left lower leg: No edema                Additional Data:     Lab Results: I have personally reviewed pertinent reports  Results from last 7 days   Lab Units 05/20/23  1326   WBC Thousand/uL 15 95*   HEMOGLOBIN g/dL 11 8   HEMATOCRIT % 37 4   PLATELETS Thousands/uL 244   NEUTROS PCT % 82*   LYMPHS PCT % 10*   MONOS PCT % 5   EOS PCT % 0     Results from last 7 days   Lab Units 05/20/23  1326   POTASSIUM mmol/L 4 5   CHLORIDE mmol/L 100   CO2 mmol/L 33*   BUN mg/dL 4*   CREATININE mg/dL 1 04   CALCIUM mg/dL 8 9                     Imaging: I have personally reviewed pertinent reports  CTA ED chest PE study   Final Result by Donna Albrecht MD (05/20 6680)      No pulmonary embolism   No acute consolidation   3 mm left lower lobe lung nodule  Based on current Fleischner Society 2017 Guidelines on incidental pulmonary nodule, no routine follow-up is needed if the patient is low risk  If the patient is high risk, optional follow-up chest CT at 12 months can be    considered  High attenuation seen within the gallbladder, may be due to gallbladder sludge  Consider nonemergent ultrasound can be considered to evaluate for stones      No CT findings of acute cholecystitis      The study was marked in EPIC for significant notification  Workstation performed: [id number]         XR chest 1 view portable   ED Interpretation by Collin Umaña MD (05/20 4037)   No obvious consolidation, pneumothorax or effusion                VTE Prophylaxis: Enoxaparin (Lovenox)        Anticipated Length of Stay:  Patient will be admitted on an Inpatient basis with an anticipated length of stay of  Greater than 2 midnights  Justification for Hospital Stay: patient needs iv steroids and oxygen  Length of stay will be greater than 2 midnights  ** Please Note: This note has been constructed using a voice recognition system   **

## 2023-05-20 NOTE — ASSESSMENT & PLAN NOTE
Due to COPD exacerbation  Currently requiring 4 liters oxygen  She is a little somnolent, so we will check ABG to see if she would benefit from BIPAP  Does not wear oxygen at home

## 2023-05-20 NOTE — RESPIRATORY THERAPY NOTE
Pt placed on bipap 14/6 RR 16 30% and appears to be tolerating well  Will continue to monitor pt's resp status  Settings and actuals below

## 2023-05-20 NOTE — ED NOTES
Applied O2 via NC at 5 lpm to maintain spO2 at 95%  Without O2 intervention, pt spO2 at 88%  Pt is sleeping and snoring, breathing through mouth       Clyde Vidales RN  05/20/23 7911

## 2023-05-20 NOTE — RESPIRATORY THERAPY NOTE
05/20/23 1816   Non-Invasive Information   O2 Interface Device Full face mask   Non-Invasive Ventilation Mode BiPAP   $ Continous NIV Initial   SpO2 97 %   Non-Invasive Settings   IPAP (cm) 14 cm   EPAP (cm) 6 cm   Rate (Set) 16   FiO2 (%) 30   Pressure Support (cm H2O) 8   Rise Time 2   Inspiratory Time (Set) 0 9   Non-Invasive Readings   Total Rate 27   MV (Mech) 11 2   Peak Pressure (Obs) 14   Spontaneous Vt (mL) 492   Leak (lpm) 30

## 2023-05-20 NOTE — ED NOTES
Pt's emergency contact, Jessica Flynn, would like to be called with updates about the pt's status once updates are available        Sherry Zhang  05/20/23 0175

## 2023-05-20 NOTE — ED PROVIDER NOTES
Final Diagnosis:  1  COPD exacerbation [facility])        Chief Complaint   Patient presents with   • Shortness of Breath     Pt c/o SOB and cough since early April  Pt reports having used nebulizer at home with little relief  Has appt with PCP coming up, but daughter felt pt unable to wait any longer at this time  HPI  Patient presents for evaluation of shortness of breath  Patient does have a history of COPD  She tells me that she only takes medications as needed though  Over the past couple days she has been feeling like she was having increased shortness of breath as well as a loose cough  Occasional chills without any fevers  No known sick contacts  No nausea vomiting  No chest pain  Denies abdominal pain  Patient did get the COVID-vaccine  Per EMS upon their arrival the patient was in the low 90s on room air  They then put her on 4 L nasal cannula with improvement into the mid 90s  Unless otherwise specified:  - No language barrier    - History obtained from patient  - There are no limitations to the history obtained  - Previous charting was reviewed    PMH:   has a past medical history of Anxiety, Chronic pain, Depression, Hyperlipidemia, Migraine, and Psychiatric disorder  PSH:   has a past surgical history that includes Other surgical history  ROS:  Review of Systems   - 13 point ROS was performed and all are normal unless stated in the history above  - Nursing note reviewed  Vitals reviewed  - Orders placed by myself and/or advanced practitioner / resident  PE:   Vitals:    05/20/23 1302 05/20/23 1303 05/20/23 1504   BP:  130/61 118/58   BP Location:  Right arm Right arm   Pulse:  105 99   Resp:  (!) 30 22   Temp: 99 °F (37 2 °C)     TempSrc: Oral     SpO2:  93% 93%   Weight: 78 5 kg (173 lb 1 oz)       Vitals reviewed by me  Patient is laying on her side with intermittent coughing  She is saturating at 90 to 91% on room air  Mildly tachypneic    No conversational dyspnea  She does have diffuse wheezing in all lung quadrants with some reduced air movement  No abdominal pain  No significant peripheral edema  Patient denies cardiac history  Unless otherwise specified above:    General: VS reviewed  Appears in NAD    Head: Normocephalic, atraumatic  Eyes: EOM-I      ENT: Atraumatic external nose and ears  No drooling  Neck: No JVD  CV: No pallor noted  Lungs:   No tachypnea  No respiratory distress    Abdomen:  Soft, non-tender, non-distended    MSK:   No obvious deformity    Skin: Dry, intact  No obvious rash  Psychiatric/Behavioral: Appropriate mood and affect   Exam: deferred    Physical Exam     CriticalCare Time    Date/Time: 5/20/2023 4:06 PM  Performed by: Collin Umaña MD  Authorized by: Collin Umaña MD     Critical care provider statement:     Critical care time (minutes):  37    Critical care was necessary to treat or prevent imminent or life-threatening deterioration of the following conditions:  Respiratory failure    Critical care was time spent personally by me on the following activities:  Obtaining history from patient or surrogate, development of treatment plan with patient or surrogate, evaluation of patient's response to treatment, examination of patient, review of old charts and re-evaluation of patient's condition       A:  - Nursing note reviewed                     ED Course as of 05/20/23 1606   Sat May 20, 2023   1350 pCO2, Avinash(!): 52 0  Mild elevation, does not appear to be experience significant retention   1401 Procalcitonin: 0 09  Not consistent with infection   1401 hs TnI 0hr: 9  Not consistent with ACS   1429 Procedure Note: EKG  Date/Time: 05/20/23 2:29 PM   Interpreted by: Lazarus Nam  Indications / Diagnosis: Shortness of breath  ECG reviewed by me, the ED Provider: yes   The EKG demonstrates:  Rhythm: normal sinus  Intervals: normal intervals  Axis: normal axis  QRS/Blocks: normal QRS  ST Changes: No acute ST Changes, no STD/LEAH  No diffuse elevations to indicate pericarditis  No coved ST elevations greater than 2mm with negative T waves in V1-3 to indicate concern for brugada  No biphasic T waves in V2, V3 to indicate Wellens (critical stenosis of LAD)  No elevation in aVR or deviation when compared to V1 (can be associated with ST depression in I,II, V4-6 when left main occlusion is present)  1506 On reassessment patient is sleeping soundly  Nonlabored breathing    1601 Patient was reassessed  She continues to fall asleep intermittently during conversation  I discussed with her discharge versus admission to hospital   Patient states that she does not feel comfortable with her increased work of breathing  Overall I believe this is reasonable  We will reach out to medical team      CTA ED chest PE study   Final Result      No pulmonary embolism   No acute consolidation   3 mm left lower lobe lung nodule  Based on current Fleischner Society 2017 Guidelines on incidental pulmonary nodule, no routine follow-up is needed if the patient is low risk  If the patient is high risk, optional follow-up chest CT at 12 months can be    considered  High attenuation seen within the gallbladder, may be due to gallbladder sludge  Consider nonemergent ultrasound can be considered to evaluate for stones      No CT findings of acute cholecystitis      The study was marked in EPIC for significant notification        Workstation performed: [id number]         XR chest 1 view portable   ED Interpretation   No obvious consolidation, pneumothorax or effusion        Orders Placed This Encounter   Procedures   • Critical Care   • XR chest 1 view portable   • CTA ED chest PE study   • Procalcitonin   • CBC and differential   • Basic metabolic panel   • Blood gas, venous   • HS Troponin 0hr (reflex protocol)   • D-dimer, quantitative   • Continuous cardiac monitoring   • Continuous pulse oximetry   • Insert peripheral IV   • ECG 12 lead   • ECG 12 lead   • INPATIENT ADMISSION     Labs Reviewed   CBC AND DIFFERENTIAL - Abnormal       Result Value Ref Range Status    WBC 15 95 (*) 4 31 - 10 16 Thousand/uL Final    RBC 3 82  3 81 - 5 12 Million/uL Final    Hemoglobin 11 8  11 5 - 15 4 g/dL Final    Hematocrit 37 4  34 8 - 46 1 % Final    MCV 98  82 - 98 fL Final    MCH 30 9  26 8 - 34 3 pg Final    MCHC 31 6  31 4 - 37 4 g/dL Final    RDW 14 9  11 6 - 15 1 % Final    MPV 9 5  8 9 - 12 7 fL Final    Platelets 240  130 - 390 Thousands/uL Final    nRBC 0  /100 WBCs Final    Neutrophils Relative 82 (*) 43 - 75 % Final    Immat GRANS % 2  0 - 2 % Final    Lymphocytes Relative 10 (*) 14 - 44 % Final    Monocytes Relative 5  4 - 12 % Final    Eosinophils Relative 0  0 - 6 % Final    Basophils Relative 1  0 - 1 % Final    Neutrophils Absolute 13 16 (*) 1 85 - 7 62 Thousands/µL Final    Immature Grans Absolute 0 28 (*) 0 00 - 0 20 Thousand/uL Final    Lymphocytes Absolute 1 53  0 60 - 4 47 Thousands/µL Final    Monocytes Absolute 0 84  0 17 - 1 22 Thousand/µL Final    Eosinophils Absolute 0 05  0 00 - 0 61 Thousand/µL Final    Basophils Absolute 0 09  0 00 - 0 10 Thousands/µL Final   BASIC METABOLIC PANEL - Abnormal    Sodium 136  135 - 147 mmol/L Final    Potassium 4 5  3 5 - 5 3 mmol/L Final    Comment: Slightly Hemolyzed:Results may be affected  Chloride 100  96 - 108 mmol/L Final    CO2 33 (*) 21 - 32 mmol/L Final    ANION GAP 3 (*) 4 - 13 mmol/L Final    BUN 4 (*) 5 - 25 mg/dL Final    Creatinine 1 04  0 60 - 1 30 mg/dL Final    Comment: Standardized to IDMS reference method    Glucose 115  65 - 140 mg/dL Final    Comment: If the patient is fasting, the ADA then defines impaired fasting glucose as > 100 mg/dL and diabetes as > or equal to 123 mg/dL      Calcium 8 9  8 4 - 10 2 mg/dL Final    eGFR 59  ml/min/1 73sq m Final    Narrative:     [place] guidelines for Chronic Kidney Disease (CKD):   • Stage 1 with normal or high GFR (GFR > 90 mL/min/1 73 square meters)  •  Stage 2 Mild CKD (GFR = 60-89 mL/min/1 73 square meters)  •  Stage 3A Moderate CKD (GFR = 45-59 mL/min/1 73 square meters)  •  Stage 3B Moderate CKD (GFR = 30-44 mL/min/1 73 square meters)  •  Stage 4 Severe CKD (GFR = 15-29 mL/min/1 73 square meters)  •  Stage 5 End Stage CKD (GFR <15 mL/min/1 73 square meters)  Note: GFR calculation is accurate only with a steady state creatinine   BLOOD GAS, VENOUS - Abnormal    pH, Avinash 7 397  7 300 - 7 400 Final    pCO2, Avinash 52 0 (*) 42 0 - 50 0 mm Hg Final    pO2, Avinash 80 8 (*) 35 0 - 45 0 mm Hg Final    HCO3, Avinash 31 3 (*) 24 - 30 mmol/L Final    Base Excess, Avinash 5 2  mmol/L Final    O2 Content, Avinash 17 3  ml/dL Final    O2 HGB, VENOUS 93 5 (*) 60 0 - 80 0 % Final   D-DIMER, QUANTITATIVE - Abnormal    D-Dimer, Quant 1 40 (*) <0 50 ug/ml FEU Final    Comment: Reference and upper limits to exclude DVT and PE are the same  Do not use to exclude if clinical symptoms are present  Pregnant women:  1st trimester:  <0 22 - 1 06 ug/ml FEU  2nd trimester:  <0 22 - 1 88 ug/ml FEU  3rd trimester:   0 24 - 3 28 ug/ml FEU    Note: Normal ranges may not apply to patients who are transgender, non-binary, or whose legal sex, sex at birth, and gender identity differ  Narrative: In the evaluation for possible pulmonary embolism, in the appropriate (Well's Score of 4 or less) patient, the age adjusted d-dimer cutoff for this patient can be calculated as:    Age x 0 01 (in ug/mL) for Age-adjusted D-dimer exclusion threshold for a patient over 50 years  PROCALCITONIN TEST - Normal    Procalcitonin 0 09  <=0 25 ng/ml Final    Comment: Suspected Lower Respiratory Tract Infection (LRTI):  - LESS than or EQUAL to 0 25 ng/mL:   low likelihood for bacterial LRTI; antibiotics DISCOURAGED   - GREATER than 0 25 ng/mL:   increased likelihood for bacterial LRTI; antibiotics ENCOURAGED      Suspected Sepsis:  - Strongly consider initiating antibiotics in ALL UNSTABLE patients  - LESS than or EQUAL to 0 5 ng/mL:   low likelihood for bacterial sepsis; antibiotics DISCOURAGED   - GREATER than 0 5 ng/mL:   increased likelihood for bacterial sepsis; antibiotics ENCOURAGED   - GREATER than 2 ng/mL:   high risk for severe sepsis / septic shock; antibiotics strongly ENCOURAGED  Decisions on antibiotic use should not be based solely on Procalcitonin (PCT) levels  If PCT is low but uncertainty exists with stopping antibiotics, repeat PCT in 6-24 hours to confirm the low level  If antibiotics are administered (regardless if initial PCT was high or low), repeat PCT every 1-2 days to consider early antibiotic cessation (when GREATER than 80% decrease from the peak OR when PCT drops below designated cutoffs, whichever comes first), so long as the infection is NOT one that typically requires prolonged treatment durations (e g , bone/joint infections, endocarditis, Staph  aureus bacteremia)      Situations of FALSE-POSITIVE Procalcitonin values:  1) Newborns < 67 hours old  2) Massive stress from severe trauma / burns, major surgery, acute pancreatitis, cardiogenic / hemorrhagic shock, sickle cell crisis, or other organ perfusion abnormalities  3) Malaria and some Candidal infections  4) Treatment with agents that stimulate cytokines (e g , OKT3, anti-lymphocyte globulins, alemtuzumab, IL-2, granulocyte transfusion [NOT GCSFs])  5) Chronic renal disease causes elevated baseline levels (consider GREATER than 0 75 ng/mL as an abnormal cut-off); initiating HD/CRRT may cause transient decreases  6) Paraneoplastic syndromes from medullary thyroid or SCLC, some forms of vasculitis, and acute [id number] disease    Situations of FALSE-NEGATIVE Procalcitonin values:  1) Too early in clinical course for PCT to have reached its peak (may repeat in 6-24 hours to confirm low level)  2) Localized infection WITHOUT systemic (SIRS / sepsis) response (e g , an "abscess, osteomyelitis, cystitis)  3) Mycobacteria (e g , Tuberculosis, MAC)  4) Cystic fibrosis exacerbations     HS TROPONIN I 0HR - Normal    hs TnI 0hr 9  \"Refer to ACS Flowchart\"- see link ng/L Final    Comment:                                              Initial (time 0) result  If >=50 ng/L, Myocardial injury suggested ;  Type of myocardial injury and treatment strategy  to be determined  If 5-49 ng/L, a delta result at 2 hours and or 4 hours will be needed to further evaluate  If <4 ng/L, and chest pain has been >3 hours since onset, patient may qualify for discharge based on the HEART score in the ED  If <5 ng/L and <3hours since onset of chest pain, a delta result at 2 hours will be needed to further evaluate  HS Troponin 99th Percentile URL of a Health Population=12 ng/L with a 95% Confidence Interval of 8-18 ng/L  Second Troponin (time 2 hours)  If calculated delta >= 20 ng/L,  Myocardial injury suggested ; Type of myocardial injury and treatment strategy to be determined  If 5-49 ng/L and the calculated delta is 5-19 ng/L, consult medical service for evaluation  Continue evaluation for ischemia on ecg and other possible etiology and repeat hs troponin at 4 hours  If delta is <5 ng/L at 2 hours, consider discharge based on risk stratification via the HEART score (if in ED), or JEREMIAS risk score in IP/Observation  HS Troponin 99th Percentile URL of a Health Population=12 ng/L with a 95% Confidence Interval of 8-18 ng/L  Final Diagnosis:  1  COPD exacerbation ([facility] [address] )        P:  -Patient presents for evaluation of shortness of breath  Concern for COPD flare  We will also evaluate for ACS  Provide neb  Patient did receive steroids while with EMS  Fluids  Reassess  -D-dimer was elevated  CT PE study failed to show any pulmonary emboli  Patient was able to ambulate somewhat with pulse ox at 92  Patient does tend to desaturate when she falls asleep    I anticipate that the patient " has a significant aspect of sleep apnea leading to her current presentation  I reviewed the results with the patient  She does not feel comfortable going home at this time  Patient will be admitted for further respiratory monitoring and treatment of  Unless otherwise noted the patient's medications were reviewed and they should continue as directed  Medications   albuterol (FOR EMS ONLY) (2 5 mg/3 mL) 0 083 % inhalation solution 5 mg (0 mg Does not apply Given to EMS 5/20/23 1323)   methylPREDNISolone sodium succinate (FOR EMS ONLY) (Solu-MEDROL) 125 MG injection 125 mg (0 mg Does not apply Given to EMS 5/20/23 1323)   albuterol inhalation solution 10 mg (10 mg Nebulization Given 5/20/23 1318)     And   ipratropium (ATROVENT) 0 02 % inhalation solution 1 mg (1 mg Nebulization Given 5/20/23 1318)     And   sodium chloride 0 9 % inhalation solution 3 mL (3 mL Nebulization Given 5/20/23 1318)   magnesium sulfate 2 g/50 mL IVPB (premix) 2 g (0 g Intravenous Stopped 5/20/23 1357)   sodium chloride 0 9 % bolus 1,000 mL (1,000 mL Intravenous New Bag 5/20/23 1425)   iohexol (OMNIPAQUE) 350 MG/ML injection (SINGLE-DOSE) 85 mL (85 mL Intravenous Given 5/20/23 1433)     Time reflects when diagnosis was documented in both MDM as applicable and the Disposition within this note     Time User Action Codes Description Comment    5/20/2023  4:05 PM Gloria Salazar Add [J44 1] COPD exacerbation [facility])       ED Disposition     ED Disposition   Admit    Condition   Stable    Date/Time   Sat May 20, 2023  4:05 PM    Comment   Case was discussed with MESSI and the patient's admission status was agreed to be Admission Status: inpatient status to the service of Dr Jony Gant   Follow-up Information    None       Patient's Medications   Discharge Prescriptions    No medications on file     No discharge procedures on file  Prior to Admission Medications   Prescriptions Last Dose Informant Patient Reported? Taking? "  ARIPiprazole (ABILIFY) 10 mg tablet   No No   Sig: Take 1 Po Q HS   DULoxetine (CYMBALTA) 60 mg delayed release capsule   No No   Sig: Take 1 Po BID   Fluticasone-Salmeterol (Advair) 100-50 mcg/dose inhaler   Yes No   Sig: Inhale 1 puff 2 (two) times a day Rinse mouth after use  albuterol (PROVENTIL HFA,VENTOLIN HFA) 90 mcg/act inhaler   No No   Sig: Inhale 2 puffs every 6 (six) hours as needed for wheezing or shortness of breath   atorvastatin (LIPITOR) 20 mg tablet   Yes No   Sig: Take 20 mg by mouth daily   Patient not taking: Reported on 12/28/2022   clonazePAM (KlonoPIN) 1 mg tablet   No No   Sig: Take 1/2 PO Q AM and 1 HS   cyclobenzaprine (FLEXERIL) 5 mg tablet   Yes No   Sig: Take 5 mg by mouth daily as needed   fluticasone (FLONASE) 50 mcg/act nasal spray   Yes No   Patient not taking: Reported on 12/28/2022   guaiFENesin (ROBITUSSIN) 100 MG/5ML oral liquid   No No   Sig: Take 5-10 mL (100-200 mg total) by mouth every 4 (four) hours as needed for cough   hydrOXYzine HCL (ATARAX) 50 mg tablet   No No   Sig: Take 1 PO TID PRN   meloxicam (MOBIC) 7 5 mg tablet   Yes No   Sig: Take 7 5 mg by mouth daily   naproxen (Naprosyn) 500 mg tablet   No No   Sig: Take 1 tablet (500 mg total) by mouth 2 (two) times a day with meals   ondansetron (Zofran ODT) 4 mg disintegrating tablet   No No   Sig: Take 1 tablet (4 mg total) by mouth every 6 (six) hours as needed for nausea or vomiting   traZODone (DESYREL) 100 mg tablet   No No   Sig: Take 2 1/2 P HS PRN      Facility-Administered Medications: None       Portions of the record may have been created with voice recognition software  Occasional wrong word or \"sound a like\" substitutions may have occurred due to the inherent limitations of voice recognition software  Read the chart carefully and recognize, using context, where substitutions have occurred      Electronically signed by:  MD Vj Gardner MD  05/20/23 1597    "

## 2023-05-21 ENCOUNTER — APPOINTMENT (INPATIENT)
Dept: ULTRASOUND IMAGING | Facility: HOSPITAL | Age: 58
End: 2023-05-21

## 2023-05-21 PROBLEM — R91.1 LUNG NODULE: Status: ACTIVE | Noted: 2023-05-21

## 2023-05-21 LAB
BACTERIA UR QL AUTO: ABNORMAL /HPF
BILIRUB UR QL STRIP: NEGATIVE
CLARITY UR: CLEAR
COLOR UR: YELLOW
EST. AVERAGE GLUCOSE BLD GHB EST-MCNC: 111 MG/DL
GLUCOSE UR STRIP-MCNC: NEGATIVE MG/DL
HBA1C MFR BLD: 5.5 %
HGB UR QL STRIP.AUTO: ABNORMAL
KETONES UR STRIP-MCNC: ABNORMAL MG/DL
LEUKOCYTE ESTERASE UR QL STRIP: NEGATIVE
MUCOUS THREADS UR QL AUTO: ABNORMAL
NITRITE UR QL STRIP: NEGATIVE
NON-SQ EPI CELLS URNS QL MICRO: ABNORMAL /HPF
PH UR STRIP.AUTO: 6 [PH]
PROCALCITONIN SERPL-MCNC: 0.09 NG/ML
PROT UR STRIP-MCNC: ABNORMAL MG/DL
RBC #/AREA URNS AUTO: ABNORMAL /HPF
SP GR UR STRIP.AUTO: 1.02 (ref 1–1.03)
UROBILINOGEN UR STRIP-ACNC: <2 MG/DL
WBC #/AREA URNS AUTO: ABNORMAL /HPF

## 2023-05-21 RX ORDER — LANOLIN ALCOHOL/MO/W.PET/CERES
100 CREAM (GRAM) TOPICAL DAILY
Status: DISCONTINUED | OUTPATIENT
Start: 2023-05-21 | End: 2023-05-23 | Stop reason: HOSPADM

## 2023-05-21 RX ORDER — PREDNISONE 20 MG/1
40 TABLET ORAL DAILY
Status: DISCONTINUED | OUTPATIENT
Start: 2023-05-22 | End: 2023-05-23 | Stop reason: HOSPADM

## 2023-05-21 RX ORDER — GUAIFENESIN 100 MG/5ML
200 SOLUTION ORAL EVERY 4 HOURS PRN
Status: DISCONTINUED | OUTPATIENT
Start: 2023-05-21 | End: 2023-05-21

## 2023-05-21 RX ORDER — FOLIC ACID 1 MG/1
1 TABLET ORAL DAILY
Status: DISCONTINUED | OUTPATIENT
Start: 2023-05-21 | End: 2023-05-23 | Stop reason: HOSPADM

## 2023-05-21 RX ORDER — TRAZODONE HYDROCHLORIDE 100 MG/1
200 TABLET ORAL
Status: DISCONTINUED | OUTPATIENT
Start: 2023-05-21 | End: 2023-05-23 | Stop reason: HOSPADM

## 2023-05-21 RX ORDER — ACETAMINOPHEN 325 MG/1
650 TABLET ORAL EVERY 6 HOURS PRN
Status: DISCONTINUED | OUTPATIENT
Start: 2023-05-21 | End: 2023-05-23 | Stop reason: HOSPADM

## 2023-05-21 RX ORDER — GUAIFENESIN/DEXTROMETHORPHAN 100-10MG/5
10 SYRUP ORAL EVERY 4 HOURS PRN
Status: DISCONTINUED | OUTPATIENT
Start: 2023-05-21 | End: 2023-05-22

## 2023-05-21 RX ORDER — DOXYCYCLINE HYCLATE 100 MG/1
100 CAPSULE ORAL EVERY 12 HOURS SCHEDULED
Status: DISCONTINUED | OUTPATIENT
Start: 2023-05-22 | End: 2023-05-23 | Stop reason: HOSPADM

## 2023-05-21 RX ADMIN — GUAIFENESIN AND DEXTROMETHORPHAN 10 ML: 100; 10 SYRUP ORAL at 19:37

## 2023-05-21 RX ADMIN — IPRATROPIUM BROMIDE AND ALBUTEROL SULFATE 3 ML: 2.5; .5 SOLUTION RESPIRATORY (INHALATION) at 22:57

## 2023-05-21 RX ADMIN — ENOXAPARIN SODIUM 40 MG: 100 INJECTION SUBCUTANEOUS at 09:09

## 2023-05-21 RX ADMIN — FLUTICASONE FUROATE AND VILANTEROL TRIFENATATE 1 PUFF: 200; 25 POWDER RESPIRATORY (INHALATION) at 09:10

## 2023-05-21 RX ADMIN — DOXYCYCLINE 100 MG: 100 INJECTION, POWDER, LYOPHILIZED, FOR SOLUTION INTRAVENOUS at 06:15

## 2023-05-21 RX ADMIN — GUAIFENESIN 200 MG: 200 SOLUTION ORAL at 09:37

## 2023-05-21 RX ADMIN — FOLIC ACID 1 MG: 1 TABLET ORAL at 16:37

## 2023-05-21 RX ADMIN — METHYLPREDNISOLONE SODIUM SUCCINATE 60 MG: 40 INJECTION, POWDER, FOR SOLUTION INTRAMUSCULAR; INTRAVENOUS at 06:13

## 2023-05-21 RX ADMIN — DOXYCYCLINE 100 MG: 100 INJECTION, POWDER, LYOPHILIZED, FOR SOLUTION INTRAVENOUS at 16:55

## 2023-05-21 RX ADMIN — TRAZODONE HYDROCHLORIDE 200 MG: 100 TABLET ORAL at 21:52

## 2023-05-21 RX ADMIN — DULOXETINE HYDROCHLORIDE 60 MG: 60 CAPSULE, DELAYED RELEASE ORAL at 21:19

## 2023-05-21 RX ADMIN — ACETAMINOPHEN 650 MG: 325 TABLET ORAL at 16:37

## 2023-05-21 RX ADMIN — GUAIFENESIN AND DEXTROMETHORPHAN 10 ML: 100; 10 SYRUP ORAL at 15:01

## 2023-05-21 RX ADMIN — THIAMINE HCL TAB 100 MG 100 MG: 100 TAB at 16:37

## 2023-05-21 RX ADMIN — DULOXETINE HYDROCHLORIDE 60 MG: 60 CAPSULE, DELAYED RELEASE ORAL at 09:09

## 2023-05-21 RX ADMIN — IPRATROPIUM BROMIDE AND ALBUTEROL SULFATE 3 ML: 2.5; .5 SOLUTION RESPIRATORY (INHALATION) at 00:04

## 2023-05-21 RX ADMIN — IPRATROPIUM BROMIDE AND ALBUTEROL SULFATE 3 ML: 2.5; .5 SOLUTION RESPIRATORY (INHALATION) at 13:42

## 2023-05-21 RX ADMIN — IPRATROPIUM BROMIDE AND ALBUTEROL SULFATE 3 ML: 2.5; .5 SOLUTION RESPIRATORY (INHALATION) at 08:42

## 2023-05-21 RX ADMIN — ARIPIPRAZOLE 10 MG: 10 TABLET ORAL at 09:09

## 2023-05-21 RX ADMIN — IPRATROPIUM BROMIDE AND ALBUTEROL SULFATE 3 ML: 2.5; .5 SOLUTION RESPIRATORY (INHALATION) at 19:36

## 2023-05-21 NOTE — ASSESSMENT & PLAN NOTE
This is a 54-year-old female with history of tobacco abuse, alcohol abuse, COPD presented with epistaxis and face flushing  She also had dyspnea, cough productive of green phlegm    Smoked half pack per day for 45 years, currently trying to quit    · CTA revealed no pulmonary embolism, no acute consolidation  · Started on IV Solu-Medrol with improvement  · Rashaun input appreciated patient transition to prednisone 40 mg daily to treat for another 4 days  · Empiric antibiotics with doxycycline  · Continue home inhalers with Advair and albuterol as needed  · Counseled on smoking cessation  · Outpatient pulmonary follow-up at [facility], she has an appointment in July

## 2023-05-21 NOTE — PROGRESS NOTES
The doxycycline has / have been converted to Oral starting 5/22 am per [facility] IV-to-PO Auto-Conversion Protocol for Adults as approved by the Pharmacy and Therapeutics Committee  The patient met all eligible criteria:  3 Age = 25years old   2) Received at least one dose of the IV form   3) Receiving at least one other scheduled oral/enteral medication   4) Tolerating an oral/enteral diet   and did not have any exclusions:   1) Critical care patient   2) Active GI bleed (IF assessing H2RAs or PPIs)   3) Continuous tube feeding (IF assessing cipro, doxycycline, levofloxacin, minocycline, rifampin, or voriconazole)   4) Receiving PO vancomycin (IF assessing metronidazole)   5) Persistent nausea and/or vomiting   6) Ileus or gastrointestinal obstruction   7) Hernandez/nasogastric tube set for continuous suction   8) Specific order not to automatically convert to PO (in the order's comments or if discussed in the most recent Infectious Disease or primary team's progress notes)       Madhuri Bates, IsaakD

## 2023-05-21 NOTE — CONSULTS
Consultation - Pulmonary Medicine   Mariaelena Williamson 62 y o  female MRN: [id number]  Unit/Bed#: [place] 2 [address] 208-01 Encounter: [id number]      Physician Requesting Consult: Doreen Lr  Reason for Consult: COPD exacerbation      Assessment:  1  Improved acute hypoxic respiratory failure, patient may have exertional hypoxia chronically but not diagnosed  2  Presumed COPD with acute exacerbation, improved and currently not wheezing  3  Epistaxis as per patient, resolved, no clear etiology could be mucosal dryness  4  Nicotine dependence with current use  5  Alcohol abuse  6  Orbit obesity with possible underlying CINDY    Plan:   · Continue oxygen as intermittent pulse ox more than 88%, no need for BiPAP routinely  · Evaluate oxygen needs with ambulation before discharge  · Switch to prednisone 40 mg and treat for another 4 days  · Continue home inhalers with Advair and as needed albuterol  · Smoking cessation with nicotine patch  · Patient already has follow-up with pulmonology at [facility] in July, she will need future PFTs and possible sleep study  · From pulmonary standpoint no further intervention, will sign off, please call with questions  ______________________________________________________________________    HPI:    Mariaelena Williamson is a 62 y o  female who presents with epistaxis and face flushing  Patient was doing fine at home and then her daughter noted that her face is red and swollen and she had 3 episodes of epistaxis associated with slightly worsening her baseline cough so she came to the emergency room  At baseline she has chronic mild cough with clear sputum production but denies significant shortness of breath except with severe exertion  Denies chest pain or fever chills or night sweats  Denies sick contact  Her epistaxis stopped  She denies history of epistaxis or hemoptysis  She is not on any anticoagulation  Patient has no history of asthma    She is a smoker and currently trying to quit on her own, she tried nicotine patch and lozenges and gum in the past   She does not follow with pulmonology but she has an appointment on 7/24/2023 with Dr Leonid Smith at [facility] pulmonology  He is on Advair and as needed albuterol at home  Patient has snoring and sometimes excessive daytime sleepiness  She denies sleep choking  Patient lives at home with her daughter and Pratima Hometica and also her sister  She has cats and dogs without allergy  Denies exposure to birds  She worked in the past and [employer] and denies significant occupational exposure  She smoked about 0 5 pack/day for 45 years and currently trying to quit  In general she feels better and currently at baseline with minimal cough, no epistaxis, denies chest pain denies shortness of breath      Review of Systems:  12 points Full review of systems was performed  Aside from what was mentioned in the HPI, it is otherwise negative  Historical Information   Past Medical History:   Diagnosis Date   • Anxiety    • Chronic pain     Back Pain   • Depression    • Hyperlipidemia    • Migraine    • Psychiatric disorder      Past Surgical History:   Procedure Laterality Date   • OTHER SURGICAL HISTORY      cyst removed from left axila     Social History   Social History     Substance and Sexual Activity   Alcohol Use Not Currently     Social History     Tobacco Use   Smoking Status Every Day   • Packs/day: 0 50   • Years: 45 00   • Pack years: 22 50   • Types: Cigarettes   Smokeless Tobacco Former   Tobacco Comments    Encouraged to stop smoking  Occupational history:  No occupational exposure    Family History:   Family History   Problem Relation Age of Onset   • Heart failure Mother    • Heart disease Father        Medications: The patient's active and prehospital medications were reviewed    Current Facility-Administered Medications   Medication Dose Route Frequency Provider Last Rate   • albuterol  2 5 mg Nebulization Q4H PRN Joylene Bloch Prechtel, DO     • ARIPiprazole  10 mg Oral Daily Mina S Prechtel, DO     • dextromethorphan-guaiFENesin  10 mL Oral Q4H PRN Malgorzata Rushing MD     • doxycycline  100 mg Intravenous Q12H Joylene Bloch Prechtel,  mg (05/21/23 0615)   • DULoxetine  60 mg Oral Q12H [address] Mina S Prechtel, DO     • enoxaparin  40 mg Subcutaneous Daily Mina S Prechtel, DO     • fluticasone-vilanterol  1 puff Inhalation Daily Mina S Prechtel, DO     • ipratropium-albuterol  3 mL Nebulization Q6H Mina S Prechtel, DO     • methylPREDNISolone sodium succinate  60 mg Intravenous Q8H [address] Mina S Prechtel, DO     • nicotine  1 patch Transdermal Daily Mina S Prechtel, DO           PhysicalExamination:  Vitals:   Vitals:    05/21/23 0430 05/21/23 0733 05/21/23 0734 05/21/23 0842   BP: 104/68 128/81 128/81    BP Location:       Pulse: 86 79 89    Resp:  15     Temp:   (!) 97 2 °F (36 2 °C)    TempSrc:       SpO2:  96% 96% 90%   Weight:         Temp  Min: 97 °F (36 1 °C)  Max: 99 °F (37 2 °C)       SpO2: 90 %,   SpO2 Activity: At Rest,   O2 Device: None (Room air)    General: alert, not in acute distress  HEENT: PERRL, no icteric sclera or cyanosis, no thrush  Neck: Supple, no lymphadenopathy or thyromegaly, no JVD  Lungs: Equal mildly diminished breath sounds and clear auscultations bilaterally, no wheezing or crackles  Heart: S1S2 regular, no murmurs or gallops  Abdomen: soft, nontender, bowel sounds present  Extremities: no edema, no clubbing or cyanosis  Neuro: Alert and oriented x 3, no focal neurodeficits   Skin: intact, no rashes    Diagnostic Data:  CBC:   Results from last 7 days   Lab Units 05/20/23  1326   WBC Thousand/uL 15 95*   HEMOGLOBIN g/dL 11 8   HEMATOCRIT % 37 4   PLATELETS Thousands/uL 244       CMP:   Results from last 7 days   Lab Units 05/20/23  1326   POTASSIUM mmol/L 4 5   CHLORIDE mmol/L 100   CO2 mmol/L 33*   BUN mg/dL 4*   CREATININE mg/dL 1 04   CALCIUM mg/dL 8 9 "    PT/INR:   No results found for: PT, INR,     Microbiology:         ABG:   Lab Results   Component Value Date    PHART 7 282 (L) 05/20/2023    VTK4RAG 65 8 (HH) 05/20/2023    PO2ART 83 5 05/20/2023    FDL7GJU 30 4 (H) 05/20/2023    BEART 2 0 05/20/2023    SOURCE Radial, Left 05/20/2023       Imaging:  Chest x-ray reviewed on PACS: Clear lungs    Chest CT scan reviewed in PACS: No PE, clear lungs parenchyma in general with no consolidation or infiltrates, tiny 3 mm lung nodule in the left lower lobe    Cardiac lab/EKG/telemetry/ECHO:             Echocardiogram: LVEF 66%, normal RV     Code Status: Level 1 - Full Code    Leslie Hummel MD    Portions of the record may have been created with voice recognition software  Occasional wrong word or \"sound a like\" substitutions may have occurred due to the inherent limitations of voice recognition software  Read the chart carefully and recognize, using context, where substitutions have occurred        "

## 2023-05-21 NOTE — ASSESSMENT & PLAN NOTE
· Due to COPD exacerbation  · Movement, currently saturating well on room air  · Does not wear oxygen at home

## 2023-05-21 NOTE — INCIDENTAL FINDINGS
The following findings require follow up:  Radiographic finding   Finding: 3 mm left lower lobe lung nodule   Follow up required: repeat CT chest   Follow up should be done within 12 month(s)    Please notify the following clinician to assist with the follow up:   Dr Edwin Parsons

## 2023-05-21 NOTE — PROGRESS NOTES
[address] [place] [address]  Progress Note  Name: Aaron Lindsay I  MRN: [id number]  Unit/Bed#: [place] 2 -01 I Date of Admission: 5/20/2023   Date of Service: 5/21/2023 I Hospital Day: 1    Assessment/Plan   * COPD exacerbation [facility])  Assessment & Plan  This is a 66-year-old female with history of tobacco abuse, alcohol abuse, COPD presented with epistaxis and face flushing  She also had dyspnea, cough productive of green phlegm  Smoked half pack per day for 45 years, currently trying to quit    · CTA revealed no pulmonary embolism, no acute consolidation  · Started on IV Solu-Medrol with improvement  · [place] input appreciated patient transition to prednisone 40 mg daily to treat for another 4 days  · Empiric antibiotics with doxycycline  · Continue home inhalers with Advair and albuterol as needed  · Counseled on smoking cessation  · Outpatient pulmonary follow-up at [facility], she has an appointment in July    Acute respiratory failure [facility])  Assessment & Plan  · Due to COPD exacerbation  · Movement, currently saturating well on room air  · Does not wear oxygen at home  Lung nodule  Assessment & Plan  · 3 mm left lower lobe lung nodule, repeat CT chest at 12 months  · Incidental findings completed    Tobacco abuse  Assessment & Plan  · Nicotine patch    Alcohol abuse  Assessment & Plan  · History of alcohol use  · Continue thiamine, folic acid  · CIWA protocol           VTE Pharmacologic Prophylaxis:   Pharmacologic: Lovenox    Patient Centered Rounds: I have performed bedside rounds with nursing staff today  Education and Discussions with Family / Patient: Updated patient's sister    Time Spent for Care: More than 50% of total time spent on counseling and coordination of care as described above      Current Length of Stay: 1 day(s)    Current Patient Status: Inpatient   Certification Statement: The patient will continue to require additional inpatient hospital stay due to COPD exacerbation    Discharge Plan / Estimated Discharge Date: 24h    Code Status: Level 1 - Full Code      Subjective:   Patient seen and examined at bedside, sitting upright, states she feels much better, complains of cough productive of green phlegm    Objective:     Vitals:   Temp (24hrs), Av 4 °F (36 3 °C), Min:97 °F (36 1 °C), Max:98 1 °F (36 7 °C)    Temp:  [97 °F (36 1 °C)-98 1 °F (36 7 °C)] 97 2 °F (36 2 °C)  HR:  [79-92] 89  Resp:  [15-22] 15  BP: (102-128)/(68-81) 128/81  SpO2:  [89 %-97 %] 92 %  Body mass index is 30 66 kg/m²  Input and Output Summary (last 24 hours): Intake/Output Summary (Last 24 hours) at 2023 1513  Last data filed at 2023 1401  Gross per 24 hour   Intake 600 ml   Output --   Net 600 ml       Physical Exam:    Constitutional: Patient is oriented to person, place and time, no acute distress  HEENT:  Normocephalic, atraumatic  Cardiovascular: Normal S1S2, RRR, No murmurs/rubs/gallops appreciated  Pulmonary: Decreased breath sounds bilaterally, no audible wheezing or crackles  Abdominal: Soft, Bowel sounds present, Non-tender, Non-distended  Extremities:  No cyanosis, clubbing or edema  Neurological: Cranial nerves II-XII grossly intact, sensation intact, otherwise no focal neurological symptoms  Skin:  Warm, dry    Additional Data:     Labs:    Results from last 7 days   Lab Units 23  1326   WBC Thousand/uL 15 95*   HEMOGLOBIN g/dL 11 8   HEMATOCRIT % 37 4   PLATELETS Thousands/uL 244   NEUTROS PCT % 82*   LYMPHS PCT % 10*   MONOS PCT % 5   EOS PCT % 0     Results from last 7 days   Lab Units 23  1326   POTASSIUM mmol/L 4 5   CHLORIDE mmol/L 100   CO2 mmol/L 33*   BUN mg/dL 4*   CREATININE mg/dL 1 04   CALCIUM mg/dL 8 9            I Have Reviewed All Lab Data Listed Above      Invasive Devices     Peripheral Intravenous Line  Duration           Peripheral IV 23 Left Antecubital 1 day                   Recent Cultures (last 7 days): Last 24 Hours Medication List:   Current Facility-Administered Medications   Medication Dose Route Frequency Provider Last Rate   • albuterol  2 5 mg Nebulization Q4H PRN Jerome Delgado, DO     • ARIPiprazole  10 mg Oral Daily Mina S Prechtel, DO     • dextromethorphan-guaiFENesin  10 mL Oral Q4H PRN Judi Carlson MD     • doxycycline  100 mg Intravenous Q12H Judi Carlson  mg (05/21/23 0615)   • [START ON 5/22/2023] doxycycline hyclate  100 mg Oral Q12H Karen Echevarria MD     • DULoxetine  60 mg Oral Q12H [address] Mina S Prechtel, DO     • enoxaparin  40 mg Subcutaneous Daily Mina S Carrolhtel, DO     • fluticasone-vilanterol  1 puff Inhalation Daily Mina Jeterel, DO     • folic acid  1 mg Oral Daily Judi Carlson MD     • ipratropium-albuterol  3 mL Nebulization Q6H Mina S Carrolhtel, DO     • nicotine  1 patch Transdermal Daily Jerome Delgado, DO     • [START ON 5/22/2023] predniSONE  40 mg Oral Daily Ta Sanz MD     • thiamine  100 mg Oral Daily Judi Carlson MD          Today, Patient Was Seen By: Judi Carlson MD

## 2023-05-21 NOTE — PLAN OF CARE
Problem: RESPIRATORY - ADULT  Goal: Achieves optimal ventilation and oxygenation  Description: INTERVENTIONS:  - Assess for changes in respiratory status  - Assess for changes in mentation and behavior  - Position to facilitate oxygenation and minimize respiratory effort  - Oxygen administered by appropriate delivery if ordered  - Initiate smoking cessation education as indicated  - Encourage broncho-pulmonary hygiene including cough, deep breathe, Incentive Spirometry  - Assess the need for suctioning and aspirate as needed  - Assess and instruct to report SOB or any respiratory difficulty  - Respiratory Therapy support as indicated  Outcome: Progressing     Problem: PAIN - ADULT  Goal: Verbalizes/displays adequate comfort level or baseline comfort level  Description: Interventions:  - Encourage patient to monitor pain and request assistance  - Assess pain using appropriate pain scale  - Administer analgesics based on type and severity of pain and evaluate response  - Implement non-pharmacological measures as appropriate and evaluate response  - Consider cultural and social influences on pain and pain management  - Notify physician/advanced practitioner if interventions unsuccessful or patient reports new pain  Outcome: Progressing     Problem: INFECTION - ADULT  Goal: Absence or prevention of progression during hospitalization  Description: INTERVENTIONS:  - Assess and monitor for signs and symptoms of infection  - Monitor lab/diagnostic results  - Monitor all insertion sites, i e  indwelling lines, tubes, and drains  - Monitor endotracheal if appropriate and nasal secretions for changes in amount and color  - [place] appropriate cooling/warming therapies per order  - Administer medications as ordered  - Instruct and encourage patient and family to use good hand hygiene technique  - Identify and instruct in appropriate isolation precautions for identified infection/condition  Outcome: Progressing     Problem: SAFETY ADULT  Goal: Patient will remain free of falls  Description: INTERVENTIONS:  - Educate patient/family on patient safety including physical limitations  - Instruct patient to call for assistance with activity   - Consult OT/PT to assist with strengthening/mobility   - Keep Call bell within reach  - Keep bed low and locked with side rails adjusted as appropriate  - Keep care items and personal belongings within reach  - Initiate and maintain comfort rounds  - Make Fall Risk Sign visible to staff  - Apply yellow socks and bracelet for high fall risk patients  - Consider moving patient to room near nurses station  Outcome: Progressing  Goal: Maintain or return to baseline ADL function  Description: INTERVENTIONS:  -  Assess patient's ability to carry out ADLs; assess patient's baseline for ADL function and identify physical deficits which impact ability to perform ADLs (bathing, care of mouth/teeth, toileting, grooming, dressing, etc )  - Assess/evaluate cause of self-care deficits   - Assess range of motion  - Assess patient's mobility; develop plan if impaired  - Assess patient's need for assistive devices and provide as appropriate  - Encourage maximum independence but intervene and supervise when necessary  - Involve family in performance of ADLs  - Assess for home care needs following discharge   - Consider OT consult to assist with ADL evaluation and planning for discharge  - Provide patient education as appropriate  Outcome: Progressing  Goal: Maintains/Returns to pre admission functional level  Description: INTERVENTIONS:  - Perform BMAT or MOVE assessment daily    - Set and communicate daily mobility goal to care team and patient/family/caregiver     - Collaborate with rehabilitation services on mobility goals if consulted  - Out of bed for toileting  - Record patient progress and toleration of activity level   Outcome: Progressing     Problem: DISCHARGE PLANNING  Goal: Discharge to home or other facility with appropriate resources  Description: INTERVENTIONS:  - Identify barriers to discharge w/patient and caregiver  - Arrange for needed discharge resources and transportation as appropriate  - Identify discharge learning needs (meds, wound care, etc )  - Arrange for interpretive services to assist at discharge as needed  - Refer to Case Management Department for coordinating discharge planning if the patient needs post-hospital services based on physician/advanced practitioner order or complex needs related to functional status, cognitive ability, or social support system  Outcome: Progressing     Problem: Knowledge Deficit  Goal: Patient/family/caregiver demonstrates understanding of disease process, treatment plan, medications, and discharge instructions  Description: Complete learning assessment and assess knowledge base    Interventions:  - Provide teaching at level of understanding  - Provide teaching via preferred learning methods  Outcome: Progressing     Problem: MOBILITY - ADULT  Goal: Maintain or return to baseline ADL function  Description: INTERVENTIONS:  -  Assess patient's ability to carry out ADLs; assess patient's baseline for ADL function and identify physical deficits which impact ability to perform ADLs (bathing, care of mouth/teeth, toileting, grooming, dressing, etc )  - Assess/evaluate cause of self-care deficits   - Assess range of motion  - Assess patient's mobility; develop plan if impaired  - Assess patient's need for assistive devices and provide as appropriate  - Encourage maximum independence but intervene and supervise when necessary  - Involve family in performance of ADLs  - Assess for home care needs following discharge   - Consider OT consult to assist with ADL evaluation and planning for discharge  - Provide patient education as appropriate  Outcome: Progressing  Goal: Maintains/Returns to pre admission functional level  Description: INTERVENTIONS:  - Perform BMAT or MOVE assessment daily    - Set and communicate daily mobility goal to care team and patient/family/caregiver     - Collaborate with rehabilitation services on mobility goals if consulted  - Out of bed for toileting  - Record patient progress and toleration of activity level   Outcome: Progressing

## 2023-05-22 PROBLEM — R00.0 SINUS TACHYCARDIA: Status: ACTIVE | Noted: 2023-05-22

## 2023-05-22 PROBLEM — K82.8 GALLBLADDER SLUDGE: Status: ACTIVE | Noted: 2023-05-22

## 2023-05-22 LAB
ALBUMIN SERPL BCP-MCNC: 3.4 G/DL (ref 3.5–5)
ALP SERPL-CCNC: 60 U/L (ref 34–104)
ALT SERPL W P-5'-P-CCNC: 12 U/L (ref 7–52)
ANION GAP SERPL CALCULATED.3IONS-SCNC: 3 MMOL/L (ref 4–13)
AST SERPL W P-5'-P-CCNC: 23 U/L (ref 13–39)
BASOPHILS # BLD MANUAL: 0 THOUSAND/UL (ref 0–0.1)
BASOPHILS NFR MAR MANUAL: 0 % (ref 0–1)
BILIRUB DIRECT SERPL-MCNC: 0.01 MG/DL (ref 0–0.2)
BILIRUB SERPL-MCNC: 0.29 MG/DL (ref 0.2–1)
BUN SERPL-MCNC: 18 MG/DL (ref 5–25)
CALCIUM SERPL-MCNC: 9.1 MG/DL (ref 8.4–10.2)
CHLORIDE SERPL-SCNC: 106 MMOL/L (ref 96–108)
CO2 SERPL-SCNC: 30 MMOL/L (ref 21–32)
CREAT SERPL-MCNC: 1.04 MG/DL (ref 0.6–1.3)
EOSINOPHIL # BLD MANUAL: 0 THOUSAND/UL (ref 0–0.4)
EOSINOPHIL NFR BLD MANUAL: 0 % (ref 0–6)
ERYTHROCYTE [DISTWIDTH] IN BLOOD BY AUTOMATED COUNT: 15.4 % (ref 11.6–15.1)
GFR SERPL CREATININE-BSD FRML MDRD: 59 ML/MIN/1.73SQ M
GLUCOSE SERPL-MCNC: 85 MG/DL (ref 65–140)
HCT VFR BLD AUTO: 37.9 % (ref 34.8–46.1)
HGB BLD-MCNC: 11.9 G/DL (ref 11.5–15.4)
LYMPHOCYTES # BLD AUTO: 13 % (ref 14–44)
LYMPHOCYTES # BLD AUTO: 2.67 THOUSAND/UL (ref 0.6–4.47)
MCH RBC QN AUTO: 30.7 PG (ref 26.8–34.3)
MCHC RBC AUTO-ENTMCNC: 31.4 G/DL (ref 31.4–37.4)
MCV RBC AUTO: 98 FL (ref 82–98)
METAMYELOCYTES NFR BLD MANUAL: 2 % (ref 0–1)
MONOCYTES # BLD AUTO: 0.62 THOUSAND/UL (ref 0–1.22)
MONOCYTES NFR BLD: 3 % (ref 4–12)
NEUTROPHILS # BLD MANUAL: 16.81 THOUSAND/UL (ref 1.85–7.62)
NEUTS BAND NFR BLD MANUAL: 2 % (ref 0–8)
NEUTS SEG NFR BLD AUTO: 80 % (ref 43–75)
PLATELET # BLD AUTO: 321 THOUSANDS/UL (ref 149–390)
PLATELET BLD QL SMEAR: ADEQUATE
PMV BLD AUTO: 9.6 FL (ref 8.9–12.7)
POTASSIUM SERPL-SCNC: 4.8 MMOL/L (ref 3.5–5.3)
PROT SERPL-MCNC: 6.6 G/DL (ref 6.4–8.4)
RBC # BLD AUTO: 3.88 MILLION/UL (ref 3.81–5.12)
RBC MORPH BLD: NORMAL
SODIUM SERPL-SCNC: 139 MMOL/L (ref 135–147)
WBC # BLD AUTO: 20.5 THOUSAND/UL (ref 4.31–10.16)

## 2023-05-22 RX ORDER — GUAIFENESIN 600 MG/1
600 TABLET, EXTENDED RELEASE ORAL EVERY 12 HOURS SCHEDULED
Status: DISCONTINUED | OUTPATIENT
Start: 2023-05-22 | End: 2023-05-23 | Stop reason: HOSPADM

## 2023-05-22 RX ORDER — DOCUSATE SODIUM 100 MG/1
100 CAPSULE, LIQUID FILLED ORAL 2 TIMES DAILY
Status: DISCONTINUED | OUTPATIENT
Start: 2023-05-22 | End: 2023-05-23 | Stop reason: HOSPADM

## 2023-05-22 RX ORDER — BENZONATATE 100 MG/1
100 CAPSULE ORAL 3 TIMES DAILY
Status: DISCONTINUED | OUTPATIENT
Start: 2023-05-22 | End: 2023-05-23 | Stop reason: HOSPADM

## 2023-05-22 RX ADMIN — BENZONATATE 100 MG: 100 CAPSULE ORAL at 17:00

## 2023-05-22 RX ADMIN — ACETAMINOPHEN 650 MG: 325 TABLET ORAL at 21:11

## 2023-05-22 RX ADMIN — THIAMINE HCL TAB 100 MG 100 MG: 100 TAB at 08:38

## 2023-05-22 RX ADMIN — BENZONATATE 100 MG: 100 CAPSULE ORAL at 11:55

## 2023-05-22 RX ADMIN — GUAIFENESIN AND DEXTROMETHORPHAN 10 ML: 100; 10 SYRUP ORAL at 05:31

## 2023-05-22 RX ADMIN — FOLIC ACID 1 MG: 1 TABLET ORAL at 08:38

## 2023-05-22 RX ADMIN — IPRATROPIUM BROMIDE AND ALBUTEROL SULFATE 3 ML: 2.5; .5 SOLUTION RESPIRATORY (INHALATION) at 07:07

## 2023-05-22 RX ADMIN — IPRATROPIUM BROMIDE AND ALBUTEROL SULFATE 3 ML: 2.5; .5 SOLUTION RESPIRATORY (INHALATION) at 19:29

## 2023-05-22 RX ADMIN — FLUTICASONE FUROATE AND VILANTEROL TRIFENATATE 1 PUFF: 200; 25 POWDER RESPIRATORY (INHALATION) at 08:38

## 2023-05-22 RX ADMIN — ENOXAPARIN SODIUM 40 MG: 100 INJECTION SUBCUTANEOUS at 08:38

## 2023-05-22 RX ADMIN — ARIPIPRAZOLE 10 MG: 10 TABLET ORAL at 08:38

## 2023-05-22 RX ADMIN — DULOXETINE HYDROCHLORIDE 60 MG: 60 CAPSULE, DELAYED RELEASE ORAL at 08:38

## 2023-05-22 RX ADMIN — DULOXETINE HYDROCHLORIDE 60 MG: 60 CAPSULE, DELAYED RELEASE ORAL at 21:11

## 2023-05-22 RX ADMIN — DOXYCYCLINE 100 MG: 100 CAPSULE ORAL at 21:12

## 2023-05-22 RX ADMIN — PREDNISONE 40 MG: 20 TABLET ORAL at 08:38

## 2023-05-22 RX ADMIN — IPRATROPIUM BROMIDE AND ALBUTEROL SULFATE 3 ML: 2.5; .5 SOLUTION RESPIRATORY (INHALATION) at 13:22

## 2023-05-22 RX ADMIN — GUAIFENESIN 600 MG: 600 TABLET, EXTENDED RELEASE ORAL at 21:12

## 2023-05-22 RX ADMIN — TRAZODONE HYDROCHLORIDE 200 MG: 100 TABLET ORAL at 21:13

## 2023-05-22 RX ADMIN — BENZONATATE 100 MG: 100 CAPSULE ORAL at 21:12

## 2023-05-22 RX ADMIN — GUAIFENESIN 600 MG: 600 TABLET, EXTENDED RELEASE ORAL at 11:55

## 2023-05-22 RX ADMIN — DOXYCYCLINE 100 MG: 100 CAPSULE ORAL at 08:38

## 2023-05-22 NOTE — ASSESSMENT & PLAN NOTE
· Patient initially presented with acute respiratory failure, hypercapnia, requiring BiPAP, due to COPD exacerbation  · ABG with respiratory acidosis  · Was followed by the pulmonary team, treated for COPD exacerbation with IV steroids, nebulizer treatments, supplemental oxygen and has improved

## 2023-05-22 NOTE — PROGRESS NOTES
"[address] [facility]  Progress Note  Name: Amilcar Palma  MRN: [id number]  Unit/Bed#: [place] 2 -01 I Date of Admission: 5/20/2023   Date of Service: 5/22/2023 I Hospital Day: 2    Assessment/Plan   * COPD exacerbation [facility])  Assessment & Plan  This is a 49-year-old female with history of tobacco abuse, alcohol abuse, COPD presented with epistaxis and face flushing, acute COPD exacerbation and acute respiratory failure with hypoxia requiring BiPAP    · CTA revealed no pulmonary embolism, no acute consolidation  · Started on IV Solu-Medrol with improvement  · She was seen by the pulmonary team: Had improved with IV steroids and was transitioned to oral prednisone  · Continue empiric antibiotics with doxycycline  · Continue home inhalers with Advair and albuterol as needed  · Cont Duoneb  · Counseled on smoking cessation  · Outpatient pulmonary follow-up at [facility], she has an appointment in July    Gallbladder sludge  Assessment & Plan  ·  CT scan: \"High attenuation seen within the gallbladder, may be due to gallbladder sludge  Consider nonemergent ultrasound can be considered to evaluate for stones  No CT findings of acute cholecystitis\"  · Abd US: \"The gallbladder is not well distended  There is evidence of sludge with perhaps a few small stones with equivocal shadowing but not definitive  No secondary signs of cholecystitis  Follow-up may be useful with a longer fast   The common duct is not dilated\"  · LFTs without obstructive pattern  · Discussed with patient presence of gallbladder sludge, and possible future complication of acute cholecystitis  Discussed with her possible future need for laparoscopic cholecystectomy    She does not wish dilatation with surgery team at this time, will follow-up with them as an outpatient in the future    Sinus tachycardia  Assessment & Plan  Pt with sinus tachycardia  Likely multifactorial:  Due to copd exacerbation, nebs, possible " alcohol withdrawl      Lung nodule  Assessment & Plan  · 3 mm left lower lobe lung nodule, repeat CT chest at 12 months  · Incidental findings completed    Acute respiratory failure (HCC)  Assessment & Plan  · Patient initially presented with acute respiratory failure, hypercapnia, requiring BiPAP, due to COPD exacerbation  · ABG with respiratory acidosis  · Was followed by the pulmonary team, treated for COPD exacerbation with IV steroids, nebulizer treatments, supplemental oxygen and has improved      Tobacco abuse  Assessment & Plan  · Nicotine patch  · Smoking cessation counseling    Alcohol abuse  Assessment & Plan  · History of alcohol use  · Patient with low-grade sinus tachycardia, possibly secondary to alcohol withdrawal versus nebulizers versus COPD exacerbation  · Continue thiamine, folic acid  · Continue CIWA protocol  · Continue monitor closely and supportive measures    Generalized anxiety disorder  Assessment & Plan  · Patient has a history of anxiety and depression  · Cont home meds with abilify, cymbalta  · Cont outpt follow up               Family:  Called sister Tiny Ramirez and LM to call my cell phone for update    dw pts nurse  Will dw CM at rounds      VTE Pharmacologic Prophylaxis: Enoxaparin (Lovenox)  VTE Mechanical Prophylaxis: sequential compression device        Certification Statement: The patient will continue to require additional inpatient hospital stay due to need for further acute intervention for copd exacerbation, tachycardia    Status: inpatient     =======================================================    Subjective:  Patient notes she feels better  She notes she is ambulating  Denies any shortness of breath or dyspnea on exertion  She notes she is having a cough with yellow phlegm expectorating, increased from her baseline  She denies any fevers or chills  Denies any pain anywhere  Denies any chest pain  Denies any abdominal pain  Denies any nausea, vomiting, diarrhea  Is tolerating p o  Notes she is ambulating without any dizziness or lightheadedness  Physical Exam:   Temp:  [97 2 °F (36 2 °C)-99 4 °F (37 4 °C)] 98 1 °F (36 7 °C)  HR:  [] 92  Resp:  [15-22] 22  BP: (126-132)/(68-89) 126/69    Gen:  Pleasant, non-tachypnic, non-dyspnic  Conversant  Heart: Tachycardic, regular rhythm, S1S2 present, no murmur, rub or gallop  Lungs: Decreased breath sounds bilaterally  Prolonged and expiratory phase  Bilateral end expiratory wheezing  Scattered rhonchi  No crackles  No accessory muscle use or respiratory distress  Abd: soft, non-tender, non-distended  NABS, no guarding, rebound or peritoneal signs  Extremities: no clubbing, cyanosis or edema  2+pedal pulses bilaterally  Full range of motion  Neuro: awake, alert  Fluent speech  Strength globally intact  Skin: warm and dry: no petechiae, purpura and rash  LABS:   Results from last 7 days   Lab Units 05/22/23  0629 05/20/23  1326   WBC Thousand/uL 20 50* 15 95*   HEMOGLOBIN g/dL 11 9 11 8   HEMATOCRIT % 37 9 37 4   PLATELETS Thousands/uL 321 244     Results from last 7 days   Lab Units 05/22/23  0629 05/20/23  1326   POTASSIUM mmol/L 4 8 4 5   CHLORIDE mmol/L 106 100   CO2 mmol/L 30 33*   BUN mg/dL 18 4*   CREATININE mg/dL 1 04 1 04   CALCIUM mg/dL 9 1 8 9       Hospital Data:    5/21: Right upper quadrant ultrasound: The gallbladder is not well distended  There is evidence of sludge with perhaps a few small stones with equivocal shadowing but not definitive  No secondary signs of cholecystitis  Follow-up may be useful with a longer fast   The common duct is not dilated  5/20: CTA chest PE study  No pulmonary embolism  No acute consolidation  3 mm left lower lobe lung nodule  Based on current Fleischner Society 2017 Guidelines on incidental pulmonary nodule, no routine follow-up is needed if the patient is low risk    If the patient is high risk, optional follow-up chest CT at 12 months can be considered    High attenuation seen within the gallbladder, may be due to gallbladder sludge  Consider nonemergent ultrasound can be considered to evaluate for stones   No CT findings of acute cholecystitis    5/20: Chest x-ray  No acute cardiopulmonary disease      ---------------------------------------------------------------------------------------------------------------  This note has been constructed using a voice recognition system

## 2023-05-22 NOTE — ASSESSMENT & PLAN NOTE
This is a 49-year-old female with history of tobacco abuse, alcohol abuse, COPD presented with epistaxis and face flushing, acute COPD exacerbation and acute respiratory failure with hypoxia requiring BiPAP    · CTA revealed no pulmonary embolism, no acute consolidation  · Started on IV Solu-Medrol with improvement  · She was seen by the pulmonary team: Had improved with IV steroids and was transitioned to oral prednisone  · Continue empiric antibiotics with doxycycline  · Continue home inhalers with Advair and albuterol as needed  · Cont Duoneb  · Counseled on smoking cessation  · Outpatient pulmonary follow-up at [facility], she has an appointment in July

## 2023-05-22 NOTE — PLAN OF CARE
Problem: RESPIRATORY - ADULT  Goal: Achieves optimal ventilation and oxygenation  Description: INTERVENTIONS:  - Assess for changes in respiratory status  - Assess for changes in mentation and behavior  - Position to facilitate oxygenation and minimize respiratory effort  - Oxygen administered by appropriate delivery if ordered  - Initiate smoking cessation education as indicated  - Encourage broncho-pulmonary hygiene including cough, deep breathe, Incentive Spirometry  - Assess the need for suctioning and aspirate as needed  - Assess and instruct to report SOB or any respiratory difficulty  - Respiratory Therapy support as indicated  Outcome: Progressing     Problem: PAIN - ADULT  Goal: Verbalizes/displays adequate comfort level or baseline comfort level  Description: Interventions:  - Encourage patient to monitor pain and request assistance  - Assess pain using appropriate pain scale  - Administer analgesics based on type and severity of pain and evaluate response  - Implement non-pharmacological measures as appropriate and evaluate response  - Consider cultural and social influences on pain and pain management  - Notify physician/advanced practitioner if interventions unsuccessful or patient reports new pain  Outcome: Progressing     Problem: INFECTION - ADULT  Goal: Absence or prevention of progression during hospitalization  Description: INTERVENTIONS:  - Assess and monitor for signs and symptoms of infection  - Monitor lab/diagnostic results  - Monitor all insertion sites, i e  indwelling lines, tubes, and drains  - Monitor endotracheal if appropriate and nasal secretions for changes in amount and color  - [place] appropriate cooling/warming therapies per order  - Administer medications as ordered  - Instruct and encourage patient and family to use good hand hygiene technique  - Identify and instruct in appropriate isolation precautions for identified infection/condition  Outcome: Progressing     Problem: SAFETY ADULT  Goal: Patient will remain free of falls  Description: INTERVENTIONS:  - Educate patient/family on patient safety including physical limitations  - Instruct patient to call for assistance with activity   - Consult OT/PT to assist with strengthening/mobility   - Keep Call bell within reach  - Keep bed low and locked with side rails adjusted as appropriate  - Keep care items and personal belongings within reach  - Initiate and maintain comfort rounds  - Make Fall Risk Sign visible to staff  - Apply yellow socks and bracelet for high fall risk patients  - Consider moving patient to room near nurses station  Outcome: Progressing  Goal: Maintain or return to baseline ADL function  Description: INTERVENTIONS:  -  Assess patient's ability to carry out ADLs; assess patient's baseline for ADL function and identify physical deficits which impact ability to perform ADLs (bathing, care of mouth/teeth, toileting, grooming, dressing, etc )  - Assess/evaluate cause of self-care deficits   - Assess range of motion  - Assess patient's mobility; develop plan if impaired  - Assess patient's need for assistive devices and provide as appropriate  - Encourage maximum independence but intervene and supervise when necessary  - Involve family in performance of ADLs  - Assess for home care needs following discharge   - Consider OT consult to assist with ADL evaluation and planning for discharge  - Provide patient education as appropriate  Outcome: Progressing  Goal: Maintains/Returns to pre admission functional level  Description: INTERVENTIONS:  - Perform BMAT or MOVE assessment daily    - Set and communicate daily mobility goal to care team and patient/family/caregiver     - Collaborate with rehabilitation services on mobility goals if consulted  - Out of bed for toileting  - Record patient progress and toleration of activity level   Outcome: Progressing     Problem: DISCHARGE PLANNING  Goal: Discharge to home or other facility with appropriate resources  Description: INTERVENTIONS:  - Identify barriers to discharge w/patient and caregiver  - Arrange for needed discharge resources and transportation as appropriate  - Identify discharge learning needs (meds, wound care, etc )  - Arrange for interpretive services to assist at discharge as needed  - Refer to Case Management Department for coordinating discharge planning if the patient needs post-hospital services based on physician/advanced practitioner order or complex needs related to functional status, cognitive ability, or social support system  Outcome: Progressing     Problem: Knowledge Deficit  Goal: Patient/family/caregiver demonstrates understanding of disease process, treatment plan, medications, and discharge instructions  Description: Complete learning assessment and assess knowledge base    Interventions:  - Provide teaching at level of understanding  - Provide teaching via preferred learning methods  Outcome: Progressing     Problem: MOBILITY - ADULT  Goal: Maintain or return to baseline ADL function  Description: INTERVENTIONS:  -  Assess patient's ability to carry out ADLs; assess patient's baseline for ADL function and identify physical deficits which impact ability to perform ADLs (bathing, care of mouth/teeth, toileting, grooming, dressing, etc )  - Assess/evaluate cause of self-care deficits   - Assess range of motion  - Assess patient's mobility; develop plan if impaired  - Assess patient's need for assistive devices and provide as appropriate  - Encourage maximum independence but intervene and supervise when necessary  - Involve family in performance of ADLs  - Assess for home care needs following discharge   - Consider OT consult to assist with ADL evaluation and planning for discharge  - Provide patient education as appropriate  Outcome: Progressing  Goal: Maintains/Returns to pre admission functional level  Description: INTERVENTIONS:  - Perform BMAT or MOVE assessment daily    - Set and communicate daily mobility goal to care team and patient/family/caregiver     - Collaborate with rehabilitation services on mobility goals if consulted  - Out of bed for toileting  - Record patient progress and toleration of activity level   Outcome: Progressing

## 2023-05-22 NOTE — ASSESSMENT & PLAN NOTE
Pt with sinus tachycardia  Likely multifactorial:  Due to copd exacerbation, nebs, possible alcohol withdrawl

## 2023-05-22 NOTE — OCCUPATIONAL THERAPY NOTE
Occupational Therapy         Patient Name: Duglas Quintana  ZPFGF'C Date: 5/22/2023 05/22/23 0955   OT Last Visit   OT Visit Date 05/22/23   Note Type   Note type Screen   Additional Comments Per PT, patient is independent with ADLs and functional mobility, functioning at baseline  Will D/C OT orders at this time       Carol Henley

## 2023-05-22 NOTE — ASSESSMENT & PLAN NOTE
· History of alcohol use  · Patient with low-grade sinus tachycardia, possibly secondary to alcohol withdrawal versus nebulizers versus COPD exacerbation  · Continue thiamine, folic acid  · Continue CIWA protocol  · Continue monitor closely and supportive measures

## 2023-05-22 NOTE — CASE MANAGEMENT
Case Management Discharge Planning Note    Patient name Lindy Presume  Location [facility] 2 /South 2 [facility]* MRN [id number]  : 1965 Date 2023       Current Admission Date: 2023  Current Admission Diagnosis:COPD exacerbation [facility])   Patient Active Problem List    Diagnosis Date Noted   • Lung nodule 2023   • COPD exacerbation ([facility] [address] ) 2023   • Acute respiratory failure ([facility] [address] ) 2023   • Major depressive disorder, recurrent episode, moderate ([facility] [address] ) 10/25/2022   • AMS (altered mental status) 2022   • High cholesterol 2022   • Chronic pain 2022   • Vision loss 2022   • Stroke-like symptoms 2021   • Acute bilateral low back pain with bilateral sciatica 2021   • Tobacco abuse 2021   • Severe episode of recurrent major depressive disorder, without psychotic features ([facility] [address] ) 2017   • Generalized anxiety disorder 2017   • Alcohol abuse 2017   • Cannabis abuse 2017      LOS (days): 2  Geometric Mean LOS (GMLOS) (days):   Days to GMLOS:     OBJECTIVE:  Risk of Unplanned Readmission Score: 16 61         Current admission status: Inpatient   Preferred Pharmacy:   [address] [place] [address]  [place] [address]  [address] [id number]  Phone: [phone] Fax: [phone]    Primary Care Provider: Ye June MD    Primary Insurance: Rogelio Maurice  Secondary Insurance:     DISCHARGE DETAILS:    Discharge planning discussed with[de-identified] patient  Freedom of Choice: Yes  Comments - Freedom of Choice: Declined HOST referral     Were Treatment Team discharge recommendations reviewed with patient/caregiver?: Yes  Did patient/caregiver verbalize understanding of patient care needs?: Yes       Contacts  Reason/Outcome: Continuity of Care, Emergency Contact, Discharge [address]         Is the patient interested in [facility] at discharge?: No    DME Referral Provided  Referral made for DME?: "No    Other Referral/Resources/Interventions Provided:  Financial Resources Provided: Financial Counselor  Interventions: None Indicated  Referral Comments: Declined HOST referral  Email to Financial conselors for PATHS review (\"I can't pay the $1 co-pay for prescriptions sometimes  \"         Treatment Team Recommendation: Home  Discharge Destination Plan[de-identified] Home  Transport at Discharge : Automobile (daughter will  pt upon d/c)                                                               "

## 2023-05-22 NOTE — CASE MANAGEMENT
Case Management Assessment & Discharge Planning Note    Patient name Parag Rash  Location [address] 2 [facility]* MRN [id number]  : 1965 Date 2023       Current Admission Date: 2023  Current Admission Diagnosis:COPD exacerbation [facility])   Patient Active Problem List    Diagnosis Date Noted   • Lung nodule 2023   • COPD exacerbation ([facility] [address] ) 2023   • Acute respiratory failure ([address] ) 2023   • Major depressive disorder, recurrent episode, moderate ([facility] [address] ) 10/25/2022   • AMS (altered mental status) 2022   • High cholesterol 2022   • Chronic pain 2022   • Vision loss 2022   • Stroke-like symptoms 2021   • Acute bilateral low back pain with bilateral sciatica 2021   • Tobacco abuse 2021   • Severe episode of recurrent major depressive disorder, without psychotic features ([facility] [address] ) 2017   • Generalized anxiety disorder 2017   • Alcohol abuse 2017   • Cannabis abuse 2017      LOS (days): 2  Geometric Mean LOS (GMLOS) (days):   Days to GMLOS:     OBJECTIVE:    Risk of Unplanned Readmission Score: 16 61         Current admission status: Inpatient       Preferred Pharmacy:   [address] [place] [address]  [place] [address]  [address] [address]  Phone: [phone] Fax: [phone]    Primary Care Provider: Belgica Cardona MD    Primary Insurance: Jannie Nieto  Secondary Insurance:     ASSESSMENT:  [address], [address] [facility] Representative - Sister   Primary Phone: [phone] (Mobile)               Advance Directives  Does patient have a [address]?: No  Was patient offered paperwork?: Yes (declined)  Does patient have Advance Directives?: No  Was patient offered paperwork?: Yes (declined)  Primary Contact: ADRIELJBCHEMA (Sister)   [phone]         Readmission Root Cause  30 Day Readmission: No    Patient Information  Admitted from[de-identified] Home  Mental Status: Alert  During Assessment patient was accompanied by: Not accompanied during assessment  Assessment information provided by[de-identified] Patient  Primary Caregiver: Self  Support Systems: [address] of Residence: [address] [facility] do you live in?: [address] [facility] entry access options   Select all that apply : Stairs  Number of steps to enter home : 4  Do the steps have railings?: Yes  Type of Current Residence: 3 story home  Upon entering residence, is there a bedroom on the main floor (no further steps)?: No  A bedroom is located on the following floor levels of residence (select all that apply):: 2nd Floor  Upon entering residence, is there a bathroom on the main floor (no further steps)?: No  Indicate which floors of current residence have a bathroom (select all the apply):: 2nd Floor  Number of steps to 2nd floor from main floor: One Flight  In the last 12 months, was there a time when you were not able to pay the mortgage or rent on time?: No  In the last 12 months, how many places have you lived?: 1  In the last 12 months, was there a time when you did not have a steady place to sleep or slept in a shelter (including now)?: No  Homeless/housing insecurity resource given?: N/A  Living Arrangements: Lives w/ Extended Family  Is patient a ?: No    Activities of Daily Living Prior to Admission  Functional Status: Independent  Completes ADLs independently?: Yes  Ambulates independently?: Yes  Does patient use assisted devices?: No  Does patient currently own DME?: No  Does patient have a history of Outpatient Therapy (PT/OT)?: No  Does the patient have a history of Short-Term Rehab?: No  Does patient have a history of HHC?: No  Does patient currently have [address]?: No         Patient Information Continued  Income Source: Unemployed  Does patient have prescription coverage?: Yes (email to Financial Counsel[employer] for PATHS)  Within the past 12 months, you worried that your food would run out before you got the money to buy more : Never true  Within the past 12 months, the food you bought just didn't last and you didn't have money to get more : Never true  Food insecurity resource given?: No (receiving food stamps)  Does patient receive dialysis treatments?: No  Does patient have a history of substance abuse?: Yes (Denies current acohol use & smokes 1/2 pack cigarrettes/day)  Historical substance use preference: Alcohol/ETOH (denies current alcohol use)  Does patient have a history of Mental Health Diagnosis?: Yes (anxiety & depression)         Means of Transportation  Means of Transport to Appts[de-identified] Family transport  In the past 12 months, has lack of transportation kept you from medical appointments or from getting medications?: No  In the past 12 months, has lack of transportation kept you from meetings, work, or from getting things needed for daily living?: No  Was application for public transport provided?: N/A        DISCHARGE DETAILS:    Discharge planning discussed with[de-identified] patient  Freedom of Choice: Yes  Comments - Freedom of Choice: Declined HOST referral     Were Treatment Team discharge recommendations reviewed with patient/caregiver?: Yes  Did patient/caregiver verbalize understanding of patient care needs?: Yes       Contacts  Reason/Outcome: Continuity of Care, Emergency Contact, Discharge Planning    Requested [address]         Is the patient interested in [place] [address] at discharge?: No    DME Referral Provided  Referral made for DME?: No    Other Referral/Resources/Interventions Provided:  Interventions: None Indicated  Referral Comments: Declined HOST referral         Treatment Team Recommendation: Home  Discharge Destination Plan[de-identified] Home  Transport at Discharge : Automobile (daughter will  pt upon d/c)

## 2023-05-22 NOTE — ASSESSMENT & PLAN NOTE
· Patient has a history of anxiety and depression  · Cont home meds with abilify, cymbalta  · Cont outpt follow up

## 2023-05-22 NOTE — ASSESSMENT & PLAN NOTE
"·  CT scan: \"High attenuation seen within the gallbladder, may be due to gallbladder sludge  Consider nonemergent ultrasound can be considered to evaluate for stones  No CT findings of acute cholecystitis\"  · Abd US: \"The gallbladder is not well distended  There is evidence of sludge with perhaps a few small stones with equivocal shadowing but not definitive  No secondary signs of cholecystitis  Follow-up may be useful with a longer fast   The common duct is not dilated\"  · LFTs without obstructive pattern  · Discussed with patient presence of gallbladder sludge, and possible future complication of acute cholecystitis  Discussed with her possible future need for laparoscopic cholecystectomy    She does not wish dilatation with surgery team at this time, will follow-up with them as an outpatient in the future  "

## 2023-05-22 NOTE — UTILIZATION REVIEW
Initial Clinical Review    Admission: Date/Time/Statement:   Admission Orders (From admission, onward)     Ordered        05/20/23 1605  INPATIENT ADMISSION  Once                      Orders Placed This Encounter   Procedures   • INPATIENT ADMISSION     Standing Status:   Standing     Number of Occurrences:   1     Order Specific Question:   Level of Care     Answer:   Med Surg [16]     Order Specific Question:   Estimated length of stay     Answer:   More than 2 Midnights     Order Specific Question:   Certification     Answer:   I certify that inpatient services are medically necessary for this patient for a duration of greater than two midnights  See H&P and MD Progress Notes for additional information about the patient's course of treatment  ED Arrival Information     Expected   -    Arrival   5/20/2023 13:00    Acuity   Urgent            Means of arrival   Ambulance    [facility]    Admission type   Emergency            Arrival complaint   copd           Chief Complaint   Patient presents with   • Shortness of Breath     Pt c/o SOB and cough since early April  Pt reports having used nebulizer at home with little relief  Has appt with PCP coming up, but daughter felt pt unable to wait any longer at this time  Initial Presentation: 62 y o  female presents to ED via  EMS  From home with shortness of breath and cough, getting worse over the past 3 days  Has significant wheezing  No recent steroids/antibiotics  Feels  Improved with O2  Started in ED, currently on  4 L  NC  Not on home O2  Compliant with home meds  PMH is COPD, tobacco/alcohol use  Admit  Ip with  Exacerbation COPD, Acute respiratory failure and plan is  O2  4L NC, IV  S/medrol,  CIWA scores,  po antibiotics and  Nebulizers  Date:    5/21      Day 2:   Pulmonary consult  Continue  Oxygen to keep sats  >  88 %  No need for Bipap  Can switch to prednisone for another 4 days  Continue  Home  Nebulizers  Needs smoking cessation  Feels  Much improved since admission  IV  S/medrol switched to prednisone      ED Triage Vitals   Temperature Pulse Respirations Blood Pressure SpO2   05/20/23 1302 05/20/23 1303 05/20/23 1303 05/20/23 1303 05/20/23 1303   99 °F (37 2 °C) 105 (!) 30 130/61 93 %      Temp Source Heart Rate Source Patient Position - Orthostatic VS BP Location FiO2 (%)   05/20/23 1302 05/20/23 1303 05/20/23 1303 05/20/23 1303 --   Oral Monitor Lying Right arm       Pain Score       05/20/23 1327       10 - Worst Possible Pain          Wt Readings from Last 1 Encounters:   05/20/23 78 5 kg (173 lb 1 oz)     Additional Vital Signs:   /21/23 21:29:41 99 4 °F (37 4 °C) 96 17 127/86 100 95 % -- -- None (Room air) -- Lying   05/21/23 1957 -- -- -- -- -- 95 % -- -- None (Room air) -- --   05/21/23 1936 -- -- -- -- -- 94 % -- -- -- -- --   05/21/23 15:45:54 97 9 °F (36 6 °C) -- 15 132/68 89 -- -- -- -- -- --   05/21/23 1347 -- -- -- -- -- 92 % -- -- -- -- --   05/21/23 0842 -- -- -- -- -- 90 % -- -- None (Room air) -- --   05/21/23 07:34:28 97 2 °F (36 2 °C) Abnormal  89 -- 128/81 97 96 % -- -- -- -- --   05/21/23 07:33:24 -- 79 15 128/81 97 96 % -- -- -- -- --   05/21/23 0430 -- 86 -- 104/68 -- -- -- -- -- -- --   05/21/23 00:26:23 97 °F (36 1 °C) Abnormal  92 19 102/69 80 93 % -- -- -- -- Lying   05/21/23 0005 -- -- -- -- -- 90 % -- -- -- Full face mask --   05/20/23 22:50:02 -- 81 20 105/72 83 89 % Abnormal  -- -- -- -- --   05/20/23 2030 -- 82 -- 110/72 -- -- -- -- -- -- --   05/20/23 2014 -- -- -- -- -- 93 % -- -- BiPAP -- --   05/20/23 1939 -- -- -- -- -- 91 % -- -- -- Full face mask --   05/20/23 1816 -- -- -- -- -- 97 % -- -- -- Full face mask --   05/20/23 1728 -- -- -- -- -- 97 % 36 4 L/min Nasal cannula -- --   05/20/23 16:38:38 98 1 °F (36 7 °C) 89 22 105/70 82 96 % 36 4 L/min Nasal cannula -- --   05/20/23 1504 -- 99 22 118/58 83 93 % 38 4 5 L/min Nasal cannula -- Lying 05/20/23 1327 -- -- -- -- -- -- -- -- None (Room air) -- --   05/20/23 1303 -- 105 30 Abnormal  130/61 88 93 % -- -- None (Room air) -- Lying       Pertinent Labs/Diagnostic Test Results:   US right upper quadrant   Final Result by Clover Wong MD (05/21 2908)      The gallbladder is not well distended  There is evidence of sludge with perhaps a few small stones with equivocal shadowing but not definitive  No secondary signs of cholecystitis  Follow-up may be useful with a longer fast       The common duct is not dilated  The study was marked in EPIC for significant notification  Workstation performed: [id number]         CTA ED chest PE study   Final Result by Mady Zimmer MD (05/20 1530)      No pulmonary embolism   No acute consolidation   3 mm left lower lobe lung nodule  Based on current Fleischner Society 2017 Guidelines on incidental pulmonary nodule, no routine follow-up is needed if the patient is low risk  If the patient is high risk, optional follow-up chest CT at 12 months can be    considered  High attenuation seen within the gallbladder, may be due to gallbladder sludge  Consider nonemergent ultrasound can be considered to evaluate for stones      No CT findings of acute cholecystitis      The study was marked in EPIC for significant notification  Workstation performed: [id number]         XR chest 1 view portable   ED Interpretation by Chelly Crockett MD (05/20 1350)   No obvious consolidation, pneumothorax or effusion      Final Result by Fawad Saleh MD (05/21 9278)      No acute cardiopulmonary disease                    Workstation performed: [id number]               Results from last 7 days   Lab Units 05/22/23  0629 05/20/23  1326   WBC Thousand/uL 20 50* 15 95*   HEMOGLOBIN g/dL 11 9 11 8   HEMATOCRIT % 37 9 37 4   PLATELETS Thousands/uL 321 244   NEUTROS ABS Thousands/µL  --  13 16*   BANDS PCT % 2  --          Results from last 7 days   Lab Units 05/22/23  0629 05/20/23  1326   SODIUM mmol/L 139 136   POTASSIUM mmol/L 4 8 4 5   CHLORIDE mmol/L 106 100   CO2 mmol/L 30 33*   ANION GAP mmol/L 3* 3*   BUN mg/dL 18 4*   CREATININE mg/dL 1 04 1 04   EGFR ml/min/1 73sq m 59 59   CALCIUM mg/dL 9 1 8 9     Results from last 7 days   Lab Units 05/22/23  0629   AST U/L 23   ALT U/L 12   ALK PHOS U/L 60   TOTAL PROTEIN g/dL 6 6   ALBUMIN g/dL 3 4*   TOTAL BILIRUBIN mg/dL 0 29   BILIRUBIN DIRECT mg/dL 0 01         Results from last 7 days   Lab Units 05/22/23  0629 05/20/23  1326   GLUCOSE RANDOM mg/dL 85 115         Results from last 7 days   Lab Units 05/20/23  1326   HEMOGLOBIN A1C % 5 5   EAG mg/dl 111      Results from last 7 days   Lab Units 05/20/23  1748   PH ART  7 282*   PCO2 ART mm Hg 65 8*   PO2 ART mm Hg 83 5   HCO3 ART mmol/L 30 4*   BASE EXC ART mmol/L 2 0   O2 CONTENT ART mL/dL 17 2   O2 HGB, ARTERIAL % 94 0   ABG SOURCE  Radial, Left     Results from last 7 days   Lab Units 05/20/23  1326   PH HILDA  7 397   PCO2 HILDA mm Hg 52 0*   PO2 HILDA mm Hg 80 8*   HCO3 HILDA mmol/L 31 3*   BASE EXC HILDA mmol/L 5 2   O2 CONTENT HILDA ml/dL 17 3   O2 HGB, VENOUS % 93 5*             Results from last 7 days   Lab Units 05/20/23  1326   HS TNI 0HR ng/L 9     Results from last 7 days   Lab Units 05/20/23  1326   D-DIMER QUANTITATIVE ug/ml FEU 1 40*             Results from last 7 days   Lab Units 05/21/23  0530 05/20/23  1326   PROCALCITONIN ng/ml 0 09 0 09  0 09               Results from last 7 days   Lab Units 05/21/23  1506   CLARITY UA  Clear   COLOR UA  Yellow   SPEC GRAV UA  1 019   PH UA  6 0   GLUCOSE UA mg/dl Negative   KETONES UA mg/dl Trace*   BLOOD UA  Trace*   PROTEIN UA mg/dl Trace*   NITRITE UA  Negative   BILIRUBIN UA  Negative   UROBILINOGEN UA (BE) mg/dl <2 0   LEUKOCYTES UA  Negative   WBC UA /hpf 1-2   RBC UA /hpf 1-2   BACTERIA UA /hpf None Seen   EPITHELIAL CELLS WET PREP /hpf Occasional   MUCUS THREADS  Occasional*                 ED Treatment: Medication Administration from 05/20/2023 1300 to 05/20/2023 1634       Date/Time Order Dose Route Action Comments     05/20/2023 1323 EDT albuterol (FOR EMS ONLY) (2 5 mg/3 mL) 0 083 % inhalation solution 5 mg 0 mg Does not apply Given to EMS --     05/20/2023 1323 EDT methylPREDNISolone sodium succinate (FOR EMS ONLY) (Solu-MEDROL) 125 MG injection 125 mg 0 mg Does not apply Given to EMS --     05/20/2023 1318 EDT albuterol inhalation solution 10 mg 10 mg Nebulization Given --     05/20/2023 1318 EDT ipratropium (ATROVENT) 0 02 % inhalation solution 1 mg 1 mg Nebulization Given --     05/20/2023 1318 EDT sodium chloride 0 9 % inhalation solution 3 mL 3 mL Nebulization Given --     05/20/2023 1357 EDT magnesium sulfate 2 g/50 mL IVPB (premix) 2 g 0 g Intravenous Stopped --     05/20/2023 1337 EDT magnesium sulfate 2 g/50 mL IVPB (premix) 2 g 2 g Intravenous New Bag --     05/20/2023 1634 EDT sodium chloride 0 9 % bolus 1,000 mL 0 mL Intravenous Stopped --     05/20/2023 1425 EDT sodium chloride 0 9 % bolus 1,000 mL 1,000 mL Intravenous New Bag --     05/20/2023 1433 EDT iohexol (OMNIPAQUE) 350 MG/ML injection (SINGLE-DOSE) 85 mL 85 mL Intravenous Given --        Present on Admission:  • Alcohol abuse  • Generalized anxiety disorder      Admitting Diagnosis: COPD (chronic obstructive pulmonary disease) ([facility]) [J44 9]  [facility] ) [J44 1]  Age/Sex: 62 y o  female  Admission Orders:  Scheduled Medications:  ARIPiprazole, 10 mg, Oral, Daily  benzonatate, 100 mg, Oral, TID  docusate sodium, 100 mg, Oral, BID  doxycycline hyclate, 100 mg, Oral, Q12H AV  DULoxetine, 60 mg, Oral, Q12H AV  enoxaparin, 40 mg, Subcutaneous, Daily  fluticasone-vilanterol, 1 puff, Inhalation, Daily  folic acid, 1 mg, Oral, Daily  guaiFENesin, 600 mg, Oral, Q12H AV  ipratropium-albuterol, 3 mL, Nebulization, Q6H  nicotine, 1 patch, Transdermal, Daily  predniSONE, 40 mg, Oral, Daily  thiamine, 100 mg, Oral, Daily  IV  S/medrol Q 8 hrs - d/c  5/21      Continuous IV Infusions:     PRN Meds:  acetaminophen, 650 mg, Oral, Q6H PRN  albuterol, 2 5 mg, Nebulization, Q4H PRN  traZODone, 200 mg, Oral, HS PRN        IP CONSULT TO PULMONOLOGY    Network Utilization Review Department  ATTENTION: Please call with any questions or concerns to [phone] and carefully listen to the prompts so that you are directed to the right person  All voicemails are confidential   Karri Underwood all requests for admission clinical reviews, approved or denied determinations and any other requests to dedicated fax number below belonging to the campus where the patient is receiving treatment   List of dedicated fax numbers for the Facilities:  [address] DENIALS (Administrative/Medical Necessity) [phone]   [address] (Maternity/NICU/Pediatrics) [phone]   [address] Renetta [address] [phone]   [facility] [phone]   [address] [place] [address] [place] [address] [facility] [phone]   [address] [place] [facility] 134 815 [facility] [phone]

## 2023-05-22 NOTE — PHYSICAL THERAPY NOTE
PT EVALUATION 09:45-09:57    62 y o     [id number]    COPD (chronic obstructive pulmonary disease) ([facility]) [J44 9]  COPD exacerbation ([facility]) [J44 1]    Past Medical History:   Diagnosis Date    Anxiety     Chronic pain     Back Pain    Depression     Hyperlipidemia     Migraine     Psychiatric disorder          Past Surgical History:   Procedure Laterality Date    OTHER SURGICAL HISTORY      cyst removed from left axila      05/22/23 0945   PT Last Visit   PT Visit Date 05/22/23   Note Type   Note type Evaluation   Pain Assessment   Pain Score No Pain   Restrictions/Precautions   Weight Bearing Precautions Per Order No   Other Precautions Fall Risk;Multiple lines  ([place])   Home Living   Type of [address] [place] [address] Multi-level;Bed/bath upstairs;Stairs to enter with rails  (2nd floor bedroom and bathroom  4 LEAH)   Bathroom Shower/Tub Tub/shower unit   Bathroom Toilet Standard   Bathroom Equipment   (none)   Home Equipment   (none)   Additional Comments resides in 3 story home with extended family  +   Prior Function   Level of [place] Independent with ADLs; Independent with functional mobility; Independent with IADLS   Lives With Family  (7 adults, 4 kids at home ages 14-6, 3 dogs and 3 cats)   Receives Help From Family   IADLs Independent with medication management; Independent with meal prep; Independent with driving   Falls in the last 6 months 0   Vocational Unemployed   Comments I PTA, community distances  General   Additional Pertinent History Pt is 61 y/o female admitted with COPD exacerbation, acute resp failure  PT consulted  Family/Caregiver Present No   Cognition   Overall Cognitive Status WFL   Arousal/Participation Alert   Orientation Level Oriented X4   Following Commands Follows all commands and directions without difficulty   Comments Pleasant   Subjective   Subjective Feels better     RUE Assessment   RUE Assessment WFL   LUE Assessment   LUE Assessment WFL   RLE Assessment   RLE Assessment WFL   LLE Assessment   LLE Assessment WFL   Bed Mobility   Supine to Sit 6  Modified independent   Sit to Supine 6  Modified independent   Transfers   Sit to Stand 6  Modified independent   Additional items HOB elevated   Stand to Sit 6  Modified independent   Additional items HOB elevated   Ambulation/Elevation   Gait pattern Decreased foot clearance; Inconsistent maxim   Gait Assistance 5  Supervision   Additional items Verbal cues   Assistive Device None   Distance Amb 60'x1, stair training then additional 250'x1  , RA O2 sat 92%  + LOVE   Stair Management Assistance 6  Modified independent   Additional items Verbal cues   Stair Management Technique One rail R;No rails; Alternating pattern; Foreward   Number of Stairs 9   Balance   Static Sitting Normal   Dynamic Sitting Good   Static Standing Fair +   Dynamic Standing Fair   Ambulatory Fair   Endurance Deficit   Endurance Deficit Yes   Endurance Deficit Description elevated   RA O2 sat 92%  + mild LOVE   Activity Tolerance   Activity Tolerance Patient tolerated treatment well;Treatment limited secondary to medical complications (Comment)   Medical Staff Made Aware Nurse, Morena   Nurse Made Aware yes   Assessment   Prognosis Good   Problem List Decreased endurance;Decreased mobility; Impaired balance   Assessment Sherwin Maria is a 62 y o  female who presents with shortness of breath and cough  No use of home O2 at baseline  Admitted with COPD exacerbation, acute respiratory failure requiring 4L on admission, + incidental finding of lung nodule  Pt with hx of anxiety, back pain, obesity, tobacco use, etoh abuse, depression, hyperlipidemia, migraines, psychiatric disorder  PT consulted  Up and OOB as tolerated orders  On CIWA protocol  Prior to admission resides with family in multistory home with B/B on 2nd floor  Independent without AD use prior to admission    Currently presents with functional limitations related to decreased activity tolerance compared to baseline, LOVE, elevated HR with activity to 128  RA O2 sat 92%  Despite impairments demonstrates all functional mobility and ambulation at S/I level  The patient's AM-PAC Basic Mobility Inpatient Short Form Raw Score is 24  A Raw score of greater than 16 suggests the patient may benefit from discharge to home  Please also refer to the recommendation of the Physical Therapist for safe discharge planning  At this time will d/c PT as does not require skilled services  Encourage continued ambulation to prevent functional decline  Goals   Patient Goals go home   Plan   Treatment/Interventions Functional transfer training; Endurance training;Patient/family training;Bed mobility;Gait training; Compensatory technique education;Continued evaluation;Spoke to nursing;Spoke to MD   PT Frequency Other (Comment)  (PT eval and d/c )   Recommendation   PT Discharge Recommendation No rehabilitation needs   AM-PAC Basic Mobility Inpatient   Turning in Flat Bed Without Bedrails 4   Lying on Back to Sitting on Edge of Flat Bed Without Bedrails 4   Moving Bed to Chair 4   Standing Up From Chair Using Arms 4   Walk in Room 4   Climb 3-5 Stairs With Railing 4   Basic Mobility Inpatient Raw Score 24   Basic Mobility Standardized Score 57 68   Highest Level Of Mobility   -HLM Goal 8: Walk 250 feet or more   JH-HLM Achieved 8: Walk 250 feet ot more   End of Consult   Patient Position at End of Consult Supine; All needs within reach     Hx/personal factors: co-morbidities, inaccessible home, mutliple lines, and fall risk  Examination: dec mobility, dec balance, dec endurance, dec amb, risk for falls, assessed body system, balance, endurance, amb, D/C disposition & fall risk  Clinical: unpredictable (ongoing medical status, abnormal lab values, and risk for falls), elevated HR, continuous monitoring     Complexity: high             Mykel Lopez, PT

## 2023-05-23 VITALS
HEIGHT: 63 IN | RESPIRATION RATE: 16 BRPM | DIASTOLIC BLOOD PRESSURE: 87 MMHG | BODY MASS INDEX: 30.66 KG/M2 | HEART RATE: 75 BPM | SYSTOLIC BLOOD PRESSURE: 130 MMHG | OXYGEN SATURATION: 90 % | WEIGHT: 173.06 LBS | TEMPERATURE: 98.2 F

## 2023-05-23 PROBLEM — J96.00 ACUTE RESPIRATORY FAILURE (HCC): Status: RESOLVED | Noted: 2023-05-20 | Resolved: 2023-05-23

## 2023-05-23 PROBLEM — R00.0 SINUS TACHYCARDIA: Status: RESOLVED | Noted: 2023-05-22 | Resolved: 2023-05-23

## 2023-05-23 LAB
DME PARACHUTE DELIVERY DATE ACTUAL: NORMAL
DME PARACHUTE DELIVERY DATE REQUESTED: NORMAL
DME PARACHUTE DELIVERY NOTE: NORMAL
DME PARACHUTE ITEM DESCRIPTION: NORMAL
DME PARACHUTE ORDER STATUS: NORMAL
DME PARACHUTE SUPPLIER NAME: NORMAL
DME PARACHUTE SUPPLIER PHONE: NORMAL

## 2023-05-23 RX ORDER — PREDNISONE 10 MG/1
TABLET ORAL
Qty: 19 TABLET | Refills: 0 | Status: SHIPPED | OUTPATIENT
Start: 2023-05-23

## 2023-05-23 RX ORDER — FLUTICASONE PROPIONATE AND SALMETEROL 100; 50 UG/1; UG/1
1 POWDER RESPIRATORY (INHALATION) 2 TIMES DAILY
Qty: 60 BLISTER | Refills: 0 | Status: SHIPPED | OUTPATIENT
Start: 2023-05-23

## 2023-05-23 RX ORDER — IPRATROPIUM BROMIDE AND ALBUTEROL SULFATE 2.5; .5 MG/3ML; MG/3ML
3 SOLUTION RESPIRATORY (INHALATION)
Qty: 360 ML | Refills: 0 | Status: SHIPPED | OUTPATIENT
Start: 2023-05-23 | End: 2023-06-22

## 2023-05-23 RX ORDER — ALBUTEROL SULFATE 90 UG/1
2 AEROSOL, METERED RESPIRATORY (INHALATION) EVERY 6 HOURS PRN
Qty: 18 G | Refills: 0 | Status: SHIPPED | OUTPATIENT
Start: 2023-05-23

## 2023-05-23 RX ADMIN — FLUTICASONE FUROATE AND VILANTEROL TRIFENATATE 1 PUFF: 200; 25 POWDER RESPIRATORY (INHALATION) at 08:13

## 2023-05-23 RX ADMIN — PREDNISONE 40 MG: 20 TABLET ORAL at 08:12

## 2023-05-23 RX ADMIN — FOLIC ACID 1 MG: 1 TABLET ORAL at 08:12

## 2023-05-23 RX ADMIN — BENZONATATE 100 MG: 100 CAPSULE ORAL at 08:12

## 2023-05-23 RX ADMIN — THIAMINE HCL TAB 100 MG 100 MG: 100 TAB at 08:12

## 2023-05-23 RX ADMIN — DOXYCYCLINE 100 MG: 100 CAPSULE ORAL at 08:12

## 2023-05-23 RX ADMIN — DULOXETINE HYDROCHLORIDE 60 MG: 60 CAPSULE, DELAYED RELEASE ORAL at 08:12

## 2023-05-23 RX ADMIN — GUAIFENESIN 600 MG: 600 TABLET, EXTENDED RELEASE ORAL at 08:12

## 2023-05-23 RX ADMIN — ARIPIPRAZOLE 10 MG: 10 TABLET ORAL at 08:12

## 2023-05-23 RX ADMIN — ENOXAPARIN SODIUM 40 MG: 100 INJECTION SUBCUTANEOUS at 08:12

## 2023-05-23 NOTE — ASSESSMENT & PLAN NOTE
· History of alcohol use  · Patient with low-grade sinus tachycardia, possibly secondary to alcohol withdrawal versus nebulizers versus COPD exacerbation  · She was placed on thiamine, folic acid and CIWA protocol  · Did not have any signs/symptoms of significant alcohol withdrawal  · Was evaluated by CM but declined HOST referral for cessation/comminity services

## 2023-05-23 NOTE — ASSESSMENT & PLAN NOTE
Pt with sinus tachycardia  Likely multifactorial:  Due to copd exacerbation, nebs, possible alcohol withdrawal  Has since resolved

## 2023-05-23 NOTE — CASE MANAGEMENT
Case Management Discharge Planning Note    Patient name Daria [place]  Location [place] [facility] /[place] 2 Skinny Martinez* MRN [id number]  : 1965 Date 2023       Current Admission Date: 2023  Current Admission Diagnosis:COPD exacerbation [facility])   Patient Active Problem List    Diagnosis Date Noted   • Gallbladder sludge 2023   • Lung nodule 2023   • COPD exacerbation ([facility] ) 2023   • Major depressive disorder, recurrent episode, moderate ([facility] ) 10/25/2022   • AMS (altered mental status) 2022   • High cholesterol 2022   • Chronic pain 2022   • Vision loss 2022   • Stroke-like symptoms 2021   • Acute bilateral low back pain with bilateral sciatica 2021   • Tobacco abuse 2021   • Severe episode of recurrent major depressive disorder, without psychotic features ([facility] ) 2017   • Generalized anxiety disorder 2017   • Alcohol abuse 2017   • Cannabis abuse 2017      LOS (days): 3  Geometric Mean LOS (GMLOS) (days):   Days to GMLOS:     OBJECTIVE:  Risk of Unplanned Readmission Score: 17 21         Current admission status: Inpatient   Preferred Pharmacy:   [address] [place] [address]  [address] [id number]  Phone: [phone] Fax: [phone]    Primary Care Provider: Abigail Elias MD    Primary Insurance: [address] [place] [address]  Secondary Insurance:     DISCHARGE DETAILS:    Discharge planning discussed with[de-identified] patient  Freedom of Choice: Yes        Were Treatment Team discharge recommendations reviewed with patient/caregiver?: Yes  Did patient/caregiver verbalize understanding of patient care needs?: Yes  Were patient/caregiver advised of the risks associated with not following Treatment Team discharge recommendations?: Yes    Contacts  Contact Method:  In Person  Reason/Outcome: Discharge [address]         Is the patient interested in [facility] at discharge?: No    DME "Referral Provided  Referral made for DME?: No    Other Referral/Resources/Interventions Provided:  Interventions: None Indicated  Referral Comments: Declined HOST referral  Email to Financial conselors for PATHS review (\"I can't pay the $1 co-pay for prescriptions sometimes  )\" Per PATHS pt already on Medicaid         Treatment Team Recommendation: Home  Discharge Destination Plan[de-identified] Home  Transport at Discharge : Automobile                                                               "

## 2023-05-23 NOTE — RESPIRATORY THERAPY NOTE
Home Oxygen Qualifying Test     Patient name: Susan Mclean        : 1965   Date of Test:  May 23, 2023  Diagnosis:    Home Oxygen Test:    **Medicare Guidelines require item(s) 1-5 on all ambulatory patients or 1 and 2 on non-ambulatory patients  1  Baseline SPO2 on Room Air at rest 92 %   a  If <= 88% on Room Air add O2 via NC to obtain SpO2 >=88%  If LPM needed, document LPM N/A needed to reach =>88%    2  SPO2 during exertion on Room Air 90  %  a  During exertion monitor SPO2  If SPO2 increases >=89%, do not add supplemental oxygen    3  SPO2 on Oxygen at Rest N/A % at N/A LPM    4  SPO2 during exertion on Oxygen N/A % at N/A LPM    5  Test performed during exertion activity  []  Supplemental Home Oxygen is indicated  []  Client does not qualify for home oxygen      Respiratory Additional Notes- WALK UNASSISTED X 6 MINUTES CM AWARE OF RESULTS    Flex Chavez, RT

## 2023-05-23 NOTE — DISCHARGE SUMMARY
"[facility]  Discharge- Zay Likens 1965, 62 y o  female MRN: [id number]  Unit/Bed#: Robel Catherine Lu[facility] Melo 87 208-01 Encounter: [id number]  Primary Care Provider: Calderon Forbes MD   Date and time admitted to hospital: 5/20/2023  1:00 PM          Admission Date: 5/20/2023       Discharge Date: 5/23/2023        Primary Diagnoses  Principal Problem:    COPD exacerbation ([facility] )  Active Problems:    Generalized anxiety disorder    Alcohol abuse    Tobacco abuse    Lung nodule    Gallbladder sludge  Resolved Problems:    Acute respiratory failure ([facility] )    Sinus tachycardia      Hospital course by problem:  * COPD exacerbation ([facility] )  Assessment & Plan  This is a 80-year-old female with history of tobacco abuse, alcohol abuse, COPD presented with epistaxis and face flushing, acute COPD exacerbation and acute respiratory failure with hypoxia requiring BiPAP    · CTA revealed no pulmonary embolism, no acute consolidation  · Started on IV Solu-Medrol with improvement  · She was seen by the pulmonary team: Had improved with IV steroids and was transitioned to oral prednisone  ·  empiric antibiotics with doxycycline  · Continued on home inhalers with Advair and albuterol as needed  · Started on Duoneb: Will be prescribed nebulizer at discharge  · Counseled on smoking cessation  · Outpatient pulmonary follow-up at [facility], she has an appointment in July    Gallbladder sludge  [address] [place] [address]  ·  CT scan: \"High attenuation seen within the gallbladder, may be due to gallbladder sludge  Consider nonemergent ultrasound can be considered to evaluate for stones  No CT findings of acute cholecystitis\"  · Abd US: \"The gallbladder is not well distended  There is evidence of sludge with perhaps a few small stones with equivocal shadowing but not definitive  No secondary signs of cholecystitis   Follow-up may be useful with a longer fast   The common duct is not dilated\"  · LFTs without " obstructive pattern  · Discussed with patient presence of gallbladder sludge, and possible future complication of acute cholecystitis  Discussed with her possible future need for laparoscopic cholecystectomy  She does not wish outpt referral for appt with surgery team at this time, but prefers to d/w her PCP first     · incindental finding completed    Lung nodule  Assessment & Plan  · 3 mm left lower lobe lung nodule, repeat CT chest at 12 months  · Incidental findings completed    Tobacco abuse  Assessment & Plan  · Nicotine patch  · Smoking cessation counseling    Alcohol abuse  Assessment & Plan  · History of alcohol use  · Patient with low-grade sinus tachycardia, possibly secondary to alcohol withdrawal versus nebulizers versus COPD exacerbation  · She was placed on thiamine, folic acid and CIWA protocol  · Did not have any signs/symptoms of significant alcohol withdrawal  · Was evaluated by CM but declined HOST referral for cessation/comminity services    Generalized anxiety disorder  Assessment & Plan  · Patient has a history of anxiety and depression  · Cont home meds with abilifjanette cymbalta  · Cont outpt follow up    Sinus tachycardia-resolved as of 5/23/2023  Assessment & Plan  Pt with sinus tachycardia  Likely multifactorial:  Due to copd exacerbation, nebs, possible alcohol withdrawal  Has since resolved      Acute respiratory failure (HCC)-resolved as of 5/23/2023  Assessment & Plan  · Patient initially presented with acute respiratory failure, hypercapnia, requiring BiPAP, due to COPD exacerbation  · ABG with respiratory acidosis  · Was followed by the pulmonary team, treated for COPD exacerbation with IV steroids, nebulizer treatments, supplemental oxygen and has improved  · Currently with adequate sats on RA  Will not need home O2 at discharge      Service:  [facility], [facility]      Consulting Providers   Pulmonology: Dr Klarissa Chowdary    Procedures Performed none    Hospital Studies:  5/21: Right upper quadrant ultrasound: The gallbladder is not well distended  There is evidence of sludge with perhaps a few small stones with equivocal shadowing but not definitive  No secondary signs of cholecystitis  Follow-up may be useful with a longer fast   The common duct is not dilated      5/20: CTA chest PE study  No pulmonary embolism  No acute consolidation  3 mm left lower lobe lung nodule  Based on current Fleischner Society 2017 Guidelines on incidental pulmonary nodule, no routine follow-up is needed if the patient is low risk   If the patient is high risk, optional follow-up chest CT at 12 months can be   considered    High attenuation seen within the gallbladder, may be due to gallbladder sludge  Consider nonemergent ultrasound can be considered to evaluate for stones   No CT findings of acute cholecystitis     5/20: Chest x-ray  No acute cardiopulmonary disease  Results from last 7 days   Lab Units 05/22/23  0629 05/20/23  1326   WBC Thousand/uL 20 50* 15 95*   HEMOGLOBIN g/dL 11 9 11 8   HEMATOCRIT % 37 9 37 4   PLATELETS Thousands/uL 321 244     Results from last 7 days   Lab Units 05/22/23  0629 05/20/23  1326   POTASSIUM mmol/L 4 8 4 5   CHLORIDE mmol/L 106 100   CO2 mmol/L 30 33*   BUN mg/dL 18 4*   CREATININE mg/dL 1 04 1 04   CALCIUM mg/dL 9 1 8 9       History and Physical Exam:  Please refer to the Admission H&P note    Discharge Condition: Improved  Discharge Disposition: Home/Self Care    Discharge Note and Physical Exam:   Patient relates that she feels back to her baseline  She notes she is ambulating and denies any shortness of breath at rest or dyspnea with exertion  Notes her cough has improved  She is expectorating thick phlegm  She denies any fevers or chills  Denies any pain anywhere at this time  Denies any abdominal pain, nausea, vomiting, diarrhea  Is tolerating p o  Denies any dizziness or lightheadedness with ambulating   notes she feels back to her baseline and is eager for discharge home  Vitals:    05/23/23 0740   BP: 130/87   Pulse:    Resp: 16   Temp: 98 2 °F (36 8 °C)   SpO2:      General:  Pleasant, non-tachypnic, non-dyspnic  Conversant  Ambulating in her room  Able to speak in complete sentences without having to stop for dyspnea  Heart: Regular rate and rhythm, S1S2 present  No murmur, rub or gallop  Lungs: Worse breath sounds bilaterally  Improved air movement  Faint end expiratory wheezing  No crackles or rhonchi  No accessory muscle use or respiratory distress  Abdomen: soft, non-tender, non-distended, NABS  No rebound or guarding  No mass or peritoneal signs  Extremities: no clubbing, cyanosis or edema  2+ pedal pulses bilaterally  Neurologic: Awake and alert  Fluent speech  Interactive  Strength globally intact  Ambulating with steady gait  Skin: warm and dry  No petechiae, purpura or rash  Discharge Medications   Please see Medical Reconciliation Discharge Form    Discharge Follow Up Appointments:   Sj Walls MD: 1 week  Keep appt at [facility] for july    Discharge  Statement   Total Time Spent today including physical exam, discussion with patient and discharge arrangements/care = 38 minutes      dw pts nurse and CM  Family:  Called sister Ariana Aguayo and gave update    This note has been constructed using a voice recognition system

## 2023-05-23 NOTE — ASSESSMENT & PLAN NOTE
This is a 70-year-old female with history of tobacco abuse, alcohol abuse, COPD presented with epistaxis and face flushing, acute COPD exacerbation and acute respiratory failure with hypoxia requiring BiPAP    · CTA revealed no pulmonary embolism, no acute consolidation  · Started on IV Solu-Medrol with improvement  · She was seen by the pulmonary team: Had improved with IV steroids and was transitioned to oral prednisone  ·  empiric antibiotics with doxycycline  · Continued on home inhalers with Advair and albuterol as needed  · Started on Duoneb:   Will be prescribed nebulizer at discharge  · Counseled on smoking cessation  · Outpatient pulmonary follow-up at [facility], she has an appointment in July

## 2023-05-23 NOTE — ASSESSMENT & PLAN NOTE
· Patient initially presented with acute respiratory failure, hypercapnia, requiring BiPAP, due to COPD exacerbation  · ABG with respiratory acidosis  · Was followed by the pulmonary team, treated for COPD exacerbation with IV steroids, nebulizer treatments, supplemental oxygen and has improved  · Currently with adequate sats on RA    Will not need home O2 at discharge

## 2023-05-23 NOTE — CASE MANAGEMENT
Case Management Discharge Planning Note    Patient name Ara Gibson  Location [facility] 2 /[facility] Eladio Luo* MRN [id number]  : 1965 Date 2023       Current Admission Date: 2023  Current Admission Diagnosis:COPD exacerbation [facility])   Patient Active Problem List    Diagnosis Date Noted   • Gallbladder sludge 2023   • Lung nodule 2023   • COPD exacerbation ([facility] ) 2023   • Major depressive disorder, recurrent episode, moderate ([facility] ) 10/25/2022   • AMS (altered mental status) 2022   • High cholesterol 2022   • Chronic pain 2022   • Vision loss 2022   • Stroke-like symptoms 2021   • Acute bilateral low back pain with bilateral sciatica 2021   • Tobacco abuse 2021   • Severe episode of recurrent major depressive disorder, without psychotic features ([facility] ) 2017   • Generalized anxiety disorder 2017   • Alcohol abuse 2017   • Cannabis abuse 2017      LOS (days): 3  Geometric Mean LOS (GMLOS) (days):   Days to GMLOS:     OBJECTIVE:  Risk of Unplanned Readmission Score: 17 21         Current admission status: Inpatient   Preferred Pharmacy:   [address] [place] [address]  [address] [id number]  Phone: [phone] Fax: [phone]    Primary Care Provider: Jose Antonio Ingram MD    Primary Insurance: [address] [place] [address]  Secondary Insurance:     DISCHARGE DETAILS:    Discharge planning discussed with[de-identified] patient  Freedom of Choice: Yes        Were Treatment Team discharge recommendations reviewed with patient/caregiver?: Yes  Did patient/caregiver verbalize understanding of patient care needs?: Yes  Were patient/caregiver advised of the risks associated with not following Treatment Team discharge recommendations?: Yes    Contacts  Contact Method:  In Person  Reason/Outcome: Discharge [address]         Is the patient interested in [facility] at discharge?: No    DME "Referral Provided  Referral made for DME?: Yes  DME referral completed for the following items[de-identified] Nebulizer  DME Supplier Name[de-identified] [employer]    Other Referral/Resources/Interventions Provided:  Interventions: None Indicated  Referral Comments: Declined HOST referral  Email to Financial conselors for PATHS review (\"I can't pay the $1 co-pay for prescriptions sometimes  )\" Per PATHS pt already on Medicaid         Treatment Team Recommendation: Home  Discharge Destination Plan[de-identified] Home  Transport at Discharge : Automobile                                                               "

## 2023-05-23 NOTE — PLAN OF CARE
Problem: RESPIRATORY - ADULT  Goal: Achieves optimal ventilation and oxygenation  Description: INTERVENTIONS:  - Assess for changes in respiratory status  - Assess for changes in mentation and behavior  - Position to facilitate oxygenation and minimize respiratory effort  - Oxygen administered by appropriate delivery if ordered  - Initiate smoking cessation education as indicated  - Encourage broncho-pulmonary hygiene including cough, deep breathe, Incentive Spirometry  - Assess the need for suctioning and aspirate as needed  - Assess and instruct to report SOB or any respiratory difficulty  - Respiratory Therapy support as indicated  Outcome: Progressing     Problem: PAIN - ADULT  Goal: Verbalizes/displays adequate comfort level or baseline comfort level  Description: Interventions:  - Encourage patient to monitor pain and request assistance  - Assess pain using appropriate pain scale  - Administer analgesics based on type and severity of pain and evaluate response  - Implement non-pharmacological measures as appropriate and evaluate response  - Consider cultural and social influences on pain and pain management  - Notify physician/advanced practitioner if interventions unsuccessful or patient reports new pain  Outcome: Progressing     Problem: INFECTION - ADULT  Goal: Absence or prevention of progression during hospitalization  Description: INTERVENTIONS:  - Assess and monitor for signs and symptoms of infection  - Monitor lab/diagnostic results  - Monitor all insertion sites, i e  indwelling lines, tubes, and drains  - Monitor endotracheal if appropriate and nasal secretions for changes in amount and color  - Blanco appropriate cooling/warming therapies per order  - Administer medications as ordered  - Instruct and encourage patient and family to use good hand hygiene technique  - Identify and instruct in appropriate isolation precautions for identified infection/condition  Outcome: Progressing     Problem: SAFETY ADULT  Goal: Patient will remain free of falls  Description: INTERVENTIONS:  - Educate patient/family on patient safety including physical limitations  - Instruct patient to call for assistance with activity   - Consult OT/PT to assist with strengthening/mobility   - Keep Call bell within reach  - Keep bed low and locked with side rails adjusted as appropriate  - Keep care items and personal belongings within reach  - Initiate and maintain comfort rounds  - Make Fall Risk Sign visible to staff  - Apply yellow socks and bracelet for high fall risk patients  - Consider moving patient to room near nurses station  Outcome: Progressing  Goal: Maintain or return to baseline ADL function  Description: INTERVENTIONS:  -  Assess patient's ability to carry out ADLs; assess patient's baseline for ADL function and identify physical deficits which impact ability to perform ADLs (bathing, care of mouth/teeth, toileting, grooming, dressing, etc )  - Assess/evaluate cause of self-care deficits   - Assess range of motion  - Assess patient's mobility; develop plan if impaired  - Assess patient's need for assistive devices and provide as appropriate  - Encourage maximum independence but intervene and supervise when necessary  - Involve family in performance of ADLs  - Assess for home care needs following discharge   - Consider OT consult to assist with ADL evaluation and planning for discharge  - Provide patient education as appropriate  Outcome: Progressing  Goal: Maintains/Returns to pre admission functional level  Description: INTERVENTIONS:  - Perform BMAT or MOVE assessment daily    - Set and communicate daily mobility goal to care team and patient/family/caregiver     - Collaborate with rehabilitation services on mobility goals if consulted  - Out of bed for toileting  - Record patient progress and toleration of activity level   Outcome: Progressing     Problem: DISCHARGE PLANNING  Goal: Discharge to home or other facility with appropriate resources  Description: INTERVENTIONS:  - Identify barriers to discharge w/patient and caregiver  - Arrange for needed discharge resources and transportation as appropriate  - Identify discharge learning needs (meds, wound care, etc )  - Arrange for interpretive services to assist at discharge as needed  - Refer to Case Management Department for coordinating discharge planning if the patient needs post-hospital services based on physician/advanced practitioner order or complex needs related to functional status, cognitive ability, or social support system  Outcome: Progressing     Problem: Knowledge Deficit  Goal: Patient/family/caregiver demonstrates understanding of disease process, treatment plan, medications, and discharge instructions  Description: Complete learning assessment and assess knowledge base    Interventions:  - Provide teaching at level of understanding  - Provide teaching via preferred learning methods  Outcome: Progressing     Problem: MOBILITY - ADULT  Goal: Maintain or return to baseline ADL function  Description: INTERVENTIONS:  -  Assess patient's ability to carry out ADLs; assess patient's baseline for ADL function and identify physical deficits which impact ability to perform ADLs (bathing, care of mouth/teeth, toileting, grooming, dressing, etc )  - Assess/evaluate cause of self-care deficits   - Assess range of motion  - Assess patient's mobility; develop plan if impaired  - Assess patient's need for assistive devices and provide as appropriate  - Encourage maximum independence but intervene and supervise when necessary  - Involve family in performance of ADLs  - Assess for home care needs following discharge   - Consider OT consult to assist with ADL evaluation and planning for discharge  - Provide patient education as appropriate  Outcome: Progressing  Goal: Maintains/Returns to pre admission functional level  Description: INTERVENTIONS:  - Perform BMAT or MOVE assessment daily    - Set and communicate daily mobility goal to care team and patient/family/caregiver     - Collaborate with rehabilitation services on mobility goals if consulted  - Out of bed for toileting  - Record patient progress and toleration of activity level   Outcome: Progressing

## 2023-05-23 NOTE — ASSESSMENT & PLAN NOTE
"·  CT scan: \"High attenuation seen within the gallbladder, may be due to gallbladder sludge  Consider nonemergent ultrasound can be considered to evaluate for stones  No CT findings of acute cholecystitis\"  · Abd US: \"The gallbladder is not well distended  There is evidence of sludge with perhaps a few small stones with equivocal shadowing but not definitive  No secondary signs of cholecystitis  Follow-up may be useful with a longer fast   The common duct is not dilated\"  · LFTs without obstructive pattern  · Discussed with patient presence of gallbladder sludge, and possible future complication of acute cholecystitis  Discussed with her possible future need for laparoscopic cholecystectomy    She does not wish outpt referral for appt with surgery team at this time, but prefers to d/w her PCP first     · incindental finding completed  "

## 2023-05-24 NOTE — UTILIZATION REVIEW
NOTIFICATION OF ADMISSION DISCHARGE   This is a Notification of Discharge from [facility]  Please be advised that this patient has been discharge from our facility  Below you will find the admission and discharge date and time including the patient’s disposition  UTILIZATION REVIEW CONTACT:  Venita Menchaca MA  Utilization   Network Utilization Review Department  Phone: [phone] x carefully listen to the prompts  All voicemails are confidential   Email: [email or web address]     ADMISSION INFORMATION  PRESENTATION DATE: 5/20/2023  1:00 PM  OBERVATION ADMISSION DATE:   INPATIENT ADMISSION DATE: 5/20/23  4:05 PM   DISCHARGE DATE: 5/23/2023 12:30 PM   DISPOSITION:Home/Self Care    IMPORTANT INFORMATION:  Send all requests for admission clinical reviews, approved or denied determinations and any other requests to dedicated fax number below belonging to the campus where the patient is receiving treatment   List of dedicated fax numbers:  [address] DENIALS (Administrative/Medical Necessity) [phone]   [address] (Maternity/NICU/Pediatrics) [phone]   [facility] [phone]   [place] [address] [phone]   [address] [phone]   [facility] [phone] [facility] [phone]   [address] [place] [address] 119 [phone]   [facility]  [phone]   [facility] [phone]   [facility] [address] [facility] [phone]

## 2023-06-13 ENCOUNTER — OFFICE VISIT (OUTPATIENT)
Dept: FAMILY MEDICINE CLINIC | Facility: CLINIC | Age: 58
End: 2023-06-13

## 2023-06-13 VITALS
BODY MASS INDEX: 33.31 KG/M2 | SYSTOLIC BLOOD PRESSURE: 138 MMHG | RESPIRATION RATE: 18 BRPM | TEMPERATURE: 98.2 F | DIASTOLIC BLOOD PRESSURE: 84 MMHG | HEIGHT: 63 IN | HEART RATE: 102 BPM | WEIGHT: 188 LBS | OXYGEN SATURATION: 93 %

## 2023-06-13 DIAGNOSIS — J44.1 COPD EXACERBATION (HCC): Primary | ICD-10-CM

## 2023-06-13 DIAGNOSIS — Z72.0 TOBACCO ABUSE: ICD-10-CM

## 2023-06-13 PROCEDURE — 99204 OFFICE O/P NEW MOD 45 MIN: CPT | Performed by: FAMILY MEDICINE

## 2023-06-13 RX ORDER — GUAIFENESIN 200 MG/10ML
100-200 LIQUID ORAL EVERY 4 HOURS PRN
Qty: 60 ML | Refills: 0 | Status: SHIPPED | OUTPATIENT
Start: 2023-06-13 | End: 2023-06-17

## 2023-06-13 RX ORDER — PREDNISONE 20 MG/1
40 TABLET ORAL DAILY
Qty: 10 TABLET | Refills: 0 | Status: SHIPPED | OUTPATIENT
Start: 2023-06-13 | End: 2023-06-17

## 2023-06-13 RX ORDER — AZITHROMYCIN 250 MG/1
TABLET, FILM COATED ORAL
Qty: 6 TABLET | Refills: 0 | Status: SHIPPED | OUTPATIENT
Start: 2023-06-13 | End: 2023-06-17

## 2023-06-13 NOTE — ASSESSMENT & PLAN NOTE
-Smokes about 8 cigarettes a day  -Patient has tried various pharmacological treatment for smoking cessation with no improvement  -Patient plans on discussing starting Chantix with PCP  -Smoke cessation counseling provided

## 2023-06-13 NOTE — ASSESSMENT & PLAN NOTE
-We will provide short course of steroid and azithromycin  -Smoking cessation counseling provided to patient  -Continue albuterol and Advair  -Recommend outpatient follow-up with pulmonology

## 2023-06-13 NOTE — PROGRESS NOTES
Name: Eloy Alanis      : 1965      MRN: [id number]  Encounter Provider: Hany Dorado MD  Encounter Date: 2023   Encounter department: [address] [place] [address]     1  COPD exacerbation ([facility] [address] )  Assessment & Plan:  -We will provide short course of steroid and azithromycin  -Smoking cessation counseling provided to patient  -Continue albuterol and Advair  -Recommend outpatient follow-up with pulmonology    Orders:  -     predniSONE 20 mg tablet; Take 2 tablets (40 mg total) by mouth daily for 5 days  -     azithromycin (ZITHROMAX) 250 mg tablet; Take 2 tablets today then 1 tablet daily x 4 days  -     guaiFENesin (ROBITUSSIN) 100 MG/5ML oral liquid; Take 5-10 mL (100-200 mg total) by mouth every 4 (four) hours as needed for cough    2  Tobacco abuse  Assessment & Plan:  -Smokes about 8 cigarettes a day  -Patient has tried various pharmacological treatment for smoking cessation with no improvement  -Patient plans on discussing starting Chantix with PCP  -Smoke cessation counseling provided               Subjective      49-year-old female with a past medical history of tobacco dependence and COPD who presents today for worsening cough for the past 2 weeks weeks  Patient reports cough with yellow sputum production  She has a history of tobacco dependence  She currently smokes 6 to 7 cigarettes/day  She also reports associated mild shortness of breath  She was recently admitted to the hospital for acute respiratory failure from COPD exacerbation  She is currently compliant with albuterol and Advair  She uses albuterol about twice per day  She denies any fever, chills, nausea, vomiting, unintentional weight loss and loss of appetite  She denies any sick contacts  Review of Systems   Constitutional: Negative for chills, diaphoresis, fatigue and fever  HENT: Negative for congestion, postnasal drip, rhinorrhea and sore throat      Eyes: Negative for visual disturbance  Respiratory: Positive for cough, shortness of breath and wheezing  Cardiovascular: Negative for chest pain and palpitations  Gastrointestinal: Negative for abdominal pain, constipation, diarrhea, nausea and vomiting  Genitourinary: Negative for dysuria, hematuria and urgency  Musculoskeletal: Negative for arthralgias  Skin: Negative for rash  Neurological: Negative for dizziness, syncope, weakness, numbness and headaches  Hematological: Negative for adenopathy  Psychiatric/Behavioral: Negative for agitation  Current Outpatient Medications on File Prior to Visit   Medication Sig   • albuterol (PROVENTIL HFA,VENTOLIN HFA) 90 mcg/act inhaler Inhale 2 puffs every 6 (six) hours as needed for wheezing or shortness of breath   • ARIPiprazole (ABILIFY) 10 mg tablet Take 1 Po Q HS   • atorvastatin (LIPITOR) 20 mg tablet Take 20 mg by mouth daily   • clonazePAM (KlonoPIN) 1 mg tablet Take 1/2 PO Q AM and 1 HS   • cyclobenzaprine (FLEXERIL) 5 mg tablet Take 5 mg by mouth daily as needed   • DULoxetine (CYMBALTA) 60 mg delayed release capsule Take 1 Po BID   • fluticasone (FLONASE) 50 mcg/act nasal spray    • Fluticasone-Salmeterol (Advair) 100-50 mcg/dose inhaler Inhale 1 puff 2 (two) times a day Rinse mouth after use     • hydrOXYzine HCL (ATARAX) 50 mg tablet Take 1 PO TID PRN   • ipratropium-albuterol (DUO-NEB) 0 5-2 5 mg/3 mL nebulizer solution Take 3 mL by nebulization every 6 (six) hours   • meloxicam (MOBIC) 7 5 mg tablet Take 7 5 mg by mouth daily   • naproxen (Naprosyn) 500 mg tablet Take 1 tablet (500 mg total) by mouth 2 (two) times a day with meals   • ondansetron (Zofran ODT) 4 mg disintegrating tablet Take 1 tablet (4 mg total) by mouth every 6 (six) hours as needed for nausea or vomiting   • traZODone (DESYREL) 100 mg tablet Take 2 1/2 P HS PRN   • [DISCONTINUED] guaiFENesin (ROBITUSSIN) 100 MG/5ML oral liquid Take 5-10 mL (100-200 mg total) by mouth every "4 (four) hours as needed for cough   • [DISCONTINUED] predniSONE 10 mg tablet Take 4 tabs po daily x1d, then 3 tabs po qd x 3d, then 2 tabs po qd x 3d, then 1 tab po qd x 3d  Objective     /84 (BP Location: Left arm, Patient Position: Sitting, Cuff Size: Standard)   Pulse 102   Temp 98 2 °F (36 8 °C) (Temporal)   Resp 18   Ht 5' 3\" (1 6 m)   Wt 85 3 kg (188 lb)   LMP  (LMP Unknown)   SpO2 93%   BMI 33 30 kg/m²     Physical Exam  Vitals reviewed  Constitutional:       General: She is not in acute distress  Appearance: Normal appearance  She is well-developed  She is not ill-appearing, toxic-appearing or diaphoretic  HENT:      Head: Normocephalic and atraumatic  Right Ear: External ear normal       Left Ear: External ear normal       Nose: Nose normal  No congestion or rhinorrhea  Mouth/Throat:      Mouth: Mucous membranes are moist       Pharynx: No oropharyngeal exudate or posterior oropharyngeal erythema  Eyes:      General: No scleral icterus  Right eye: No discharge  Left eye: No discharge  Extraocular Movements: Extraocular movements intact  Conjunctiva/sclera: Conjunctivae normal    Neck:      Thyroid: No thyromegaly  Vascular: No JVD  Trachea: No tracheal deviation  Cardiovascular:      Rate and Rhythm: Normal rate and regular rhythm  Pulses:           Dorsalis pedis pulses are 2+ on the right side and 2+ on the left side  Posterior tibial pulses are 2+ on the right side and 2+ on the left side  Heart sounds: Normal heart sounds  No murmur heard  No friction rub  No gallop  Pulmonary:      Effort: Pulmonary effort is normal  No respiratory distress  Breath sounds: No stridor  Wheezing present  No rhonchi  Comments: Speaking full sentences  No use of accessory muscle of respiration  Abdominal:      General: Bowel sounds are normal  There is no distension  Palpations: Abdomen is soft   There is no " mass       Tenderness: There is no abdominal tenderness  There is no guarding or rebound  Hernia: No hernia is present  Musculoskeletal:         General: Normal range of motion  Cervical back: Normal range of motion and neck supple  Feet:      Right foot:      Skin integrity: No ulcer, skin breakdown, erythema, warmth, callus or dry skin  Left foot:      Skin integrity: No ulcer, skin breakdown, erythema, warmth, callus or dry skin  Skin:     General: Skin is warm  Capillary Refill: Capillary refill takes less than 2 seconds  Findings: No rash  Neurological:      General: No focal deficit present  Mental Status: She is alert and oriented to person, place, and time  Cranial Nerves: No cranial nerve deficit  Motor: No weakness or abnormal muscle tone        Gait: Gait normal    Psychiatric:         Mood and Affect: Mood normal          Behavior: Behavior normal        Yoli Rosas MD

## 2023-06-15 ENCOUNTER — APPOINTMENT (EMERGENCY)
Dept: RADIOLOGY | Facility: HOSPITAL | Age: 58
End: 2023-06-15
Payer: COMMERCIAL

## 2023-06-15 ENCOUNTER — HOSPITAL ENCOUNTER (OUTPATIENT)
Facility: HOSPITAL | Age: 58
Setting detail: OBSERVATION
Discharge: HOME | End: 2023-06-17
Attending: EMERGENCY MEDICINE | Admitting: INTERNAL MEDICINE
Payer: COMMERCIAL

## 2023-06-15 DIAGNOSIS — Z72.0 TOBACCO ABUSE: ICD-10-CM

## 2023-06-15 DIAGNOSIS — J44.1 COPD EXACERBATION (HCC): Primary | ICD-10-CM

## 2023-06-15 LAB
ALBUMIN SERPL BCP-MCNC: 3.6 G/DL (ref 3.5–5)
ALP SERPL-CCNC: 81 U/L (ref 34–104)
ALT SERPL W P-5'-P-CCNC: 14 U/L (ref 7–52)
ANION GAP SERPL CALCULATED.3IONS-SCNC: 3 MMOL/L (ref 4–13)
AST SERPL W P-5'-P-CCNC: 10 U/L (ref 13–39)
BASOPHILS # BLD AUTO: 0.06 THOUSANDS/ÂΜL (ref 0–0.1)
BASOPHILS NFR BLD AUTO: 1 % (ref 0–1)
BILIRUB SERPL-MCNC: 0.24 MG/DL (ref 0.2–1)
BUN SERPL-MCNC: 8 MG/DL (ref 5–25)
CALCIUM SERPL-MCNC: 9 MG/DL (ref 8.4–10.2)
CARDIAC TROPONIN I PNL SERPL HS: 4 NG/L
CHLORIDE SERPL-SCNC: 103 MMOL/L (ref 96–108)
CO2 SERPL-SCNC: 33 MMOL/L (ref 21–32)
CREAT SERPL-MCNC: 0.99 MG/DL (ref 0.6–1.3)
EOSINOPHIL # BLD AUTO: 0.21 THOUSAND/ÂΜL (ref 0–0.61)
EOSINOPHIL NFR BLD AUTO: 2 % (ref 0–6)
ERYTHROCYTE [DISTWIDTH] IN BLOOD BY AUTOMATED COUNT: 15.1 % (ref 11.6–15.1)
FLUAV RNA RESP QL NAA+PROBE: NEGATIVE
FLUBV RNA RESP QL NAA+PROBE: NEGATIVE
GFR SERPL CREATININE-BSD FRML MDRD: 63 ML/MIN/1.73SQ M
GLUCOSE SERPL-MCNC: 107 MG/DL (ref 65–140)
HCT VFR BLD AUTO: 42.5 % (ref 34.8–46.1)
HGB BLD-MCNC: 12.9 G/DL (ref 11.5–15.4)
IMM GRANULOCYTES # BLD AUTO: 0.05 THOUSAND/UL (ref 0–0.2)
IMM GRANULOCYTES NFR BLD AUTO: 1 % (ref 0–2)
LYMPHOCYTES # BLD AUTO: 2.89 THOUSANDS/ÂΜL (ref 0.6–4.47)
LYMPHOCYTES NFR BLD AUTO: 30 % (ref 14–44)
MCH RBC QN AUTO: 30.7 PG (ref 26.8–34.3)
MCHC RBC AUTO-ENTMCNC: 30.4 G/DL (ref 31.4–37.4)
MCV RBC AUTO: 101 FL (ref 82–98)
MONOCYTES # BLD AUTO: 0.63 THOUSAND/ÂΜL (ref 0.17–1.22)
MONOCYTES NFR BLD AUTO: 7 % (ref 4–12)
NEUTROPHILS # BLD AUTO: 5.74 THOUSANDS/ÂΜL (ref 1.85–7.62)
NEUTS SEG NFR BLD AUTO: 59 % (ref 43–75)
NRBC BLD AUTO-RTO: 0 /100 WBCS
PLATELET # BLD AUTO: 222 THOUSANDS/UL (ref 149–390)
PMV BLD AUTO: 10 FL (ref 8.9–12.7)
POTASSIUM SERPL-SCNC: 4 MMOL/L (ref 3.5–5.3)
PROT SERPL-MCNC: 6.4 G/DL (ref 6.4–8.4)
RBC # BLD AUTO: 4.2 MILLION/UL (ref 3.81–5.12)
RSV RNA RESP QL NAA+PROBE: NEGATIVE
SARS-COV-2 RNA RESP QL NAA+PROBE: NEGATIVE
SODIUM SERPL-SCNC: 139 MMOL/L (ref 135–147)
WBC # BLD AUTO: 9.58 THOUSAND/UL (ref 4.31–10.16)

## 2023-06-15 PROCEDURE — 71046 X-RAY EXAM CHEST 2 VIEWS: CPT

## 2023-06-15 PROCEDURE — 84145 PROCALCITONIN (PCT): CPT

## 2023-06-15 PROCEDURE — 96374 THER/PROPH/DIAG INJ IV PUSH: CPT

## 2023-06-15 PROCEDURE — 85025 COMPLETE CBC W/AUTO DIFF WBC: CPT | Performed by: EMERGENCY MEDICINE

## 2023-06-15 PROCEDURE — 80053 COMPREHEN METABOLIC PANEL: CPT | Performed by: EMERGENCY MEDICINE

## 2023-06-15 PROCEDURE — 99285 EMERGENCY DEPT VISIT HI MDM: CPT

## 2023-06-15 PROCEDURE — 36415 COLL VENOUS BLD VENIPUNCTURE: CPT | Performed by: EMERGENCY MEDICINE

## 2023-06-15 PROCEDURE — 94644 CONT INHLJ TX 1ST HOUR: CPT

## 2023-06-15 PROCEDURE — 0241U HB NFCT DS VIR RESP RNA 4 TRGT: CPT | Performed by: EMERGENCY MEDICINE

## 2023-06-15 PROCEDURE — 93005 ELECTROCARDIOGRAM TRACING: CPT

## 2023-06-15 PROCEDURE — 84484 ASSAY OF TROPONIN QUANT: CPT | Performed by: EMERGENCY MEDICINE

## 2023-06-15 RX ORDER — METHYLPREDNISOLONE SODIUM SUCCINATE 125 MG/2ML
125 INJECTION, POWDER, LYOPHILIZED, FOR SOLUTION INTRAMUSCULAR; INTRAVENOUS ONCE
Status: COMPLETED | OUTPATIENT
Start: 2023-06-15 | End: 2023-06-15

## 2023-06-15 RX ORDER — SODIUM CHLORIDE FOR INHALATION 0.9 %
3 VIAL, NEBULIZER (ML) INHALATION ONCE
Status: COMPLETED | OUTPATIENT
Start: 2023-06-15 | End: 2023-06-15

## 2023-06-15 RX ADMIN — IPRATROPIUM BROMIDE 1 MG: 0.5 SOLUTION RESPIRATORY (INHALATION) at 19:30

## 2023-06-15 RX ADMIN — ISODIUM CHLORIDE 3 ML: 0.03 SOLUTION RESPIRATORY (INHALATION) at 19:30

## 2023-06-15 RX ADMIN — METHYLPREDNISOLONE SODIUM SUCCINATE 125 MG: 125 INJECTION, POWDER, FOR SOLUTION INTRAMUSCULAR; INTRAVENOUS at 19:25

## 2023-06-15 RX ADMIN — ALBUTEROL SULFATE 10 MG: 2.5 SOLUTION RESPIRATORY (INHALATION) at 19:30

## 2023-06-15 NOTE — ED PROVIDER NOTES
History  Chief Complaint   Patient presents with   • Shortness of Breath     Shortness of breath on and off for the past 2 weeks, hx of copd     59-year-old female with past medical history of depression, anxiety, hyperlipidemia, COPD, and migraines presents with shortness of breath  Patient states that the shortness of breath got bad 3 days ago  However, her daughter who is at bedside states that she has been having increased work of breathing for longer than 3 days  She is now feeling a little short of breath at rest   It is worse with exertion  Patient has no chest pain  No fevers  She has a productive cough, but no congestion  No leg swelling  No recent immobilizations  Patient does smoke cigarettes  Patient was seen by her primary care physician 2 days ago  He prescribed her steroids and antibiotics, but patient states that she did not pick these up secondary to issues with Monday  She has no plans to  the medications  She has been using her Advair and albuterol inhalers as directed  She states that the albuterol inhaler only helps a little  Prior to Admission Medications   Prescriptions Last Dose Informant Patient Reported? Taking? ARIPiprazole (ABILIFY) 10 mg tablet 6/15/2023  No Yes   Sig: Take 1 Po Q HS   DULoxetine (CYMBALTA) 60 mg delayed release capsule 6/15/2023  No Yes   Sig: Take 1 Po BID   Fluticasone-Salmeterol (Advair) 100-50 mcg/dose inhaler 6/15/2023  No Yes   Sig: Inhale 1 puff 2 (two) times a day Rinse mouth after use     albuterol (PROVENTIL HFA,VENTOLIN HFA) 90 mcg/act inhaler 6/15/2023  No Yes   Sig: Inhale 2 puffs every 6 (six) hours as needed for wheezing or shortness of breath   atorvastatin (LIPITOR) 20 mg tablet Unknown  Yes No   Sig: Take 20 mg by mouth daily   azithromycin (ZITHROMAX) 250 mg tablet 6/15/2023  No Yes   Sig: Take 2 tablets today then 1 tablet daily x 4 days   clonazePAM (KlonoPIN) 1 mg tablet 6/15/2023  No Yes   Sig: Take 1/2 PO Q AM and 1 HS   cyclobenzaprine (FLEXERIL) 5 mg tablet 6/15/2023  Yes Yes   Sig: Take 5 mg by mouth daily as needed   fluticasone (FLONASE) 50 mcg/act nasal spray 6/15/2023  Yes Yes   guaiFENesin (ROBITUSSIN) 100 MG/5ML oral liquid Not Taking  No No   Sig: Take 5-10 mL (100-200 mg total) by mouth every 4 (four) hours as needed for cough   Patient not taking: Reported on 6/15/2023   hydrOXYzine HCL (ATARAX) 50 mg tablet 6/15/2023  No Yes   Sig: Take 1 PO TID PRN   ipratropium-albuterol (DUO-NEB) 0 5-2 5 mg/3 mL nebulizer solution 6/15/2023  No Yes   Sig: Take 3 mL by nebulization every 6 (six) hours   meloxicam (MOBIC) 7 5 mg tablet 6/15/2023  Yes Yes   Sig: Take 7 5 mg by mouth daily   naproxen (Naprosyn) 500 mg tablet Not Taking  No No   Sig: Take 1 tablet (500 mg total) by mouth 2 (two) times a day with meals   Patient not taking: Reported on 6/15/2023   ondansetron (Zofran ODT) 4 mg disintegrating tablet Not Taking  No No   Sig: Take 1 tablet (4 mg total) by mouth every 6 (six) hours as needed for nausea or vomiting   Patient not taking: Reported on 6/15/2023   predniSONE 20 mg tablet Not Taking  No No   Sig: Take 2 tablets (40 mg total) by mouth daily for 5 days   Patient not taking: Reported on 6/15/2023   traZODone (DESYREL) 100 mg tablet 6/14/2023  No Yes   Sig: Take 2 1/2 P HS PRN      Facility-Administered Medications: None       Past Medical History:   Diagnosis Date   • Anxiety    • Chronic pain     Back Pain   • Depression    • Hyperlipidemia    • Migraine    • Psychiatric disorder        Past Surgical History:   Procedure Laterality Date   • OTHER SURGICAL HISTORY      cyst removed from left axila       Family History   Problem Relation Age of Onset   • Heart failure Mother    • Heart disease Father      I have reviewed and agree with the history as documented      E-Cigarette/Vaping   • E-Cigarette Use Never User      E-Cigarette/Vaping Substances     Social History     Tobacco Use   • Smoking status: Every Day Packs/day: 0 50     Years: 45 00     Total pack years: 22 50     Types: Cigarettes     Passive exposure: Current   • Smokeless tobacco: Former   • Tobacco comments:     Encouraged to stop smoking  Vaping Use   • Vaping Use: Never used   Substance Use Topics   • Alcohol use: Not Currently   • Drug use: Not Currently     Types: Methamphetamines, Marijuana     Comment: reports last use years ago        Review of Systems   Constitutional: Negative for chills, fatigue and fever  HENT: Negative for congestion, rhinorrhea and sore throat  Eyes: Negative for pain and redness  Respiratory: Positive for cough and shortness of breath  Negative for chest tightness and wheezing  Cardiovascular: Negative for chest pain and palpitations  Gastrointestinal: Negative for abdominal pain, diarrhea, nausea and vomiting  Endocrine: Negative  Genitourinary: Negative for difficulty urinating and hematuria  Musculoskeletal: Negative for back pain and myalgias  Skin: Negative for pallor and rash  Allergic/Immunologic: Negative  Neurological: Negative for dizziness, weakness, light-headedness and headaches  Hematological: Negative  Physical Exam  ED Triage Vitals [06/15/23 1837]   Temperature Pulse Respirations Blood Pressure SpO2   98 2 °F (36 8 °C) 96 19 125/58 95 %      Temp Source Heart Rate Source Patient Position - Orthostatic VS BP Location FiO2 (%)   Oral Monitor Sitting Right arm --      Pain Score       --             Orthostatic Vital Signs  Vitals:    06/15/23 1837 06/15/23 2045   BP: 125/58 121/74   Pulse: 96 104   Patient Position - Orthostatic VS: Sitting Lying       Physical Exam  Vitals and nursing note reviewed  Constitutional:       General: She is not in acute distress  Appearance: Normal appearance  She is well-developed  She is not ill-appearing  HENT:      Head: Normocephalic and atraumatic     Eyes:      Conjunctiva/sclera: Conjunctivae normal    Cardiovascular:      Rate and Rhythm: Normal rate and regular rhythm  Heart sounds: No murmur heard  Pulmonary:      Effort: Pulmonary effort is normal  No respiratory distress  Breath sounds: Decreased breath sounds and wheezing (Inspiratory and expiratory) present  No rhonchi or rales  Abdominal:      General: Abdomen is flat  Palpations: Abdomen is soft  Musculoskeletal:         General: Normal range of motion  Cervical back: Normal range of motion and neck supple  Right lower leg: No tenderness  No edema  Left lower leg: No tenderness  No edema  Skin:     General: Skin is warm and dry  Neurological:      General: No focal deficit present  Mental Status: She is alert and oriented to person, place, and time           ED Medications  Medications   albuterol inhalation solution 10 mg (10 mg Nebulization Given 6/15/23 1930)     And   ipratropium (ATROVENT) 0 02 % inhalation solution 1 mg (1 mg Nebulization Given 6/15/23 1930)     And   sodium chloride 0 9 % inhalation solution 3 mL (3 mL Nebulization Given 6/15/23 1930)   methylPREDNISolone sodium succinate (Solu-MEDROL) injection 125 mg (125 mg Intravenous Given 6/15/23 1925)       Diagnostic Studies  Results Reviewed     Procedure Component Value Units Date/Time    HS Troponin 0hr (reflex protocol) [[id number]]  (Normal) Collected: 06/15/23 1925    Lab Status: Final result Specimen: Blood from Arm, Right Updated: 06/15/23 2020     hs TnI 0hr 4 ng/L     Comprehensive metabolic panel [[id number]]  (Abnormal) Collected: 06/15/23 1925    Lab Status: Final result Specimen: Blood from Arm, Right Updated: 06/15/23 2016     Sodium 139 mmol/L      Potassium 4 0 mmol/L      Chloride 103 mmol/L      CO2 33 mmol/L      ANION GAP 3 mmol/L      BUN 8 mg/dL      Creatinine 0 99 mg/dL      Glucose 107 mg/dL      Calcium 9 0 mg/dL      AST 10 U/L      ALT 14 U/L      Alkaline Phosphatase 81 U/L      Total Protein 6 4 g/dL      Albumin 3 6 g/dL      Total Bilirubin 0 24 mg/dL      eGFR 63 ml/min/1 73sq m     Narrative:      [place] guidelines for Chronic Kidney Disease (CKD):   •  Stage 1 with normal or high GFR (GFR > 90 mL/min/1 73 square meters)  •  Stage 2 Mild CKD (GFR = 60-89 mL/min/1 73 square meters)  •  Stage 3A Moderate CKD (GFR = 45-59 mL/min/1 73 square meters)  •  Stage 3B Moderate CKD (GFR = 30-44 mL/min/1 73 square meters)  •  Stage 4 Severe CKD (GFR = 15-29 mL/min/1 73 square meters)  •  Stage 5 End Stage CKD (GFR <15 mL/min/1 73 square meters)  Note: GFR calculation is accurate only with a steady state creatinine    CBC and differential [[id number]]  (Abnormal) Collected: 06/15/23 1925    Lab Status: Final result Specimen: Blood from Arm, Right Updated: 06/15/23 1954     WBC 9 58 Thousand/uL      RBC 4 20 Million/uL      Hemoglobin 12 9 g/dL      Hematocrit 42 5 %       fL      MCH 30 7 pg      MCHC 30 4 g/dL      RDW 15 1 %      MPV 10 0 fL      Platelets 081 Thousands/uL      nRBC 0 /100 WBCs      Neutrophils Relative 59 %      Immat GRANS % 1 %      Lymphocytes Relative 30 %      Monocytes Relative 7 %      Eosinophils Relative 2 %      Basophils Relative 1 %      Neutrophils Absolute 5 74 Thousands/µL      Immature Grans Absolute 0 05 Thousand/uL      Lymphocytes Absolute 2 89 Thousands/µL      Monocytes Absolute 0 63 Thousand/µL      Eosinophils Absolute 0 21 Thousand/µL      Basophils Absolute 0 06 Thousands/µL     FLU/RSV/COVID - if FLU/RSV clinically relevant [[id number]] Collected: 06/15/23 1925    Lab Status: In process Specimen: Nares from Nasopharyngeal Swab Updated: 06/15/23 1950                 XR chest 2 views   ED Interpretation by Desirae Tan DO (06/15 2031)   No acute cardiopulmonary process            Procedures  Procedures      ED Course  ED Course as of 06/15/23 2113   Thu Jose Rafael 15, 2023   2032 Pt feeling a little better, still wheezing, doesn't want anther neb tx   Will reach out to [facility] for admission   2104 ECG 12 lead  This ECG was interpreted by me  The heart rate is 90, which is normal  The rhythm is regular  The axis is normal  The P waves are normal and the ID interval is normal  The QRS height is normal and width is normal  The ST segments are not elevated or depressed  The T waves are normal  The QT segment is normal  This ecg shows sinus rhythm  SBIRT 20yo+    Flowsheet Row Most Recent Value   Initial Alcohol Screen: US AUDIT-C     1  How often do you have a drink containing alcohol? 0 Filed at: 06/15/2023 1850   2  How many drinks containing alcohol do you have on a typical day you are drinking? 0 Filed at: 06/15/2023 1850   3b  FEMALE Any Age, or MALE 65+: How often do you have 4 or more drinks on one occassion? 0 Filed at: 06/15/2023 1850   Audit-C Score 0 Filed at: 06/15/2023 1850   MANOJ: How many times in the past year have you    Used an illegal drug or used a prescription medication for non-medical reasons? Never Filed at: 06/15/2023 [address]  59-year-old female with history of COPD presents with shortness of breath  Patient has wheezing, so this is most likely a COPD exacerbation  Is also concern for arrhythmia, ACS, pneumonia, pneumothorax, or other acute cardiopulmonary process  Will evaluate with CBC, CMP, troponin, EKG, and chest x-ray  Will give heart neb and steroids and reevaluate  Discussed admission versus home if patient feels better, but patient would feel more comfortable with admission since she cannot  her medications that her primary care physician prescribed  Patient felt a little better after treatment, but still had a significant amount of wheezing  Case was discussed with medicine and patient was admitted for further work-up and evaluation  COPD exacerbation ([facility] [address] ): acute illness or injury  Amount and/or Complexity of Data Reviewed  External Data Reviewed: notes       Details: Reviewed past medical history and medications  Reviewed PCP note from 6/13  Labs: ordered  Radiology: ordered and independent interpretation performed  ECG/medicine tests: ordered and independent interpretation performed  Decision-making details documented in ED Course  Risk  Prescription drug management  Decision regarding hospitalization  Risk Details: Patient is at increased risk due to chronic medical conditions such as COPD and hyperlipidemia  Disposition  Final diagnoses:   COPD exacerbation ([address] )     Time reflects when diagnosis was documented in both MDM as applicable and the Disposition within this note     Time User Action Codes Description Comment    6/15/2023  8:46 PM Berlin Vann Add [J44 1] COPD exacerbation [facility])       ED Disposition     ED Disposition   Admit    Condition   Stable    Date/Time   Thu Jose Rafael 15, 2023  8:46 PM    Comment   Case was discussed with Johnny Brown PA-C and the patient's admission status was agreed to be Admission Status: observation status to the service of Dr Martina Churchill   Follow-up Information    None         Patient's Medications   Discharge Prescriptions    No medications on file     No discharge procedures on file  PDMP Review       Value Time User    PDMP Reviewed  Yes 5/21/2023  9:26 PM Anisha Taveras PA-C           ED Provider  Attending physically available and evaluated [facility]  I managed the patient along with the ED Attending      Electronically Signed by         Jose Matta DO  06/15/23 6062

## 2023-06-16 PROBLEM — R65.10 SIRS (SYSTEMIC INFLAMMATORY RESPONSE SYNDROME) (HCC): Status: ACTIVE | Noted: 2023-06-16

## 2023-06-16 LAB
ANION GAP SERPL CALCULATED.3IONS-SCNC: 6 MMOL/L (ref 4–13)
ARTERIAL PATENCY WRIST A: YES
ARTERIAL PATENCY WRIST A: YES
ATRIAL RATE: 90 BPM
BASE EXCESS BLDA CALC-SCNC: 3.1 MMOL/L
BASE EXCESS BLDA CALC-SCNC: 3.5 MMOL/L
BASOPHILS # BLD AUTO: 0.01 THOUSANDS/ÂΜL (ref 0–0.1)
BASOPHILS NFR BLD AUTO: 0 % (ref 0–1)
BUN SERPL-MCNC: 11 MG/DL (ref 5–25)
CALCIUM SERPL-MCNC: 8.9 MG/DL (ref 8.4–10.2)
CHLORIDE SERPL-SCNC: 105 MMOL/L (ref 96–108)
CO2 SERPL-SCNC: 31 MMOL/L (ref 21–32)
CREAT SERPL-MCNC: 1.05 MG/DL (ref 0.6–1.3)
EOSINOPHIL # BLD AUTO: 0 THOUSAND/ÂΜL (ref 0–0.61)
EOSINOPHIL NFR BLD AUTO: 0 % (ref 0–6)
ERYTHROCYTE [DISTWIDTH] IN BLOOD BY AUTOMATED COUNT: 14.9 % (ref 11.6–15.1)
GFR SERPL CREATININE-BSD FRML MDRD: 58 ML/MIN/1.73SQ M
GLUCOSE SERPL-MCNC: 149 MG/DL (ref 65–140)
HCO3 BLDA-SCNC: 29.8 MMOL/L (ref 22–28)
HCO3 BLDA-SCNC: 30.2 MMOL/L (ref 22–28)
HCT VFR BLD AUTO: 41.9 % (ref 34.8–46.1)
HGB BLD-MCNC: 13 G/DL (ref 11.5–15.4)
IMM GRANULOCYTES # BLD AUTO: 0.06 THOUSAND/UL (ref 0–0.2)
IMM GRANULOCYTES NFR BLD AUTO: 1 % (ref 0–2)
LYMPHOCYTES # BLD AUTO: 0.68 THOUSANDS/ÂΜL (ref 0.6–4.47)
LYMPHOCYTES NFR BLD AUTO: 8 % (ref 14–44)
MCH RBC QN AUTO: 31.1 PG (ref 26.8–34.3)
MCHC RBC AUTO-ENTMCNC: 31 G/DL (ref 31.4–37.4)
MCV RBC AUTO: 100 FL (ref 82–98)
MONOCYTES # BLD AUTO: 0.04 THOUSAND/ÂΜL (ref 0.17–1.22)
MONOCYTES NFR BLD AUTO: 1 % (ref 4–12)
NASAL CANNULA: 2
NEUTROPHILS # BLD AUTO: 7.55 THOUSANDS/ÂΜL (ref 1.85–7.62)
NEUTS SEG NFR BLD AUTO: 90 % (ref 43–75)
NON VENT ROOM AIR: 21 %
NRBC BLD AUTO-RTO: 0 /100 WBCS
O2 CT BLDA-SCNC: 16.8 ML/DL (ref 16–23)
O2 CT BLDA-SCNC: 17.4 ML/DL (ref 16–23)
OXYHGB MFR BLDA: 90.1 % (ref 94–97)
OXYHGB MFR BLDA: 90.6 % (ref 94–97)
P AXIS: 69 DEGREES
PCO2 BLDA: 52.1 MM HG (ref 36–44)
PCO2 BLDA: 57.3 MM HG (ref 36–44)
PH BLDA: 7.34 [PH] (ref 7.35–7.45)
PH BLDA: 7.38 [PH] (ref 7.35–7.45)
PLATELET # BLD AUTO: 207 THOUSANDS/UL (ref 149–390)
PMV BLD AUTO: 9.8 FL (ref 8.9–12.7)
PO2 BLDA: 62 MM HG (ref 75–129)
PO2 BLDA: 66.6 MM HG (ref 75–129)
POTASSIUM SERPL-SCNC: 4.4 MMOL/L (ref 3.5–5.3)
PR INTERVAL: 144 MS
PROCALCITONIN SERPL-MCNC: <0.05 NG/ML
QRS AXIS: 68 DEGREES
QRSD INTERVAL: 76 MS
QT INTERVAL: 368 MS
QTC INTERVAL: 450 MS
RBC # BLD AUTO: 4.18 MILLION/UL (ref 3.81–5.12)
SODIUM SERPL-SCNC: 142 MMOL/L (ref 135–147)
SPECIMEN SOURCE: ABNORMAL
SPECIMEN SOURCE: ABNORMAL
T WAVE AXIS: 69 DEGREES
VENTRICULAR RATE: 90 BPM
WBC # BLD AUTO: 8.34 THOUSAND/UL (ref 4.31–10.16)

## 2023-06-16 PROCEDURE — 85025 COMPLETE CBC W/AUTO DIFF WBC: CPT

## 2023-06-16 PROCEDURE — 94002 VENT MGMT INPAT INIT DAY: CPT

## 2023-06-16 PROCEDURE — 99223 1ST HOSP IP/OBS HIGH 75: CPT | Performed by: HOSPITALIST

## 2023-06-16 PROCEDURE — 82805 BLOOD GASES W/O2 SATURATION: CPT

## 2023-06-16 PROCEDURE — 94760 N-INVAS EAR/PLS OXIMETRY 1: CPT

## 2023-06-16 PROCEDURE — 94664 DEMO&/EVAL PT USE INHALER: CPT

## 2023-06-16 PROCEDURE — 36600 WITHDRAWAL OF ARTERIAL BLOOD: CPT

## 2023-06-16 PROCEDURE — 94640 AIRWAY INHALATION TREATMENT: CPT

## 2023-06-16 PROCEDURE — 93010 ELECTROCARDIOGRAM REPORT: CPT | Performed by: INTERNAL MEDICINE

## 2023-06-16 PROCEDURE — 80048 BASIC METABOLIC PNL TOTAL CA: CPT

## 2023-06-16 RX ORDER — TRAZODONE HYDROCHLORIDE 100 MG/1
100 TABLET ORAL ONCE
Status: COMPLETED | OUTPATIENT
Start: 2023-06-16 | End: 2023-06-16

## 2023-06-16 RX ORDER — GUAIFENESIN 600 MG/1
600 TABLET, EXTENDED RELEASE ORAL EVERY 12 HOURS SCHEDULED
Status: DISCONTINUED | OUTPATIENT
Start: 2023-06-16 | End: 2023-06-17

## 2023-06-16 RX ORDER — ACETAMINOPHEN 325 MG/1
650 TABLET ORAL EVERY 6 HOURS PRN
Status: DISCONTINUED | OUTPATIENT
Start: 2023-06-16 | End: 2023-06-17

## 2023-06-16 RX ORDER — HYDROXYZINE HYDROCHLORIDE 25 MG/1
50 TABLET, FILM COATED ORAL 3 TIMES DAILY PRN
Status: DISCONTINUED | OUTPATIENT
Start: 2023-06-16 | End: 2023-06-17

## 2023-06-16 RX ORDER — MELOXICAM 7.5 MG/1
7.5 TABLET ORAL DAILY
Status: DISCONTINUED | OUTPATIENT
Start: 2023-06-16 | End: 2023-06-16

## 2023-06-16 RX ORDER — LEVALBUTEROL INHALATION SOLUTION 1.25 MG/3ML
1.25 SOLUTION RESPIRATORY (INHALATION)
Status: DISCONTINUED | OUTPATIENT
Start: 2023-06-16 | End: 2023-06-17

## 2023-06-16 RX ORDER — ATORVASTATIN CALCIUM 20 MG/1
20 TABLET, FILM COATED ORAL
Status: DISCONTINUED | OUTPATIENT
Start: 2023-06-16 | End: 2023-06-17 | Stop reason: HOSPADM

## 2023-06-16 RX ORDER — BENZONATATE 100 MG/1
100 CAPSULE ORAL 3 TIMES DAILY PRN
Status: DISCONTINUED | OUTPATIENT
Start: 2023-06-16 | End: 2023-06-17

## 2023-06-16 RX ORDER — ARIPIPRAZOLE 10 MG/1
10 TABLET ORAL
Status: DISCONTINUED | OUTPATIENT
Start: 2023-06-16 | End: 2023-06-17 | Stop reason: HOSPADM

## 2023-06-16 RX ORDER — METHYLPREDNISOLONE SODIUM SUCCINATE 40 MG/ML
40 INJECTION, POWDER, LYOPHILIZED, FOR SOLUTION INTRAMUSCULAR; INTRAVENOUS EVERY 8 HOURS
Status: DISCONTINUED | OUTPATIENT
Start: 2023-06-16 | End: 2023-06-17

## 2023-06-16 RX ORDER — LANOLIN ALCOHOL/MO/W.PET/CERES
100 CREAM (GRAM) TOPICAL DAILY
Status: DISCONTINUED | OUTPATIENT
Start: 2023-06-16 | End: 2023-06-17 | Stop reason: HOSPADM

## 2023-06-16 RX ORDER — DOCUSATE SODIUM 100 MG/1
100 CAPSULE, LIQUID FILLED ORAL 2 TIMES DAILY
Status: DISCONTINUED | OUTPATIENT
Start: 2023-06-16 | End: 2023-06-17

## 2023-06-16 RX ORDER — GUAIFENESIN/DEXTROMETHORPHAN 100-10MG/5
10 SYRUP ORAL EVERY 4 HOURS PRN
Status: DISCONTINUED | OUTPATIENT
Start: 2023-06-16 | End: 2023-06-17

## 2023-06-16 RX ORDER — CYCLOBENZAPRINE HCL 10 MG
5 TABLET ORAL DAILY PRN
Status: DISCONTINUED | OUTPATIENT
Start: 2023-06-15 | End: 2023-06-17

## 2023-06-16 RX ORDER — CLONAZEPAM 0.5 MG/1
0.5 TABLET ORAL DAILY
Status: DISCONTINUED | OUTPATIENT
Start: 2023-06-16 | End: 2023-06-17 | Stop reason: HOSPADM

## 2023-06-16 RX ORDER — SODIUM CHLORIDE FOR INHALATION 0.9 %
3 VIAL, NEBULIZER (ML) INHALATION
Status: DISCONTINUED | OUTPATIENT
Start: 2023-06-16 | End: 2023-06-16

## 2023-06-16 RX ORDER — FLUTICASONE FUROATE AND VILANTEROL 100; 25 UG/1; UG/1
1 POWDER RESPIRATORY (INHALATION)
Status: DISCONTINUED | OUTPATIENT
Start: 2023-06-16 | End: 2023-06-17 | Stop reason: HOSPADM

## 2023-06-16 RX ORDER — DULOXETIN HYDROCHLORIDE 60 MG/1
60 CAPSULE, DELAYED RELEASE ORAL 2 TIMES DAILY
Status: DISCONTINUED | OUTPATIENT
Start: 2023-06-16 | End: 2023-06-17 | Stop reason: HOSPADM

## 2023-06-16 RX ORDER — CLONAZEPAM 1 MG/1
1 TABLET ORAL
Status: DISCONTINUED | OUTPATIENT
Start: 2023-06-16 | End: 2023-06-17 | Stop reason: HOSPADM

## 2023-06-16 RX ORDER — HEPARIN SODIUM 5000 [USP'U]/ML
5000 INJECTION, SOLUTION INTRAVENOUS; SUBCUTANEOUS EVERY 8 HOURS SCHEDULED
Status: DISCONTINUED | OUTPATIENT
Start: 2023-06-16 | End: 2023-06-17 | Stop reason: HOSPADM

## 2023-06-16 RX ORDER — ALBUTEROL SULFATE 90 UG/1
2 AEROSOL, METERED RESPIRATORY (INHALATION) EVERY 6 HOURS PRN
Status: DISCONTINUED | OUTPATIENT
Start: 2023-06-15 | End: 2023-06-17

## 2023-06-16 RX ORDER — MAGNESIUM HYDROXIDE/ALUMINUM HYDROXICE/SIMETHICONE 120; 1200; 1200 MG/30ML; MG/30ML; MG/30ML
30 SUSPENSION ORAL EVERY 6 HOURS PRN
Status: DISCONTINUED | OUTPATIENT
Start: 2023-06-16 | End: 2023-06-17

## 2023-06-16 RX ORDER — NICOTINE 21 MG/24HR
1 PATCH, TRANSDERMAL 24 HOURS TRANSDERMAL DAILY
Status: DISCONTINUED | OUTPATIENT
Start: 2023-06-16 | End: 2023-06-17 | Stop reason: HOSPADM

## 2023-06-16 RX ORDER — FOLIC ACID 1 MG/1
1 TABLET ORAL DAILY
Status: DISCONTINUED | OUTPATIENT
Start: 2023-06-16 | End: 2023-06-17

## 2023-06-16 RX ADMIN — IPRATROPIUM BROMIDE 0.5 MG: 0.5 SOLUTION RESPIRATORY (INHALATION) at 13:12

## 2023-06-16 RX ADMIN — LEVALBUTEROL HYDROCHLORIDE 1.25 MG: 1.25 SOLUTION RESPIRATORY (INHALATION) at 13:12

## 2023-06-16 RX ADMIN — TRAZODONE HYDROCHLORIDE 100 MG: 100 TABLET ORAL at 21:04

## 2023-06-16 RX ADMIN — METHYLPREDNISOLONE SODIUM SUCCINATE 40 MG: 40 INJECTION, POWDER, FOR SOLUTION INTRAMUSCULAR; INTRAVENOUS at 17:25

## 2023-06-16 RX ADMIN — IPRATROPIUM BROMIDE 0.5 MG: 0.5 SOLUTION RESPIRATORY (INHALATION) at 07:41

## 2023-06-16 RX ADMIN — ATORVASTATIN CALCIUM 20 MG: 20 TABLET, FILM COATED ORAL at 17:25

## 2023-06-16 RX ADMIN — GUAIFENESIN 600 MG: 600 TABLET, EXTENDED RELEASE ORAL at 08:31

## 2023-06-16 RX ADMIN — LEVALBUTEROL HYDROCHLORIDE 1.25 MG: 1.25 SOLUTION RESPIRATORY (INHALATION) at 07:41

## 2023-06-16 RX ADMIN — HEPARIN SODIUM 5000 UNITS: 5000 INJECTION INTRAVENOUS; SUBCUTANEOUS at 05:13

## 2023-06-16 RX ADMIN — ARIPIPRAZOLE 10 MG: 10 TABLET ORAL at 00:40

## 2023-06-16 RX ADMIN — DULOXETINE HYDROCHLORIDE 60 MG: 60 CAPSULE, DELAYED RELEASE ORAL at 08:27

## 2023-06-16 RX ADMIN — METHYLPREDNISOLONE SODIUM SUCCINATE 40 MG: 40 INJECTION, POWDER, FOR SOLUTION INTRAMUSCULAR; INTRAVENOUS at 11:46

## 2023-06-16 RX ADMIN — METHYLPREDNISOLONE SODIUM SUCCINATE 40 MG: 40 INJECTION, POWDER, FOR SOLUTION INTRAMUSCULAR; INTRAVENOUS at 02:53

## 2023-06-16 RX ADMIN — CLONAZEPAM 1 MG: 1 TABLET ORAL at 21:04

## 2023-06-16 RX ADMIN — THIAMINE HCL TAB 100 MG 100 MG: 100 TAB at 08:27

## 2023-06-16 RX ADMIN — DULOXETINE HYDROCHLORIDE 60 MG: 60 CAPSULE, DELAYED RELEASE ORAL at 17:24

## 2023-06-16 RX ADMIN — CLONAZEPAM 0.5 MG: 0.5 TABLET ORAL at 08:27

## 2023-06-16 RX ADMIN — FLUTICASONE FUROATE AND VILANTEROL TRIFENATATE 1 PUFF: 100; 25 POWDER RESPIRATORY (INHALATION) at 08:27

## 2023-06-16 RX ADMIN — AZITHROMYCIN 500 MG: 500 INJECTION, POWDER, LYOPHILIZED, FOR SOLUTION INTRAVENOUS at 23:55

## 2023-06-16 RX ADMIN — GUAIFENESIN 600 MG: 600 TABLET, EXTENDED RELEASE ORAL at 00:40

## 2023-06-16 RX ADMIN — ARIPIPRAZOLE 10 MG: 10 TABLET ORAL at 21:04

## 2023-06-16 RX ADMIN — GUAIFENESIN 600 MG: 600 TABLET, EXTENDED RELEASE ORAL at 21:04

## 2023-06-16 RX ADMIN — HEPARIN SODIUM 5000 UNITS: 5000 INJECTION INTRAVENOUS; SUBCUTANEOUS at 21:04

## 2023-06-16 RX ADMIN — HEPARIN SODIUM 5000 UNITS: 5000 INJECTION INTRAVENOUS; SUBCUTANEOUS at 13:00

## 2023-06-16 RX ADMIN — FOLIC ACID 1 MG: 1 TABLET ORAL at 08:27

## 2023-06-16 RX ADMIN — AZITHROMYCIN 500 MG: 500 INJECTION, POWDER, LYOPHILIZED, FOR SOLUTION INTRAVENOUS at 00:40

## 2023-06-16 RX ADMIN — LEVALBUTEROL HYDROCHLORIDE 1.25 MG: 1.25 SOLUTION RESPIRATORY (INHALATION) at 20:24

## 2023-06-16 RX ADMIN — CLONAZEPAM 1 MG: 1 TABLET ORAL at 00:40

## 2023-06-16 RX ADMIN — MULTIPLE VITAMINS W/ MINERALS TAB 1 TABLET: TAB ORAL at 08:26

## 2023-06-16 RX ADMIN — IPRATROPIUM BROMIDE 0.5 MG: 0.5 SOLUTION RESPIRATORY (INHALATION) at 20:24

## 2023-06-16 RX ADMIN — HEPARIN SODIUM 5000 UNITS: 5000 INJECTION INTRAVENOUS; SUBCUTANEOUS at 00:40

## 2023-06-16 NOTE — ASSESSMENT & PLAN NOTE
· Mood stable   · Continue home medications of Abilify, Cymbalta, clonazepam, atarax prn  · PDMP reviewed

## 2023-06-16 NOTE — PLAN OF CARE
Problem: Potential for Falls  Goal: Patient will remain free of falls  Description: INTERVENTIONS:  - Educate patient/family on patient safety including physical limitations  - Instruct patient to call for assistance with activity   - Consult OT/PT to assist with strengthening/mobility   - Keep Call bell within reach  - Keep bed low and locked with side rails adjusted as appropriate  - Keep care items and personal belongings within reach  - Initiate and maintain comfort rounds  - Make Fall Risk Sign visible to staff  - Apply yellow socks and bracelet for high fall risk patients  - Consider moving patient to room near nurses station  Outcome: Progressing     Problem: PAIN - ADULT  Goal: Verbalizes/displays adequate comfort level or baseline comfort level  Description: Interventions:  - Encourage patient to monitor pain and request assistance  - Assess pain using appropriate pain scale  - Administer analgesics based on type and severity of pain and evaluate response  - Implement non-pharmacological measures as appropriate and evaluate response  - Consider cultural and social influences on pain and pain management  - Notify physician/advanced practitioner if interventions unsuccessful or patient reports new pain  Outcome: Progressing     Problem: INFECTION - ADULT  Goal: Absence or prevention of progression during hospitalization  Description: INTERVENTIONS:  - Assess and monitor for signs and symptoms of infection  - Monitor lab/diagnostic results  - Monitor all insertion sites, i e  indwelling lines, tubes, and drains  - Monitor endotracheal if appropriate and nasal secretions for changes in amount and color  - [place] appropriate cooling/warming therapies per order  - Administer medications as ordered  - Instruct and encourage patient and family to use good hand hygiene technique  - Identify and instruct in appropriate isolation precautions for identified infection/condition  Outcome: Progressing  Goal: Absence of fever/infection during neutropenic period  Description: INTERVENTIONS:  - Monitor WBC    Outcome: Progressing     Problem: SAFETY ADULT  Goal: Patient will remain free of falls  Description: INTERVENTIONS:  - Educate patient/family on patient safety including physical limitations  - Instruct patient to call for assistance with activity   - Consult OT/PT to assist with strengthening/mobility   - Keep Call bell within reach  - Keep bed low and locked with side rails adjusted as appropriate  - Keep care items and personal belongings within reach  - Initiate and maintain comfort rounds  - Apply yellow socks and bracelet for high fall risk patients  - Consider moving patient to room near nurses station  Outcome: Progressing  Goal: Maintain or return to baseline ADL function  Description: INTERVENTIONS:  -  Assess patient's ability to carry out ADLs; assess patient's baseline for ADL function and identify physical deficits which impact ability to perform ADLs (bathing, care of mouth/teeth, toileting, grooming, dressing, etc )  - Assess/evaluate cause of self-care deficits   - Assess range of motion  - Assess patient's mobility; develop plan if impaired  - Assess patient's need for assistive devices and provide as appropriate  - Encourage maximum independence but intervene and supervise when necessary  - Involve family in performance of ADLs  - Assess for home care needs following discharge   - Consider OT consult to assist with ADL evaluation and planning for discharge  - Provide patient education as appropriate  Outcome: Progressing  Goal: Maintains/Returns to pre admission functional level  Description: INTERVENTIONS:  - Perform BMAT or MOVE assessment daily    - Set and communicate daily mobility goal to care team and patient/family/caregiver     - Collaborate with rehabilitation services on mobility goals if consulted  - Out of bed for toileting  - Record patient progress and toleration of activity level   Outcome: Progressing     Problem: DISCHARGE PLANNING  Goal: Discharge to home or other facility with appropriate resources  Description: INTERVENTIONS:  - Identify barriers to discharge w/patient and caregiver  - Arrange for needed discharge resources and transportation as appropriate  - Identify discharge learning needs (meds, wound care, etc )  - Arrange for interpretive services to assist at discharge as needed  - Refer to Case Management Department for coordinating discharge planning if the patient needs post-hospital services based on physician/advanced practitioner order or complex needs related to functional status, cognitive ability, or social support system  Outcome: Progressing     Problem: Knowledge Deficit  Goal: Patient/family/caregiver demonstrates understanding of disease process, treatment plan, medications, and discharge instructions  Description: Complete learning assessment and assess knowledge base    Interventions:  - Provide teaching at level of understanding  - Provide teaching via preferred learning methods  Outcome: Progressing     Problem: RESPIRATORY - ADULT  Goal: Achieves optimal ventilation and oxygenation  Description: INTERVENTIONS:  - Assess for changes in respiratory status  - Assess for changes in mentation and behavior  - Position to facilitate oxygenation and minimize respiratory effort  - Oxygen administered by appropriate delivery if ordered  - Initiate smoking cessation education as indicated  - Encourage broncho-pulmonary hygiene including cough, deep breathe, Incentive Spirometry  - Assess the need for suctioning and aspirate as needed  - Assess and instruct to report SOB or any respiratory difficulty  - Respiratory Therapy support as indicated  Outcome: Progressing

## 2023-06-16 NOTE — ASSESSMENT & PLAN NOTE
· Patient currently 0 5 mg pack a day smoker   · Continue to encourage cessation  · Nicotine patch supplementation

## 2023-06-16 NOTE — QUICK NOTE
Patient noted to be hypercarbic with pCO2 of 52 1 at 0130 pH 7 375  BiPAP ordered at that time, which patient refused   Nursing reached out as patient continues to fall asleep during conversation his AM   · Will order repeat ABG to reassess  · Encourage patient compliance with BiPAP therapy

## 2023-06-16 NOTE — ASSESSMENT & PLAN NOTE
· History, patient denies current use   · Last admission 5/20-5/23 patient on CIWA, but did not have withdrawal symptoms   · Will place on CIWA for now   · Folate, thiamine supplementation  · UDS pending

## 2023-06-16 NOTE — UTILIZATION REVIEW
Initial Clinical Review    Admission: Date/Time/Statement:   Admission Orders (From admission, onward)     Ordered        06/15/23 2047  Place in Observation  Once                      Orders Placed This Encounter   Procedures   • Place in Observation     Standing Status:   Standing     Number of Occurrences:   1     Order Specific Question:   Level of Care     Answer:   Med Surg [16]     ED Arrival Information     Expected   -    Arrival   6/15/2023 18:29    Acuity   Urgent            Means of arrival   Walk-In    Escorted by   Family Member    Service   Hospitalist    Admission type   Emergency            Arrival complaint   SOB           Chief Complaint   Patient presents with   • Shortness of Breath     Shortness of breath on and off for the past 2 weeks, hx of copd       Initial Presentation: 62 y o  female  To ER from home  PMH  Anxiety, Chronic pain, Depression, Hyperlipidemia, Migraine, and Psychiatric disorder,  COPD w/multiple hospitalizations  Not on home oxygen or cpap  Presents w/ worsening SOB over past 3 days - now sob even at rest   Seen at office 2 days ago - prescribed steroid & abx, but pt did not pick these up from pharmacy  States she has been taking albuterol inhaler at home  Pt drifts off during conversation    IN ER,  satting in low 90's on rm air   Breath sounds w/wheezing,  Productive cough  CXR  Similar to previous  (no acute disease)  H/o alcohol abuse  Admit IP status ,  MS Level of care for acute COPD exacerbation associated w/hypoxia and hypercarbia:  Will treat w/IV steroids/ IV abx/ nebs/pulmonary consult  Closely monitor resp status - wean supplemental oxygen as tolerated  Cont infectious workup including procal    CIWA monitoring, initiate Vit B supplementation  Resp therapy to assess q shift  (pt refuses CPAP @ HS)       Date:   6/16      Day 2:     Patient noted to be hypercarbic with pCO2 of 52 1 at 0130 pH 7 375   BiPAP ordered at that time, which patient refused  Nursing reached out as patient continues to fall asleep during conversation his AM    Will order repeat ABG to reassess - continues to refuse bipap     Shows no active signs withdrawal  (WA protocol dc'ed)      ED Triage Vitals   Temperature Pulse Respirations Blood Pressure SpO2   06/15/23 1837 06/15/23 1837 06/15/23 1837 06/15/23 1837 06/15/23 1837   98 2 °F (36 8 °C) 96 19 125/58 95 %      Temp Source Heart Rate Source Patient Position - Orthostatic VS BP Location FiO2 (%)   06/15/23 1837 06/15/23 1837 06/15/23 1837 06/15/23 1837 --   Oral Monitor Sitting Right arm       Pain Score       06/15/23 2300       No Pain          Wt Readings from Last 1 Encounters:   06/15/23 85 7 kg (188 lb 15 oz)     Additional Vital Signs:   06/16/23 19:30:22 98 3 °F (36 8 °C) 103 20 111/70 84 93 % -- -- --   06/16/23 15:31:51 98 °F (36 7 °C) 113 Abnormal  20 110/71 84 93 % -- -- --   06/16/23 1314 -- -- -- -- -- -- 24 1 L/min Nasal cannula   06/16/23 0900 -- -- -- -- -- 94 % -- -- None (Room air)    06/16/23 08:24:46 97 8 °F (36 6 °C) 101 20 120/74 89 94 % -- -- --   06/16/23 0746 -- -- -- -- -- -- 24 1 L/min Nasal cannula   06/16/23 0007 -- -- -- -- -- 90 % -- -- None (Room air)   06/15/23 2245 98 5 °F (36 9 °C) 106 Abnormal  -- 120/73 89 90 % -- -- --   06/15/23 22:42:52 98 5 °F (36 9 °C) 102 18 120/73 89 91 % -- -- --   06/15/23 2217 -- 103 24  115/68 87 94 % -- -- None (Room air)   06/15/23 2045 -- 104 22 121/74 93 93 % -- -- None (Room air)   06/15/23 1852 -- -- -- -- -- -- -- -- None (Room air)     Pertinent Labs/Diagnostic Test Results:     6/15  EKG - nsr     XR chest 2 views   ED Interpretation by Broderick Espinosa DO (06/15 2031)   No acute cardiopulmonary process   Findings are stable        Results from last 7 days   Lab Units 06/15/23  1925   SARS-COV-2  Negative     Results from last 7 days   Lab Units 06/16/23  0448 06/15/23  1925   WBC Thousand/uL 8 34 9 58   HEMOGLOBIN g/dL 13 0 12 9   HEMATOCRIT % 41 9 42 5 PLATELETS Thousands/uL 207 222   NEUTROS ABS Thousands/µL 7 55 5 74         Results from last 7 days   Lab Units 06/16/23  0448 06/15/23  1925   SODIUM mmol/L 142 139   POTASSIUM mmol/L 4 4 4 0   CHLORIDE mmol/L 105 103   CO2 mmol/L 31 33*   ANION GAP mmol/L 6 3*   BUN mg/dL 11 8   CREATININE mg/dL 1 05 0 99   EGFR ml/min/1 73sq m 58 63   CALCIUM mg/dL 8 9 9 0     Results from last 7 days   Lab Units 06/15/23  1925   AST U/L 10*   ALT U/L 14   ALK PHOS U/L 81   TOTAL PROTEIN g/dL 6 4   ALBUMIN g/dL 3 6   TOTAL BILIRUBIN mg/dL 0 24         Results from last 7 days   Lab Units 06/16/23  0448 06/15/23  1925   GLUCOSE RANDOM mg/dL 149* 107       Results from last 7 days   Lab Units 06/16/23  0557 06/16/23  0109   PH ART  7 340* 7 375   PCO2 ART mm Hg 57 3* 52 1*   PO2 ART mm Hg 66 6* 62 0*   HCO3 ART mmol/L 30 2* 29 8*   BASE EXC ART mmol/L 3 1 3 5   O2 CONTENT ART mL/dL 16 8 17 4   O2 HGB, ARTERIAL % 90 6* 90 1*   ABG SOURCE  Radial, Right Radial, Left     Results from last 7 days   Lab Units 06/15/23  1925   HS TNI 0HR ng/L 4     Results from last 7 days   Lab Units 06/15/23  1925   PROCALCITONIN ng/ml <0 05     Results from last 7 days   Lab Units 06/15/23  1925   INFLUENZA A PCR  Negative   INFLUENZA B PCR  Negative   RSV PCR  Negative   ED Treatment:   Medication Administration from 06/15/2023 1829 to 06/15/2023 2234       Date/Time Order Dose Route Action     06/15/2023 1930 EDT albuterol inhalation solution 10 mg 10 mg Nebulization Given     06/15/2023 1930 EDT ipratropium (ATROVENT) 0 02 % inhalation solution 1 mg 1 mg Nebulization Given     06/15/2023 1930 EDT sodium chloride 0 9 % inhalation solution 3 mL 3 mL Nebulization Given     06/15/2023 1925 EDT methylPREDNISolone sodium succinate (Solu-MEDROL) injection 125 mg 125 mg Intravenous Given        Past Medical History:   Diagnosis Date   • Anxiety    • Chronic pain     Back Pain   • Depression    • Hyperlipidemia    • Migraine    • Psychiatric disorder Present on Admission:  • COPD exacerbation (HCC)  • Tobacco abuse  • Alcohol abuse  • Generalized anxiety disorder      Admitting Diagnosis: SOB (shortness of breath) [R06 02]  COPD exacerbation (HCC) [J44 1]  Age/Sex: 62 y o  female     Admission Orders:   See above note  Continuous pulse oximetry  OOB as tolerated  Scheduled Medications:  ARIPiprazole, 10 mg, Oral, HS  atorvastatin, 20 mg, Oral, Daily With Dinner  azithromycin, 500 mg, Intravenous, Q24H  clonazePAM, 0 5 mg, Oral, Daily  clonazePAM, 1 mg, Oral, HS  docusate sodium, 100 mg, Oral, BID  DULoxetine, 60 mg, Oral, BID  Fluticasone Furoate-Vilanterol, 1 puff, Inhalation, Daily  folic acid, 1 mg, Oral, Daily  guaiFENesin, 600 mg, Oral, Q12H AV  heparin (porcine), 5,000 Units, Subcutaneous, Q8H AV  ipratropium, 0 5 mg, Nebulization, TID  levalbuterol, 1 25 mg, Nebulization, TID  methylPREDNISolone sodium succinate, 40 mg, Intravenous, Q8H  multivitamin-minerals, 1 tablet, Oral, Daily  nicotine, 1 patch, Transdermal, Daily  thiamine, 100 mg, Oral, Daily      Continuous IV Infusions:     PRN Meds:  acetaminophen, 650 mg, Oral, Q6H PRN  albuterol, 2 puff, Inhalation, Q6H PRN  aluminum-magnesium hydroxide-simethicone, 30 mL, Oral, Q6H PRN  benzonatate, 100 mg, Oral, TID PRN  cyclobenzaprine, 5 mg, Oral, Daily PRN  dextromethorphan-guaiFENesin, 10 mL, Oral, Q4H PRN  hydrOXYzine HCL, 50 mg, Oral, TID PRN          Network Utilization Review Department  ATTENTION: Please call with any questions or concerns to [phone] and carefully listen to the prompts so that you are directed to the right person  All voicemails are confidential   Jerral Sep all requests for admission clinical reviews, approved or denied determinations and any other requests to dedicated fax number below belonging to the campus where the patient is receiving treatment   List of dedicated fax numbers for the Facilities:  FACILITY NAME UR FAX NUMBER   ADMISSION DENIALS (Administrative/Medical Necessity) [phone]   [address] (Maternity/NICU/Pediatrics) Kaci Vásquez [address] [place]a Figueroa  [phone]   [address] [place] [place] [id number] [place] [address] [address] [facility] [place] [address] [facility] [phone]

## 2023-06-16 NOTE — ASSESSMENT & PLAN NOTE
· Patient with tachycardia and tachypnea  · Likely in the setting of COPD exacerbation  · WBC 9, afebrile   · CXR without acute disease, official read pending  · Continue to monitor for worsening infection  · procalc pending

## 2023-06-16 NOTE — PLAN OF CARE
Problem: Potential for Falls  Goal: Patient will remain free of falls  Description: INTERVENTIONS:  - Educate patient/family on patient safety including physical limitations  - Instruct patient to call for assistance with activity   - Consult OT/PT to assist with strengthening/mobility   - Keep Call bell within reach  - Keep bed low and locked with side rails adjusted as appropriate  - Keep care items and personal belongings within reach  - Initiate and maintain comfort rounds  - Make Fall Risk Sign visible to staff  - Offer Toileting every  Hours, in advance of need  - Initiate/Maintain alarm  - Obtain necessary fall risk management equipment:   - Apply yellow socks and bracelet for high fall risk patients  - Consider moving patient to room near nurses station  Outcome: Progressing     Problem: PAIN - ADULT  Goal: Verbalizes/displays adequate comfort level or baseline comfort level  Description: Interventions:  - Encourage patient to monitor pain and request assistance  - Assess pain using appropriate pain scale  - Administer analgesics based on type and severity of pain and evaluate response  - Implement non-pharmacological measures as appropriate and evaluate response  - Consider cultural and social influences on pain and pain management  - Notify physician/advanced practitioner if interventions unsuccessful or patient reports new pain  Outcome: Progressing     Problem: INFECTION - ADULT  Goal: Absence or prevention of progression during hospitalization  Description: INTERVENTIONS:  - Assess and monitor for signs and symptoms of infection  - Monitor lab/diagnostic results  - Monitor all insertion sites, i e  indwelling lines, tubes, and drains  - Monitor endotracheal if appropriate and nasal secretions for changes in amount and color  - [place] appropriate cooling/warming therapies per order  - Administer medications as ordered  - Instruct and encourage patient and family to use good hand hygiene technique  - Identify and instruct in appropriate isolation precautions for identified infection/condition  Outcome: Progressing  Goal: Absence of fever/infection during neutropenic period  Description: INTERVENTIONS:  - Monitor WBC    Outcome: Progressing     Problem: SAFETY ADULT  Goal: Patient will remain free of falls  Description: INTERVENTIONS:  - Educate patient/family on patient safety including physical limitations  - Instruct patient to call for assistance with activity   - Consult OT/PT to assist with strengthening/mobility   - Keep Call bell within reach  - Keep bed low and locked with side rails adjusted as appropriate  - Keep care items and personal belongings within reach  - Initiate and maintain comfort rounds  - Make Fall Risk Sign visible to staff  - Offer Toileting every  Hours, in advance of need  - Initiate/Maintain alarm  - Obtain necessary fall risk management equipment:   - Apply yellow socks and bracelet for high fall risk patients  - Consider moving patient to room near nurses station  Outcome: Progressing  Goal: Maintain or return to baseline ADL function  Description: INTERVENTIONS:  -  Assess patient's ability to carry out ADLs; assess patient's baseline for ADL function and identify physical deficits which impact ability to perform ADLs (bathing, care of mouth/teeth, toileting, grooming, dressing, etc )  - Assess/evaluate cause of self-care deficits   - Assess range of motion  - Assess patient's mobility; develop plan if impaired  - Assess patient's need for assistive devices and provide as appropriate  - Encourage maximum independence but intervene and supervise when necessary  - Involve family in performance of ADLs  - Assess for home care needs following discharge   - Consider OT consult to assist with ADL evaluation and planning for discharge  - Provide patient education as appropriate  Outcome: Progressing  Goal: Maintains/Returns to pre admission functional level  Description: INTERVENTIONS:  - Perform BMAT or MOVE assessment daily    - Set and communicate daily mobility goal to care team and patient/family/caregiver  - Collaborate with rehabilitation services on mobility goals if consulted  - Perform Range of Motion  times a day  - Reposition patient every  hours  - Dangle patient  times a day  - Stand patient  times a day  - Ambulate patient  times a day  - Out of bed to chair  times a day   - Out of bed for meals  times a day  - Out of bed for toileting  - Record patient progress and toleration of activity level   Outcome: Progressing     Problem: DISCHARGE PLANNING  Goal: Discharge to home or other facility with appropriate resources  Description: INTERVENTIONS:  - Identify barriers to discharge w/patient and caregiver  - Arrange for needed discharge resources and transportation as appropriate  - Identify discharge learning needs (meds, wound care, etc )  - Arrange for interpretive services to assist at discharge as needed  - Refer to Case Management Department for coordinating discharge planning if the patient needs post-hospital services based on physician/advanced practitioner order or complex needs related to functional status, cognitive ability, or social support system  Outcome: Progressing     Problem: Knowledge Deficit  Goal: Patient/family/caregiver demonstrates understanding of disease process, treatment plan, medications, and discharge instructions  Description: Complete learning assessment and assess knowledge base    Interventions:  - Provide teaching at level of understanding  - Provide teaching via preferred learning methods  Outcome: Progressing     Problem: RESPIRATORY - ADULT  Goal: Achieves optimal ventilation and oxygenation  Description: INTERVENTIONS:  - Assess for changes in respiratory status  - Assess for changes in mentation and behavior  - Position to facilitate oxygenation and minimize respiratory effort  - Oxygen administered by appropriate delivery if ordered  - Initiate smoking cessation education as indicated  - Encourage broncho-pulmonary hygiene including cough, deep breathe, Incentive Spirometry  - Assess the need for suctioning and aspirate as needed  - Assess and instruct to report SOB or any respiratory difficulty  - Respiratory Therapy support as indicated  Outcome: Progressing

## 2023-06-16 NOTE — NURSING NOTE
Patient showing no active signs of withdrawal  CIWA discontinued at time  Dr Gavin Mcleod made aware and approved      Amy Freada Cockayne 6/16/2023 3:48 PM

## 2023-06-16 NOTE — ASSESSMENT & PLAN NOTE
· Patient with PMHx of COPD presented to the ED with reports of 3 weeks of increased SOB, congestion, and productive cough  · Patient was evaluated by PCP on 6/13 and prescribed steroid taper, azithro, and mucinex which patient did not  secondary to cost  · Patient also drifting off at times during conversation  History of hypercarbia last admission  Pt reports that she thinks she is tired secondary to not sleeping with cough    · Currently stating in low 90's on RA, does not use oxygen or cpap at home  · CXR similar to previous, no acute cardiopulm disease, official read pending  · Resp protocol, aspiration precautions, supportive care  · Scheduled nebs  · Procalc pending, repeat in AM  · ABG pending  · Continue IV steriods  · IV azithromycin initiated

## 2023-06-16 NOTE — H&P
[facility]  H&P  Name: Karina Card 62 y o  female I MRN: [id number]  Unit/Bed#: [facility] [facility] -02 I Date of Admission: 6/15/2023   Date of Service: 6/16/2023 I Hospital Day: 0      Assessment/Plan   * COPD exacerbation [facility])  Assessment & Plan  · Patient with PMHx of COPD presented to the ED with reports of 3 weeks of increased SOB, congestion, and productive cough  · Patient was evaluated by PCP on 6/13 and prescribed steroid taper, azithro, and mucinex which patient did not  secondary to cost  · Patient also drifting off at times during conversation  History of hypercarbia last admission  Pt reports that she thinks she is tired secondary to not sleeping with cough    · Currently stating in low 90's on RA, does not use oxygen or cpap at home  · CXR similar to previous, no acute cardiopulm disease, official read pending  · Resp protocol, aspiration precautions, supportive care  · Scheduled nebs  · Procalc pending, repeat in AM  · ABG pending  · Continue IV steriods  · IV azithromycin initiated       Tobacco abuse  Assessment & Plan  · Patient currently 0 5 mg pack a day smoker   · Continue to encourage cessation  · Nicotine patch supplementation    SIRS (systemic inflammatory response syndrome) ([facility] [address] )  Assessment & Plan  · Patient with tachycardia and tachypnea  · Likely in the setting of COPD exacerbation  · WBC 9, afebrile   · CXR without acute disease, official read pending  · Continue to monitor for worsening infection  · procalc pending     Alcohol abuse  Assessment & Plan  · History, patient denies current use   · Last admission 5/20-5/23 patient on CIWA, but did not have withdrawal symptoms   · Will place on CIWA for now   · Folate, thiamine supplementation  · UDS pending     Generalized anxiety disorder  Assessment & Plan  · Mood stable   · Continue home medications of Abilify, Cymbalta, clonazepam, atarax prn  · PDMP reviewed       VTE Pharmacologic Prophylaxis: VTE "Score: 3 Moderate Risk (Score 3-4) - Pharmacological DVT Prophylaxis Ordered: heparin  Code Status: Level 3 - DNAR and DNI   Discussion with family: Patient declined call to   Anticipated Length of Stay: Patient will be admitted on an observation basis with an anticipated length of stay of less than 2 midnights secondary to COPD exacerbation  Total Time Spent on Date of Encounter in care of patient: 75 minutes This time was spent on one or more of the following: performing physical exam; counseling and coordination of care; obtaining or reviewing history; documenting in the medical record; reviewing/ordering tests, medications or procedures; communicating with other healthcare professionals and discussing with patient's family/caregivers  Chief Complaint: \"I've been SOB x 3 weeks\"    History of Present Illness:  Ashish Leung is a 62 y o  female with a PMH of COPD who presents with SOB, cough, congestion x 3 weeks  Patient reports 3 weeks of worsening shortness of breath cough and congestion  She reports that she saw her PCP on 6/13 who prescribed her medications which she did not  because she could not afford  Patient reports that her symptoms became worse today leading her to seek emergent care  Patient often drifting off during conversation reports that she has not been sleeping well secondary to her cough which she believes to be the cause  Patient does have history of hypercarbia on last admission for her COPD exacerbation last month  Patient does endorse some fatigue and some abdominal pain with coughing  She denies any other symptoms such as fever, chills, nausea, vomiting, sick contacts, headache, chest pain, palpitations, or pedal edema  Review of Systems:  Review of Systems   Constitutional: Positive for fatigue  Negative for appetite change, chills, diaphoresis and fever  HENT: Positive for congestion  Negative for rhinorrhea and sore throat      Eyes: Negative " for photophobia and visual disturbance  Respiratory: Positive for cough (productive), shortness of breath and wheezing  Cardiovascular: Negative for chest pain, palpitations and leg swelling  Gastrointestinal: Positive for abdominal pain (with coughing )  Negative for abdominal distention, blood in stool, constipation, diarrhea, nausea and vomiting  Genitourinary: Negative for dysuria and hematuria  Musculoskeletal: Negative for arthralgias, back pain, myalgias and neck stiffness  Skin: Negative for color change and rash  Neurological: Positive for weakness (generalized)  Negative for dizziness, seizures, syncope, light-headedness and headaches  Psychiatric/Behavioral: Negative for agitation, behavioral problems and confusion  The patient is not nervous/anxious  All other systems reviewed and are negative  Past Medical and Surgical History:   Past Medical History:   Diagnosis Date   • Anxiety    • Chronic pain     Back Pain   • Depression    • Hyperlipidemia    • Migraine    • Psychiatric disorder        Past Surgical History:   Procedure Laterality Date   • OTHER SURGICAL HISTORY      cyst removed from left axila       Meds/Allergies:  Prior to Admission medications    Medication Sig Start Date End Date Taking?  Authorizing Provider   albuterol (PROVENTIL HFA,VENTOLIN HFA) 90 mcg/act inhaler Inhale 2 puffs every 6 (six) hours as needed for wheezing or shortness of breath 5/23/23  Yes Geraldine Gannon MD   ARIPiprazole (ABILIFY) 10 mg tablet Take 1 Po Q HS 4/26/23  Yes FRANKLIN Kiran   azithromycin (ZITHROMAX) 250 mg tablet Take 2 tablets today then 1 tablet daily x 4 days 6/13/23 6/17/23 Yes Sammie Nelson MD   clonazePAM (KlonoPIN) 1 mg tablet Take 1/2 PO Q AM and 1 HS 4/26/23  Yes FRANKLIN Kiran   cyclobenzaprine (FLEXERIL) 5 mg tablet Take 5 mg by mouth daily as needed 11/3/22  Yes Historical Provider, MD   DULoxetine (CYMBALTA) 60 mg delayed release capsule Take 1 Po BID 4/26/23  Yes FRANKLIN Lizarraga   fluticasone [facility]) 50 mcg/act nasal spray  10/25/21  Yes Historical Provider, MD   Fluticasone-Salmeterol (Advair) 100-50 mcg/dose inhaler Inhale 1 puff 2 (two) times a day Rinse mouth after use  5/23/23  Yes Haylie Lopez MD   hydrOXYzine HCL (ATARAX) 50 mg tablet Take 1 PO TID PRN 4/26/23  Yes FRANKLIN Lizarraga   ipratropium-albuterol (DUO-NEB) 0 5-2 5 mg/3 mL nebulizer solution Take 3 mL by nebulization every 6 (six) hours 5/23/23 6/22/23 Yes Haylie Lopez MD   meloxicam (MOBIC) 7 5 mg tablet Take 7 5 mg by mouth daily 9/23/22 9/23/23 Yes Historical Provider, MD   traZODone (DESYREL) 100 mg tablet Take 2 1/2 P HS PRN 4/26/23  Yes FRANKLIN Lizarraga   atorvastatin (LIPITOR) 20 mg tablet Take 20 mg by mouth daily    Historical Provider, MD   guaiFENesin (ROBITUSSIN) 100 MG/5ML oral liquid Take 5-10 mL (100-200 mg total) by mouth every 4 (four) hours as needed for cough  Patient not taking: Reported on 6/15/2023 6/13/23   Jenness Kussmaul, MD   naproxen (Naprosyn) 500 mg tablet Take 1 tablet (500 mg total) by mouth 2 (two) times a day with meals  Patient not taking: Reported on 6/15/2023 4/6/23   Melba Bhatia PA-C   ondansetron (Zofran ODT) 4 mg disintegrating tablet Take 1 tablet (4 mg total) by mouth every 6 (six) hours as needed for nausea or vomiting  Patient not taking: Reported on 6/15/2023 4/6/23   Melba Bhatia PA-C   predniSONE 20 mg tablet Take 2 tablets (40 mg total) by mouth daily for 5 days  Patient not taking: Reported on 6/15/2023 6/13/23 6/18/23  Jenness Kussmaul, MD     I have reviewed home medications using recent Epic encounter  Allergies:    Allergies   Allergen Reactions   • Penicillins Anaphylaxis, Rash and Edema     Anaphylaxis (Tolerates IV ceftriaxone 9/7/22)       Social History:  Marital Status: Single   Patient Pre-hospital Living Situation: Home  Patient Pre-hospital Level of Mobility: walks  Patient Pre-hospital Diet Restrictions: None  Substance Use History:   Social History     Substance and Sexual Activity   Alcohol Use Not Currently     Social History     Tobacco Use   Smoking Status Every Day   • Packs/day: 0 50   • Years: 45 00   • Total pack years: 22 50   • Types: Cigarettes   • Passive exposure: Current   Smokeless Tobacco Former   Tobacco Comments    Encouraged to stop smoking  Social History     Substance and Sexual Activity   Drug Use Not Currently   • Types: Methamphetamines, Marijuana    Comment: reports last use years ago       Family History:  Family History   Problem Relation Age of Onset   • Heart failure Mother    • Heart disease Father        Physical Exam:     Vitals:   Blood Pressure: 120/73 (06/15/23 2245)  Pulse: (!) 106 (06/15/23 2245)  Temperature: 98 5 °F (36 9 °C) (06/15/23 2245)  Temp Source: Oral (06/15/23 1837)  Respirations: 18 (06/15/23 2242)  Weight - Scale: 85 7 kg (188 lb 15 oz) (06/15/23 1837)  SpO2: 90 % (06/15/23 2245)    Physical Exam  Vitals and nursing note reviewed  Constitutional:       General: She is not in acute distress  Appearance: She is well-developed  She is obese  She is not ill-appearing  Comments: Drifting off at times during conversation   HENT:      Head: Normocephalic and atraumatic  Nose: Congestion present  Mouth/Throat:      Mouth: Mucous membranes are moist       Pharynx: Oropharynx is clear  Eyes:      Conjunctiva/sclera: Conjunctivae normal    Cardiovascular:      Rate and Rhythm: Normal rate and regular rhythm  Heart sounds: Normal heart sounds  No murmur heard  No friction rub  No gallop  Pulmonary:      Effort: Pulmonary effort is normal  No respiratory distress  Breath sounds: Wheezing (diffuse expiratory) and rhonchi present  No rales  Abdominal:      General: Bowel sounds are normal  There is no distension  Palpations: Abdomen is soft  Tenderness: There is no abdominal tenderness  Musculoskeletal:      Cervical back: Neck supple  Right lower leg: No edema  Left lower leg: No edema  Skin:     General: Skin is warm and dry  Capillary Refill: Capillary refill takes less than 2 seconds  Neurological:      General: No focal deficit present  Mental Status: She is alert and oriented to person, place, and time  Mental status is at baseline  Psychiatric:         Mood and Affect: Mood normal          Behavior: Behavior normal           Additional Data:     Lab Results:  Results from last 7 days   Lab Units 06/15/23  1925   WBC Thousand/uL 9 58   HEMOGLOBIN g/dL 12 9   HEMATOCRIT % 42 5   PLATELETS Thousands/uL 222   NEUTROS PCT % 59   LYMPHS PCT % 30   MONOS PCT % 7   EOS PCT % 2     Results from last 7 days   Lab Units 06/15/23  1925   SODIUM mmol/L 139   POTASSIUM mmol/L 4 0   CHLORIDE mmol/L 103   CO2 mmol/L 33*   BUN mg/dL 8   CREATININE mg/dL 0 99   ANION GAP mmol/L 3*   CALCIUM mg/dL 9 0   ALBUMIN g/dL 3 6   TOTAL BILIRUBIN mg/dL 0 24   ALK PHOS U/L 81   ALT U/L 14   AST U/L 10*   GLUCOSE RANDOM mg/dL 107                       Lines/Drains:  Invasive Devices     Peripheral Intravenous Line  Duration           Peripheral IV 06/15/23 Left Antecubital <1 day                    Imaging: Reviewed radiology reports from this admission including: chest xray  XR chest 2 views   ED Interpretation by Fabián Meier DO (06/15 2031)   No acute cardiopulmonary process          EKG and Other Studies Reviewed on Admission:   · EKG: NSR  HR 90     ** Please Note: This note has been constructed using a voice recognition system   **

## 2023-06-17 ENCOUNTER — DOCUMENTATION (OUTPATIENT)
Dept: PSYCHIATRY | Facility: CLINIC | Age: 58
End: 2023-06-17

## 2023-06-17 VITALS
RESPIRATION RATE: 18 BRPM | TEMPERATURE: 98.2 F | OXYGEN SATURATION: 94 % | BODY MASS INDEX: 33.47 KG/M2 | WEIGHT: 188.93 LBS | DIASTOLIC BLOOD PRESSURE: 63 MMHG | SYSTOLIC BLOOD PRESSURE: 110 MMHG | HEART RATE: 97 BPM

## 2023-06-17 LAB — PROCALCITONIN SERPL-MCNC: <0.05 NG/ML

## 2023-06-17 PROCEDURE — 99238 HOSP IP/OBS DSCHRG MGMT 30/<: CPT | Performed by: INTERNAL MEDICINE

## 2023-06-17 PROCEDURE — 94760 N-INVAS EAR/PLS OXIMETRY 1: CPT

## 2023-06-17 PROCEDURE — 94660 CPAP INITIATION&MGMT: CPT

## 2023-06-17 PROCEDURE — 84145 PROCALCITONIN (PCT): CPT

## 2023-06-17 PROCEDURE — 94640 AIRWAY INHALATION TREATMENT: CPT

## 2023-06-17 RX ORDER — PREDNISONE 20 MG/1
20 TABLET ORAL DAILY
Qty: 7 TABLET | Refills: 0 | Status: SHIPPED | OUTPATIENT
Start: 2023-06-18

## 2023-06-17 RX ORDER — PREDNISONE 20 MG/1
20 TABLET ORAL DAILY
Status: DISCONTINUED | OUTPATIENT
Start: 2023-06-18 | End: 2023-06-17 | Stop reason: HOSPADM

## 2023-06-17 RX ORDER — NICOTINE 21 MG/24HR
1 PATCH, TRANSDERMAL 24 HOURS TRANSDERMAL DAILY
Qty: 28 PATCH | Refills: 0 | Status: SHIPPED | OUTPATIENT
Start: 2023-06-18

## 2023-06-17 RX ADMIN — LEVALBUTEROL HYDROCHLORIDE 1.25 MG: 1.25 SOLUTION RESPIRATORY (INHALATION) at 07:13

## 2023-06-17 RX ADMIN — METHYLPREDNISOLONE SODIUM SUCCINATE 40 MG: 40 INJECTION, POWDER, FOR SOLUTION INTRAMUSCULAR; INTRAVENOUS at 04:19

## 2023-06-17 RX ADMIN — MULTIPLE VITAMINS W/ MINERALS TAB 1 TABLET: TAB ORAL at 08:45

## 2023-06-17 RX ADMIN — THIAMINE HCL TAB 100 MG 100 MG: 100 TAB at 08:46

## 2023-06-17 RX ADMIN — HEPARIN SODIUM 5000 UNITS: 5000 INJECTION INTRAVENOUS; SUBCUTANEOUS at 14:15

## 2023-06-17 RX ADMIN — CLONAZEPAM 0.5 MG: 0.5 TABLET ORAL at 08:46

## 2023-06-17 RX ADMIN — DULOXETINE HYDROCHLORIDE 60 MG: 60 CAPSULE, DELAYED RELEASE ORAL at 08:46

## 2023-06-17 RX ADMIN — HEPARIN SODIUM 5000 UNITS: 5000 INJECTION INTRAVENOUS; SUBCUTANEOUS at 04:27

## 2023-06-17 RX ADMIN — FLUTICASONE FUROATE AND VILANTEROL TRIFENATATE 1 PUFF: 100; 25 POWDER RESPIRATORY (INHALATION) at 09:00

## 2023-06-17 RX ADMIN — ACETAMINOPHEN 325MG 650 MG: 325 TABLET ORAL at 14:17

## 2023-06-17 RX ADMIN — IPRATROPIUM BROMIDE 0.5 MG: 0.5 SOLUTION RESPIRATORY (INHALATION) at 07:13

## 2023-06-17 RX ADMIN — GUAIFENESIN 600 MG: 600 TABLET, EXTENDED RELEASE ORAL at 08:46

## 2023-06-17 RX ADMIN — FOLIC ACID 1 MG: 1 TABLET ORAL at 08:46

## 2023-06-17 NOTE — NURSING NOTE
Patient discharged at this time  IV removed  Discharge paperwork reviewed with verbal understanding  Left in stable condition

## 2023-06-17 NOTE — PLAN OF CARE
Problem: Potential for Falls  Goal: Patient will remain free of falls  Description: INTERVENTIONS:  - Educate patient/family on patient safety including physical limitations  - Instruct patient to call for assistance with activity   - Consult OT/PT to assist with strengthening/mobility   - Keep Call bell within reach  - Keep bed low and locked with side rails adjusted as appropriate  - Keep care items and personal belongings within reach  - Initiate and maintain comfort rounds  - Make Fall Risk Sign visible to staff  - Apply yellow socks and bracelet for high fall risk patients  - Consider moving patient to room near nurses station  6/17/2023 1721 by Lavonne Pantoja RN  Outcome: Completed  6/17/2023 1117 by Lavonne Pantoja RN  Outcome: Progressing     Problem: PAIN - ADULT  Goal: Verbalizes/displays adequate comfort level or baseline comfort level  Description: Interventions:  - Encourage patient to monitor pain and request assistance  - Assess pain using appropriate pain scale  - Administer analgesics based on type and severity of pain and evaluate response  - Implement non-pharmacological measures as appropriate and evaluate response  - Consider cultural and social influences on pain and pain management  - Notify physician/advanced practitioner if interventions unsuccessful or patient reports new pain  6/17/2023 1721 by Lavonne Pantoja RN  Outcome: Completed  6/17/2023 1117 by Lavonne Pantoja RN  Outcome: Progressing     Problem: INFECTION - ADULT  Goal: Absence or prevention of progression during hospitalization  Description: INTERVENTIONS:  - Assess and monitor for signs and symptoms of infection  - Monitor lab/diagnostic results  - Monitor all insertion sites, i e  indwelling lines, tubes, and drains  - Monitor endotracheal if appropriate and nasal secretions for changes in amount and color  - [place] appropriate cooling/warming therapies per order  - Administer medications as ordered  - Instruct and encourage patient and family to use good hand hygiene technique  - Identify and instruct in appropriate isolation precautions for identified infection/condition  6/17/2023 1721 by Henny Gamboa RN  Outcome: Completed  6/17/2023 1117 by Henny Gamboa RN  Outcome: Progressing  Goal: Absence of fever/infection during neutropenic period  Description: INTERVENTIONS:  - Monitor WBC    6/17/2023 1721 by Henny Gamboa RN  Outcome: Completed  6/17/2023 1117 by Henny Gamboa RN  Outcome: Progressing     Problem: SAFETY ADULT  Goal: Patient will remain free of falls  Description: INTERVENTIONS:  - Educate patient/family on patient safety including physical limitations  - Instruct patient to call for assistance with activity   - Consult OT/PT to assist with strengthening/mobility   - Keep Call bell within reach  - Keep bed low and locked with side rails adjusted as appropriate  - Keep care items and personal belongings within reach  - Initiate and maintain comfort rounds  - Make Fall Risk Sign visible to staff  - Apply yellow socks and bracelet for high fall risk patients  - Consider moving patient to room near nurses station  6/17/2023 1721 by Henny Gamboa RN  Outcome: Completed  6/17/2023 1117 by Henny Gamboa RN  Outcome: Progressing  Goal: Maintain or return to baseline ADL function  Description: INTERVENTIONS:  -  Assess patient's ability to carry out ADLs; assess patient's baseline for ADL function and identify physical deficits which impact ability to perform ADLs (bathing, care of mouth/teeth, toileting, grooming, dressing, etc )  - Assess/evaluate cause of self-care deficits   - Assess range of motion  - Assess patient's mobility; develop plan if impaired  - Assess patient's need for assistive devices and provide as appropriate  - Encourage maximum independence but intervene and supervise when necessary  - Involve family in performance of ADLs  - Assess for home care needs following discharge   - Consider OT consult to assist with ADL evaluation and planning for discharge  - Provide patient education as appropriate  6/17/2023 1721 by Yadi Quinn RN  Outcome: Completed  6/17/2023 1117 by Yadi Quinn RN  Outcome: Progressing  Goal: Maintains/Returns to pre admission functional level  Description: INTERVENTIONS:  - Perform BMAT or MOVE assessment daily    - Set and communicate daily mobility goal to care team and patient/family/caregiver  - Out of bed for toileting  - Record patient progress and toleration of activity level   6/17/2023 1721 by Yadi Quinn RN  Outcome: Completed  6/17/2023 1117 by Yadi Quinn RN  Outcome: Progressing     Problem: DISCHARGE PLANNING  Goal: Discharge to home or other facility with appropriate resources  Description: INTERVENTIONS:  - Identify barriers to discharge w/patient and caregiver  - Arrange for needed discharge resources and transportation as appropriate  - Identify discharge learning needs (meds, wound care, etc )  - Arrange for interpretive services to assist at discharge as needed  - Refer to Case Management Department for coordinating discharge planning if the patient needs post-hospital services based on physician/advanced practitioner order or complex needs related to functional status, cognitive ability, or social support system  6/17/2023 1721 by Yadi Quinn RN  Outcome: Completed  6/17/2023 1117 by Yadi Quinn RN  Outcome: Progressing     Problem: Knowledge Deficit  Goal: Patient/family/caregiver demonstrates understanding of disease process, treatment plan, medications, and discharge instructions  Description: Complete learning assessment and assess knowledge base    Interventions:  - Provide teaching at level of understanding  - Provide teaching via preferred learning methods  6/17/2023 1721 by Yadi Quinn RN  Outcome: Completed  6/17/2023 1117 by Yadi Quinn RN  Outcome: Progressing     Problem: RESPIRATORY - ADULT  Goal: Achieves optimal ventilation and oxygenation  Description: INTERVENTIONS:  - Assess for changes in respiratory status  - Assess for changes in mentation and behavior  - Position to facilitate oxygenation and minimize respiratory effort  - Oxygen administered by appropriate delivery if ordered  - Initiate smoking cessation education as indicated  - Encourage broncho-pulmonary hygiene including cough, deep breathe, Incentive Spirometry  - Assess the need for suctioning and aspirate as needed  - Assess and instruct to report SOB or any respiratory difficulty  - Respiratory Therapy support as indicated  6/17/2023 1721 by Meghann Upton RN  Outcome: Completed  6/17/2023 1117 by Meghann Upton RN  Outcome: Progressing

## 2023-06-17 NOTE — PLAN OF CARE
Problem: Potential for Falls  Goal: Patient will remain free of falls  Description: INTERVENTIONS:  - Educate patient/family on patient safety including physical limitations  - Instruct patient to call for assistance with activity   - Consult OT/PT to assist with strengthening/mobility   - Keep Call bell within reach  - Keep bed low and locked with side rails adjusted as appropriate  - Keep care items and personal belongings within reach  - Initiate and maintain comfort rounds  - Make Fall Risk Sign visible to staff  - Apply yellow socks and bracelet for high fall risk patients  - Consider moving patient to room near nurses station  Outcome: Progressing     Problem: PAIN - ADULT  Goal: Verbalizes/displays adequate comfort level or baseline comfort level  Description: Interventions:  - Encourage patient to monitor pain and request assistance  - Assess pain using appropriate pain scale  - Administer analgesics based on type and severity of pain and evaluate response  - Implement non-pharmacological measures as appropriate and evaluate response  - Consider cultural and social influences on pain and pain management  - Notify physician/advanced practitioner if interventions unsuccessful or patient reports new pain  Outcome: Progressing     Problem: INFECTION - ADULT  Goal: Absence or prevention of progression during hospitalization  Description: INTERVENTIONS:  - Assess and monitor for signs and symptoms of infection  - Monitor lab/diagnostic results  - Monitor all insertion sites, i e  indwelling lines, tubes, and drains  - Monitor endotracheal if appropriate and nasal secretions for changes in amount and color  - [place] appropriate cooling/warming therapies per order  - Administer medications as ordered  - Instruct and encourage patient and family to use good hand hygiene technique  - Identify and instruct in appropriate isolation precautions for identified infection/condition  Outcome: Progressing  Goal: Absence of fever/infection during neutropenic period  Description: INTERVENTIONS:  - Monitor WBC    Outcome: Progressing     Problem: SAFETY ADULT  Goal: Patient will remain free of falls  Description: INTERVENTIONS:  - Educate patient/family on patient safety including physical limitations  - Instruct patient to call for assistance with activity   - Consult OT/PT to assist with strengthening/mobility   - Keep Call bell within reach  - Keep bed low and locked with side rails adjusted as appropriate  - Keep care items and personal belongings within reach  - Initiate and maintain comfort rounds  - Make Fall Risk Sign visible to staff  - Apply yellow socks and bracelet for high fall risk patients  - Consider moving patient to room near nurses station  Outcome: Progressing  Goal: Maintain or return to baseline ADL function  Description: INTERVENTIONS:  -  Assess patient's ability to carry out ADLs; assess patient's baseline for ADL function and identify physical deficits which impact ability to perform ADLs (bathing, care of mouth/teeth, toileting, grooming, dressing, etc )  - Assess/evaluate cause of self-care deficits   - Assess range of motion  - Assess patient's mobility; develop plan if impaired  - Assess patient's need for assistive devices and provide as appropriate  - Encourage maximum independence but intervene and supervise when necessary  - Involve family in performance of ADLs  - Assess for home care needs following discharge   - Consider OT consult to assist with ADL evaluation and planning for discharge  - Provide patient education as appropriate  Outcome: Progressing  Goal: Maintains/Returns to pre admission functional level  Description: INTERVENTIONS:  - Perform BMAT or MOVE assessment daily    - Set and communicate daily mobility goal to care team and patient/family/caregiver     - Collaborate with rehabilitation services on mobility goals if consulted  - Out of bed for toileting  - Record patient progress and toleration of activity level   Outcome: Progressing     Problem: DISCHARGE PLANNING  Goal: Discharge to home or other facility with appropriate resources  Description: INTERVENTIONS:  - Identify barriers to discharge w/patient and caregiver  - Arrange for needed discharge resources and transportation as appropriate  - Identify discharge learning needs (meds, wound care, etc )  - Arrange for interpretive services to assist at discharge as needed  - Refer to Case Management Department for coordinating discharge planning if the patient needs post-hospital services based on physician/advanced practitioner order or complex needs related to functional status, cognitive ability, or social support system  Outcome: Progressing     Problem: Knowledge Deficit  Goal: Patient/family/caregiver demonstrates understanding of disease process, treatment plan, medications, and discharge instructions  Description: Complete learning assessment and assess knowledge base    Interventions:  - Provide teaching at level of understanding  - Provide teaching via preferred learning methods  Outcome: Progressing     Problem: RESPIRATORY - ADULT  Goal: Achieves optimal ventilation and oxygenation  Description: INTERVENTIONS:  - Assess for changes in respiratory status  - Assess for changes in mentation and behavior  - Position to facilitate oxygenation and minimize respiratory effort  - Oxygen administered by appropriate delivery if ordered  - Initiate smoking cessation education as indicated  - Encourage broncho-pulmonary hygiene including cough, deep breathe, Incentive Spirometry  - Assess the need for suctioning and aspirate as needed  - Assess and instruct to report SOB or any respiratory difficulty  - Respiratory Therapy support as indicated  Outcome: Progressing

## 2023-06-17 NOTE — PROGRESS NOTES
Progress Note - Anna Galicia 62 y o  female MRN: [id number]    Unit/Bed#: [place] 2 -02 Encounter: [id number]    Assessment/Plan:    COPD exacerbation  both likely related to ongoing smoking, improving with IV steroids transition to p o , now stable on room air, chest x-ray no active cardiopulmonary disease    Tobacco abuse  cessation counseling provided    Alcohol abuse   cessation counseling provided    SAJAN    mood stable with Abilify Cymbalta clonazepam      Subjective:   Breathing much better, minimal cough nonproductive, no chest pain no shortness of breath no nausea vomiting no fever appetite good      Objective:     Vitals: Blood pressure 110/63, pulse 97, temperature 98 2 °F (36 8 °C), resp  rate 18, weight 85 7 kg (188 lb 15 oz), SpO2 94 %, not currently breastfeeding  ,Body mass index is 33 47 kg/m²        Results from last 7 days   Lab Units 06/16/23  0448   WBC Thousand/uL 8 34   HEMOGLOBIN g/dL 13 0   HEMATOCRIT % 41 9   PLATELETS Thousands/uL 207     Results from last 7 days   Lab Units 06/16/23  0448 06/15/23  1925   POTASSIUM mmol/L 4 4 4 0   CHLORIDE mmol/L 105 103   CO2 mmol/L 31 33*   BUN mg/dL 11 8   CREATININE mg/dL 1 05 0 99   CALCIUM mg/dL 8 9 9 0   ALK PHOS U/L  --  81   ALT U/L  --  14   AST U/L  --  10*       Scheduled Meds:  Current Facility-Administered Medications   Medication Dose Route Frequency Provider Last Rate   • acetaminophen  650 mg Oral Q6H PRN Rosa Mariateprabhue WILMAR Parekh     • albuterol  2 puff Inhalation Q6H PRN Bettejane WILMAR Parekh     • aluminum-magnesium hydroxide-simethicone  30 mL Oral Q6H PRN Bettejane WILMAR Parekh     • ARIPiprazole  10 mg Oral HS Rosa Mariatejane WILMAR Parekh     • atorvastatin  20 mg Oral Daily With Xenia Hummel PA-C     • benzonatate  100 mg Oral TID PRN Bettejane WILMAR Parekh     • clonazePAM  0 5 mg Oral Daily Rosa Mariatejane WILMAR Parekh     • clonazePAM  1 mg Oral HS Bettejane WILMAR Parekh     • cyclobenzaprine  5 mg Oral Daily PRN Charlaine Paci Pillo Yu PA-C     • DULoxetine  60 mg Oral BID Harris Garces PA-C     • Fluticasone Furoate-Vilanterol  1 puff Inhalation Daily Harris Garces PA-C     • heparin (porcine)  5,000 Units Subcutaneous [place], [place]     • nicotine  1 patch Transdermal Daily [place], [place]     • [START ON 6/18/2023] predniSONE  20 mg Oral Daily Devaughn Horner DO     • thiamine  100 mg Oral Daily Harris Garces PA-C         Continuous Infusions:         Physical exam:  General appearance: Alert no distress interaction appropriate  Head/Eyes: Anicteric  Neck: Supple  Lungs: Decreased BS bilateral no wheezing rhonchi or rales  Heart: normal S1 S2 regular  Abdomen: Soft nontender with bowel sounds  Extremities: no edema  Skin: no rash    Invasive Devices     Peripheral Intravenous Line  Duration           Peripheral IV 06/17/23 Right;Ventral (anterior) Forearm <1 day

## 2023-06-17 NOTE — DISCHARGE SUMMARY
Discharge Summary - Medical Farzana Ashley 62 y o  female MRN: [id number]    [facility] Room / Bed: [address]/[facility] 2 M* Encounter: [id number]    BRIEF OVERVIEW    Admitting Provider: Kathi Cerda MD  Discharge Provider: Yesenia Cardoso DO  Admission Date: 6/15/2023     Discharge Date: No discharge date for patient encounter    Primary Care Physician at Discharge: Gabriella Najera [phone]    Primary Discharge Diagnosis    COPD exacerbation    Other Problems Addressed  Patient Active Problem List    Diagnosis Date Noted   • SIRS (systemic inflammatory response syndrome) ([facility] [place] [address] ) 06/16/2023   • Gallbladder sludge 05/22/2023   • Lung nodule 05/21/2023   • COPD exacerbation ([facility] [place] [address] ) 05/20/2023   • Major depressive disorder, recurrent episode, moderate ([facility] [place] [address] ) 10/25/2022   • AMS (altered mental status) 09/07/2022   • High cholesterol 09/02/2022   • Chronic pain 09/02/2022   • Vision loss 09/02/2022   • Stroke-like symptoms 02/04/2021   • Acute bilateral low back pain with bilateral sciatica 02/04/2021   • Tobacco abuse 02/04/2021   • Severe episode of recurrent major depressive disorder, without psychotic features ([facility] [place] [address] ) 12/12/2017   • Generalized anxiety disorder 12/12/2017   • Alcohol abuse 12/12/2017   • Cannabis abuse 12/12/2017     Discharge Disposition: home    Allergies  Allergies   Allergen Reactions   • Penicillins Anaphylaxis, Rash and Edema     Anaphylaxis (Tolerates IV ceftriaxone 9/7/22)     Diet restrictions: None  Activity restrictions: None  Discharge Condition: Ping Mcgee in 1 week  Follow up with consulting providers  Pulmonologist as scheduled      [facility]       Presenting Problem/History of Present Illness  COPD exacerbation [facility])  Hospital Course    27-year-old female presents with progressing shortness of breath cough and congestion    COPD exacerbation  etiology most likely continued tobacco abuse  He is noted initial chest x-ray no acute pulmonary cardiac findings, was initiated on steroids and nebulizer treatments with benefit, patient 94% on room air prior to discharge  She is discharged with p o  steroids  Recommendation stop smoking    Tobacco abuse   cessation counseling provided, continue nicotine patches    Alcohol abuse   cessation counseling provided    Discharge  Statement   I spent 22 minutes discharging the patient  This time was spent on the day of discharge  I had direct contact with the patient on the day of discharge  Additional documentation is required if more than 30 minutes were spent on discharge  Plan discharge and follow-up stressed no alcohol or tobacco use    Discharge instructions/Information to patient and family:   See after visit summary for information provided to patient and family

## 2023-06-17 NOTE — PSYCH
"[address]    Patient Name Herbie Kimble     Date of Birth: 62 y o  1965      MRN: [id number]    Admission Date: several years ago    Date of Transfer: June 17, 2023    Admission Diagnosis:     Major Depressive Disorder, recurrent, severe without psychotic features  Generalized Anxiety Disorder    Current Diagnosis: An be pretty sad  Anxiety difficult  Drinks \"pretty much caffeine  Does not speak to a therapist  No diagnosis found  Reason for Admission: Landon Wood presented for treatment due to depression and ADHD  Primary complaints included DEPRESSIVE SYMPTOMS: depressed mood and ANXIETY SYMPTOMS: daily anxiety symptoms  Progress in Treatment: Landon Wood was seen for Medication Management  During the course of treatment she can be pretty sad  Anxiety difficult at times  Drinks :pretty much caffeiee  Does not see a therapist    Episodes of Higher Level of Care: No    Transfer request Initiated by: Psychiatrist: None and Nurse Practitioner Elias Howard Therapist: None    Reason for Transfer Request: clinician leaving practice    Does this individual need a clinician with specialized training/expertise?: No    Is this client [facility] other [facility]?  Psychiatrist: None Therapist: None    Other pertinent issues: None    Are there any specific individuals who would be a “best fit” or who have already agreed to accept this transfer request?      Psychiatrist: None   Therapist: None  Rationale: Not Applicable    Attempts to maintain the current therapeutic relationship: Not Applicable    Transfer request routed to Clinical Coordinator for input and/or approval      Comments from other involved providers and/or clinical coordinator: FRANKLIN Ha06/17/23     "

## 2023-06-17 NOTE — PLAN OF CARE
Problem: Potential for Falls  Goal: Patient will remain free of falls  Description: INTERVENTIONS:  - Educate patient/family on patient safety including physical limitations  - Instruct patient to call for assistance with activity   - Consult OT/PT to assist with strengthening/mobility   - Keep Call bell within reach  - Keep bed low and locked with side rails adjusted as appropriate  - Keep care items and personal belongings within reach  - Initiate and maintain comfort rounds  - Make Fall Risk Sign visible to staff  - Offer Toileting every  Hours, in advance of need  - Initiate/Maintain alarm  - Obtain necessary fall risk management equipment:   - Apply yellow socks and bracelet for high fall risk patients  - Consider moving patient to room near nurses station  Outcome: Progressing     Problem: PAIN - ADULT  Goal: Verbalizes/displays adequate comfort level or baseline comfort level  Description: Interventions:  - Encourage patient to monitor pain and request assistance  - Assess pain using appropriate pain scale  - Administer analgesics based on type and severity of pain and evaluate response  - Implement non-pharmacological measures as appropriate and evaluate response  - Consider cultural and social influences on pain and pain management  - Notify physician/advanced practitioner if interventions unsuccessful or patient reports new pain  Outcome: Progressing     Problem: INFECTION - ADULT  Goal: Absence or prevention of progression during hospitalization  Description: INTERVENTIONS:  - Assess and monitor for signs and symptoms of infection  - Monitor lab/diagnostic results  - Monitor all insertion sites, i e  indwelling lines, tubes, and drains  - Monitor endotracheal if appropriate and nasal secretions for changes in amount and color  - [place] appropriate cooling/warming therapies per order  - Administer medications as ordered  - Instruct and encourage patient and family to use good hand hygiene technique  - Identify and instruct in appropriate isolation precautions for identified infection/condition  Outcome: Progressing  Goal: Absence of fever/infection during neutropenic period  Description: INTERVENTIONS:  - Monitor WBC    Outcome: Progressing     Problem: SAFETY ADULT  Goal: Patient will remain free of falls  Description: INTERVENTIONS:  - Educate patient/family on patient safety including physical limitations  - Instruct patient to call for assistance with activity   - Consult OT/PT to assist with strengthening/mobility   - Keep Call bell within reach  - Keep bed low and locked with side rails adjusted as appropriate  - Keep care items and personal belongings within reach  - Initiate and maintain comfort rounds  - Make Fall Risk Sign visible to staff  - Offer Toileting every  Hours, in advance of need  - Initiate/Maintain alarm  - Obtain necessary fall risk management equipment:   - Apply yellow socks and bracelet for high fall risk patients  - Consider moving patient to room near nurses station  Outcome: Progressing  Goal: Maintain or return to baseline ADL function  Description: INTERVENTIONS:  -  Assess patient's ability to carry out ADLs; assess patient's baseline for ADL function and identify physical deficits which impact ability to perform ADLs (bathing, care of mouth/teeth, toileting, grooming, dressing, etc )  - Assess/evaluate cause of self-care deficits   - Assess range of motion  - Assess patient's mobility; develop plan if impaired  - Assess patient's need for assistive devices and provide as appropriate  - Encourage maximum independence but intervene and supervise when necessary  - Involve family in performance of ADLs  - Assess for home care needs following discharge   - Consider OT consult to assist with ADL evaluation and planning for discharge  - Provide patient education as appropriate  Outcome: Progressing  Goal: Maintains/Returns to pre admission functional level  Description: INTERVENTIONS:  - Perform BMAT or MOVE assessment daily    - Set and communicate daily mobility goal to care team and patient/family/caregiver  - Collaborate with rehabilitation services on mobility goals if consulted  - Perform Range of Motion  times a day  - Reposition patient every  hours  - Dangle patient  times a day  - Stand patient  times a day  - Ambulate patient  times a day  - Out of bed to chair  times a day   - Out of bed for meals times a day  - Out of bed for toileting  - Record patient progress and toleration of activity level   Outcome: Progressing     Problem: DISCHARGE PLANNING  Goal: Discharge to home or other facility with appropriate resources  Description: INTERVENTIONS:  - Identify barriers to discharge w/patient and caregiver  - Arrange for needed discharge resources and transportation as appropriate  - Identify discharge learning needs (meds, wound care, etc )  - Arrange for interpretive services to assist at discharge as needed  - Refer to Case Management Department for coordinating discharge planning if the patient needs post-hospital services based on physician/advanced practitioner order or complex needs related to functional status, cognitive ability, or social support system  Outcome: Progressing     Problem: Knowledge Deficit  Goal: Patient/family/caregiver demonstrates understanding of disease process, treatment plan, medications, and discharge instructions  Description: Complete learning assessment and assess knowledge base    Interventions:  - Provide teaching at level of understanding  - Provide teaching via preferred learning methods  Outcome: Progressing     Problem: RESPIRATORY - ADULT  Goal: Achieves optimal ventilation and oxygenation  Description: INTERVENTIONS:  - Assess for changes in respiratory status  - Assess for changes in mentation and behavior  - Position to facilitate oxygenation and minimize respiratory effort  - Oxygen administered by appropriate delivery if ordered  - Initiate smoking cessation education as indicated  - Encourage broncho-pulmonary hygiene including cough, deep breathe, Incentive Spirometry  - Assess the need for suctioning and aspirate as needed  - Assess and instruct to report SOB or any respiratory difficulty  - Respiratory Therapy support as indicated  Outcome: Progressing

## 2023-06-17 NOTE — UTILIZATION REVIEW
Continued Stay Review    Date: 6/17/23                        Current Patient Class: OBS  Current Level of Care: MED-SURG    HPI:58 y o  female initially admitted on 6/15      Assessment/Plan:     Vital Signs:   06/17/23 08:01:20 98 4 °F (36 9 °C) 87 18 130/85 100 92 % -- -- -- -- -- --   06/17/23 0735 -- -- -- -- -- 96 % -- -- -- -- -- --   06/17/23 0713 -- -- -- -- -- 90 % -- -- -- None (Room air) -- --   06/17/23 0349 -- -- -- -- -- 94 % 24 -- -- BiPAP Full face mask --   06/16/23 2235 -- -- -- -- -- 92 % 24 -- -- BiPAP Full face mask --   06/16/23 2024 -- -- -- -- -- 94 % -- -- -- -- -- --   06/16/23 19:30:22 98 3 °F (36 8 °C) 103 20 111/70 84 93 % -- 24 1 L/min Nasal cannula -- Lying   06/16/23 15:31:51 98 °F (36 7 °C) 113 Abnormal  20 110/71 84 93 % -- -- -- -- -- --   06/16/23 1314 -- -- -- -- -- -- -- 24 1 L/min Nasal cannula -- --   06/16/23 0900 -- -- -- -- -- 94 % -- -- -- None (Room air)  -- --   O2 Device: 2 L PRN at 06/16/23 0900 06/16/23 08:24:46 97 8 °F (36 6 °C) 101 20 120/74 89 94 % -- -- -- -- -- --   06/16/23 0746 -- -- -- -- -- -- -- 24 1 L/min Nasal cannula -- --   06/16/23 0007 -- -- -- -- -- 90 % -- -- -- None (Room air) -- --   06/15/23 2245 98 5 °F (36 9 °C) 106 Abnormal  -- 120/73 89 90 % -- -- -- -- -- --   06/15/23 22:42:52 98 5 °F (36 9 °C) 102 18 120/73 89 91 % -- -- -- -- -- --   06/15/23 2217 -- 103 24 Abnormal  115/68 87 94 % -- -- -- None (Room air) -- Lying   06/15/23 2045 -- 104 22 121/74 93 93 % -- -- -- None (Room air) -- Lying   06/15/23 1852 -- -- -- -- -- -- -- -- -- None (Room air) -- --   06/15/23 1837 98 2 °F (36 8 °C) 96 19 125/58 -- 95 % -- -- -- None (Room air) --              CIWA-Ar Score    Row Name 06/17/23 08:01:20 06/16/23 19:30:22 06/16/23 15:31:51 06/16/23 08:24:46 06/16/23 0400   CIWA-Ar   /85  -/70  -/71  -/74  -DI --   Pulse 87  -  - Abnormal   -  -DI --   Nausea and Vomiting -- -- -- 0  -AM 0  -RC   Tactile Disturbances -- -- -- 0  -AM 0  -RC   Tremor -- -- -- 0  -AM 0  -RC   Auditory Disturbances -- -- -- 0  -AM 0  -RC   Paroxysmal Sweats -- -- -- 0  -AM 0  -RC   Visual Disturbances -- -- -- 0  -AM 0  -RC   Anxiety -- -- -- 0  -AM 0  -RC   Headache, Fullness in Head -- -- -- 0  -AM 0  -RC   Agitation -- -- -- 0  -AM 0  -RC   Orientation and Clouding of Sensorium -- -- -- 0  -AM 0  -RC   CIWA-Ar Total -- -- -- 0  -AM 0  -RC   Row Name 06/15/23 2245 06/15/23 22:42:52 06/15/23 2217 06/15/23 2045 06/15/23 1837   CIWA-Ar   /73  -/73  -/68  -/74  -/58  -BG   Pulse 106 Abnormal   -  -  -  -AW 96  -BG   Nausea and Vomiting 0  -RC -- -- -- --   Tactile Disturbances 0  -RC -- -- -- --   Tremor 0  -RC -- -- -- --   Auditory Disturbances 0  -RC -- -- -- --   Paroxysmal Sweats 0  -RC -- -- -- --   Visual Disturbances 0  -RC -- -- -- --   Anxiety 0  -RC -- -- -- --   Headache, Fullness in Head 0  -RC -- -- -- --   Agitation 0  -RC -- -- -- --   Orientation and Clouding of Sensorium 0  -RC -- -- -- --   CIWA-Ar Total 0  -RC -- -- -- --       Pertinent Labs/Diagnostic Results:   6/15 CXR:No acute cardiopulmonary disease      Findings are stable    6/15 EKG:  Normal sinus rhythm  Low voltage QRS  Borderline ECG  When compared with ECG of 20-MAY-2023 13:43,  No significant change was found  Confirmed by Marilu Morales ([id number]) on 6/16/2023 4:58:10 AM    Results from last 7 days   Lab Units 06/15/23  1925   SARS-COV-2  Negative     Results from last 7 days   Lab Units 06/16/23 0448 06/15/23  1925   WBC Thousand/uL 8 34 9 58   HEMOGLOBIN g/dL 13 0 12 9   HEMATOCRIT % 41 9 42 5   PLATELETS Thousands/uL 207 222   NEUTROS ABS Thousands/µL 7 55 5 74         Results from last 7 days   Lab Units 06/16/23  0448 06/15/23  1925   SODIUM mmol/L 142 139   POTASSIUM mmol/L 4 4 4 0   CHLORIDE mmol/L 105 103   CO2 mmol/L 31 33*   ANION GAP mmol/L 6 3*   BUN mg/dL 11 8   CREATININE mg/dL 1 05 0 99   EGFR ml/min/1 73sq m 58 63   CALCIUM mg/dL 8 9 9 0     Results from last 7 days   Lab Units 06/15/23  1925   AST U/L 10*   ALT U/L 14   ALK PHOS U/L 81   TOTAL PROTEIN g/dL 6 4   ALBUMIN g/dL 3 6   TOTAL BILIRUBIN mg/dL 0 24         Results from last 7 days   Lab Units 06/16/23  0448 06/15/23  1925   GLUCOSE RANDOM mg/dL 149* 107       Results from last 7 days   Lab Units 06/16/23  0557 06/16/23  0109   PH ART  7 340* 7 375   PCO2 ART mm Hg 57 3* 52 1*   PO2 ART mm Hg 66 6* 62 0*   HCO3 ART mmol/L 30 2* 29 8*   BASE EXC ART mmol/L 3 1 3 5   O2 CONTENT ART mL/dL 16 8 17 4   O2 HGB, ARTERIAL % 90 6* 90 1*   ABG SOURCE  Radial, Right Radial, Left       Results from last 7 days   Lab Units 06/15/23  1925   HS TNI 0HR ng/L 4       Results from last 7 days   Lab Units 06/17/23  0419 06/15/23  1925   PROCALCITONIN ng/ml <0 05 <0 05       Results from last 7 days   Lab Units 06/15/23  1925   INFLUENZA A PCR  Negative   INFLUENZA B PCR  Negative   RSV PCR  Negative       Medications:   Scheduled Medications:  ARIPiprazole, 10 mg, Oral, HS  atorvastatin, 20 mg, Oral, Daily With Dinner  azithromycin, 500 mg, Intravenous, Q24H  clonazePAM, 0 5 mg, Oral, Daily  clonazePAM, 1 mg, Oral, HS  docusate sodium, 100 mg, Oral, BID  DULoxetine, 60 mg, Oral, BID  Fluticasone Furoate-Vilanterol, 1 puff, Inhalation, Daily  folic acid, 1 mg, Oral, Daily  guaiFENesin, 600 mg, Oral, Q12H AV  heparin (porcine), 5,000 Units, Subcutaneous, Q8H AV  ipratropium, 0 5 mg, Nebulization, TID  levalbuterol, 1 25 mg, Nebulization, TID  methylPREDNISolone sodium succinate, 40 mg, Intravenous, Q8H  multivitamin-minerals, 1 tablet, Oral, Daily  nicotine, 1 patch, Transdermal, Daily  thiamine, 100 mg, Oral, Daily      PRN Meds:  acetaminophen, 650 mg, Oral, Q6H PRN  albuterol, 2 puff, Inhalation, Q6H PRN  aluminum-magnesium hydroxide-simethicone, 30 mL, Oral, Q6H PRN  benzonatate, 100 mg, Oral, TID PRN  cyclobenzaprine, 5 mg, Oral, Daily PRN  dextromethorphan-guaiFENesin, 10 mL, Oral, Q4H PRN  hydrOXYzine HCL, 50 mg, Oral, TID PRN    CIWA  SCD  OOB      Discharge Plan: TBD    Network Utilization Review Department  ATTENTION: Please call with any questions or concerns to [phone] and carefully listen to the prompts so that you are directed to the right person  All voicemails are confidential   Arely Mora all requests for admission clinical reviews, approved or denied determinations and any other requests to dedicated fax number below belonging to the campus where the patient is receiving treatment   List of dedicated fax numbers for the Facilities:  [address] DENIALS (Administrative/Medical Necessity) [phone]   [address] (Maternity/NICU/Pediatrics) [phone]   7 Renetta Latham [phone]   Mary Figueroa 77 [phone]   [address] [place] [address] [facility] [phone]   1550 [facility] Milla Vivas [facility] 134 815 [facility] [phone]

## 2023-07-03 ENCOUNTER — TELEPHONE (OUTPATIENT)
Dept: PSYCHIATRY | Facility: CLINIC | Age: 58
End: 2023-07-03

## 2023-07-03 NOTE — TELEPHONE ENCOUNTER
Contacted client about cancelling appt. with FRANKLIN Hernandez due to him retiring. We are in the process of bringing new providers into our practice. Once we have their schedules, we will contact you to reschedule your appt. Refills will be processed.   Any questions please call [phone]

## 2023-08-28 ENCOUNTER — APPOINTMENT (EMERGENCY)
Dept: RADIOLOGY | Facility: HOSPITAL | Age: 58
DRG: 140 | End: 2023-08-28
Payer: COMMERCIAL

## 2023-08-28 ENCOUNTER — HOSPITAL ENCOUNTER (INPATIENT)
Facility: HOSPITAL | Age: 58
LOS: 5 days | Discharge: HOME | DRG: 140 | End: 2023-09-02
Attending: EMERGENCY MEDICINE | Admitting: INTERNAL MEDICINE
Payer: COMMERCIAL

## 2023-08-28 DIAGNOSIS — J44.1 COPD EXACERBATION (HCC): Primary | ICD-10-CM

## 2023-08-28 PROBLEM — F10.11 HISTORY OF ALCOHOL ABUSE: Status: ACTIVE | Noted: 2017-12-12

## 2023-08-28 PROBLEM — R06.03 RESPIRATORY DISTRESS: Status: ACTIVE | Noted: 2023-08-28

## 2023-08-28 PROBLEM — J96.02 ACUTE RESPIRATORY FAILURE WITH HYPERCAPNIA (HCC): Status: ACTIVE | Noted: 2023-05-20

## 2023-08-28 PROBLEM — R40.0 SOMNOLENCE: Status: ACTIVE | Noted: 2023-08-28

## 2023-08-28 LAB
2HR DELTA HS TROPONIN: -1 NG/L
4HR DELTA HS TROPONIN: -1 NG/L
ALBUMIN SERPL BCP-MCNC: 4 G/DL (ref 3.5–5)
ALP SERPL-CCNC: 89 U/L (ref 34–104)
ALT SERPL W P-5'-P-CCNC: 11 U/L (ref 7–52)
ANION GAP SERPL CALCULATED.3IONS-SCNC: 1 MMOL/L
ARTERIAL PATENCY WRIST A: YES
ARTERIAL PATENCY WRIST A: YES
AST SERPL W P-5'-P-CCNC: 10 U/L (ref 13–39)
ATRIAL RATE: 104 BPM
ATRIAL RATE: 105 BPM
ATRIAL RATE: 107 BPM
BASE EXCESS BLDA CALC-SCNC: 0.4 MMOL/L
BASE EXCESS BLDA CALC-SCNC: 1.8 MMOL/L
BASOPHILS # BLD AUTO: 0.07 THOUSANDS/ÂΜL (ref 0–0.1)
BASOPHILS NFR BLD AUTO: 1 % (ref 0–1)
BILIRUB SERPL-MCNC: 0.34 MG/DL (ref 0.2–1)
BNP SERPL-MCNC: 29 PG/ML (ref 0–100)
BUN SERPL-MCNC: 10 MG/DL (ref 5–25)
CALCIUM SERPL-MCNC: 8.9 MG/DL (ref 8.4–10.2)
CARDIAC TROPONIN I PNL SERPL HS: 6 NG/L
CARDIAC TROPONIN I PNL SERPL HS: 6 NG/L
CARDIAC TROPONIN I PNL SERPL HS: 7 NG/L
CHLORIDE SERPL-SCNC: 105 MMOL/L (ref 96–108)
CO2 SERPL-SCNC: 34 MMOL/L (ref 21–32)
CREAT SERPL-MCNC: 0.99 MG/DL (ref 0.6–1.3)
EOSINOPHIL # BLD AUTO: 0.43 THOUSAND/ÂΜL (ref 0–0.61)
EOSINOPHIL NFR BLD AUTO: 6 % (ref 0–6)
ERYTHROCYTE [DISTWIDTH] IN BLOOD BY AUTOMATED COUNT: 13.9 % (ref 11.6–15.1)
FLUAV RNA RESP QL NAA+PROBE: NEGATIVE
FLUBV RNA RESP QL NAA+PROBE: NEGATIVE
GFR SERPL CREATININE-BSD FRML MDRD: 63 ML/MIN/1.73SQ M
GLUCOSE SERPL-MCNC: 119 MG/DL (ref 65–140)
HCO3 BLDA-SCNC: 30.7 MMOL/L (ref 22–28)
HCO3 BLDA-SCNC: 31.8 MMOL/L (ref 22–28)
HCT VFR BLD AUTO: 49 % (ref 34.8–46.1)
HGB BLD-MCNC: 14.7 G/DL (ref 11.5–15.4)
IMM GRANULOCYTES # BLD AUTO: 0.08 THOUSAND/UL (ref 0–0.2)
IMM GRANULOCYTES NFR BLD AUTO: 1 % (ref 0–2)
IPAP: 14
LYMPHOCYTES # BLD AUTO: 1.36 THOUSANDS/ÂΜL (ref 0.6–4.47)
LYMPHOCYTES NFR BLD AUTO: 18 % (ref 14–44)
MCH RBC QN AUTO: 30.4 PG (ref 26.8–34.3)
MCHC RBC AUTO-ENTMCNC: 30 G/DL (ref 31.4–37.4)
MCV RBC AUTO: 101 FL (ref 82–98)
MONOCYTES # BLD AUTO: 0.5 THOUSAND/ÂΜL (ref 0.17–1.22)
MONOCYTES NFR BLD AUTO: 7 % (ref 4–12)
NASAL CANNULA: 3
NEUTROPHILS # BLD AUTO: 5.09 THOUSANDS/ÂΜL (ref 1.85–7.62)
NEUTS SEG NFR BLD AUTO: 67 % (ref 43–75)
NON VENT- BIPAP: ABNORMAL
NRBC BLD AUTO-RTO: 0 /100 WBCS
O2 CT BLDA-SCNC: 20.1 ML/DL (ref 16–23)
O2 CT BLDA-SCNC: 21.1 ML/DL (ref 16–23)
OXYHGB MFR BLDA: 89.3 % (ref 94–97)
OXYHGB MFR BLDA: 95.4 % (ref 94–97)
P AXIS: 72 DEGREES
P AXIS: 76 DEGREES
P AXIS: 87 DEGREES
PCO2 BLDA: 75.6 MM HG (ref 36–44)
PCO2 BLDA: 76 MM HG (ref 36–44)
PEEP MAX SETTING VENT: 6 CM[H2O]
PH BLDA: 7.22 [PH] (ref 7.35–7.45)
PH BLDA: 7.24 [PH] (ref 7.35–7.45)
PLATELET # BLD AUTO: 196 THOUSANDS/UL (ref 149–390)
PLATELET # BLD AUTO: 203 THOUSANDS/UL (ref 149–390)
PMV BLD AUTO: 9.4 FL (ref 8.9–12.7)
PMV BLD AUTO: 9.6 FL (ref 8.9–12.7)
PO2 BLDA: 62.2 MM HG (ref 75–129)
PO2 BLDA: 85 MM HG (ref 75–129)
POTASSIUM SERPL-SCNC: 4.8 MMOL/L (ref 3.5–5.3)
PR INTERVAL: 148 MS
PR INTERVAL: 156 MS
PR INTERVAL: 156 MS
PROCALCITONIN SERPL-MCNC: <0.05 NG/ML
PROT SERPL-MCNC: 6.9 G/DL (ref 6.4–8.4)
QRS AXIS: 85 DEGREES
QRS AXIS: 89 DEGREES
QRS AXIS: 95 DEGREES
QRSD INTERVAL: 76 MS
QT INTERVAL: 324 MS
QT INTERVAL: 334 MS
QT INTERVAL: 340 MS
QTC INTERVAL: 428 MS
QTC INTERVAL: 445 MS
QTC INTERVAL: 447 MS
RBC # BLD AUTO: 4.84 MILLION/UL (ref 3.81–5.12)
RSV RNA RESP QL NAA+PROBE: NEGATIVE
SARS-COV-2 RNA RESP QL NAA+PROBE: NEGATIVE
SODIUM SERPL-SCNC: 140 MMOL/L (ref 135–147)
SPECIMEN SOURCE: ABNORMAL
SPECIMEN SOURCE: ABNORMAL
T WAVE AXIS: 67 DEGREES
T WAVE AXIS: 70 DEGREES
T WAVE AXIS: 75 DEGREES
VENT BIPAP FIO2: 40 %
VENTRICULAR RATE: 104 BPM
VENTRICULAR RATE: 105 BPM
VENTRICULAR RATE: 107 BPM
WBC # BLD AUTO: 7.53 THOUSAND/UL (ref 4.31–10.16)

## 2023-08-28 PROCEDURE — 99285 EMERGENCY DEPT VISIT HI MDM: CPT

## 2023-08-28 PROCEDURE — 93010 ELECTROCARDIOGRAM REPORT: CPT | Performed by: STUDENT IN AN ORGANIZED HEALTH CARE EDUCATION/TRAINING PROGRAM

## 2023-08-28 PROCEDURE — 85049 AUTOMATED PLATELET COUNT: CPT | Performed by: INTERNAL MEDICINE

## 2023-08-28 PROCEDURE — 80053 COMPREHEN METABOLIC PANEL: CPT | Performed by: EMERGENCY MEDICINE

## 2023-08-28 PROCEDURE — 99223 1ST HOSP IP/OBS HIGH 75: CPT | Performed by: INTERNAL MEDICINE

## 2023-08-28 PROCEDURE — 94760 N-INVAS EAR/PLS OXIMETRY 1: CPT

## 2023-08-28 PROCEDURE — 82805 BLOOD GASES W/O2 SATURATION: CPT | Performed by: EMERGENCY MEDICINE

## 2023-08-28 PROCEDURE — 96365 THER/PROPH/DIAG IV INF INIT: CPT

## 2023-08-28 PROCEDURE — 87040 BLOOD CULTURE FOR BACTERIA: CPT | Performed by: INTERNAL MEDICINE

## 2023-08-28 PROCEDURE — 83880 ASSAY OF NATRIURETIC PEPTIDE: CPT | Performed by: EMERGENCY MEDICINE

## 2023-08-28 PROCEDURE — 93005 ELECTROCARDIOGRAM TRACING: CPT

## 2023-08-28 PROCEDURE — 99255 IP/OBS CONSLTJ NEW/EST HI 80: CPT | Performed by: INTERNAL MEDICINE

## 2023-08-28 PROCEDURE — 84145 PROCALCITONIN (PCT): CPT | Performed by: INTERNAL MEDICINE

## 2023-08-28 PROCEDURE — 36600 WITHDRAWAL OF ARTERIAL BLOOD: CPT

## 2023-08-28 PROCEDURE — 85025 COMPLETE CBC W/AUTO DIFF WBC: CPT | Performed by: EMERGENCY MEDICINE

## 2023-08-28 PROCEDURE — 84484 ASSAY OF TROPONIN QUANT: CPT | Performed by: EMERGENCY MEDICINE

## 2023-08-28 PROCEDURE — 36415 COLL VENOUS BLD VENIPUNCTURE: CPT | Performed by: EMERGENCY MEDICINE

## 2023-08-28 PROCEDURE — 94644 CONT INHLJ TX 1ST HOUR: CPT

## 2023-08-28 PROCEDURE — 82805 BLOOD GASES W/O2 SATURATION: CPT | Performed by: INTERNAL MEDICINE

## 2023-08-28 PROCEDURE — 94640 AIRWAY INHALATION TREATMENT: CPT

## 2023-08-28 PROCEDURE — 94002 VENT MGMT INPAT INIT DAY: CPT

## 2023-08-28 PROCEDURE — 94664 DEMO&/EVAL PT USE INHALER: CPT

## 2023-08-28 PROCEDURE — 0241U HB NFCT DS VIR RESP RNA 4 TRGT: CPT | Performed by: EMERGENCY MEDICINE

## 2023-08-28 PROCEDURE — 71045 X-RAY EXAM CHEST 1 VIEW: CPT

## 2023-08-28 PROCEDURE — 99285 EMERGENCY DEPT VISIT HI MDM: CPT | Performed by: EMERGENCY MEDICINE

## 2023-08-28 RX ORDER — HYDROXYZINE HYDROCHLORIDE 25 MG/1
50 TABLET, FILM COATED ORAL 3 TIMES DAILY PRN
Status: DISCONTINUED | OUTPATIENT
Start: 2023-08-28 | End: 2023-09-02 | Stop reason: HOSPADM

## 2023-08-28 RX ORDER — LANOLIN ALCOHOL/MO/W.PET/CERES
100 CREAM (GRAM) TOPICAL DAILY
Status: DISCONTINUED | OUTPATIENT
Start: 2023-08-28 | End: 2023-09-02 | Stop reason: HOSPADM

## 2023-08-28 RX ORDER — SODIUM CHLORIDE FOR INHALATION 0.9 %
3 VIAL, NEBULIZER (ML) INHALATION
Status: DISCONTINUED | OUTPATIENT
Start: 2023-08-28 | End: 2023-08-28

## 2023-08-28 RX ORDER — ALBUTEROL SULFATE 90 UG/1
2 AEROSOL, METERED RESPIRATORY (INHALATION) EVERY 6 HOURS PRN
Status: DISCONTINUED | OUTPATIENT
Start: 2023-08-28 | End: 2023-09-02 | Stop reason: HOSPADM

## 2023-08-28 RX ORDER — FOLIC ACID 1 MG/1
1 TABLET ORAL DAILY
Status: DISCONTINUED | OUTPATIENT
Start: 2023-08-28 | End: 2023-09-02 | Stop reason: HOSPADM

## 2023-08-28 RX ORDER — CLONAZEPAM 0.5 MG/1
0.5 TABLET ORAL EVERY MORNING
Status: DISCONTINUED | OUTPATIENT
Start: 2023-08-29 | End: 2023-09-02 | Stop reason: HOSPADM

## 2023-08-28 RX ORDER — MAGNESIUM SULFATE HEPTAHYDRATE 40 MG/ML
2 INJECTION, SOLUTION INTRAVENOUS ONCE
Status: COMPLETED | OUTPATIENT
Start: 2023-08-28 | End: 2023-08-28

## 2023-08-28 RX ORDER — GUAIFENESIN 600 MG/1
600 TABLET, EXTENDED RELEASE ORAL 2 TIMES DAILY
Status: DISCONTINUED | OUTPATIENT
Start: 2023-08-28 | End: 2023-09-02 | Stop reason: HOSPADM

## 2023-08-28 RX ORDER — POLYETHYLENE GLYCOL 3350 17 G/17G
17 POWDER, FOR SOLUTION ORAL DAILY
Status: DISCONTINUED | OUTPATIENT
Start: 2023-08-28 | End: 2023-09-02 | Stop reason: HOSPADM

## 2023-08-28 RX ORDER — NICOTINE 21 MG/24HR
1 PATCH, TRANSDERMAL 24 HOURS TRANSDERMAL DAILY
Status: DISCONTINUED | OUTPATIENT
Start: 2023-08-28 | End: 2023-09-02 | Stop reason: HOSPADM

## 2023-08-28 RX ORDER — IPRATROPIUM BROMIDE AND ALBUTEROL SULFATE .5; 3 MG/3ML; MG/3ML
2 SOLUTION RESPIRATORY (INHALATION) ONCE
Status: COMPLETED | OUTPATIENT
Start: 2023-08-28 | End: 2023-08-28

## 2023-08-28 RX ORDER — FLUTICASONE FUROATE AND VILANTEROL 100; 25 UG/1; UG/1
1 POWDER RESPIRATORY (INHALATION)
Status: DISCONTINUED | OUTPATIENT
Start: 2023-08-29 | End: 2023-09-02 | Stop reason: HOSPADM

## 2023-08-28 RX ORDER — DOCUSATE SODIUM 100 MG/1
100 CAPSULE, LIQUID FILLED ORAL 2 TIMES DAILY
Status: DISCONTINUED | OUTPATIENT
Start: 2023-08-28 | End: 2023-09-02 | Stop reason: HOSPADM

## 2023-08-28 RX ORDER — PANTOPRAZOLE SODIUM 40 MG/1
40 TABLET, DELAYED RELEASE ORAL
Status: DISCONTINUED | OUTPATIENT
Start: 2023-08-29 | End: 2023-09-02 | Stop reason: HOSPADM

## 2023-08-28 RX ORDER — ACETAMINOPHEN 325 MG/1
650 TABLET ORAL EVERY 6 HOURS PRN
Status: DISCONTINUED | OUTPATIENT
Start: 2023-08-28 | End: 2023-08-29

## 2023-08-28 RX ORDER — METHYLPREDNISOLONE SODIUM SUCCINATE 40 MG/ML
40 INJECTION, POWDER, LYOPHILIZED, FOR SOLUTION INTRAMUSCULAR; INTRAVENOUS EVERY 8 HOURS SCHEDULED
Status: DISCONTINUED | OUTPATIENT
Start: 2023-08-28 | End: 2023-08-30

## 2023-08-28 RX ORDER — HEPARIN SODIUM 5000 [USP'U]/ML
5000 INJECTION, SOLUTION INTRAVENOUS; SUBCUTANEOUS EVERY 8 HOURS SCHEDULED
Status: DISCONTINUED | OUTPATIENT
Start: 2023-08-28 | End: 2023-09-02 | Stop reason: HOSPADM

## 2023-08-28 RX ORDER — METHYLPREDNISOLONE SOD SUCC 125 MG
1 VIAL (EA) INJECTION ONCE
Status: COMPLETED | OUTPATIENT
Start: 2023-08-28 | End: 2023-08-28

## 2023-08-28 RX ORDER — LEVALBUTEROL INHALATION SOLUTION 1.25 MG/3ML
1.25 SOLUTION RESPIRATORY (INHALATION)
Status: DISCONTINUED | OUTPATIENT
Start: 2023-08-28 | End: 2023-08-30

## 2023-08-28 RX ORDER — SODIUM CHLORIDE 9 MG/ML
75 INJECTION, SOLUTION INTRAVENOUS CONTINUOUS
Status: DISCONTINUED | OUTPATIENT
Start: 2023-08-28 | End: 2023-08-29

## 2023-08-28 RX ORDER — LORAZEPAM 2 MG/ML
0.5 INJECTION INTRAMUSCULAR ONCE
Status: COMPLETED | OUTPATIENT
Start: 2023-08-28 | End: 2023-08-28

## 2023-08-28 RX ORDER — CALCIUM CARBONATE 500 MG/1
1000 TABLET, CHEWABLE ORAL DAILY PRN
Status: DISCONTINUED | OUTPATIENT
Start: 2023-08-28 | End: 2023-09-02 | Stop reason: HOSPADM

## 2023-08-28 RX ORDER — DULOXETIN HYDROCHLORIDE 60 MG/1
60 CAPSULE, DELAYED RELEASE ORAL 2 TIMES DAILY
Status: DISCONTINUED | OUTPATIENT
Start: 2023-08-28 | End: 2023-09-02 | Stop reason: HOSPADM

## 2023-08-28 RX ORDER — AZITHROMYCIN 250 MG/1
500 TABLET, FILM COATED ORAL EVERY 24 HOURS
Status: DISPENSED | OUTPATIENT
Start: 2023-08-28 | End: 2023-08-31

## 2023-08-28 RX ORDER — TRAZODONE HYDROCHLORIDE 100 MG/1
200 TABLET ORAL
Status: DISCONTINUED | OUTPATIENT
Start: 2023-08-28 | End: 2023-09-02 | Stop reason: HOSPADM

## 2023-08-28 RX ORDER — ARIPIPRAZOLE 10 MG/1
10 TABLET ORAL
Status: DISCONTINUED | OUTPATIENT
Start: 2023-08-28 | End: 2023-09-02 | Stop reason: HOSPADM

## 2023-08-28 RX ORDER — SODIUM CHLORIDE FOR INHALATION 0.9 %
3 VIAL, NEBULIZER (ML) INHALATION ONCE
Status: COMPLETED | OUTPATIENT
Start: 2023-08-28 | End: 2023-08-28

## 2023-08-28 RX ADMIN — METHYLPREDNISOLONE SODIUM SUCCINATE 40 MG: 40 INJECTION, POWDER, FOR SOLUTION INTRAMUSCULAR; INTRAVENOUS at 22:24

## 2023-08-28 RX ADMIN — DOCUSATE SODIUM 100 MG: 100 CAPSULE, LIQUID FILLED ORAL at 18:04

## 2023-08-28 RX ADMIN — LEVALBUTEROL HYDROCHLORIDE 1.25 MG: 1.25 SOLUTION RESPIRATORY (INHALATION) at 19:49

## 2023-08-28 RX ADMIN — DULOXETINE HYDROCHLORIDE 60 MG: 60 CAPSULE, DELAYED RELEASE ORAL at 18:04

## 2023-08-28 RX ADMIN — LEVALBUTEROL HYDROCHLORIDE 1.25 MG: 1.25 SOLUTION RESPIRATORY (INHALATION) at 16:18

## 2023-08-28 RX ADMIN — HEPARIN SODIUM 5000 UNITS: 5000 INJECTION INTRAVENOUS; SUBCUTANEOUS at 22:23

## 2023-08-28 RX ADMIN — MAGNESIUM SULFATE 2 G: 2 INJECTION INTRAVENOUS at 11:22

## 2023-08-28 RX ADMIN — Medication 3 ML: at 09:26

## 2023-08-28 RX ADMIN — HEPARIN SODIUM 5000 UNITS: 5000 INJECTION INTRAVENOUS; SUBCUTANEOUS at 16:09

## 2023-08-28 RX ADMIN — TRAZODONE HYDROCHLORIDE 200 MG: 100 TABLET ORAL at 22:31

## 2023-08-28 RX ADMIN — SODIUM CHLORIDE 75 ML/HR: 0.9 INJECTION, SOLUTION INTRAVENOUS at 18:04

## 2023-08-28 RX ADMIN — IPRATROPIUM BROMIDE 0.5 MG: 0.5 SOLUTION RESPIRATORY (INHALATION) at 16:18

## 2023-08-28 RX ADMIN — IPRATROPIUM BROMIDE 0.5 MG: 0.5 SOLUTION RESPIRATORY (INHALATION) at 19:49

## 2023-08-28 RX ADMIN — ARIPIPRAZOLE 10 MG: 10 TABLET ORAL at 22:23

## 2023-08-28 RX ADMIN — GUAIFENESIN 600 MG: 600 TABLET, EXTENDED RELEASE ORAL at 18:04

## 2023-08-28 RX ADMIN — LORAZEPAM 0.5 MG: 2 INJECTION INTRAMUSCULAR; INTRAVENOUS at 14:00

## 2023-08-28 RX ADMIN — METHYLPREDNISOLONE SODIUM SUCCINATE 40 MG: 40 INJECTION, POWDER, FOR SOLUTION INTRAMUSCULAR; INTRAVENOUS at 18:00

## 2023-08-28 RX ADMIN — ALBUTEROL SULFATE 10 MG: 2.5 SOLUTION RESPIRATORY (INHALATION) at 09:26

## 2023-08-28 NOTE — ED NOTES
Patient's oxygen saturation decreased to 90% on room air. Patient placed on 2L via nasal cannula at this time. Provider made aware.       Danielle Mckeon RN  08/28/23 3723

## 2023-08-28 NOTE — QUICK NOTE
ABG resulted 7.22/76/62 on 3L nc  Rechecked pt at bedside with CC NP    Pt sitting up in bed, more awake and alert. Less somnolent  Less tachypnic  Pt is agreeable to trial of bipap with "sedative". Notes she did not tolerate bipap in the past due to claustrophobia, but will attempt it now. She notes she did not take any of her morning meds yet today, including her clonazepam.    D/w CC NP:  Will give 0.5mg iv ativan now, trial bipap and ok for SD2.

## 2023-08-28 NOTE — RESPIRATORY THERAPY NOTE
RT Protocol Note  Curry Severance 62 y.o. female MRN: [id number]  Unit/Bed#: [facility] [place] [address] -01 Encounter: [id number]      Results from last 7 days   Lab Units 08/28/23  1621   PH ART  7.242*   PCO2 ART mm Hg 75.6*   PO2 ART mm Hg 85.0   HCO3 ART mmol/L 31.8*   BASE EXC ART mmol/L 1.8   O2 CONTENT ART mL/dL 21.1   O2 HGB, ARTERIAL % 95.4   ABG SOURCE  Radial, Left   MADHURI TEST  Yes        08/28/23 1732   Non-Invasive Information   O2 Interface Device Full face mask   Non-Invasive Ventilation Mode BiPAP   SpO2 93 %   Non-Invasive Settings   IPAP (cm) 18 cm   EPAP (cm) 6 cm   Rate (Set) 28   FiO2 (%) 40   Rise Time 2   Inspiratory Time (Set) 1   Non-Invasive Readings   Skin Intervention Skin intact; Skin barrier applied   Total Rate 28   MV (Mech) 9.5   Spontaneous Vt (mL) 338   Leak (lpm) 2   Non-Invasive Alarms   Insp Pressure High (cm H20) 50   Insp Pressure Low (cm H20) 5   Low Insp Pressure Time (sec) 20 sec   MV Low (L/min) 2   Vt High (mL) 1500   Vt Low (mL) 150   High Resp Rate (BPM) 50 BPM   Low Resp Rate (BPM) 5 BPM           Plan:  Spoke with critical care AP regarding ABG results. Plan to increase BIPAP pressures to 18/6. Transferred patient to 208 on BIPAP.

## 2023-08-28 NOTE — QUICK NOTE
Progress Note - Triage Asssessment   Feliciano Gamez 62 y.o. female MRN: [id number]    Time Called ( Time): 3211  Date Called: 08/28/23  Room#: ED 10  Person requesting evaluation: Dr. Bruce Alvarado, SLLING    Situation:    COPD Exac, ABG 7.22/76/62 on 3L NC. Being placed on bipap now with plan to recheck abg in a few hrs to assess response.    Interventions:   N/A         Triage Assessment:     Patient to be admitted to step down level of care SD2    Recommendations discussed with Dr. Bruce Alvarado

## 2023-08-28 NOTE — CONSULTS
Consultation - Pulmonary Medicine   Feliciano Gamez 62 y.o. female MRN: [id number]  Unit/Bed#: ED-10 Encounter: [id number]      Physician Requesting Consult: Dr. Bruce Alvarado  Reason for Consult: COPD exacerbation    Assessment/Plan:    1. Acute hypoxemic and hypercapnic respiratory failure. Likely component of chronic hypercapnic failure as well  • Patient currently on BiPAP, continue BiPAP for now  • Agree with plans for admission to stepdown level 2  • May need repeat ABG  •   2. Acute exacerbation of COPD  • Continue steroids with Solu-Medrol 40 mg IV every 8 hours  • Continue bronchodilators  • Has had multiple recent admissions, most recently DNR/DNI in June. • Outpatient pulmonary function tests and pulmonary follow-up  • Advair 100/50 is likely inadequate for her at baseline. Would strongly consider triple therapy on discharge with Trelegy or Breztri depending upon patient's drug formulary. These medications could always be de-escalated once symptoms are adequately controlled  3. Obesity with probable obstructive sleep apnea  • Diet and weight loss is encouraged  • Needs outpatient polysomnogram   4. Tobacco abuse  · Patient still smoking 3 cigarettes a day, smoked more heavily previously  · Meets lung cancer screening criteria    Patient has pulmonary evaluation scheduled at [facility] in September. She has never seen pulmonology before except during hospitalization. Importance of outpatient follow-up was stressed with her  ______________________________________________________________________    HPI:    Feliciano Gamez is a 62 y.o. female who presents with about 1 week worth of increasing shortness of breath, wheezing, and worsening respiratory symptoms. She presents emergency department today with more significant respiratory distress. She denies cough or phlegm production. No fever or chills. She has had wheezing and chest tightness which has been escalating over the last week.   She does smoke but has cut down to about 3 cigarettes weekly. She previously smoked a pack a day of cigarettes for up to 45 years. In the ED, she initially was on nasal cannula oxygen but did require BiPAP for work of breathing. She is alert and communicative on the BiPAP. She states that she is starting to feel better. She still has wheezing and some chest tightness however. No marty chest pain, palpitations, history of leg swelling or signs or symptoms of congestive heart failure. Denies abdominal pain or GERD. She uses albuterol and Advair 100 at home. She has never seen a pulmonologist.  She has been hospitalized for her breathing multiple times but has never been on a ventilator. PFT results:  None available    Review of Systems:  Review of systems was somewhat limited incompleteness due to the patient's BiPAP  Aside from what was mentioned in the HPI, it is otherwise negative. Historical Information   Past Medical History:   Diagnosis Date   • Anxiety    • Chronic pain     Back Pain   • COPD (chronic obstructive pulmonary disease) (HCC)    • Depression    • Hyperlipidemia    • Migraine    • Psychiatric disorder      Past Surgical History:   Procedure Laterality Date   • OTHER SURGICAL HISTORY      cyst removed from left axila     Social History   Social History     Substance and Sexual Activity   Alcohol Use Not Currently    Comment: quit drinking 6 years ago     Social History     Tobacco Use   Smoking Status Every Day   • Packs/day: 1.00   • Years: 45.00   • Total pack years: 45.00   • Types: Cigarettes   • Passive exposure: Current   Smokeless Tobacco Former   Tobacco Comments    Currently smoking 3 cigarettes daily     Family History:   Family History   Problem Relation Age of Onset   • Heart failure Mother    • Heart disease Father        Medications: The patient's active and prehospital medications were reviewed.         PhysicalExamination:  Vitals:   Vitals:    08/28/23 1130 08/28/23 1200 08/28/23 1349 08/28/23 1400   BP: 151/75 137/64  133/60   BP Location: Right arm Right arm  Right arm   Pulse: 104 101  100   Resp: (!) 26 (!) 26  (!) 26   Temp:       TempSrc:       SpO2: 94% 92% 94% 96%       Temp  Min: 98.1 °F (36.7 °C)  Max: 98.1 °F (36.7 °C)   5 feet 3 inches tall. 188 pounds with BMI 33.5 was last recorded weight and BMI    SpO2: 96 %,   SpO2 Activity: At Rest,   O2 Device: BiPAP 14/6 with 40% oxygen    Gen: Sleepy but arousable. Responds to questions appropriately although somewhat sluggishly  HEENT: Conjugate gaze. No scleral icterus. Oropharynx is dry through the BiPAP mask  Neck: No JVD or adenopathy, trachea midline  Chest: Symmetric, shallow. Diffuse wheezing  Cardiac: Regular, distant heart tones, difficult to auscultate through the wheezing  Abd: Obese, benign  Ext: No edema  Neuro: Somewhat sluggish but arousable and appropriate  Skin: No rash    Diagnostic Data:  CBC:   Results from last 7 days   Lab Units 08/28/23  0922   WBC Thousand/uL 7.53   HEMOGLOBIN g/dL 14.7   HEMATOCRIT % 49.0*   PLATELETS Thousands/uL 196       CMP:   Results from last 7 days   Lab Units 08/28/23  0922   SODIUM mmol/L 140   POTASSIUM mmol/L 4.8   CHLORIDE mmol/L 105   CO2 mmol/L 34*   BUN mg/dL 10   CREATININE mg/dL 0.99   CALCIUM mg/dL 8.9   ALK PHOS U/L 89   ALT U/L 11   AST U/L 10*         Microbiology:          Results from last 7 days   Lab Units 08/28/23  0922   SARS-COV-2  Negative   INFLUENZA A PCR  Negative   INFLUENZA B PCR  Negative   RSV PCR  Negative         ABG:   Lab Results   Component Value Date    PHART 7.224 (LL) 08/28/2023    ELZ7YHC 76.0 (HH) 08/28/2023    PO2ART 62.2 (L) 08/28/2023    EKW0NYN 30.7 (H) 08/28/2023    BEART 0.4 08/28/2023    SOURCE Radial, Left 08/28/2023       Imaging: The pertinent imaging studies were reviewed personally on the PACS system  Current chest x-ray is without infiltrate. CT scan from May 2023 shows no evidence of pulmonary embolism.   There are tiny pulmonary nodules amenable to ongoing lung cancer screening surveillance. There is mild peribronchial thickening.   No evidence of substantial emphysema    Cardiac lab/EKG/telemetry/ECHO:     Results from last 7 days   Lab Units 08/28/23  1344 08/28/23  1124 08/28/23  0922   HS TNI 0HR ng/L  --   --  7   HS TNI 2HR ng/L  --  6  --    HS TNI 4HR ng/L 6  --   --          Lab Results   Component Value Date    BNP 29 08/28/2023         Richard Montgomery MD

## 2023-08-28 NOTE — PLAN OF CARE
Problem: Potential for Falls  Goal: Patient will remain free of falls  Description: INTERVENTIONS:  - Educate patient/family on patient safety including physical limitations  - Instruct patient to call for assistance with activity   - Consult OT/PT to assist with strengthening/mobility   - Keep Call bell within reach  - Keep bed low and locked with side rails adjusted as appropriate  - Keep care items and personal belongings within reach  - Initiate and maintain comfort rounds  - Make Fall Risk Sign visible to staff  - Offer Toileting every 2 Hours, in advance of need  - Initiate/Maintain alarm  - Obtain necessary fall risk management equipment  - Apply yellow socks and bracelet for high fall risk patients  - Consider moving patient to room near nurses station  Outcome: Progressing     Problem: NEUROSENSORY - ADULT  Goal: Achieves maximal functionality and self care  Description: INTERVENTIONS  - Monitor swallowing and airway patency with patient fatigue and changes in neurological status  - Encourage and assist patient to increase activity and self care.    - Encourage visually impaired, hearing impaired and aphasic patients to use assistive/communication devices  Outcome: Progressing     Problem: CARDIOVASCULAR - ADULT  Goal: Maintains optimal cardiac output and hemodynamic stability  Description: INTERVENTIONS:  - Monitor I/O, vital signs and rhythm  - Monitor for S/S and trends of decreased cardiac output  - Administer and titrate ordered vasoactive medications to optimize hemodynamic stability  - Assess quality of pulses, skin color and temperature  - Assess for signs of decreased coronary artery perfusion  - Instruct patient to report change in severity of symptoms  Outcome: Progressing  Goal: Absence of cardiac dysrhythmias or at baseline rhythm  Description: INTERVENTIONS:  - Continuous cardiac monitoring, vital signs, obtain 12 lead EKG if ordered  - Administer antiarrhythmic and heart rate control medications as ordered  - Monitor electrolytes and administer replacement therapy as ordered  Outcome: Progressing     Problem: RESPIRATORY - ADULT  Goal: Achieves optimal ventilation and oxygenation  Description: INTERVENTIONS:  - Assess for changes in respiratory status  - Assess for changes in mentation and behavior  - Position to facilitate oxygenation and minimize respiratory effort  - Oxygen administered by appropriate delivery if ordered  - Initiate smoking cessation education as indicated  - Encourage broncho-pulmonary hygiene including cough, deep breathe, Incentive Spirometry  - Assess the need for suctioning and aspirate as needed  - Assess and instruct to report SOB or any respiratory difficulty  - Respiratory Therapy support as indicated  Outcome: Progressing     Problem: SKIN/TISSUE INTEGRITY - ADULT  Goal: Skin Integrity remains intact(Skin Breakdown Prevention)  Description: Assess:  -Perform Ty assessment   -Clean and moisturize skin  -Inspect skin when repositioning, toileting, and assisting with ADLS  -Assess under medical devices   -Assess extremities for adequate circulation and sensation     Bed Management:  -Have minimal linens on bed & keep smooth, unwrinkled  -Change linens as needed when moist or perspiring  -Avoid sitting or lying in one position for more than 2 hours while in bed  -Keep HOB at 45 degrees     Toileting:  -Offer bedside commode  -Assess for incontinence  -Use incontinent care products after each incontinent episode     Activity:  -Mobilize patient 3 times a day  -Encourage activity and walks on unit  -Encourage or provide ROM exercises   -Turn and reposition patient every 2 Hours  -Use appropriate equipment to lift or move patient in bed  -Instruct/ Assist with weight shifting every 2 when out of bed in chair  -Consider limitation of chair time 2 hour intervals    Skin Care:  -Avoid use of baby powder, tape, friction and shearing, hot water or constrictive clothing  -Relieve pressure over bony prominences   -Do not massage red bony areas    Next Steps:  -Teach patient strategies to minimize risks   -Consider consults to  interdisciplinary teams   Outcome: Progressing     Problem: SAFETY ADULT  Goal: Patient will remain free of falls  Description: INTERVENTIONS:  - Educate patient/family on patient safety including physical limitations  - Instruct patient to call for assistance with activity   - Consult OT/PT to assist with strengthening/mobility   - Keep Call bell within reach  - Keep bed low and locked with side rails adjusted as appropriate  - Keep care items and personal belongings within reach  - Initiate and maintain comfort rounds  - Make Fall Risk Sign visible to staff  - Offer Toileting every 2 Hours, in advance of need  - Initiate/Maintain alarm  - Obtain necessary fall risk management equipment  - Apply yellow socks and bracelet for high fall risk patients  - Consider moving patient to room near nurses station  Outcome: Progressing  Goal: Maintain or return to baseline ADL function  Description: INTERVENTIONS:  -  Assess patient's ability to carry out ADLs; assess patient's baseline for ADL function and identify physical deficits which impact ability to perform ADLs (bathing, care of mouth/teeth, toileting, grooming, dressing, etc.)  - Assess/evaluate cause of self-care deficits   - Assess range of motion  - Assess patient's mobility; develop plan if impaired  - Assess patient's need for assistive devices and provide as appropriate  - Encourage maximum independence but intervene and supervise when necessary  - Involve family in performance of ADLs  - Assess for home care needs following discharge   - Consider OT consult to assist with ADL evaluation and planning for discharge  - Provide patient education as appropriate  Outcome: Progressing  Goal: Maintains/Returns to pre admission functional level  Description: INTERVENTIONS:  - Perform BMAT or MOVE assessment daily.   - Set and communicate daily mobility goal to care team and patient/family/caregiver. - Collaborate with rehabilitation services on mobility goals if consulted  - Perform Range of Motion 3 times a day. - Reposition patient every 2 hours. - Dangle patient 3 times a day  - Stand patient 3 times a day  - Ambulate patient 3 times a day  - Out of bed to chair 3 times a day   - Out of bed for meals 3 times a day  - Out of bed for toileting  - Record patient progress and toleration of activity level   Outcome: Progressing     Problem: DISCHARGE PLANNING  Goal: Discharge to home or other facility with appropriate resources  Description: INTERVENTIONS:  - Identify barriers to discharge w/patient and caregiver  - Arrange for needed discharge resources and transportation as appropriate  - Identify discharge learning needs (meds, wound care, etc.)  - Arrange for interpretive services to assist at discharge as needed  - Refer to Case Management Department for coordinating discharge planning if the patient needs post-hospital services based on physician/advanced practitioner order or complex needs related to functional status, cognitive ability, or social support system  Outcome: Progressing     Problem: Knowledge Deficit  Goal: Patient/family/caregiver demonstrates understanding of disease process, treatment plan, medications, and discharge instructions  Description: Complete learning assessment and assess knowledge base.   Interventions:  - Provide teaching at level of understanding  - Provide teaching via preferred learning methods  Outcome: Progressing

## 2023-08-28 NOTE — ED PROVIDER NOTES
Pt Name: Elvin Loo  MRN: [id number]  [address] 1965  Age/Sex: 62 y.o. female  Date of evaluation: 8/28/2023  PCP: Shavon Hurst MD    CHIEF COMPLAINT    Chief Complaint   Patient presents with   • Shortness of Breath     Sob for the past couple of days. Recent diagnosis of COPD. 2 duo nebs given by ems pta. HPI    Pepper Milton presents to the Emergency Department complaining of wheezing and SOB. She was diagnosed with COPD a few months ago. She was given two duonebs and solumedrol by EMS. She reports that she had been complaint with her meds at home. Reports subjective fever. No other complaint. HPI      Past Medical and Surgical History    Past Medical History:   Diagnosis Date   • Anxiety    • Chronic pain     Back Pain   • Depression    • Hyperlipidemia    • Migraine    • Psychiatric disorder        Past Surgical History:   Procedure Laterality Date   • OTHER SURGICAL HISTORY      cyst removed from left axila       Family History   Problem Relation Age of Onset   • Heart failure Mother    • Heart disease Father        Social History     Tobacco Use   • Smoking status: Every Day     Packs/day: 0.20     Years: 45.00     Total pack years: 9.00     Types: Cigarettes     Passive exposure: Current   • Smokeless tobacco: Former   • Tobacco comments:     Encouraged to stop smoking. Vaping Use   • Vaping Use: Never used   Substance Use Topics   • Alcohol use: Not Currently   • Drug use: Not Currently     Types: Methamphetamines, Marijuana     Comment: reports last use years ago         . Allergies    Allergies   Allergen Reactions   • Penicillins Anaphylaxis, Rash and Edema     Anaphylaxis (Tolerates IV ceftriaxone 9/7/22)       Home Medications    Prior to Admission medications    Medication Sig Start Date End Date Taking?  Authorizing Provider   albuterol (PROVENTIL HFA,VENTOLIN HFA) 90 mcg/act inhaler Inhale 2 puffs every 6 (six) hours as needed for wheezing or shortness of breath 5/23/23   North Fletcher MD   ARIPiprazole (ABILIFY) 10 mg tablet Take 1 Po Q HS 4/26/23   Dietra Section, FRANKLIN   atorvastatin (LIPITOR) 20 mg tablet Take 20 mg by mouth daily    Historical Provider, MD   clonazePAM (KlonoPIN) 1 mg tablet Take 1/2 PO Q AM and 1 HS 4/26/23   Dietra Section, FRANKLIN   cyclobenzaprine (FLEXERIL) 5 mg tablet Take 5 mg by mouth daily as needed 11/3/22   Historical Provider, MD   DULoxetine (CYMBALTA) 60 mg delayed release capsule Take 1 Po BID 4/26/23   Dietra Section, FRANKLIN   fluticasone [facility]) 50 mcg/act nasal spray  10/25/21   Historical Provider, MD   Fluticasone-Salmeterol (Advair) 100-50 mcg/dose inhaler Inhale 1 puff 2 (two) times a day Rinse mouth after use. 5/23/23   North Fletcher MD   hydrOXYzine HCL (ATARAX) 50 mg tablet Take 1 PO TID PRN 4/26/23   Dietra Section, FRANKLIN   meloxicam (MOBIC) 7.5 mg tablet Take 7.5 mg by mouth daily 9/23/22 9/23/23  Historical Provider, MD   nicotine (NICODERM CQ) 14 mg/24hr TD 24 hr patch Place 1 patch on the skin over 24 hours daily Do not start before June 18, 2023. 6/18/23   Lou Sprain, DO   predniSONE 20 mg tablet Take 1 tablet (20 mg total) by mouth daily Do not start before June 18, 2023. 6/18/23   Lou Sprain, DO   traZODone (DESYREL) 100 mg tablet Take 2 1/2 P HS PRN 4/26/23   Dietra Section, FRANKLIN           Review of Systems    Review of Systems   Constitutional: Negative for chills and fever. HENT: Negative for ear pain and sore throat. Eyes: Negative for pain and visual disturbance. Respiratory: Positive for cough, shortness of breath and wheezing. Cardiovascular: Negative for chest pain and palpitations. Gastrointestinal: Negative for abdominal pain and vomiting. Genitourinary: Negative for dysuria and hematuria. Musculoskeletal: Negative for arthralgias and back pain. Skin: Negative for color change and rash. Neurological: Negative for seizures and syncope.    All other systems reviewed and are negative. Physical Exam      ED Triage Vitals [08/28/23 0919]   Temperature Pulse Respirations Blood Pressure SpO2   98.1 °F (36.7 °C) 105 (!) 24 (!) 173/88 97 %      Temp Source Heart Rate Source Patient Position - Orthostatic VS BP Location FiO2 (%)   Oral Monitor Sitting Right arm --      Pain Score       --               Physical Exam  Vitals and nursing note reviewed. Constitutional:       General: She is not in acute distress. Appearance: She is well-developed. HENT:      Head: Normocephalic and atraumatic. Eyes:      Conjunctiva/sclera: Conjunctivae normal.   Cardiovascular:      Rate and Rhythm: Normal rate and regular rhythm. Heart sounds: No murmur heard. Pulmonary:      Effort: Tachypnea, accessory muscle usage and respiratory distress present. Breath sounds: Decreased breath sounds and wheezing present. Abdominal:      Palpations: Abdomen is soft. Tenderness: There is no abdominal tenderness. Musculoskeletal:         General: No swelling. Cervical back: Neck supple. Skin:     General: Skin is warm and dry. Capillary Refill: Capillary refill takes less than 2 seconds. Neurological:      Mental Status: She is alert. Psychiatric:         Mood and Affect: Mood normal.                Assessment and Plan    Shelley Mcintosh is a 62 y.o. female who presents with wheezing and SOB. Physical examination remarkable for increased work of breathing and diffuse wheezing. Differential diagnosis (not completely inclusive) includes COPD exacerbation vs concomitant infectious process. Plan will be to perform diagnostic testing and treat symptomatically. MDM    Diagnostic Results    EKG:  nonspecific ST and T waves changes, sinus tachycardia    EKG INTERPRETATION  EKG Interpretation    Rate: 107 BPM  Rhythm: sinus tachycardia  Intervals: Normal, no blocks, QTc 445ms  T waves: nonspecific  ST segments: nonspecific    Impression: nondiagnostic EKG.  EKG for comparison: no significant change is seen  EKG interpreted by me. Interpretation by Rebekah Bailey DO  EKG reviewed and interpreted independently.     Labs:    Results for orders placed or performed during the hospital encounter of 08/28/23   FLU/RSV/COVID - if FLU/RSV clinically relevant    Specimen: Nose; Nares   Result Value Ref Range    SARS-CoV-2 Negative Negative    INFLUENZA A PCR Negative Negative    INFLUENZA B PCR Negative Negative    RSV PCR Negative Negative   CBC and differential   Result Value Ref Range    WBC 7.53 4.31 - 10.16 Thousand/uL    RBC 4.84 3.81 - 5.12 Million/uL    Hemoglobin 14.7 11.5 - 15.4 g/dL    Hematocrit 49.0 (H) 34.8 - 46.1 %     (H) 82 - 98 fL    MCH 30.4 26.8 - 34.3 pg    MCHC 30.0 (L) 31.4 - 37.4 g/dL    RDW 13.9 11.6 - 15.1 %    MPV 9.6 8.9 - 12.7 fL    Platelets 286 747 - 535 Thousands/uL    nRBC 0 /100 WBCs    Neutrophils Relative 67 43 - 75 %    Immat GRANS % 1 0 - 2 %    Lymphocytes Relative 18 14 - 44 %    Monocytes Relative 7 4 - 12 %    Eosinophils Relative 6 0 - 6 %    Basophils Relative 1 0 - 1 %    Neutrophils Absolute 5.09 1.85 - 7.62 Thousands/µL    Immature Grans Absolute 0.08 0.00 - 0.20 Thousand/uL    Lymphocytes Absolute 1.36 0.60 - 4.47 Thousands/µL    Monocytes Absolute 0.50 0.17 - 1.22 Thousand/µL    Eosinophils Absolute 0.43 0.00 - 0.61 Thousand/µL    Basophils Absolute 0.07 0.00 - 0.10 Thousands/µL   Comprehensive metabolic panel   Result Value Ref Range    Sodium 140 135 - 147 mmol/L    Potassium 4.8 3.5 - 5.3 mmol/L    Chloride 105 96 - 108 mmol/L    CO2 34 (H) 21 - 32 mmol/L    ANION GAP 1 mmol/L    BUN 10 5 - 25 mg/dL    Creatinine 0.99 0.60 - 1.30 mg/dL    Glucose 119 65 - 140 mg/dL    Calcium 8.9 8.4 - 10.2 mg/dL    AST 10 (L) 13 - 39 U/L    ALT 11 7 - 52 U/L    Alkaline Phosphatase 89 34 - 104 U/L    Total Protein 6.9 6.4 - 8.4 g/dL    Albumin 4.0 3.5 - 5.0 g/dL    Total Bilirubin 0.34 0.20 - 1.00 mg/dL    eGFR 63 ml/min/1.73sq m   HS Troponin 0hr (reflex protocol)   Result Value Ref Range    hs TnI 0hr 7 "Refer to ACS Flowchart"- see link ng/L   B-Type Natriuretic Peptide(BNP)   Result Value Ref Range    BNP 29 0 - 100 pg/mL   HS Troponin I 2hr   Result Value Ref Range    hs TnI 2hr 6 "Refer to ACS Flowchart"- see link ng/L    Delta 2hr hsTnI -1 <20 ng/L   ECG 12 lead   Result Value Ref Range    Ventricular Rate 107 BPM    Atrial Rate 107 BPM    TX Interval 156 ms    QRSD Interval 76 ms    QT Interval 334 ms    QTC Interval 445 ms    P Axis 76 degrees    QRS Axis 89 degrees    T Wave Axis 70 degrees   ECG 12 lead   Result Value Ref Range    Ventricular Rate 104 BPM    Atrial Rate 104 BPM    TX Interval 148 ms    QRSD Interval 76 ms    QT Interval 340 ms    QTC Interval 447 ms    P Axis 72 degrees    QRS Axis 85 degrees    T Wave Axis 67 degrees       All labs reviewed and utilized in the medical decision making process    Radiology:    XR chest 1 view portable   Final Result      No acute cardiopulmonary disease.                   Workstation performed: [id number]            suspect LLL effusion vs infiltrate    All radiology studies independently viewed by me and interpreted by the radiologist.    Procedure    CriticalCare Time    Date/Time: 8/28/2023 12:15 PM    Performed by: Alex Garza DO  Authorized by: Alex Garza DO    Critical care provider statement:     Critical care time (minutes):  30    Critical care time was exclusive of:  Separately billable procedures and treating other patients and teaching time    Critical care was time spent personally by me on the following activities:  Blood draw for specimens, obtaining history from patient or surrogate, development of treatment plan with patient or surrogate, discussions with consultants, evaluation of patient's response to treatment, examination of patient, interpretation of cardiac output measurements, ordering and performing treatments and interventions, ordering and review of laboratory studies, ordering and review of radiographic studies, re-evaluation of patient's condition and review of old charts    I assumed direction of critical care for this patient from another provider in my specialty: no    Comments:      Patient was treated aggressively with hour long neb, steroids, magnesium, frequent reassessments and refer for admission. ED Course of Care and Re-Assessments    Patient re-evaluated after an hour long treatment. She continued to have wheezing and tachypnea. ABG ordered as well as magnesium. I discussed case with Dr. Veena Barron for admission to [facility] service. Medications   ipratropium-albuterol (FOR EMS ONLY) (DUO-NEB) 0.5-2.5 mg/3 mL inhalation solution 6 mL (0 mL Does not apply Given to EMS 8/28/23 0921)   methylPREDNISolone sodium succinate (FOR EMS ONLY) (Solu-MEDROL) 125 MG injection 125 mg (0 mg Does not apply Given to EMS 8/28/23 0921)   albuterol inhalation solution 10 mg (10 mg Nebulization Given 8/28/23 0926)     And   sodium chloride 0.9 % inhalation solution 3 mL (3 mL Nebulization Given 8/28/23 0926)   magnesium sulfate 2 g/50 mL IVPB (premix) 2 g (2 g Intravenous New Bag 8/28/23 1122)           FINAL IMPRESSION    Final diagnoses:   COPD exacerbation ([address])         DISPOSITION/PLAN    Time reflects when diagnosis was documented in both MDM as applicable and the Disposition within this note     Time User Action Codes Description Comment    8/28/2023 12:07 PM Deyanira PERALES Add [J44.1] COPD exacerbation [facility])       ED Disposition     ED Disposition   Admit    Condition   Stable    Date/Time   Mon Aug 28, 2023 12:08 PM    Comment   Case was discussed with MESSI and the patient's admission status was agreed to be Admission Status: inpatient status to the service of Dr. Veena Barron .            Follow-up Information    None                Blanca Anderson, DO Blanca Anderson DO  08/28/23 5187

## 2023-08-28 NOTE — QUICK NOTE
Reviewed ABG-  pH and PCO2 without significant improvement  D/w pulm/critical care who coordinated with RT to titrate settings        Mental status slightly improved, but still somnolent.    Pt agreeable to bipap at this time      Called sister Sylvie Lopez and gave update

## 2023-08-28 NOTE — H&P
[facility]  H&P  Name: Mansi Olea 62 y.o. female I MRN: [id number]  Unit/Bed#: ED-10 I Date of Admission: 8/28/2023   Date of Service: 8/28/2023 I Hospital Day: 0      Assessment/Plan   * COPD exacerbation [facility])  Assessment & Plan  Patient is a 49-year-old female with past medical history significant for COPD and tobacco abuse who presents to the ER with cough, dyspnea and acute COPD exacerbation. Patient also was admitted May 2023, and June 2023 for COPD exacerbation. · Patient presents with a 1 week history of worsening dyspnea, chest congestion, productive cough. She notes she was using her home albuterol nebulizers and albuterol/Advair inhalers without any improvement  · In the ER patient was noted to be tachypneic, but not hypoxic: Lungs with very poor air movement  · She was given a DuoNeb by EMS, as well as 10 mg albuterol nebulizer and 125 mg of Solu-Medrol in the ER without significant improvement  · She will be admitted to the hospital for COPD exacerbation  · Continue IV steroids, and Xopenex/Atrovent nebs  · Azithromycin for COPD exacerbation  · Patient currently afebrile, chest x-ray without focal infiltrates, and no evidence of leukocytosis  · No current evidence of infectious etiology  · Check procalcitonin for confirmation  · COVID/influenza/RSV PCR negative  · Sputum culture/blood cultures pending  · We will confer with the pulmonary team  · Discussed with patient if her condition were to worsen, possible treatment options including BiPAP, and transferred to the ICU. · Patient with previous hypercarbia on ABG 5/23: Current ABG pending  · Patient currently agreeable to BiPAP if indicated.   She confirms she is a DNR/DNI and notes she would not wish transfer to the ICU      Respiratory distress  Assessment & Plan  · Patient presented to the ER with a COPD exacerbation, and tachypnea with respiratory distress  · Old records note a history of hypercapnia  · ABG pending  · Case discussed with pulmonary team, monitor for any deterioration in status    Somnolence  Assessment & Plan  · Patient noted to be somnolent during exam  · She notes she did not sleep at all last night over the past few nights  · Patient has previous history of hypercapnea on prior admissions, however notes states she had refused BiPAP  · check ABG    Lung nodule  Assessment & Plan  · During prior admission 5/23 3 mm left lower lung nodule was noted with repeat CT scan recommended in 12 months  · Patient has been referred to [facility] pulmonology as an outpatient    Tobacco abuse  Assessment & Plan  Nicotine patch  Smoking cessation counseled    History of alcohol abuse  Assessment & Plan  · Per records, patient has a history of alcohol abuse  · CIWA scale, thiamine and folic acid  · Per her sister, patient has been abstinent for 6 years: No longer an active problem and will discontinue if no signs or symptoms of withdrawal    Generalized anxiety disorder  Assessment & Plan  Continue home Abilify, clonazepam, Cymbalta, trazodone, and as needed Atarax  [place] queried: Confirmed patient's clonazepam prescription. No red flags       Addendum:  Acute Respiratory Failure with hypercapnea:  ABG with PCO2 of 76 and pH of 7.22:  Communicated with critical care team to request eval for SD1. BIPAP order placed. SIRS:  Pt with tachycardia, tachypnea :due to acute copd exacerbation. Afebrile with nl wbc. Cultures and procal pending but does not appear c/w sepsis  ======================================    History of Present Illness     HPI:  Daniel Guevara is a 62 y.o. female who presents with shortness of breath and cough. History currently taken for patient at the bedside    Relates her symptoms began 1 week ago and became progressively worse. She notes shortness of breath at rest and dyspnea with exertion. SHe notes chest congestion and phlegm. She noted a productive cough. Denies any fevers and chills. She notes she lives with her sister, daughter, and her significant other. She denies any sick contacts. She denies any fevers or chills. She denies any current pain at this time. Old records note a history of chronic pain. Patient denies any abdominal pain. Notes some mild nausea. Notes yesterday she was tolerating p.o. without any difficulty denies any current vomiting or diarrhea. Denies any abdominal pain    She denies any dizziness or lightheadedness. She notes she did not sleep at all last night. She notes she has not slept in the last few nights due to difficulty breathing and notes she is very tired.             Historical Information   Past Medical History:   Diagnosis Date   • Anxiety    • Chronic pain     Back Pain   • COPD (chronic obstructive pulmonary disease) ([facility])    • Depression    • Hyperlipidemia    • Migraine    • Psychiatric disorder      Patient Active Problem List   Diagnosis   • Severe episode of recurrent major depressive disorder, without psychotic features ([facility])   • Generalized anxiety disorder   • History of alcohol abuse   • Cannabis abuse   • Stroke-like symptoms   • Acute bilateral low back pain with bilateral sciatica   • Tobacco abuse   • High cholesterol   • Chronic pain   • Vision loss   • AMS (altered mental status)   • Major depressive disorder, recurrent episode, moderate ([facility])   • COPD exacerbation ([facility])   • Lung nodule   • Gallbladder sludge   • SIRS (systemic inflammatory response syndrome) ([facility])   • Somnolence   • Respiratory distress     Past Surgical History:   Procedure Laterality Date   • OTHER SURGICAL HISTORY      cyst removed from left axila       Social History   Social History     Substance and Sexual Activity   Alcohol Use Not Currently    Comment: quit drinking 6 years ago     Social History     Substance and Sexual Activity   Drug Use Not Currently   • Types: Methamphetamines, Marijuana    Comment: reports last use years ago     Social History     Tobacco Use   Smoking Status Every Day   • Packs/day: 0.20   • Years: 45.00   • Total pack years: 9.00   • Types: Cigarettes   • Passive exposure: Current   Smokeless Tobacco Former   Tobacco Comments    Encouraged to stop smoking. Family History:   Family History   Problem Relation Age of Onset   • Heart failure Mother    • Heart disease Father        Meds/Allergies     No current facility-administered medications for this encounter. Current Outpatient Medications:   •  albuterol (PROVENTIL HFA,VENTOLIN HFA) 90 mcg/act inhaler, Inhale 2 puffs every 6 (six) hours as needed for wheezing or shortness of breath, Disp: 18 g, Rfl: 0  •  ARIPiprazole (ABILIFY) 10 mg tablet, Take 1 Po Q HS, Disp: 90 tablet, Rfl: 1  •  clonazePAM (KlonoPIN) 1 mg tablet, Take 1/2 PO Q AM and 1 HS, Disp: 45 tablet, Rfl: 3  •  DULoxetine (CYMBALTA) 60 mg delayed release capsule, Take 1 Po BID, Disp: 180 capsule, Rfl: 1  •  fluticasone (FLONASE) 50 mcg/act nasal spray, , Disp: , Rfl:   •  Fluticasone-Salmeterol (Advair) 100-50 mcg/dose inhaler, Inhale 1 puff 2 (two) times a day Rinse mouth after use., Disp: 60 blister, Rfl: 0  •  hydrOXYzine HCL (ATARAX) 50 mg tablet, Take 1 PO TID PRN, Disp: 90 tablet, Rfl: 3  •  meloxicam (MOBIC) 7.5 mg tablet, Take 7.5 mg by mouth daily, Disp: , Rfl:   •  traZODone (DESYREL) 100 mg tablet, Take 2 1/2 P HS PRN, Disp: 225 tablet, Rfl: 1  •  atorvastatin (LIPITOR) 20 mg tablet, Take 20 mg by mouth daily (Patient not taking: Reported on 8/28/2023), Disp: , Rfl:   •  cyclobenzaprine (FLEXERIL) 5 mg tablet, Take 5 mg by mouth daily as needed (Patient not taking: Reported on 8/28/2023), Disp: , Rfl:   •  nicotine (NICODERM CQ) 14 mg/24hr TD 24 hr patch, Place 1 patch on the skin over 24 hours daily Do not start before June 18, 2023.  (Patient not taking: Reported on 8/28/2023), Disp: 28 patch, Rfl: 0  •  predniSONE 20 mg tablet, Take 1 tablet (20 mg total) by mouth daily Do not start before June 18, 2023. (Patient not taking: Reported on 8/28/2023), Disp: 7 tablet, Rfl: 0    Allergies   Allergen Reactions   • Penicillins Anaphylaxis, Rash and Edema     Anaphylaxis (Tolerates IV ceftriaxone 9/7/22)       Review of Systems  A detailed 12 point review of systems was conducted and is negative apart from those mentioned in the HPI. Objective   Vitals: Blood pressure 137/64, pulse 101, temperature 98.1 °F (36.7 °C), temperature source Oral, resp. rate (!) 26, SpO2 92 %, not currently breastfeeding. Physical Exam   General: Pale female. Tachypneic. Lying in bed. Slightly somnolent. Opens eyes to voice and touch   HEENT: EOMI, sclera anicteric, dry mucous membranes, tongue mucosa dry without lesions. Neck: supple  Heart: Regular rate and rhythm, S1S2 present. No murmur, rub or gallop. Lungs; bilateral rhonchi. Bilateral end expiratory wheezing. No crackles. No accessory muscle use or respiratory distress. Abdomen: soft, non-tender, non-distended, NABS. No guarding or rebound. No peritoneal sound or mass. Extremities: no clubbing, cyanosis, or edema. 2+ pedal pulses bilaterally. Full range of motion. Neurologic: Somnolent however awakens to voice and touch. Moving all 4 extremities. Cooperates with exam.  Answers questions with 1-2 word phrases. Skin: warm and dry. No petechiae, purpura or rash. Lab Results:   Results from last 7 days   Lab Units 08/28/23  0922   WBC Thousand/uL 7.53   HEMOGLOBIN g/dL 14.7   HEMATOCRIT % 49.0*   PLATELETS Thousands/uL 196     Results from last 7 days   Lab Units 08/28/23  0922   POTASSIUM mmol/L 4.8   CHLORIDE mmol/L 105   CO2 mmol/L 34*   BUN mg/dL 10   CREATININE mg/dL 0.99   CALCIUM mg/dL 8.9         Imaging  Chest x-ray: No acute cardiopulmonary disease    EKG: Sinus tachycardia: Rate of 107. No acute ST or T wave changes    Family: Called patient's Sister Rachid Fletcher gave an update. Reviewed test results, treatment plan. Reviewed patient's significant dyspnea, and if she worsens she may require transfer to ICU and the BiPAP mask. Also discussed with patient's sister my conversation with Eve Benedict that she did not wish any intubation, mechanical ventilation, life support, or transfer to the ICU    Code Status: Confirmed with patient at the bedside: She wishes DO NOT INTUBATE, DO NOT RESUSCITATE. She relates she will accept BiPAP. She states she does not wish to be transferred to the ICU if her condition worsens      Disposition: Due to COPD exacerbation and respiratory distress patient be admitted to the hospital.  It is anticipated length of stay will be greater than 2 midnights and she will be placed on inpatient status      Portions of the record may have been created with voice recognition software.

## 2023-08-28 NOTE — ACP (ADVANCE CARE PLANNING)
Advanced Care Planning Progress Note    Serious Illness Conversation    1. What is your understanding now of where you are with your illness? Pt presents with copd exacerbation and respiratory distress     2. How much information about what is likely to be ahead with your illness would you like to have? Pt somnolent and tachypnic     3. What did you (clinician) communicate to the patient? You are having difficulty breathing, if your condition were to worsen we may need to use a mask "BIPAP" like we tried on a prior admission. You may need to be transferred to the ICU. 4. If your health situation worsens, what are your most important goals? 5. What are the biggest fears and worries about the future and your health? 6. What abilities are so critical to your life that you cannot imagine living without them? 7. What gives you strength as you think about the future with your illness? 8. If you become sicker, how much are you willing to go through for the possibility of gaining more time? Be in the hospital: Yes    Be in the ICU: No    Be on a ventilator: No    Pt states she will try BIPAP mask to see if she can wear it, if needed. She is not certain if she will agree to keep it on, but will agree to try it. She notes if she were to worsen she would not wish to be transferred to the ICU  She would not wish intubation, mechanical ventillation (be on a "ventilator" or "respirator" or any "machine" to breathe)  She states she would not want CPR or cardiac shocks  9. How much does your proxy and family know about your priorities and wishes?   She states she thinks she discussed this with her sister in the past  She gives me permission to speak with her sister        Updated sister Susan Doing via phone and relayed to her my discussion with pt     Advanced directives         Stephanie Walton MD

## 2023-08-28 NOTE — ED NOTES
Patient's oxygen saturation decreasing to 90% on 2l via nasal cannula. Oxygen increased to 3L via nasal cannula at this time.       Frankie Lara RN  08/28/23 0699

## 2023-08-29 PROBLEM — J96.01 ACUTE RESPIRATORY FAILURE WITH HYPOXIA AND HYPERCAPNIA (HCC): Status: ACTIVE | Noted: 2023-05-20

## 2023-08-29 PROBLEM — R06.03 RESPIRATORY DISTRESS: Status: RESOLVED | Noted: 2023-08-28 | Resolved: 2023-08-29

## 2023-08-29 LAB
GLUCOSE SERPL-MCNC: 126 MG/DL (ref 65–140)
PROCALCITONIN SERPL-MCNC: <0.05 NG/ML

## 2023-08-29 PROCEDURE — 99232 SBSQ HOSP IP/OBS MODERATE 35: CPT | Performed by: PHYSICIAN ASSISTANT

## 2023-08-29 PROCEDURE — 84145 PROCALCITONIN (PCT): CPT | Performed by: INTERNAL MEDICINE

## 2023-08-29 PROCEDURE — 82948 REAGENT STRIP/BLOOD GLUCOSE: CPT

## 2023-08-29 PROCEDURE — 94640 AIRWAY INHALATION TREATMENT: CPT

## 2023-08-29 PROCEDURE — 99232 SBSQ HOSP IP/OBS MODERATE 35: CPT | Performed by: INTERNAL MEDICINE

## 2023-08-29 PROCEDURE — 94760 N-INVAS EAR/PLS OXIMETRY 1: CPT

## 2023-08-29 RX ORDER — SUMATRIPTAN 50 MG/1
50 TABLET, FILM COATED ORAL ONCE
Status: COMPLETED | OUTPATIENT
Start: 2023-08-29 | End: 2023-08-29

## 2023-08-29 RX ORDER — ACETAMINOPHEN 325 MG/1
650 TABLET ORAL EVERY 6 HOURS PRN
Status: DISCONTINUED | OUTPATIENT
Start: 2023-08-29 | End: 2023-09-02 | Stop reason: HOSPADM

## 2023-08-29 RX ORDER — BENZONATATE 100 MG/1
100 CAPSULE ORAL 3 TIMES DAILY PRN
Status: DISCONTINUED | OUTPATIENT
Start: 2023-08-29 | End: 2023-09-02 | Stop reason: HOSPADM

## 2023-08-29 RX ADMIN — HEPARIN SODIUM 5000 UNITS: 5000 INJECTION INTRAVENOUS; SUBCUTANEOUS at 05:43

## 2023-08-29 RX ADMIN — DOCUSATE SODIUM 100 MG: 100 CAPSULE, LIQUID FILLED ORAL at 17:34

## 2023-08-29 RX ADMIN — AZITHROMYCIN 500 MG: 250 TABLET, FILM COATED ORAL at 14:50

## 2023-08-29 RX ADMIN — HEPARIN SODIUM 5000 UNITS: 5000 INJECTION INTRAVENOUS; SUBCUTANEOUS at 14:50

## 2023-08-29 RX ADMIN — BENZONATATE 100 MG: 100 CAPSULE ORAL at 03:26

## 2023-08-29 RX ADMIN — SODIUM CHLORIDE 75 ML/HR: 0.9 INJECTION, SOLUTION INTRAVENOUS at 05:41

## 2023-08-29 RX ADMIN — GUAIFENESIN 600 MG: 600 TABLET, EXTENDED RELEASE ORAL at 17:34

## 2023-08-29 RX ADMIN — POLYETHYLENE GLYCOL 3350 17 G: 17 POWDER, FOR SOLUTION ORAL at 08:29

## 2023-08-29 RX ADMIN — DULOXETINE HYDROCHLORIDE 60 MG: 60 CAPSULE, DELAYED RELEASE ORAL at 17:34

## 2023-08-29 RX ADMIN — HYDROXYZINE HYDROCHLORIDE 50 MG: 25 TABLET, FILM COATED ORAL at 03:26

## 2023-08-29 RX ADMIN — DULOXETINE HYDROCHLORIDE 60 MG: 60 CAPSULE, DELAYED RELEASE ORAL at 08:30

## 2023-08-29 RX ADMIN — IPRATROPIUM BROMIDE 0.5 MG: 0.5 SOLUTION RESPIRATORY (INHALATION) at 14:01

## 2023-08-29 RX ADMIN — CLONAZEPAM 0.5 MG: 0.5 TABLET ORAL at 08:29

## 2023-08-29 RX ADMIN — METHYLPREDNISOLONE SODIUM SUCCINATE 40 MG: 40 INJECTION, POWDER, FOR SOLUTION INTRAMUSCULAR; INTRAVENOUS at 21:58

## 2023-08-29 RX ADMIN — LEVALBUTEROL HYDROCHLORIDE 1.25 MG: 1.25 SOLUTION RESPIRATORY (INHALATION) at 07:54

## 2023-08-29 RX ADMIN — DOCUSATE SODIUM 100 MG: 100 CAPSULE, LIQUID FILLED ORAL at 08:29

## 2023-08-29 RX ADMIN — TRAZODONE HYDROCHLORIDE 200 MG: 100 TABLET ORAL at 22:00

## 2023-08-29 RX ADMIN — IPRATROPIUM BROMIDE 0.5 MG: 0.5 SOLUTION RESPIRATORY (INHALATION) at 19:08

## 2023-08-29 RX ADMIN — MULTIPLE VITAMINS W/ MINERALS TAB 1 TABLET: TAB ORAL at 08:29

## 2023-08-29 RX ADMIN — IPRATROPIUM BROMIDE 0.5 MG: 0.5 SOLUTION RESPIRATORY (INHALATION) at 07:54

## 2023-08-29 RX ADMIN — PANTOPRAZOLE SODIUM 40 MG: 40 TABLET, DELAYED RELEASE ORAL at 05:45

## 2023-08-29 RX ADMIN — FOLIC ACID 1 MG: 1 TABLET ORAL at 08:29

## 2023-08-29 RX ADMIN — HEPARIN SODIUM 5000 UNITS: 5000 INJECTION INTRAVENOUS; SUBCUTANEOUS at 21:58

## 2023-08-29 RX ADMIN — METHYLPREDNISOLONE SODIUM SUCCINATE 40 MG: 40 INJECTION, POWDER, FOR SOLUTION INTRAMUSCULAR; INTRAVENOUS at 14:50

## 2023-08-29 RX ADMIN — SUMATRIPTAN SUCCINATE 50 MG: 50 TABLET ORAL at 18:15

## 2023-08-29 RX ADMIN — LEVALBUTEROL HYDROCHLORIDE 1.25 MG: 1.25 SOLUTION RESPIRATORY (INHALATION) at 14:01

## 2023-08-29 RX ADMIN — METHYLPREDNISOLONE SODIUM SUCCINATE 40 MG: 40 INJECTION, POWDER, FOR SOLUTION INTRAMUSCULAR; INTRAVENOUS at 05:43

## 2023-08-29 RX ADMIN — THIAMINE HCL TAB 100 MG 100 MG: 100 TAB at 08:30

## 2023-08-29 RX ADMIN — LEVALBUTEROL HYDROCHLORIDE 1.25 MG: 1.25 SOLUTION RESPIRATORY (INHALATION) at 19:08

## 2023-08-29 RX ADMIN — GUAIFENESIN 600 MG: 600 TABLET, EXTENDED RELEASE ORAL at 08:29

## 2023-08-29 RX ADMIN — ACETAMINOPHEN 650 MG: 325 TABLET ORAL at 09:21

## 2023-08-29 RX ADMIN — ARIPIPRAZOLE 10 MG: 10 TABLET ORAL at 21:58

## 2023-08-29 RX ADMIN — FLUTICASONE FUROATE AND VILANTEROL TRIFENATATE 1 PUFF: 100; 25 POWDER RESPIRATORY (INHALATION) at 08:37

## 2023-08-29 NOTE — NURSING NOTE
Patient desatting to low 80s on 02 @ 4lpm via n/c, dry cough, diffuse wheezing, refuses neb txs, "it makes me cough" non-compliant with home nebs. Patient reluctant to go on bipap "I'm scared of that machine" Order obtained for cough med; prn atarax given for anxiety and bipap started, 02 sat improved to 90%.

## 2023-08-29 NOTE — PROGRESS NOTES
Progress Note - Pulmonary   Feliciano Vera 62 y.o. female MRN: [id number]  Unit/Bed#: [facility] -01 Encounter: [id number]      Assessment/Plan:  63 y/o woman presented w/ sob, wheezing, presented to ED and initially required bipap admitted w/ AECOPD    Problems  1. Acute hypoxemic and hypercapneic respiratory failure   2. Acute exacerbation of copd   3. Obesity with suspected sleep apnea  4. Nicotine dependence     Recommendations     Still has fairly extensive wheezing. Would continue Solu-Medrol 40 mg every 8 hours today. Potentially de-escalate tomorrow  Cont supplemental 02 and wean to keep sats > 88%   Cont bronchodilators: curently fluticasone-furoate-vilanterol, xopenex/atrovent q6 hours; would discharge with breztri or trelegy   Azithromycin 500 mg x 3 days total     Pulmonary hygeine: out of bed to chair, IS, ambulation   Will need outpatient pfts and pulmonary followup; qualified for lung cancer screening  Smoking cessation recommended: has nicotine patch   Recommend outpatient sleep study   Weight loss is encouraged through self paced exercise and dietary modification       Subjective:       Is much better than yesterday. Less shortness of breath, chest tightness and wheezing    Objective:         Vitals: Blood pressure 123/73, pulse (!) 111, temperature 99.3 °F (37.4 °C), temperature source Oral, resp. rate 18, height 5' 3" (1.6 m), weight 91 kg (200 lb 9.9 oz), SpO2 94 %, not currently breastfeeding., 5L, Body mass index is 35.54 kg/m².       Intake/Output Summary (Last 24 hours) at 8/29/2023 0917  Last data filed at 8/28/2023 1930  Gross per 24 hour   Intake 290 ml   Output --   Net 290 ml         Physical Exam  Gen: Awake, alert, oriented x 3, no acute distress  Head: no masses, lesions   HEENT: Mucous membranes moist, no oral lesions, no thrush  NECK: No accessory muscle use, JVP not elevated  Cardiac: Regular, single S1, single S2, no murmurs, no rubs, no gallops  Lungs: Diffuse posterior wheezes  Abdomen: normoactive bowel sounds, soft nontender, nondistended, no rebound or rigidity, no guarding  Extremities: no cyanosis, no clubbing, no edema  Skin: no lesions, rashes  Neuro: CN grossly intact, no gross neurological deficits     Labs: I have personally reviewed pertinent lab results. Results from last 7 days   Lab Units 08/28/23  1610 08/28/23  0922   WBC Thousand/uL  --  7.53   HEMOGLOBIN g/dL  --  14.7   HEMATOCRIT %  --  49.0*   PLATELETS Thousands/uL 203 196   NEUTROS PCT %  --  67   MONOS PCT %  --  7   EOS PCT %  --  6      Results from last 7 days   Lab Units 08/28/23  0922   POTASSIUM mmol/L 4.8   CHLORIDE mmol/L 105   CO2 mmol/L 34*   BUN mg/dL 10   CREATININE mg/dL 0.99   CALCIUM mg/dL 8.9   ALK PHOS U/L 89   ALT U/L 11   AST U/L 10*                      0   Lab Value Date/Time    TROPONINI <0.01 05/25/2021 1713    TROPONINI <0.01 02/27/2021 1705    TROPONINI <0.01 02/04/2021 2058    Vivienne Canary <0.02 01/11/2017 1520       Microbiology:  Blood cxs 8/28: pending   Flu/rsv/covid 8/28; negative     Imaging and other studies: I have personally reviewed pertinent reports. and I have personally reviewed pertinent films in PACS  CXR 8/28/2023    FINDINGS:     Cardiomediastinal silhouette appears unremarkable. The lungs are clear.  No pneumothorax or pleural effusion. Epicardial adipose tissue is noted as on prior studies   Osseous structures appear within normal limits for patient age. Impression:       No acute cardiopulmonary disease.           Lisa Salcedo MD  [facility]

## 2023-08-29 NOTE — UTILIZATION REVIEW
Initial Clinical Review    Admission: Date/Time/Statement:   Admission Orders (From admission, onward)     Ordered        08/28/23 1208  INPATIENT ADMISSION  Once                      Orders Placed This Encounter   Procedures   • INPATIENT ADMISSION     Standing Status:   Standing     Number of Occurrences:   1     Order Specific Question:   Level of Care     Answer:   Level 2 Stepdown / HOT [14]     Order Specific Question:   Estimated length of stay     Answer:   More than 2 Midnights     Order Specific Question:   Certification     Answer:   I certify that inpatient services are medically necessary for this patient for a duration of greater than two midnights. See H&P and MD Progress Notes for additional information about the patient's course of treatment. ED Arrival Information     Expected   -    Arrival   8/28/2023 09:10    Acuity   Urgent            Means of arrival   Stretcher    Escorted by   [facility] ([address] [place] [address])    Service   Hospitalist    Admission type   Emergency            Arrival complaint   sob           Chief Complaint   Patient presents with   • Shortness of Breath     Sob for the past couple of days. Recent diagnosis of COPD. 2 duo nebs given by ems pta. Initial Presentation: 62 y.o. female presents to the ED via EMS from home with c/o progressively worsening SOB at rest and LOVE, cough, chest congestionx 1 wk. Mild nausea, not sleeping well, fatigued. EMS gave DuoNeb, IV SoluMedrol. PMH: chronic pain, SAJAN, h/o ETOH abuse, lung nodule. In the ED pt is tachycardic, tachypneic and HTN. Labs - elevated CO2, acidotic gasses. Imaging - no acute disease. ECG - ST. Treated with nebs, IV Mag, Heparin sq, IV Ativan. On exam tachypneic, somnolent - awakens to voice, touch, bilat rhonchi, bilat E wheezing.   She is admitted to INPATIENT status to Level 2 SD with COPD exac w respiratory distress - IV steroids, Xopenex/Atrovent nebs, Azithromycin, procal, sputum and blood cultures, pulm consult, agreeable to BIPAP if needed. Somnolence - ABG. H/o ETOH - not used in 6 years - CIWA - initial 1, thiamine, folic acid. Acute hypercapnea respiratory failure - BIPAP with pt requesting sedative d/t previous Claustrophobia w/ BIPAP. 8/28 Pulmonary Consult - Acute hypoxemic and hypercapnic respiratory failure - now on BIPAP. Acute exac COPD - IVsteroids, bronchodilators, h/o multiple admits, needs OP PFTs and pulm F/u. Obesity w/ prob CINDY - weight loss, OP polysomogram.  Tobacco use - current. Date: 8/29   Day 2:   Pt was able to come off BIPAP and was on 4l NC oxygen but had desats into low 80s and back on BIPAP w/ sats into 90s. On exam today extensive wheezing, but less SOB and chest tightness. Continue IV SoluMedrol 40 mg q 8 hr with poss de-escalation on 8/30, wean to keep sats 88%. Intermittent tachycardia.      ED Triage Vitals   Temperature Pulse Respirations Blood Pressure SpO2   08/28/23 0919 08/28/23 0919 08/28/23 0919 08/28/23 0919 08/28/23 0919   98.1 °F (36.7 °C) 105 (!) 24 (!) 173/88 97 %      Temp Source Heart Rate Source Patient Position - Orthostatic VS BP Location FiO2 (%)   08/28/23 0919 08/28/23 0919 08/28/23 0919 08/28/23 0919 --   Oral Monitor Sitting Right arm       Pain Score       08/28/23 1530       No Pain          Wt Readings from Last 1 Encounters:   08/29/23 91 kg (200 lb 9.9 oz)     Additional Vital Signs:   08/29/23 0754 -- -- -- -- -- 94 % 40 5 L/min Nasal cannula -- --   08/29/23 07:38:39 99.3 °F (37.4 °C) 111 Abnormal  18 123/73 90 92 % -- -- -- -- Sitting   08/29/23 06:43:40 98.7 °F (37.1 °C) 102 -- 124/73 90 90 % 44 6 L/min Nasal cannula -- Lying   08/29/23 3917 -- -- -- -- -- 93 % -- -- -- Full face mask --   08/29/23 0400 -- -- -- -- -- 92 % -- -- BiPAP -- --   08/29/23 00:10:37 98.8 °F (37.1 °C) 106 Abnormal  18 130/73 92 93 % -- -- -- -- --   08/28/23 1950 -- -- -- -- -- 88 % Abnormal  32 3 L/min Nasal cannula -- --   08/28/23 19:09:34 98.7 °F (37.1 °C) 107 Abnormal  18 138/78 98 85 % Abnormal  32 3 L/min Nasal cannula -- Lying   08/28/23 1744 -- -- -- -- -- -- -- -- BiPAP -- --   08/28/23 1732 -- -- -- -- -- 93 % -- -- -- Full face mask --   08/28/23 17:26:47 -- 96 20 131/84 100 94 % -- -- -- -- --   08/28/23 1618 -- -- -- -- -- 95 % -- -- -- Full face mask --   08/28/23 1530 -- 105 24 Abnormal  116/70 87 95 % -- -- BiPAP -- Lying   08/28/23 1400 -- 100 26 Abnormal  133/60 87 96 % -- -- BiPAP -- Lying   08/28/23 1349 -- -- -- -- -- 94 % -- -- -- Full face mask --   08/28/23 1200 -- 101 26 Abnormal  137/64 92 92 % 32 3 L/min Nasal cannula -- Lying   08/28/23 1130 -- 104 26 Abnormal  151/75 106 94 % 32 3 L/min Nasal cannula -- Lying   08/28/23 0955 -- 109 Abnormal  35 Abnormal  -- -- 96 % -- -- -- -- --   08/28/23 0937 -- -- -- -- -- -- -- -- Nasal cannula -- --   08/28/23 0924 -- 109 Abnormal  28 Abnormal  173/88 Abnormal  122 91 % 28 2 L/min Nasal cannula -- Lying     08/28/23 0920 -- -- -- -- -- 90 % -- -- None (Room air) -- --   Pertinent Labs/Diagnostic Test Results:     8/28 ECG - Sinus tachycardia  Low voltage QRS  Cannot rule out Anterior infarct , age undetermined  Abnormal ECG  8/28 ECG - Sinus tachycardia  Rightward axis  Low voltage QRS  Cannot rule out Anterior infarct (cited on or before 28-AUG-2023)  Abnormal ECG    XR chest 1 view portable   Final Result by Edison Cabral MD (08/28 1022)      No acute cardiopulmonary disease.      Results from last 7 days   Lab Units 08/28/23  0922   SARS-COV-2  Negative     Results from last 7 days   Lab Units 08/28/23  1610 08/28/23  0922   WBC Thousand/uL  --  7.53   HEMOGLOBIN g/dL  --  14.7   HEMATOCRIT %  --  49.0*   PLATELETS Thousands/uL 203 196   NEUTROS ABS Thousands/µL  --  5.09         Results from last 7 days   Lab Units 08/28/23  0922   SODIUM mmol/L 140   POTASSIUM mmol/L 4.8   CHLORIDE mmol/L 105   CO2 mmol/L 34*   ANION GAP mmol/L 1   BUN mg/dL 10   CREATININE mg/dL 0.99   EGFR ml/min/1.73sq m 63   CALCIUM mg/dL 8.9     Results from last 7 days   Lab Units 08/28/23  0922   AST U/L 10*   ALT U/L 11   ALK PHOS U/L 89   TOTAL PROTEIN g/dL 6.9   ALBUMIN g/dL 4.0   TOTAL BILIRUBIN mg/dL 0.34         Results from last 7 days   Lab Units 08/28/23  0922   GLUCOSE RANDOM mg/dL 119          Results from last 7 days   Lab Units 08/28/23  1621 08/28/23  1236   PH ART  7.242* 7.224*   PCO2 ART mm Hg 75.6* 76.0*   PO2 ART mm Hg 85.0 62.2*   HCO3 ART mmol/L 31.8* 30.7*   BASE EXC ART mmol/L 1.8 0.4   O2 CONTENT ART mL/dL 21.1 20.1   O2 HGB, ARTERIAL % 95.4 89.3*   ABG SOURCE  Radial, Left Radial, Left                 Results from last 7 days   Lab Units 08/28/23  1344 08/28/23  1124 08/28/23  0922   HS TNI 0HR ng/L  --   --  7   HS TNI 2HR ng/L  --  6  --    HSTNI D2 ng/L  --  -1  --    HS TNI 4HR ng/L 6  --   --    HSTNI D4 ng/L -1  --   --                  Results from last 7 days   Lab Units 08/29/23  0442 08/28/23  1610   PROCALCITONIN ng/ml <0.05 <0.05                 Results from last 7 days   Lab Units 08/28/23  0922   BNP pg/mL 29     Results from last 7 days   Lab Units 08/28/23  0922   INFLUENZA A PCR  Negative   INFLUENZA B PCR  Negative   RSV PCR  Negative     Results from last 7 days   Lab Units 08/28/23  1613 08/28/23  1610   BLOOD CULTURE  Received in Microbiology Lab. Culture in Progress. Received in Microbiology Lab. Culture in Progress.          ED Treatment:   Medication Administration from 08/28/2023 0910 to 08/28/2023 1724       Date/Time Order Dose Route Action     08/28/2023 0926 EDT albuterol inhalation solution 10 mg 10 mg Nebulization Given     08/28/2023 0926 EDT sodium chloride 0.9 % inhalation solution 3 mL 3 mL Nebulization Given     08/28/2023 1122 EDT magnesium sulfate 2 g/50 mL IVPB (premix) 2 g 2 g Intravenous New Bag     08/28/2023 1618 EDT levalbuterol (XOPENEX) inhalation solution 1.25 mg 1.25 mg Nebulization Given     08/28/2023 1618 EDT ipratropium (ATROVENT) 0.02 % inhalation solution 0.5 mg 0.5 mg Nebulization Given     08/28/2023 1609 EDT heparin (porcine) subcutaneous injection 5,000 Units 5,000 Units Subcutaneous Given     08/28/2023 1400 EDT LORazepam (ATIVAN) injection 0.5 mg 0.5 mg Intravenous Given        Past Medical History:   Diagnosis Date   • Anxiety    • Chronic pain     Back Pain   • COPD (chronic obstructive pulmonary disease) ([facility])    • Depression    • Hyperlipidemia    • Migraine    • Psychiatric disorder      Present on Admission:  • COPD exacerbation ([address])  • Generalized anxiety disorder  • History of alcohol abuse  • Tobacco abuse  • Lung nodule  • SIRS (systemic inflammatory response syndrome) ([facility])      Admitting Diagnosis: Shortness of breath [R06.02]  Cough [R05.9]  COPD exacerbation ([address]) [J44.1]  Age/Sex: 62 y.o. female  Admission Orders:  Scheduled Medications:  ARIPiprazole, 10 mg, Oral, HS  azithromycin, 500 mg, Oral, Q24H  clonazePAM, 0.5 mg, Oral, QAM  docusate sodium, 100 mg, Oral, BID  DULoxetine, 60 mg, Oral, BID  Fluticasone Furoate-Vilanterol, 1 puff, Inhalation, Daily  folic acid, 1 mg, Oral, Daily  guaiFENesin, 600 mg, Oral, BID  heparin (porcine), 5,000 Units, Subcutaneous, Q8H AV  ipratropium, 0.5 mg, Nebulization, Q6H  levalbuterol, 1.25 mg, Nebulization, Q6H  methylPREDNISolone sodium succinate, 40 mg, Intravenous, Q8H AV  multivitamin-minerals, 1 tablet, Oral, Daily  nicotine, 1 patch, Transdermal, Daily  pantoprazole, 40 mg, Oral, Early Morning  polyethylene glycol, 17 g, Oral, Daily  thiamine, 100 mg, Oral, Daily      Continuous IV Infusions:  sodium chloride, 75 mL/hr, Intravenous, Continuous      PRN Meds:  acetaminophen, 650 mg, Oral, Q6H PRN -x 1 8/29  albuterol, 2 puff, Inhalation, Q6H PRN  benzonatate, 100 mg, Oral, TID PRN -x 1 8/29  calcium carbonate, 1,000 mg, Oral, Daily PRN  hydrOXYzine HCL, 50 mg, Oral, TID PRN - x 1 8/29  traZODone, 200 mg, Oral, HS PRN -x 1 8/28    Level 2 SD  BIPAP  Sputum culture  Daily wt  CIWA Continuous pulse ox  IP CONSULT TO PULMONOLOGY    Network Utilization Review Department  ATTENTION: Please call with any questions or concerns to [phone] and carefully listen to the prompts so that you are directed to the right person. All voicemails are confidential.  Marino Louise all requests for admission clinical reviews, approved or denied determinations and any other requests to dedicated fax number below belonging to the campus where the patient is receiving treatment.  List of dedicated fax numbers for the Facilities:  [place] DENIALS (Administrative/Medical Necessity) [phone] [address] [facility] (Maternity/NICU/Pediatrics) [phone]   [address] [place] [address] [phone]   [facility] [address] [facility] [phone]   [address] [place] [address] [address] [place] [address] [phone]   [address] [place] [address] [phone]

## 2023-08-29 NOTE — PROGRESS NOTES
[facility]  Progress Note  Name: Sheila Kiser I  MRN: [id number]  Unit/Bed#: [facility] -01 I Date of Admission: 8/28/2023   Date of Service: 8/29/2023 I Hospital Day: 1    Assessment/Plan   * COPD exacerbation [facility])  Assessment & Plan  Patient is a 26-year-old female with past medical history significant for COPD and tobacco abuse who presents to the ER with cough, dyspnea and acute COPD exacerbation. Patient also was admitted May 2023, and June 2023 for COPD exacerbation. · Patient presents with a 1 week history of worsening dyspnea, chest congestion, productive cough.   She notes she was using her home albuterol nebulizers and albuterol/Advair inhalers without any improvement  · She was given a DuoNeb by EMS, as well as 10 mg albuterol nebulizer and 125 mg of Solu-Medrol in the ER without significant improvement  · Continue IV steroids, and Xopenex/Atrovent nebs  · Continue azithromycin 500 mg x 3 days total, currently on day 2  · COVID/flu/RSV negative  · Chest x-ray without any evidence of infiltrates  · Hypercapnia and somnolence improved with BiPAP  · Pulmonology consulted, appreciate recommendations  · We will need outpatient PFTs  · Continue scheduled nebulizers  · Continue IV antibiotics      Lung nodule  Assessment & Plan  During prior admission 5/23 3 mm left lower lung nodule was noted with repeat CT scan recommended in 12 months  · Patient has been referred to [facility] pulmonology as an outpatient    Acute respiratory failure with hypoxia and hypercapnia ([address])  Assessment & Plan  Currently requiring 5 L of nasal cannula oxygen to maintain oxygen saturations greater than 88%  · See assessment and plan under COPD exacerbation    Tobacco abuse  Assessment & Plan  Nicotine patch  · Smoking cessation counseled    History of alcohol abuse  Assessment & Plan  Per records, patient has a history of alcohol abuse  · CIWA scale, thiamine and folic acid  · Per her sister, patient has been abstinent for 6 years: No longer an active problem and will discontinue if no signs or symptoms of withdrawal    Generalized anxiety disorder  Assessment & Plan  Continue home Abilify, clonazepam, Cymbalta, trazodone, and as needed Atarax  · [place]ied: Confirmed patient's clonazepam prescription. No red flags    Respiratory distress-resolved as of 2023  Assessment & Plan  Patient presented to the ER with a COPD exacerbation, and tachypnea with respiratory distress  · Old records note a history of hypercapnia  · Resolved         VTE Pharmacologic Prophylaxis: VTE Score: 3 Moderate Risk (Score 3-4) - Pharmacological DVT Prophylaxis Ordered: heparin. Patient Centered Rounds: I performed bedside rounds with nursing staff today. Discussions with Specialists or Other Care Team Provider: Pulmonology    Education and Discussions with Family / Patient: Updated  (daughter) via phone. Total Time Spent on Date of Encounter in care of patient: 35 minutes This time was spent on one or more of the following: performing physical exam; counseling and coordination of care; obtaining or reviewing history; documenting in the medical record; reviewing/ordering tests, medications or procedures; communicating with other healthcare professionals and discussing with patient's family/caregivers. Current Length of Stay: 1 day(s)  Current Patient Status: Inpatient   Certification Statement: The patient will continue to require additional inpatient hospital stay due to COPD exacerbation, IV IV steroids  Discharge Plan: Anticipate discharge in 48 hrs to home. Code Status: Level 3 - DNAR and DNI    Subjective: The patient is seen resting comfortably in bed. She states that her breathing is slightly improved from yesterday. She is much less sleepy, she denies any nausea, vomiting, diarrhea, constipation, chest pain.     Objective:     Vitals:   Temp (24hrs), Av.9 °F (37.2 °C), Min:98.7 °F (37.1 °C), Max:99.3 °F (37.4 °C)    Temp:  [98.7 °F (37.1 °C)-99.3 °F (37.4 °C)] 99.3 °F (37.4 °C)  HR:  [] 111  Resp:  [18-26] 18  BP: (116-138)/(60-84) 123/73  SpO2:  [85 %-96 %] 94 %  Body mass index is 35.54 kg/m². Input and Output Summary (last 24 hours): Intake/Output Summary (Last 24 hours) at 8/29/2023 1237  Last data filed at 8/28/2023 1930  Gross per 24 hour   Intake 240 ml   Output --   Net 240 ml       Physical Exam:   Physical Exam  Vitals and nursing note reviewed. Constitutional:       General: She is not in acute distress. Appearance: Normal appearance. She is obese. She is not ill-appearing, toxic-appearing or diaphoretic. HENT:      Head: Normocephalic and atraumatic. Cardiovascular:      Rate and Rhythm: Regular rhythm. Tachycardia present. Heart sounds: No murmur heard. No friction rub. No gallop. Pulmonary:      Effort: Pulmonary effort is normal. No respiratory distress. Breath sounds: Wheezing present. No rhonchi or rales. Comments: Diffuse bilateral expiratory wheezing  Abdominal:      General: Abdomen is flat. Bowel sounds are normal. There is no distension. Palpations: Abdomen is soft. Tenderness: There is no abdominal tenderness. Musculoskeletal:      Right lower leg: No edema. Left lower leg: No edema. Skin:     General: Skin is warm and dry. Coloration: Skin is not jaundiced or pale. Neurological:      General: No focal deficit present. Mental Status: She is alert. Mental status is at baseline.           Additional Data:     Labs:  Results from last 7 days   Lab Units 08/28/23  1610 08/28/23  0922   WBC Thousand/uL  --  7.53   HEMOGLOBIN g/dL  --  14.7   HEMATOCRIT %  --  49.0*   PLATELETS Thousands/uL 203 196   NEUTROS PCT %  --  67   LYMPHS PCT %  --  18   MONOS PCT %  --  7   EOS PCT %  --  6     Results from last 7 days   Lab Units 08/28/23  0922   SODIUM mmol/L 140   POTASSIUM mmol/L 4. 8   CHLORIDE mmol/L 105   CO2 mmol/L 34*   BUN mg/dL 10   CREATININE mg/dL 0.99   ANION GAP mmol/L 1   CALCIUM mg/dL 8.9   ALBUMIN g/dL 4.0   TOTAL BILIRUBIN mg/dL 0.34   ALK PHOS U/L 89   ALT U/L 11   AST U/L 10*   GLUCOSE RANDOM mg/dL 119                 Results from last 7 days   Lab Units 08/29/23  0442 08/28/23  1610   PROCALCITONIN ng/ml <0.05 <0.05       Lines/Drains:  Invasive Devices     Peripheral Intravenous Line  Duration           Peripheral IV 08/28/23 Left Antecubital 1 day                      Imaging: No pertinent imaging reviewed. Recent Cultures (last 7 days):   Results from last 7 days   Lab Units 08/28/23  1613 08/28/23  1610   BLOOD CULTURE  Received in Microbiology Lab. Culture in Progress. Received in Microbiology Lab. Culture in Progress.        Last 24 Hours Medication List:   Current Facility-Administered Medications   Medication Dose Route Frequency Provider Last Rate   • acetaminophen  650 mg Oral Q6H PRN Amanda Alanis MD     • albuterol  2 puff Inhalation Q6H PRN Amanda Alanis MD     • ARIPiprazole  10 mg Oral HS Amanda Alanis MD     • azithromycin  500 mg Oral Q24H Amanda Alanis MD     • benzonatate  100 mg Oral TID PRN Salinas Cooley PA-C     • calcium carbonate  1,000 mg Oral Daily PRN Amanda Alanis MD     • clonazePAM  0.5 mg Oral QAM Amanda Alanis MD     • docusate sodium  100 mg Oral BID Amanda Alanis MD     • DULoxetine  60 mg Oral BID Amanda Alanis MD     • Fluticasone Furoate-Vilanterol  1 puff Inhalation Daily Amanda Alanis MD     • folic acid  1 mg Oral Daily Amanda Alanis MD     • guaiFENesin  600 mg Oral BID Amanda Alanis MD     • heparin (porcine)  5,000 Units Subcutaneous [facility] Amanda Alanis MD     • hydrOXYzine HCL  50 mg Oral TID PRN Amanda Alanis MD     • ipratropium  0.5 mg Nebulization Q6H Amanda Alanis MD     • levalbuterol  1.25 mg Nebulization Q6H Amanda Alanis MD     • methylPREDNISolone sodium succinate  40 mg Intravenous Q8H [address] Stephanie Walton MD     • multivitamin-minerals  1 tablet Oral Daily Stephanie Walton MD     • nicotine  1 patch Transdermal Daily Stephanie Walton MD     • pantoprazole  40 mg Oral Early Morning Stephanie Walton MD     • polyethylene glycol  17 g Oral Daily Stephanie Walton MD     • sodium chloride  75 mL/hr Intravenous Continuous Stephanie Walton MD 75 mL/hr (08/29/23 0541)   • thiamine  100 mg Oral Daily Stephanie Walton MD     • traZODone  200 mg Oral HS PRN Stephanie Walton MD          Today, Patient Was Seen By: Sisi Jackman PA-C    **Please Note: This note may have been constructed using a voice recognition system. **

## 2023-08-29 NOTE — UTILIZATION REVIEW
NOTIFICATION OF INPATIENT ADMISSION   AUTHORIZATION REQUEST   SERVICING FACILITY:   [address] [place] [address]  Tax ID: [id number]  NPI: [id number] ATTENDING PROVIDER:  Attending Name and NPI#: Elidia Honeycutt Md [[id number]]  Address: [address]  Phone: [phone]     ADMISSION INFORMATION:  Place of Service: Inpatient [address]  Place of Service Code: 21  Inpatient Admission Date/Time: 8/28/23 12:08 PM  Discharge Date/Time: No discharge date for patient encounter. Admitting Diagnosis Code/Description:  Shortness of breath [R06.02]  Cough [R05.9]  COPD exacerbation ([address]) [J44.1]     UTILIZATION REVIEW CONTACT:  Anupama Polo Utilization   Network Utilization Review Department  Phone: [phone]  Fax [phone]  Email: [email or web address]  Contact for approvals/pending authorizations, clinical reviews, and discharge. PHYSICIAN ADVISORY SERVICES:  Medical Necessity Denial & Agts-uq-Tunp Review  Phone: [phone]  Fax: [phone]  Email: [email or web address]

## 2023-08-30 PROBLEM — D72.829 LEUKOCYTOSIS: Status: ACTIVE | Noted: 2023-08-30

## 2023-08-30 LAB
ANION GAP SERPL CALCULATED.3IONS-SCNC: 5 MMOL/L
BUN SERPL-MCNC: 18 MG/DL (ref 5–25)
CALCIUM SERPL-MCNC: 9.4 MG/DL (ref 8.4–10.2)
CHLORIDE SERPL-SCNC: 102 MMOL/L (ref 96–108)
CO2 SERPL-SCNC: 32 MMOL/L (ref 21–32)
CREAT SERPL-MCNC: 0.82 MG/DL (ref 0.6–1.3)
ERYTHROCYTE [DISTWIDTH] IN BLOOD BY AUTOMATED COUNT: 14 % (ref 11.6–15.1)
GFR SERPL CREATININE-BSD FRML MDRD: 79 ML/MIN/1.73SQ M
GLUCOSE SERPL-MCNC: 113 MG/DL (ref 65–140)
HCT VFR BLD AUTO: 42.9 % (ref 34.8–46.1)
HGB BLD-MCNC: 13.5 G/DL (ref 11.5–15.4)
MCH RBC QN AUTO: 31.2 PG (ref 26.8–34.3)
MCHC RBC AUTO-ENTMCNC: 31.5 G/DL (ref 31.4–37.4)
MCV RBC AUTO: 99 FL (ref 82–98)
PLATELET # BLD AUTO: 238 THOUSANDS/UL (ref 149–390)
PMV BLD AUTO: 9.9 FL (ref 8.9–12.7)
POTASSIUM SERPL-SCNC: 4.7 MMOL/L (ref 3.5–5.3)
RBC # BLD AUTO: 4.33 MILLION/UL (ref 3.81–5.12)
SODIUM SERPL-SCNC: 139 MMOL/L (ref 135–147)
WBC # BLD AUTO: 14.42 THOUSAND/UL (ref 4.31–10.16)

## 2023-08-30 PROCEDURE — 94760 N-INVAS EAR/PLS OXIMETRY 1: CPT

## 2023-08-30 PROCEDURE — 87070 CULTURE OTHR SPECIMN AEROBIC: CPT | Performed by: INTERNAL MEDICINE

## 2023-08-30 PROCEDURE — 85027 COMPLETE CBC AUTOMATED: CPT | Performed by: PHYSICIAN ASSISTANT

## 2023-08-30 PROCEDURE — 80048 BASIC METABOLIC PNL TOTAL CA: CPT | Performed by: PHYSICIAN ASSISTANT

## 2023-08-30 PROCEDURE — 94640 AIRWAY INHALATION TREATMENT: CPT

## 2023-08-30 PROCEDURE — 99232 SBSQ HOSP IP/OBS MODERATE 35: CPT | Performed by: PHYSICIAN ASSISTANT

## 2023-08-30 PROCEDURE — 99232 SBSQ HOSP IP/OBS MODERATE 35: CPT | Performed by: INTERNAL MEDICINE

## 2023-08-30 PROCEDURE — 87205 SMEAR GRAM STAIN: CPT | Performed by: INTERNAL MEDICINE

## 2023-08-30 RX ORDER — KETOROLAC TROMETHAMINE 30 MG/ML
30 INJECTION, SOLUTION INTRAMUSCULAR; INTRAVENOUS ONCE
Status: COMPLETED | OUTPATIENT
Start: 2023-08-30 | End: 2023-08-30

## 2023-08-30 RX ORDER — METHYLPREDNISOLONE SODIUM SUCCINATE 40 MG/ML
40 INJECTION, POWDER, LYOPHILIZED, FOR SOLUTION INTRAMUSCULAR; INTRAVENOUS EVERY 12 HOURS SCHEDULED
Status: DISCONTINUED | OUTPATIENT
Start: 2023-08-30 | End: 2023-09-01

## 2023-08-30 RX ORDER — LEVALBUTEROL INHALATION SOLUTION 1.25 MG/3ML
1.25 SOLUTION RESPIRATORY (INHALATION)
Status: DISCONTINUED | OUTPATIENT
Start: 2023-08-31 | End: 2023-09-02 | Stop reason: HOSPADM

## 2023-08-30 RX ORDER — DIPHENHYDRAMINE HCL 25 MG
25 TABLET ORAL EVERY 6 HOURS PRN
Status: DISCONTINUED | OUTPATIENT
Start: 2023-08-30 | End: 2023-09-02 | Stop reason: HOSPADM

## 2023-08-30 RX ORDER — METOCLOPRAMIDE HYDROCHLORIDE 5 MG/ML
10 INJECTION INTRAMUSCULAR; INTRAVENOUS EVERY 6 HOURS PRN
Status: DISCONTINUED | OUTPATIENT
Start: 2023-08-30 | End: 2023-09-02 | Stop reason: HOSPADM

## 2023-08-30 RX ORDER — SUMATRIPTAN 50 MG/1
50 TABLET, FILM COATED ORAL ONCE
Status: COMPLETED | OUTPATIENT
Start: 2023-08-30 | End: 2023-08-30

## 2023-08-30 RX ORDER — MAGNESIUM SULFATE HEPTAHYDRATE 40 MG/ML
2 INJECTION, SOLUTION INTRAVENOUS EVERY 24 HOURS
Status: DISCONTINUED | OUTPATIENT
Start: 2023-08-30 | End: 2023-08-30

## 2023-08-30 RX ORDER — MAGNESIUM SULFATE HEPTAHYDRATE 40 MG/ML
2 INJECTION, SOLUTION INTRAVENOUS ONCE
Status: COMPLETED | OUTPATIENT
Start: 2023-08-30 | End: 2023-08-30

## 2023-08-30 RX ADMIN — IPRATROPIUM BROMIDE 0.5 MG: 0.5 SOLUTION RESPIRATORY (INHALATION) at 08:13

## 2023-08-30 RX ADMIN — PANTOPRAZOLE SODIUM 40 MG: 40 TABLET, DELAYED RELEASE ORAL at 06:18

## 2023-08-30 RX ADMIN — FOLIC ACID 1 MG: 1 TABLET ORAL at 08:20

## 2023-08-30 RX ADMIN — TRAZODONE HYDROCHLORIDE 200 MG: 100 TABLET ORAL at 21:45

## 2023-08-30 RX ADMIN — SUMATRIPTAN SUCCINATE 50 MG: 50 TABLET ORAL at 13:39

## 2023-08-30 RX ADMIN — HEPARIN SODIUM 5000 UNITS: 5000 INJECTION INTRAVENOUS; SUBCUTANEOUS at 13:38

## 2023-08-30 RX ADMIN — THIAMINE HCL TAB 100 MG 100 MG: 100 TAB at 08:20

## 2023-08-30 RX ADMIN — MULTIPLE VITAMINS W/ MINERALS TAB 1 TABLET: TAB ORAL at 08:20

## 2023-08-30 RX ADMIN — BENZONATATE 100 MG: 100 CAPSULE ORAL at 02:00

## 2023-08-30 RX ADMIN — HEPARIN SODIUM 5000 UNITS: 5000 INJECTION INTRAVENOUS; SUBCUTANEOUS at 21:20

## 2023-08-30 RX ADMIN — CLONAZEPAM 0.5 MG: 0.5 TABLET ORAL at 08:20

## 2023-08-30 RX ADMIN — METHYLPREDNISOLONE SODIUM SUCCINATE 40 MG: 40 INJECTION, POWDER, FOR SOLUTION INTRAMUSCULAR; INTRAVENOUS at 21:20

## 2023-08-30 RX ADMIN — LEVALBUTEROL HYDROCHLORIDE 1.25 MG: 1.25 SOLUTION RESPIRATORY (INHALATION) at 08:13

## 2023-08-30 RX ADMIN — KETOROLAC TROMETHAMINE 30 MG: 30 INJECTION, SOLUTION INTRAMUSCULAR at 21:47

## 2023-08-30 RX ADMIN — DIPHENHYDRAMINE HCL 25 MG: 25 TABLET, COATED ORAL at 21:45

## 2023-08-30 RX ADMIN — LEVALBUTEROL HYDROCHLORIDE 1.25 MG: 1.25 SOLUTION RESPIRATORY (INHALATION) at 14:12

## 2023-08-30 RX ADMIN — GUAIFENESIN 600 MG: 600 TABLET, EXTENDED RELEASE ORAL at 08:20

## 2023-08-30 RX ADMIN — DULOXETINE HYDROCHLORIDE 60 MG: 60 CAPSULE, DELAYED RELEASE ORAL at 08:20

## 2023-08-30 RX ADMIN — FLUTICASONE FUROATE AND VILANTEROL TRIFENATATE 1 PUFF: 100; 25 POWDER RESPIRATORY (INHALATION) at 08:18

## 2023-08-30 RX ADMIN — GUAIFENESIN 600 MG: 600 TABLET, EXTENDED RELEASE ORAL at 17:00

## 2023-08-30 RX ADMIN — AZITHROMYCIN 500 MG: 250 TABLET, FILM COATED ORAL at 16:56

## 2023-08-30 RX ADMIN — HEPARIN SODIUM 5000 UNITS: 5000 INJECTION INTRAVENOUS; SUBCUTANEOUS at 06:18

## 2023-08-30 RX ADMIN — DULOXETINE HYDROCHLORIDE 60 MG: 60 CAPSULE, DELAYED RELEASE ORAL at 17:00

## 2023-08-30 RX ADMIN — METHYLPREDNISOLONE SODIUM SUCCINATE 40 MG: 40 INJECTION, POWDER, FOR SOLUTION INTRAMUSCULAR; INTRAVENOUS at 06:18

## 2023-08-30 RX ADMIN — MAGNESIUM SULFATE HEPTAHYDRATE 2 G: 40 INJECTION, SOLUTION INTRAVENOUS at 21:47

## 2023-08-30 RX ADMIN — IPRATROPIUM BROMIDE 0.5 MG: 0.5 SOLUTION RESPIRATORY (INHALATION) at 20:27

## 2023-08-30 RX ADMIN — IPRATROPIUM BROMIDE 0.5 MG: 0.5 SOLUTION RESPIRATORY (INHALATION) at 14:12

## 2023-08-30 RX ADMIN — LEVALBUTEROL HYDROCHLORIDE 1.25 MG: 1.25 SOLUTION RESPIRATORY (INHALATION) at 20:27

## 2023-08-30 RX ADMIN — ARIPIPRAZOLE 10 MG: 10 TABLET ORAL at 21:20

## 2023-08-30 NOTE — CASE MANAGEMENT
Case Management Assessment & Discharge Planning Note    Patient name Andreina Sidhu  Location [facility] Kim Mckeon* MRN [id number]  : 1965 Date 2023       Current Admission Date: 2023  Current Admission Diagnosis:COPD exacerbation [facility])   Patient Active Problem List    Diagnosis Date Noted   • Leukocytosis 2023   • Somnolence 2023   • SIRS (systemic inflammatory response syndrome) ([address]) 2023   • Gallbladder sludge 2023   • Lung nodule 2023   • COPD exacerbation ([address]) 2023   • Acute respiratory failure with hypoxia and hypercapnia ([address]) 2023   • Major depressive disorder, recurrent episode, moderate ([address]) 10/25/2022   • AMS (altered mental status) 2022   • High cholesterol 2022   • Chronic pain 2022   • Vision loss 2022   • Stroke-like symptoms 2021   • Acute bilateral low back pain with bilateral sciatica 2021   • Tobacco abuse 2021   • Severe episode of recurrent major depressive disorder, without psychotic features ([address]) 2017   • Generalized anxiety disorder 2017   • History of alcohol abuse 2017   • Cannabis abuse 2017      LOS (days): 2  Geometric Mean LOS (GMLOS) (days):   Days to GMLOS:     OBJECTIVE:    Risk of Unplanned Readmission Score: 20.78         Current admission status: Inpatient       Preferred Pharmacy:   [address] [place] [address] Road [facility]  Phone: [phone] Fax: [phone]    Primary Care Provider: Maliha Jennings MD    Primary Insurance: [facility]  Secondary Insurance:     ASSESSMENT:  [facility]   Primary Phone: [phone] (Mobile)               Advance Directives  Does patient have a [address]?: No  Was patient offered paperwork?: Yes (Decline)  Does patient currently have a Health Care decision maker?: Yes, please see Health Care Proxy section Pam Chavez [facility])   [phone] (M))  Does patient have Advance Directives?: No  Was patient offered paperwork?: Yes (Decline)  Primary Contact: Pam Chavez (Sister)   [phone] (M)         Readmission Root Cause  30 Day Readmission: No    Patient Information  Admitted from[de-identified] Home  Mental Status: Alert  During Assessment patient was accompanied by: Not accompanied during assessment  Assessment information provided by[de-identified] Patient  Primary Caregiver: Self  Support Systems: Family members  [facility]: [address] [place] [address] do you live in?: [address] entry access options.  Select all that apply.: Stairs  Number of steps to enter home.: 4  Do the steps have railings?: Yes  Type of Current Residence: 3 story home  Upon entering residence, is there a bedroom on the main floor (no further steps)?: No  A bedroom is located on the following floor levels of residence (select all that apply):: 2nd Floor  Upon entering residence, is there a bathroom on the main floor (no further steps)?: No  Indicate which floors of current residence have a bathroom (select all the apply):: 2nd Floor  Number of steps to 2nd floor from main floor: One Flight  In the last 12 months, was there a time when you were not able to pay the mortgage or rent on time?: No  In the last 12 months, how many places have you lived?: 1  In the last 12 months, was there a time when you did not have a steady place to sleep or slept in a shelter (including now)?: No  Homeless/housing insecurity resource given?: N/A  Living Arrangements: Other (Comment) (Lives with sister, friend and friend's boyfriend.)  Is patient a ?: No    Activities of Daily Living Prior to Admission  Functional Status: Independent  Completes ADLs independently?: Yes  Ambulates independently?: Yes  Does patient use assisted devices?: No  Does patient currently own DME?: Yes  What DME does the patient currently own?: Nebulizer  Does patient have a history of Outpatient Therapy (PT/OT)?: No  Does the patient have a history of Short-Term Rehab?: No  Does patient have a history of HHC?: No  Does patient currently have [address] [facility] [address]?: No         Patient Information Continued  Income Source: Unemployed  Does patient have prescription coverage?: Yes  Within the past 12 months, you worried that your food would run out before you got the money to buy more.: Never true  Within the past 12 months, the food you bought just didn't last and you didn't have money to get more.: Never true  Does patient receive dialysis treatments?: No  Does patient have a history of substance abuse?: Yes  Historical substance use preference: Methamphetamines, Marijuana, Alcohol/ETOH (Stop doing methamphetamines years ago, but will do Marijuana now and then. Last time she did Marijuana was 3 months ago.  Last drank alcohol  4 years ago.)  Does patient have a history of Mental Health Diagnosis?: Yes (Major Depressive Disorder, Generalized Anxiety Disorder)  Has patient received inpatient treatment related to mental health in the last 2 years?: No         Means of Transportation  Means of Transport to Appts[de-identified] Family transport  In the past 12 months, has lack of transportation kept you from medical appointments or from getting medications?: No  In the past 12 months, has lack of transportation kept you from meetings, work, or from getting things needed for daily living?: No  Was application for public transport provided?: N/A        DISCHARGE DETAILS:    Discharge planning discussed with[de-identified] pt        CM contacted family/caregiver?: No- see comments (Pt will keep her sister informed.)  Were Treatment Team discharge recommendations reviewed with patient/caregiver?: Yes  Did patient/caregiver verbalize understanding of patient care needs?: Yes  Were patient/caregiver advised of the risks associated with not following Treatment Team discharge recommendations?: Yes      Would you like to participate in our [facility] service program?  : No - Declined    Treatment Team Recommendation: Home  Discharge Destination Plan[de-identified] Home        Additional Comments: Met with pt and she reports she will be moving this weekend to [address] [place] [address] - with her sister, friend and friend's boyfriend. This was changed on face sheet. WILMAR stated possible home 02 and will follow.

## 2023-08-30 NOTE — APP STUDENT NOTE
SON[facility] STUDENT  Inpatient Progress Note for TRAINING ONLY  Not Part of Legal Medical Record       Progress Note - Luther Soliz 62 y.o. female MRN: [id number]    Unit/Bed#: [facility] -01 Encounter: [id number]      Assessment and Plan:   1. COPD Exacerbation  - continue Methylprednisolone sodium succinate 40 mg q12hr.  - continue Azithromycin 500 mg q 24 hours. Today is her last dose. - continue encouraging Bipap compliance. - continue Benzonatate 100 mg TID as needed for cough. - continue Albuterol 2 puffs q6h as needed. - continue monitoring for changes in metal status. - sputum culture is pending, however infectious etiology of exacerbation is unlikely due to negative COVID19/flu and negative procalcitonin,   2. Generalized Anxiety Disorder  - continue Clonazepam 0.5 tablet every morning.   - continue Duloxetine 60 mg capsule BID. - continue Trazadone 200 mg as needed. - continue Hydroxyzine HCL tablet 50 mg TID as needed. - continue Aripiprazole 10 mg tablet at bedtime.   - follow up with psychiatrist and mental health services. 3. Tobacco use  - continue administration of Nicoderm patch  - continue encouraging smoking cessation   - follow up with PCP if interested in oral medications  4. Acute respiratory failure with hypercapnia  - continue monitoring ABG and CMP labs. - continue 3 L nasal canula oxygen administration. - continue monitoring oxygen saturation status. 5. Lung nodule  - lung nodules were found incidentally on CT scan performed during prior admission, 05/2023.  - encourage follow up with pulmonology. - encourage future evaluation for nodule, routine CT scan in 12 months. Subjective:   Patient is a 63 yo female who has a recent diagnosis of COPD. Patient was admitted into the hospital on Monday 08/28. The patient has experienced 2 prior admissions regarding COPD exacerbations. The patient was admitted into the hospital complaining of shortness of breath and cough.  She states that she has been compliant with her at home regimens. During her hospital visit she was given Methylprednisolone sodium succinate and two duonebs. Patient states that she experienced relief after ED treatment. She has a long history of tobacco use. Upon admission she was given a Nicoderm patch to encourage cessation. Today the patient is very upset and depressed that she is in the hospital and missing an [facility] [place] fair concert this evening. She states that she continues to have a productive cough. She denies dysuria, urinary incontinence, and abdominal pain. She states that she is having normal bowel movements today. She complains of headaches today. She denies lightheadedness or dizziness however complains of nausea and light sensitivity. She states that her SOB and chest pains feel a lot better today compared to admission. She continues the use of 5 L of oxygen via nasal canula. She had denied the use of BiPAP last night and denied trouble sleeping. However she states that she wakes up in the night to clear cough and urinate. Objective:     Vitals: Blood pressure 110/66, pulse 89, temperature 98.4 °F (36.9 °C), temperature source Oral, resp. rate 18, height 5' 3" (1.6 m), weight 89.4 kg (197 lb 1.5 oz), SpO2 94 %, not currently breastfeeding. ,Body mass index is 34.91 kg/m². Intake/Output Summary (Last 24 hours) at 8/30/2023 1131  Last data filed at 8/29/2023 2000  Gross per 24 hour   Intake 1910 ml   Output --   Net 1910 ml       Physical Exam:   General Appearance:   - awake and in no acute distress  - sitting up on hospital bed  - teary and upset about not being able to attend concert tonight. HENT:  - Pupils ROBERT   - dry nasal mucus membranes due to nasal canula. - no epistaxis    Cardiovascular  - normal rate and rhythm  - diminished sounds due to overlying wheezing. Pulmonary  - diffuse bilateral wheezing.  - Lower lobes were louder compared to upper lobes.    - wheezing with inhalation and exhalation. Abdomen  - active bowel sounds  - round and large  - no guarding  - nontender to palpitation. Musculoskeletal  - no LE edema  - nontender LE    Skin  - warm     Neurological  - AAO X3  - aware of time, place, and person    Psychiatry  - normal mood  - normal affect  - teary and sad.        Invasive Devices     Peripheral Intravenous Line  Duration           Peripheral IV 08/28/23 Left Antecubital 2 days                Bisi MATTAS

## 2023-08-30 NOTE — PLAN OF CARE
Problem: Potential for Falls  Goal: Patient will remain free of falls  Description: INTERVENTIONS:  - Educate patient/family on patient safety including physical limitations  - Instruct patient to call for assistance with activity   - Consult OT/PT to assist with strengthening/mobility   - Keep Call bell within reach  - Keep bed low and locked with side rails adjusted as appropriate  - Keep care items and personal belongings within reach  - Initiate and maintain comfort rounds  - Make Fall Risk Sign visible to staff  - Offer Toileting every  Hours, in advance of need  - Initiate/Maintain alarm  - Obtain necessary fall risk management equipment:   - Apply yellow socks and bracelet for high fall risk patients  - Consider moving patient to room near nurses station  Outcome: Progressing     Problem: NEUROSENSORY - ADULT  Goal: Achieves maximal functionality and self care  Description: INTERVENTIONS  - Monitor swallowing and airway patency with patient fatigue and changes in neurological status  - Encourage and assist patient to increase activity and self care.    - Encourage visually impaired, hearing impaired and aphasic patients to use assistive/communication devices  Outcome: Progressing     Problem: CARDIOVASCULAR - ADULT  Goal: Maintains optimal cardiac output and hemodynamic stability  Description: INTERVENTIONS:  - Monitor I/O, vital signs and rhythm  - Monitor for S/S and trends of decreased cardiac output  - Administer and titrate ordered vasoactive medications to optimize hemodynamic stability  - Assess quality of pulses, skin color and temperature  - Assess for signs of decreased coronary artery perfusion  - Instruct patient to report change in severity of symptoms  Outcome: Progressing  Goal: Absence of cardiac dysrhythmias or at baseline rhythm  Description: INTERVENTIONS:  - Continuous cardiac monitoring, vital signs, obtain 12 lead EKG if ordered  - Administer antiarrhythmic and heart rate control medications as ordered  - Monitor electrolytes and administer replacement therapy as ordered  Outcome: Progressing     Problem: RESPIRATORY - ADULT  Goal: Achieves optimal ventilation and oxygenation  Description: INTERVENTIONS:  - Assess for changes in respiratory status  - Assess for changes in mentation and behavior  - Position to facilitate oxygenation and minimize respiratory effort  - Oxygen administered by appropriate delivery if ordered  - Initiate smoking cessation education as indicated  - Encourage broncho-pulmonary hygiene including cough, deep breathe, Incentive Spirometry  - Assess the need for suctioning and aspirate as needed  - Assess and instruct to report SOB or any respiratory difficulty  - Respiratory Therapy support as indicated  Outcome: Progressing     Problem: SKIN/TISSUE INTEGRITY - ADULT  Goal: Skin Integrity remains intact(Skin Breakdown Prevention)  Description: Assess:  -Perform Ty assessment every   -Clean and moisturize skin every   -Inspect skin when repositioning, toileting, and assisting with ADLS  -Assess under medical devices such as  every   -Assess extremities for adequate circulation and sensation     Bed Management:  -Have minimal linens on bed & keep smooth, unwrinkled  -Change linens as needed when moist or perspiring  -Avoid sitting or lying in one position for more than  hours while in bed  -Keep HOB at degrees     Toileting:  -Offer bedside commode  -Assess for incontinence every   -Use incontinent care products after each incontinent episode such as     Activity:  -Mobilize patient  times a day  -Encourage activity and walks on unit  -Encourage or provide ROM exercises   -Turn and reposition patient every  Hours  -Use appropriate equipment to lift or move patient in bed  -Instruct/ Assist with weight shifting every  when out of bed in chair  -Consider limitation of chair time  hour intervals    Skin Care:  -Avoid use of baby powder, tape, friction and shearing, hot water or constrictive clothing  -Relieve pressure over bony prominences using   -Do not massage red bony areas    Next Steps:  -Teach patient strategies to minimize risks such as    -Consider consults to  interdisciplinary teams such as   Outcome: Progressing     Problem: SAFETY ADULT  Goal: Patient will remain free of falls  Description: INTERVENTIONS:  - Educate patient/family on patient safety including physical limitations  - Instruct patient to call for assistance with activity   - Consult OT/PT to assist with strengthening/mobility   - Keep Call bell within reach  - Keep bed low and locked with side rails adjusted as appropriate  - Keep care items and personal belongings within reach  - Initiate and maintain comfort rounds  - Make Fall Risk Sign visible to staff  - Offer Toileting every  Hours, in advance of need  - Initiate/Maintain alarm  - Obtain necessary fall risk management equipment:   - Apply yellow socks and bracelet for high fall risk patients  - Consider moving patient to room near nurses station  Outcome: Progressing  Goal: Maintain or return to baseline ADL function  Description: INTERVENTIONS:  -  Assess patient's ability to carry out ADLs; assess patient's baseline for ADL function and identify physical deficits which impact ability to perform ADLs (bathing, care of mouth/teeth, toileting, grooming, dressing, etc.)  - Assess/evaluate cause of self-care deficits   - Assess range of motion  - Assess patient's mobility; develop plan if impaired  - Assess patient's need for assistive devices and provide as appropriate  - Encourage maximum independence but intervene and supervise when necessary  - Involve family in performance of ADLs  - Assess for home care needs following discharge   - Consider OT consult to assist with ADL evaluation and planning for discharge  - Provide patient education as appropriate  Outcome: Progressing  Goal: Maintains/Returns to pre admission functional level  Description: INTERVENTIONS:  - Perform BMAT or MOVE assessment daily.   - Set and communicate daily mobility goal to care team and patient/family/caregiver. - Collaborate with rehabilitation services on mobility goals if consulted  - Perform Range of Motion  times a day. - Reposition patient every  hours. - Dangle patient  times a day  - Stand patient  times a day  - Ambulate patient  times a day  - Out of bed to chair  times a day   - Out of bed for meals  times a day  - Out of bed for toileting  - Record patient progress and toleration of activity level   Outcome: Progressing     Problem: DISCHARGE PLANNING  Goal: Discharge to home or other facility with appropriate resources  Description: INTERVENTIONS:  - Identify barriers to discharge w/patient and caregiver  - Arrange for needed discharge resources and transportation as appropriate  - Identify discharge learning needs (meds, wound care, etc.)  - Arrange for interpretive services to assist at discharge as needed  - Refer to Case Management Department for coordinating discharge planning if the patient needs post-hospital services based on physician/advanced practitioner order or complex needs related to functional status, cognitive ability, or social support system  Outcome: Progressing     Problem: Knowledge Deficit  Goal: Patient/family/caregiver demonstrates understanding of disease process, treatment plan, medications, and discharge instructions  Description: Complete learning assessment and assess knowledge base.   Interventions:  - Provide teaching at level of understanding  - Provide teaching via preferred learning methods  Outcome: Progressing     Problem: MOBILITY - ADULT  Goal: Maintain or return to baseline ADL function  Description: INTERVENTIONS:  -  Assess patient's ability to carry out ADLs; assess patient's baseline for ADL function and identify physical deficits which impact ability to perform ADLs (bathing, care of mouth/teeth, toileting, grooming, dressing, etc.)  - Assess/evaluate cause of self-care deficits   - Assess range of motion  - Assess patient's mobility; develop plan if impaired  - Assess patient's need for assistive devices and provide as appropriate  - Encourage maximum independence but intervene and supervise when necessary  - Involve family in performance of ADLs  - Assess for home care needs following discharge   - Consider OT consult to assist with ADL evaluation and planning for discharge  - Provide patient education as appropriate  Outcome: Progressing  Goal: Maintains/Returns to pre admission functional level  Description: INTERVENTIONS:  - Perform BMAT or MOVE assessment daily.   - Set and communicate daily mobility goal to care team and patient/family/caregiver. - Collaborate with rehabilitation services on mobility goals if consulted  - Perform Range of Motion  times a day. - Reposition patient every  hours.   - Dangle patient  times a day  - Stand patient  times a day  - Ambulate patient  times a day  - Out of bed to chair  times a day   - Out of bed for meals  times a day  - Out of bed for toileting  - Record patient progress and toleration of activity level   Outcome: Progressing

## 2023-08-30 NOTE — PROGRESS NOTES
[facility]  Progress Note  Name: Daniel Guevara I  MRN: [id number]  Unit/Bed#: [facility] 2 -01 I Date of Admission: 8/28/2023   Date of Service: 8/30/2023 I Hospital Day: 2    Assessment/Plan   * COPD exacerbation [facility])  Assessment & Plan  Patient is a 51-year-old female with past medical history significant for COPD and tobacco abuse who presents to the ER with cough, dyspnea and acute COPD exacerbation. Patient also was admitted May 2023, and June 2023 for COPD exacerbation. · Patient presents with a 1 week history of worsening dyspnea, chest congestion, productive cough. She notes she was using her home albuterol nebulizers and albuterol/Advair inhalers without any improvement  · She was given a DuoNeb by EMS, as well as 10 mg albuterol nebulizer and 125 mg of Solu-Medrol in the ER without significant improvement  · Continue IV steroids, and Xopenex/Atrovent nebs  · Continue azithromycin 500 mg x 3 days total, currently on day 2/3  · COVID/flu/RSV negative  · Chest x-ray without any evidence of infiltrates  · Hypercapnia and somnolence improved with BiPAP  · Pulmonology consulted, appreciate recommendations  · We will need outpatient PFTs  · Continue scheduled nebulizers  · Continue IV steroids however begin taper to every 12 hours dosing with hopeful transition to oral prednisone tomorrow      Leukocytosis  Assessment & Plan  With leukocytosis of 14.42  · Do not suspect infection, procalcitonin negative, patient has remained afebrile  · Suspect likely secondary to high-dose IV steroids  · CBC in a.m.     Lung nodule  Assessment & Plan  During prior admission 5/23 3 mm left lower lung nodule was noted with repeat CT scan recommended in 12 months  · Patient has been referred to [facility] pulmonology as an outpatient    Acute respiratory failure with hypoxia and hypercapnia ([address])  Assessment & Plan  Currently requiring 3 L of nasal cannula oxygen to maintain oxygen saturations greater than 88%  · See assessment and plan under COPD exacerbation    History of alcohol abuse  Assessment & Plan  Per records, patient has a history of alcohol abuse  · Per her sister, patient has been abstinent for 6 years: No longer an active problem and will discontinue if no signs or symptoms of withdrawal    Generalized anxiety disorder  Assessment & Plan  Continue home Abilify, clonazepam, Cymbalta, trazodone, and as needed Atarax  · [place]: Confirmed patient's clonazepam prescription. No red flags         VTE Pharmacologic Prophylaxis: VTE Score: 3 Moderate Risk (Score 3-4) - Pharmacological DVT Prophylaxis Ordered: enoxaparin (Lovenox). Patient Centered Rounds: I performed bedside rounds with nursing staff today. Discussions with Specialists or Other Care Team Provider: Pulmonology, case management    Education and Discussions with Family / Patient: Patient declined call to . Total Time Spent on Date of Encounter in care of patient: 35 minutes This time was spent on one or more of the following: performing physical exam; counseling and coordination of care; obtaining or reviewing history; documenting in the medical record; reviewing/ordering tests, medications or procedures; communicating with other healthcare professionals and discussing with patient's family/caregivers. Current Length of Stay: 2 day(s)  Current Patient Status: Inpatient   Certification Statement: The patient will continue to require additional inpatient hospital stay due to IV steroids  Discharge Plan: Anticipate discharge in 48 hrs to home. Code Status: Level 3 - DNAR and DNI    Subjective: The patient is seen resting comfortably in bed. She was really hoping to be discharged today as she had a concert to attend this evening however she understands that she needs to get better before being discharged.   She denies any worsening shortness of breath, chest pain or palpitations. States that she feels about the same as she did yesterday. Objective:     Vitals:   Temp (24hrs), Av.4 °F (36.9 °C), Min:98 °F (36.7 °C), Max:98.9 °F (37.2 °C)    Temp:  [98 °F (36.7 °C)-98.9 °F (37.2 °C)] 98.4 °F (36.9 °C)  HR:  [] 89  Resp:  [18-20] 18  BP: (110-139)/(60-90) 110/66  SpO2:  [87 %-96 %] 94 %  Body mass index is 34.91 kg/m². Input and Output Summary (last 24 hours): Intake/Output Summary (Last 24 hours) at 2023 1256  Last data filed at 2023  Gross per 24 hour   Intake 1910 ml   Output --   Net 1910 ml       Physical Exam:   Physical Exam  Vitals and nursing note reviewed. Constitutional:       General: She is not in acute distress. Appearance: Normal appearance. She is obese. She is not ill-appearing, toxic-appearing or diaphoretic. HENT:      Head: Normocephalic and atraumatic. Cardiovascular:      Rate and Rhythm: Normal rate and regular rhythm. Heart sounds: No murmur heard. No friction rub. No gallop. Pulmonary:      Effort: Pulmonary effort is normal. No respiratory distress. Breath sounds: Wheezing present. No rhonchi or rales. Comments: Significant bilateral expiratory wheezing  Abdominal:      General: Abdomen is flat. Bowel sounds are normal. There is no distension. Palpations: Abdomen is soft. Tenderness: There is no abdominal tenderness. Musculoskeletal:      Right lower leg: No edema. Left lower leg: No edema. Skin:     General: Skin is warm and dry. Coloration: Skin is not jaundiced or pale. Neurological:      General: No focal deficit present. Mental Status: She is alert. Mental status is at baseline.         Additional Data:     Labs:  Results from last 7 days   Lab Units 23  0537 23  1610 23  0922   WBC Thousand/uL 14.42*  --  7.53   HEMOGLOBIN g/dL 13.5  --  14.7   HEMATOCRIT % 42.9  --  49.0*   PLATELETS Thousands/uL 238   < > 196   NEUTROS PCT %  --   -- 67   LYMPHS PCT %  --   --  18   MONOS PCT %  --   --  7   EOS PCT %  --   --  6    < > = values in this interval not displayed. Results from last 7 days   Lab Units 08/30/23  0537 08/28/23  0922   SODIUM mmol/L 139 140   POTASSIUM mmol/L 4.7 4.8   CHLORIDE mmol/L 102 105   CO2 mmol/L 32 34*   BUN mg/dL 18 10   CREATININE mg/dL 0.82 0.99   ANION GAP mmol/L 5 1   CALCIUM mg/dL 9.4 8.9   ALBUMIN g/dL  --  4.0   TOTAL BILIRUBIN mg/dL  --  0.34   ALK PHOS U/L  --  89   ALT U/L  --  11   AST U/L  --  10*   GLUCOSE RANDOM mg/dL 113 119         Results from last 7 days   Lab Units 08/29/23  2100   POC GLUCOSE mg/dl 126         Results from last 7 days   Lab Units 08/29/23  0442 08/28/23  1610   PROCALCITONIN ng/ml <0.05 <0.05       Lines/Drains:  Invasive Devices     Peripheral Intravenous Line  Duration           Peripheral IV 08/28/23 Left Antecubital 2 days                      Imaging: No pertinent imaging reviewed. Recent Cultures (last 7 days):   Results from last 7 days   Lab Units 08/30/23  0202 08/28/23  1613 08/28/23  1610   BLOOD CULTURE   --  No Growth at 24 hrs. No Growth at 24 hrs.    GRAM STAIN RESULT  Rare Epithelial cells per low power field*  Rare Polys*  1+ Gram positive cocci in chains*  Rare Gram negative rods*  --   --        Last 24 Hours Medication List:   Current Facility-Administered Medications   Medication Dose Route Frequency Provider Last Rate   • acetaminophen  650 mg Oral Q6H PRN Fitz Feliciano MD     • albuterol  2 puff Inhalation Q6H PRN Margret Sims MD     • ARIPiprazole  10 mg Oral HS Margret Sims MD     • azithromycin  500 mg Oral Q24H Margret Sims MD     • benzonatate  100 mg Oral TID PRN Angelita Negrete PA-C     • calcium carbonate  1,000 mg Oral Daily PRN Margret Sims MD     • clonazePAM  0.5 mg Oral QAM Margret Sims MD     • docusate sodium  100 mg Oral BID Margret Sims MD     • DULoxetine  60 mg Oral BID Margret Sims MD     • Fluticasone Furoate-Vilanterol  1 puff Inhalation Daily Thomas Tello MD     • folic acid  1 mg Oral Daily Thomas Tello MD     • guaiFENesin  600 mg Oral BID Thomas Tello MD     • heparin (porcine)  5,000 Units Subcutaneous [facility] Thomas Tello MD     • hydrOXYzine HCL  50 mg Oral TID PRN Thomas Tello MD     • ipratropium  0.5 mg Nebulization Q6H Thomas Tello MD     • levalbuterol  1.25 mg Nebulization Q6H Thomas Tello MD     • methylPREDNISolone sodium succinate  40 mg Intravenous Q12H [address] Hu Merino MD     • multivitamin-minerals  1 tablet Oral Daily Thomas Tello MD     • nicotine  1 patch Transdermal Daily Thomas Tello MD     • pantoprazole  40 mg Oral Early Morning Thomas Tello MD     • polyethylene glycol  17 g Oral Daily Thomas Tello MD     • thiamine  100 mg Oral Daily Thomas Tello MD     • traZODone  200 mg Oral HS PRN Thomas Tello MD          Today, Patient Was Seen By: Iva Vann PA-C    **Please Note: This note may have been constructed using a voice recognition system. **

## 2023-08-30 NOTE — PROGRESS NOTES
Progress Note - Pulmonary   Feliciano Gamez 62 y.o. female MRN: [id number]  Unit/Bed#: [address] [address] -01 Encounter: [id number]          Assessment/Plan:  61 y/o woman presented w/ sob, wheezing, presented to ED and initially required bipap admitted w/ AECOPD     Problems  1. Acute hypoxemic and hypercapneic respiratory failure   2. Acute exacerbation of copd   3. Obesity with suspected sleep apnea  4. Nicotine dependence      Recommendations      Clinically improving.-Change Solu-Medrol to 40 mg every 12 hours. Cont supplemental 02 and wean to keep sats > 88%   Cont bronchodilators: curently fluticasone-furoate-vilanterol, xopenex/atrovent q6 hours; would discharge with breztri or trelegy   Azithromycin 500 mg x 3 days total (day 2/3)     Pulmonary hygeine: out of bed to chair, IS, ambulation   Will need outpatient pfts and pulmonary followup; qualifies for lung cancer screening  Smoking cessation recommended: has nicotine patch   Recommend outpatient sleep study   Weight loss is encouraged through self paced exercise and dietary modification      Subjective:   Overall feeling better. Less cough, wheezing and shortness of breath. No new complaints today. Objective:       Vitals: Blood pressure 139/90, pulse 92, temperature 98.6 °F (37 °C), temperature source Oral, resp. rate 20, height 5' 3" (1.6 m), weight 89.4 kg (197 lb 1.5 oz), SpO2 (!) 87 %, not currently breastfeeding., 3L, Body mass index is 34.91 kg/m².       Intake/Output Summary (Last 24 hours) at 8/30/2023 1016  Last data filed at 8/29/2023 2000  Gross per 24 hour   Intake 1910 ml   Output --   Net 1910 ml         Physical Exam  Gen: Awake, alert, oriented x 3, no acute distress  Head: no masses, lesions   HEENT: Mucous membranes moist, no oral lesions, no thrush  NECK: No accessory muscle use, JVP not elevated  Cardiac: Regular, single S1, single S2, no murmurs, no rubs, no gallops  Lungs: Bilateral posterior expiratory wheezes  Extremities: no cyanosis, no clubbing, no edema  Skin: no lesions, rashes  Neuro: CN grossly intact, no gross neurological deficits     Labs: I have personally reviewed pertinent lab results. Results from last 7 days   Lab Units 08/30/23  0537 08/28/23  1610 08/28/23  0922   WBC Thousand/uL 14.42*  --  7.53   HEMOGLOBIN g/dL 13.5  --  14.7   HEMATOCRIT % 42.9  --  49.0*   PLATELETS Thousands/uL 238 203 196   NEUTROS PCT %  --   --  67   MONOS PCT %  --   --  7   EOS PCT %  --   --  6      Results from last 7 days   Lab Units 08/30/23  0537 08/28/23  0922   POTASSIUM mmol/L 4.7 4.8   CHLORIDE mmol/L 102 105   CO2 mmol/L 32 34*   BUN mg/dL 18 10   CREATININE mg/dL 0.82 0.99   CALCIUM mg/dL 9.4 8.9   ALK PHOS U/L  --  89   ALT U/L  --  11   AST U/L  --  10*                      0   Lab Value Date/Time    TROPONINI <0.01 05/25/2021 1713    TROPONINI <0.01 02/27/2021 1705    TROPONINI <0.01 02/04/2021 2058    TROPONINI <0.02 01/11/2017 0349       Microbiology:    Blood culture 8/28/2023: No growth      Imaging and other studies: I have personally reviewed pertinent reports. and I have personally reviewed pertinent films in PACS     CXR 8/28     FINDINGS:     Cardiomediastinal silhouette appears unremarkable. The lungs are clear.  No pneumothorax or pleural effusion. Epicardial adipose tissue is noted as on prior studies   Osseous structures appear within normal limits for patient age. Impression:       No acute cardiopulmonary disease.           MD Judie Ruvalcaba [facility]

## 2023-08-30 NOTE — RESTORATIVE TECHNICIAN NOTE
Restorative Technician Note      Patient Name: Sidney Christine     Restorative Tech Visit Date: 08/30/23  Note Type: Mobility  Patient Position Upon Consult: Supine  Activity Performed: Range of motion  Patient Position at End of Consult: Supine;  All needs within reach

## 2023-08-31 PROBLEM — D72.829 LEUKOCYTOSIS: Status: RESOLVED | Noted: 2023-08-30 | Resolved: 2023-08-31

## 2023-08-31 LAB
ANION GAP SERPL CALCULATED.3IONS-SCNC: 3 MMOL/L
BUN SERPL-MCNC: 21 MG/DL (ref 5–25)
CALCIUM SERPL-MCNC: 8.9 MG/DL (ref 8.4–10.2)
CHLORIDE SERPL-SCNC: 102 MMOL/L (ref 96–108)
CO2 SERPL-SCNC: 34 MMOL/L (ref 21–32)
CREAT SERPL-MCNC: 0.99 MG/DL (ref 0.6–1.3)
ERYTHROCYTE [DISTWIDTH] IN BLOOD BY AUTOMATED COUNT: 14.1 % (ref 11.6–15.1)
GFR SERPL CREATININE-BSD FRML MDRD: 63 ML/MIN/1.73SQ M
GLUCOSE SERPL-MCNC: 123 MG/DL (ref 65–140)
HCT VFR BLD AUTO: 40.6 % (ref 34.8–46.1)
HGB BLD-MCNC: 12.7 G/DL (ref 11.5–15.4)
MAGNESIUM SERPL-MCNC: 2.7 MG/DL (ref 1.9–2.7)
MCH RBC QN AUTO: 31.1 PG (ref 26.8–34.3)
MCHC RBC AUTO-ENTMCNC: 31.3 G/DL (ref 31.4–37.4)
MCV RBC AUTO: 99 FL (ref 82–98)
PLATELET # BLD AUTO: 205 THOUSANDS/UL (ref 149–390)
PMV BLD AUTO: 9.5 FL (ref 8.9–12.7)
POTASSIUM SERPL-SCNC: 5 MMOL/L (ref 3.5–5.3)
RBC # BLD AUTO: 4.09 MILLION/UL (ref 3.81–5.12)
SODIUM SERPL-SCNC: 139 MMOL/L (ref 135–147)
WBC # BLD AUTO: 8.83 THOUSAND/UL (ref 4.31–10.16)

## 2023-08-31 PROCEDURE — 80048 BASIC METABOLIC PNL TOTAL CA: CPT | Performed by: PHYSICIAN ASSISTANT

## 2023-08-31 PROCEDURE — 83735 ASSAY OF MAGNESIUM: CPT

## 2023-08-31 PROCEDURE — 94640 AIRWAY INHALATION TREATMENT: CPT

## 2023-08-31 PROCEDURE — 85027 COMPLETE CBC AUTOMATED: CPT | Performed by: PHYSICIAN ASSISTANT

## 2023-08-31 PROCEDURE — 99232 SBSQ HOSP IP/OBS MODERATE 35: CPT | Performed by: INTERNAL MEDICINE

## 2023-08-31 PROCEDURE — 94760 N-INVAS EAR/PLS OXIMETRY 1: CPT

## 2023-08-31 PROCEDURE — 99232 SBSQ HOSP IP/OBS MODERATE 35: CPT

## 2023-08-31 RX ADMIN — IPRATROPIUM BROMIDE 0.5 MG: 0.5 SOLUTION RESPIRATORY (INHALATION) at 07:28

## 2023-08-31 RX ADMIN — MULTIPLE VITAMINS W/ MINERALS TAB 1 TABLET: TAB ORAL at 09:52

## 2023-08-31 RX ADMIN — BENZONATATE 100 MG: 100 CAPSULE ORAL at 17:57

## 2023-08-31 RX ADMIN — FOLIC ACID 1 MG: 1 TABLET ORAL at 09:52

## 2023-08-31 RX ADMIN — HEPARIN SODIUM 5000 UNITS: 5000 INJECTION INTRAVENOUS; SUBCUTANEOUS at 05:51

## 2023-08-31 RX ADMIN — GUAIFENESIN 600 MG: 600 TABLET, EXTENDED RELEASE ORAL at 09:52

## 2023-08-31 RX ADMIN — LEVALBUTEROL HYDROCHLORIDE 1.25 MG: 1.25 SOLUTION RESPIRATORY (INHALATION) at 20:37

## 2023-08-31 RX ADMIN — ARIPIPRAZOLE 10 MG: 10 TABLET ORAL at 21:27

## 2023-08-31 RX ADMIN — DULOXETINE HYDROCHLORIDE 60 MG: 60 CAPSULE, DELAYED RELEASE ORAL at 17:54

## 2023-08-31 RX ADMIN — HEPARIN SODIUM 5000 UNITS: 5000 INJECTION INTRAVENOUS; SUBCUTANEOUS at 21:27

## 2023-08-31 RX ADMIN — DULOXETINE HYDROCHLORIDE 60 MG: 60 CAPSULE, DELAYED RELEASE ORAL at 09:52

## 2023-08-31 RX ADMIN — LEVALBUTEROL HYDROCHLORIDE 1.25 MG: 1.25 SOLUTION RESPIRATORY (INHALATION) at 13:20

## 2023-08-31 RX ADMIN — CLONAZEPAM 0.5 MG: 0.5 TABLET ORAL at 09:52

## 2023-08-31 RX ADMIN — METHYLPREDNISOLONE SODIUM SUCCINATE 40 MG: 40 INJECTION, POWDER, FOR SOLUTION INTRAMUSCULAR; INTRAVENOUS at 21:27

## 2023-08-31 RX ADMIN — IPRATROPIUM BROMIDE 0.5 MG: 0.5 SOLUTION RESPIRATORY (INHALATION) at 20:37

## 2023-08-31 RX ADMIN — IPRATROPIUM BROMIDE 0.5 MG: 0.5 SOLUTION RESPIRATORY (INHALATION) at 13:20

## 2023-08-31 RX ADMIN — FLUTICASONE FUROATE AND VILANTEROL TRIFENATATE 1 PUFF: 100; 25 POWDER RESPIRATORY (INHALATION) at 09:53

## 2023-08-31 RX ADMIN — PANTOPRAZOLE SODIUM 40 MG: 40 TABLET, DELAYED RELEASE ORAL at 05:51

## 2023-08-31 RX ADMIN — TRAZODONE HYDROCHLORIDE 200 MG: 100 TABLET ORAL at 22:11

## 2023-08-31 RX ADMIN — METHYLPREDNISOLONE SODIUM SUCCINATE 40 MG: 40 INJECTION, POWDER, FOR SOLUTION INTRAMUSCULAR; INTRAVENOUS at 09:55

## 2023-08-31 RX ADMIN — GUAIFENESIN 600 MG: 600 TABLET, EXTENDED RELEASE ORAL at 17:54

## 2023-08-31 RX ADMIN — LEVALBUTEROL HYDROCHLORIDE 1.25 MG: 1.25 SOLUTION RESPIRATORY (INHALATION) at 07:28

## 2023-08-31 RX ADMIN — THIAMINE HCL TAB 100 MG 100 MG: 100 TAB at 09:52

## 2023-08-31 RX ADMIN — HEPARIN SODIUM 5000 UNITS: 5000 INJECTION INTRAVENOUS; SUBCUTANEOUS at 14:56

## 2023-08-31 NOTE — PLAN OF CARE
Problem: Potential for Falls  Goal: Patient will remain free of falls  Description: INTERVENTIONS:  - Educate patient/family on patient safety including physical limitations  - Instruct patient to call for assistance with activity   - Consult OT/PT to assist with strengthening/mobility   - Keep Call bell within reach  - Keep bed low and locked with side rails adjusted as appropriate  - Keep care items and personal belongings within reach  - Initiate and maintain comfort rounds  - Make Fall Risk Sign visible to staff  - Offer Toileting every  Hours, in advance of need  - Initiate/Maintain alarm  - Obtain necessary fall risk management equipment:   - Apply yellow socks and bracelet for high fall risk patients  - Consider moving patient to room near nurses station  Outcome: Progressing     Problem: NEUROSENSORY - ADULT  Goal: Achieves maximal functionality and self care  Description: INTERVENTIONS  - Monitor swallowing and airway patency with patient fatigue and changes in neurological status  - Encourage and assist patient to increase activity and self care.    - Encourage visually impaired, hearing impaired and aphasic patients to use assistive/communication devices  Outcome: Progressing     Problem: CARDIOVASCULAR - ADULT  Goal: Maintains optimal cardiac output and hemodynamic stability  Description: INTERVENTIONS:  - Monitor I/O, vital signs and rhythm  - Monitor for S/S and trends of decreased cardiac output  - Administer and titrate ordered vasoactive medications to optimize hemodynamic stability  - Assess quality of pulses, skin color and temperature  - Assess for signs of decreased coronary artery perfusion  - Instruct patient to report change in severity of symptoms  Outcome: Progressing  Goal: Absence of cardiac dysrhythmias or at baseline rhythm  Description: INTERVENTIONS:  - Continuous cardiac monitoring, vital signs, obtain 12 lead EKG if ordered  - Administer antiarrhythmic and heart rate control medications as ordered  - Monitor electrolytes and administer replacement therapy as ordered  Outcome: Progressing     Problem: RESPIRATORY - ADULT  Goal: Achieves optimal ventilation and oxygenation  Description: INTERVENTIONS:  - Assess for changes in respiratory status  - Assess for changes in mentation and behavior  - Position to facilitate oxygenation and minimize respiratory effort  - Oxygen administered by appropriate delivery if ordered  - Initiate smoking cessation education as indicated  - Encourage broncho-pulmonary hygiene including cough, deep breathe, Incentive Spirometry  - Assess the need for suctioning and aspirate as needed  - Assess and instruct to report SOB or any respiratory difficulty  - Respiratory Therapy support as indicated  Outcome: Progressing     Problem: SKIN/TISSUE INTEGRITY - ADULT  Goal: Skin Integrity remains intact(Skin Breakdown Prevention)  Description: Assess:  -Perform Ty assessment every   -Clean and moisturize skin every   -Inspect skin when repositioning, toileting, and assisting with ADLS  -Assess under medical devices such as  every   -Assess extremities for adequate circulation and sensation     Bed Management:  -Have minimal linens on bed & keep smooth, unwrinkled  -Change linens as needed when moist or perspiring  -Avoid sitting or lying in one position for more than  hours while in bed  -Keep HOB at degrees     Toileting:  -Offer bedside commode  -Assess for incontinence every   -Use incontinent care products after each incontinent episode such as     Activity:  -Mobilize patient  times a day  -Encourage activity and walks on unit  -Encourage or provide ROM exercises   -Turn and reposition patient every  Hours  -Use appropriate equipment to lift or move patient in bed  -Instruct/ Assist with weight shifting every  when out of bed in chair  -Consider limitation of chair time  hour intervals    Skin Care:  -Avoid use of baby powder, tape, friction and shearing, hot water or constrictive clothing  -Relieve pressure over bony prominences using   -Do not massage red bony areas    Next Steps:  -Teach patient strategies to minimize risks such as    -Consider consults to  interdisciplinary teams such as   Outcome: Progressing     Problem: SAFETY ADULT  Goal: Patient will remain free of falls  Description: INTERVENTIONS:  - Educate patient/family on patient safety including physical limitations  - Instruct patient to call for assistance with activity   - Consult OT/PT to assist with strengthening/mobility   - Keep Call bell within reach  - Keep bed low and locked with side rails adjusted as appropriate  - Keep care items and personal belongings within reach  - Initiate and maintain comfort rounds  - Make Fall Risk Sign visible to staff  - Offer Toileting every  Hours, in advance of need  - Initiate/Maintain alarm  - Obtain necessary fall risk management equipment:   - Apply yellow socks and bracelet for high fall risk patients  - Consider moving patient to room near nurses station  Outcome: Progressing  Goal: Maintain or return to baseline ADL function  Description: INTERVENTIONS:  -  Assess patient's ability to carry out ADLs; assess patient's baseline for ADL function and identify physical deficits which impact ability to perform ADLs (bathing, care of mouth/teeth, toileting, grooming, dressing, etc.)  - Assess/evaluate cause of self-care deficits   - Assess range of motion  - Assess patient's mobility; develop plan if impaired  - Assess patient's need for assistive devices and provide as appropriate  - Encourage maximum independence but intervene and supervise when necessary  - Involve family in performance of ADLs  - Assess for home care needs following discharge   - Consider OT consult to assist with ADL evaluation and planning for discharge  - Provide patient education as appropriate  Outcome: Progressing  Goal: Maintains/Returns to pre admission functional level  Description: INTERVENTIONS:  - Perform BMAT or MOVE assessment daily.   - Set and communicate daily mobility goal to care team and patient/family/caregiver. - Collaborate with rehabilitation services on mobility goals if consulted  - Perform Range of Motion  times a day. - Reposition patient every  hours. - Dangle patient  times a day  - Stand patient  times a day  - Ambulate patient  times a day  - Out of bed to chair  times a day   - Out of bed for meals  times a day  - Out of bed for toileting  - Record patient progress and toleration of activity level   Outcome: Progressing     Problem: DISCHARGE PLANNING  Goal: Discharge to home or other facility with appropriate resources  Description: INTERVENTIONS:  - Identify barriers to discharge w/patient and caregiver  - Arrange for needed discharge resources and transportation as appropriate  - Identify discharge learning needs (meds, wound care, etc.)  - Arrange for interpretive services to assist at discharge as needed  - Refer to Case Management Department for coordinating discharge planning if the patient needs post-hospital services based on physician/advanced practitioner order or complex needs related to functional status, cognitive ability, or social support system  Outcome: Progressing     Problem: Knowledge Deficit  Goal: Patient/family/caregiver demonstrates understanding of disease process, treatment plan, medications, and discharge instructions  Description: Complete learning assessment and assess knowledge base.   Interventions:  - Provide teaching at level of understanding  - Provide teaching via preferred learning methods  Outcome: Progressing     Problem: MOBILITY - ADULT  Goal: Maintain or return to baseline ADL function  Description: INTERVENTIONS:  -  Assess patient's ability to carry out ADLs; assess patient's baseline for ADL function and identify physical deficits which impact ability to perform ADLs (bathing, care of mouth/teeth, toileting, grooming, dressing, etc.)  - Assess/evaluate cause of self-care deficits   - Assess range of motion  - Assess patient's mobility; develop plan if impaired  - Assess patient's need for assistive devices and provide as appropriate  - Encourage maximum independence but intervene and supervise when necessary  - Involve family in performance of ADLs  - Assess for home care needs following discharge   - Consider OT consult to assist with ADL evaluation and planning for discharge  - Provide patient education as appropriate  Outcome: Progressing  Goal: Maintains/Returns to pre admission functional level  Description: INTERVENTIONS:  - Perform BMAT or MOVE assessment daily.   - Set and communicate daily mobility goal to care team and patient/family/caregiver. - Collaborate with rehabilitation services on mobility goals if consulted  - Perform Range of Motion  times a day. - Reposition patient every  hours.   - Dangle patient  times a day  - Stand patient  times a day  - Ambulate patient  times a day  - Out of bed to chair  times a day   - Out of bed for josh times a day  - Out of bed for toileting  - Record patient progress and toleration of activity level   Outcome: Progressing

## 2023-08-31 NOTE — PLAN OF CARE
Problem: Potential for Falls  Goal: Patient will remain free of falls  Description: INTERVENTIONS:  - Educate patient/family on patient safety including physical limitations  - Instruct patient to call for assistance with activity   - Consult OT/PT to assist with strengthening/mobility   - Keep Call bell within reach  - Keep bed low and locked with side rails adjusted as appropriate  - Keep care items and personal belongings within reach  - Initiate and maintain comfort rounds  - Make Fall Risk Sign visible to staff  - Offer Toileting every 2 Hours, in advance of need  - Initiate/Maintain bed alarm  - Obtain necessary fall risk management equipment  - Apply yellow socks and bracelet for high fall risk patients  - Consider moving patient to room near nurses station  Outcome: Progressing     Problem: NEUROSENSORY - ADULT  Goal: Achieves maximal functionality and self care  Description: INTERVENTIONS  - Monitor swallowing and airway patency with patient fatigue and changes in neurological status  - Encourage and assist patient to increase activity and self care.    - Encourage visually impaired, hearing impaired and aphasic patients to use assistive/communication devices  Outcome: Progressing     Problem: CARDIOVASCULAR - ADULT  Goal: Maintains optimal cardiac output and hemodynamic stability  Description: INTERVENTIONS:  - Monitor I/O, vital signs and rhythm  - Monitor for S/S and trends of decreased cardiac output  - Administer and titrate ordered vasoactive medications to optimize hemodynamic stability  - Assess quality of pulses, skin color and temperature  - Assess for signs of decreased coronary artery perfusion  - Instruct patient to report change in severity of symptoms  Outcome: Progressing  Goal: Absence of cardiac dysrhythmias or at baseline rhythm  Description: INTERVENTIONS:  - Continuous cardiac monitoring, vital signs, obtain 12 lead EKG if ordered  - Administer antiarrhythmic and heart rate control medications as ordered  - Monitor electrolytes and administer replacement therapy as ordered  Outcome: Progressing     Problem: RESPIRATORY - ADULT  Goal: Achieves optimal ventilation and oxygenation  Description: INTERVENTIONS:  - Assess for changes in respiratory status  - Assess for changes in mentation and behavior  - Position to facilitate oxygenation and minimize respiratory effort  - Oxygen administered by appropriate delivery if ordered  - Initiate smoking cessation education as indicated  - Encourage broncho-pulmonary hygiene including cough, deep breathe, Incentive Spirometry  - Assess the need for suctioning and aspirate as needed  - Assess and instruct to report SOB or any respiratory difficulty  - Respiratory Therapy support as indicated  Outcome: Progressing     Problem: SKIN/TISSUE INTEGRITY - ADULT  Goal: Skin Integrity remains intact(Skin Breakdown Prevention)  Description: Assess:  -Perform Ty assessment  -Clean and moisturize skin   -Inspect skin when repositioning, toileting, and assisting with ADLS  -Assess under medical devices  -Assess extremities for adequate circulation and sensation     Bed Management:  -Have minimal linens on bed & keep smooth, unwrinkled  -Change linens as needed when moist or perspiring  -Avoid sitting or lying in one position for more than 2 hours while in bed  -Keep HOB at 30 degrees     Toileting:  -Offer bedside commode  -Assess for incontinence  -Use incontinent care products after each incontinent episode     Activity:  -Mobilize patient 3 times a day  -Encourage activity and walks on unit  -Encourage or provide ROM exercises   -Turn and reposition patient every 2 Hours  -Use appropriate equipment to lift or move patient in bed  -Instruct/ Assist with weight shifting every  when out of bed in chair  -Consider limitation of chair time 2 hour intervals    Skin Care:  -Avoid use of baby powder, tape, friction and shearing, hot water or constrictive clothing  -Relieve pressure over bony prominences   -Do not massage red bony areas    Next Steps:  -Teach patient strategies to minimize risks   -Consider consults to  interdisciplinary teams   Outcome: Progressing     Problem: SAFETY ADULT  Goal: Patient will remain free of falls  Description: INTERVENTIONS:  - Educate patient/family on patient safety including physical limitations  - Instruct patient to call for assistance with activity   - Consult OT/PT to assist with strengthening/mobility   - Keep Call bell within reach  - Keep bed low and locked with side rails adjusted as appropriate  - Keep care items and personal belongings within reach  - Initiate and maintain comfort rounds  - Make Fall Risk Sign visible to staff  - Offer Toileting every 2 Hours, in advance of need  - Initiate/Maintain bed alarm  - Obtain necessary fall risk management equipment  - Apply yellow socks and bracelet for high fall risk patients  - Consider moving patient to room near nurses station  Outcome: Progressing  Goal: Maintain or return to baseline ADL function  Description: INTERVENTIONS:  -  Assess patient's ability to carry out ADLs; assess patient's baseline for ADL function and identify physical deficits which impact ability to perform ADLs (bathing, care of mouth/teeth, toileting, grooming, dressing, etc.)  - Assess/evaluate cause of self-care deficits   - Assess range of motion  - Assess patient's mobility; develop plan if impaired  - Assess patient's need for assistive devices and provide as appropriate  - Encourage maximum independence but intervene and supervise when necessary  - Involve family in performance of ADLs  - Assess for home care needs following discharge   - Consider OT consult to assist with ADL evaluation and planning for discharge  - Provide patient education as appropriate  Outcome: Progressing  Goal: Maintains/Returns to pre admission functional level  Description: INTERVENTIONS:  - Perform BMAT or MOVE assessment daily.   - Set and communicate daily mobility goal to care team and patient/family/caregiver. - Collaborate with rehabilitation services on mobility goals if consulted  - Perform Range of Motion 3 times a day. - Reposition patient every 2 hours. - Dangle patient 3 times a day  - Stand patient 3 times a day  - Ambulate patient 3 times a day  - Out of bed to chair 3 times a day   - Out of bed for meals 3 times a day  - Out of bed for toileting  - Record patient progress and toleration of activity level   Outcome: Progressing     Problem: DISCHARGE PLANNING  Goal: Discharge to home or other facility with appropriate resources  Description: INTERVENTIONS:  - Identify barriers to discharge w/patient and caregiver  - Arrange for needed discharge resources and transportation as appropriate  - Identify discharge learning needs (meds, wound care, etc.)  - Arrange for interpretive services to assist at discharge as needed  - Refer to Case Management Department for coordinating discharge planning if the patient needs post-hospital services based on physician/advanced practitioner order or complex needs related to functional status, cognitive ability, or social support system  Outcome: Progressing     Problem: Knowledge Deficit  Goal: Patient/family/caregiver demonstrates understanding of disease process, treatment plan, medications, and discharge instructions  Description: Complete learning assessment and assess knowledge base.   Interventions:  - Provide teaching at level of understanding  - Provide teaching via preferred learning methods  Outcome: Progressing     Problem: MOBILITY - ADULT  Goal: Maintain or return to baseline ADL function  Description: INTERVENTIONS:  -  Assess patient's ability to carry out ADLs; assess patient's baseline for ADL function and identify physical deficits which impact ability to perform ADLs (bathing, care of mouth/teeth, toileting, grooming, dressing, etc.)  - Assess/evaluate cause of self-care deficits   - Assess range of motion  - Assess patient's mobility; develop plan if impaired  - Assess patient's need for assistive devices and provide as appropriate  - Encourage maximum independence but intervene and supervise when necessary  - Involve family in performance of ADLs  - Assess for home care needs following discharge   - Consider OT consult to assist with ADL evaluation and planning for discharge  - Provide patient education as appropriate  Outcome: Progressing  Goal: Maintains/Returns to pre admission functional level  Description: INTERVENTIONS:  - Perform BMAT or MOVE assessment daily.   - Set and communicate daily mobility goal to care team and patient/family/caregiver. - Collaborate with rehabilitation services on mobility goals if consulted  - Perform Range of Motion 3 times a day. - Reposition patient every 2 hours.   - Dangle patient 3 times a day  - Stand patient 3 times a day  - Ambulate patient 3 times a day  - Out of bed to chair 3 times a day   - Out of bed for meals 3 times a day  - Out of bed for toileting  - Record patient progress and toleration of activity level   Outcome: Progressing

## 2023-08-31 NOTE — PROGRESS NOTES
[address] [facility]  Progress Note  Name: Sheila Kiser I  MRN: [id number]  Unit/Bed#: [facility] -01 I Date of Admission: 8/28/2023   Date of Service: 8/31/2023 I Hospital Day: 3    Assessment/Plan   * COPD exacerbation [facility])  Assessment & Plan  Patient is a 26-year-old female with past medical history significant for COPD and tobacco abuse who presented due to cough, dyspnea and acute COPD exacerbation. Patient also was admitted May 2023, and June 2023 for COPD exacerbation. · Continue IV steroids q 12 hours, and Xopenex/Atrovent nebs  · Continue azithromycin 500 mg x 3 days total, currently on day 3/3  · COVID/flu/RSV negative  · Chest x-ray without any evidence of infiltrates  · Hypercapnia and somnolence improved with BiPAP  · Pulmonology consulted, appreciate recommendations  · We will need outpatient PFTs  · Continue scheduled nebulizers  · Continue IV steroids however begin taper to every 12 hours dosing with hopeful transition to oral prednisone tomorrow      Acute respiratory failure with hypoxia and hypercapnia (HCC)  Assessment & Plan  Currently requiring 3 L of nasal cannula oxygen to maintain oxygen saturations greater than 88%  · See assessment and plan under COPD exacerbation  · Home oxygen evaluation prior to discharge    Generalized anxiety disorder  Assessment & Plan  Continue home Abilify, clonazepam, Cymbalta, trazodone, and as needed Atarax  · [place]: Confirmed patient's clonazepam prescription.   No red flags    Lung nodule  Assessment & Plan  During prior admission 5/23 3 mm left lower lung nodule was noted with repeat CT scan recommended in 12 months  · Patient has been referred to [facility] pulmonology as an outpatient    Leukocytosis-resolved as of 8/31/2023  Assessment & Plan  With leukocytosis of 14.42 yesterday, resolved today  · Do not suspect infection, procalcitonin negative, patient has remained afebrile  · Suspect likely secondary to high-dose IV steroids  · CBC in a.m. Somnolence  Assessment & Plan  · Patient noted to be somnolent during admission  · Patient has previous history of hypercapnea on prior admissions, however notes states she had refused BiPAP  · Improved with bipap    History of alcohol abuse  Assessment & Plan  Per records, patient has a history of alcohol abuse  · Per her sister, patient has been abstinent for 6 years: No longer an active problem and will discontinue if no signs or symptoms of withdrawal    Tobacco abuse  Assessment & Plan  Nicotine patch  · Smoking cessation counseled           VTE Pharmacologic Prophylaxis: VTE Score: 3 Moderate Risk (Score 3-4) - Pharmacological DVT Prophylaxis Ordered: heparin. Patient Centered Rounds: I performed bedside rounds with nursing staff today. Discussions with Specialists or Other Care Team Provider: PHIL    Education and Discussions with Family / Patient: Updated  (sister) via phone. Total Time Spent on Date of Encounter in care of patient: 45 minutes This time was spent on one or more of the following: performing physical exam; counseling and coordination of care; obtaining or reviewing history; documenting in the medical record; reviewing/ordering tests, medications or procedures; communicating with other healthcare professionals and discussing with patient's family/caregivers. Current Length of Stay: 3 day(s)  Current Patient Status: Inpatient   Certification Statement: The patient will continue to require additional inpatient hospital stay due to COPD exacerbation requiring IV steroids  Discharge Plan: Anticipate discharge tomorrow to home. Code Status: Level 3 - DNAR and DNI    Subjective:   Patient was seen laying in bed today. She reports continued wheezing. She denies any sob. She denies any home oxygen. She reports feeling tired.      Objective:     Vitals:   Temp (24hrs), Av.3 °F (36.8 °C), Min:97.6 °F (36.4 °C), Max:98.7 °F (37.1 °C)    Temp:  [97.6 °F (36.4 °C)-98.7 °F (37.1 °C)] 97.6 °F (36.4 °C)  HR:  [76-90] 80  Resp:  [16-18] 16  BP: (113-131)/(74-81) 127/81  SpO2:  [87 %-97 %] 97 %  Body mass index is 34.13 kg/m². Input and Output Summary (last 24 hours): Intake/Output Summary (Last 24 hours) at 8/31/2023 1108  Last data filed at 8/31/2023 0052  Gross per 24 hour   Intake 360 ml   Output --   Net 360 ml       Physical Exam:   Physical Exam  Vitals and nursing note reviewed. Constitutional:       Appearance: Normal appearance. HENT:      Head: Normocephalic and atraumatic. Eyes:      General: No scleral icterus. Cardiovascular:      Rate and Rhythm: Normal rate and regular rhythm. Pulmonary:      Effort: Pulmonary effort is normal.      Breath sounds: Wheezing present. Abdominal:      General: Abdomen is flat. Bowel sounds are normal.      Palpations: Abdomen is soft. Musculoskeletal:      Right lower leg: No edema. Left lower leg: No edema. Skin:     General: Skin is warm and dry. Neurological:      General: No focal deficit present. Mental Status: She is alert. Psychiatric:         Mood and Affect: Mood normal.         Behavior: Behavior normal.          Additional Data:     Labs:  Results from last 7 days   Lab Units 08/31/23  0511 08/28/23  1610 08/28/23  0922   WBC Thousand/uL 8.83   < > 7.53   HEMOGLOBIN g/dL 12.7   < > 14.7   HEMATOCRIT % 40.6   < > 49.0*   PLATELETS Thousands/uL 205   < > 196   NEUTROS PCT %  --   --  67   LYMPHS PCT %  --   --  18   MONOS PCT %  --   --  7   EOS PCT %  --   --  6    < > = values in this interval not displayed.      Results from last 7 days   Lab Units 08/31/23  0511 08/30/23  0537 08/28/23  0922   SODIUM mmol/L 139   < > 140   POTASSIUM mmol/L 5.0   < > 4.8   CHLORIDE mmol/L 102   < > 105   CO2 mmol/L 34*   < > 34*   BUN mg/dL 21   < > 10   CREATININE mg/dL 0.99   < > 0.99   ANION GAP mmol/L 3   < > 1   CALCIUM mg/dL 8.9   < > 8.9   ALBUMIN g/dL  -- --  4.0   TOTAL BILIRUBIN mg/dL  --   --  0.34   ALK PHOS U/L  --   --  89   ALT U/L  --   --  11   AST U/L  --   --  10*   GLUCOSE RANDOM mg/dL 123   < > 119    < > = values in this interval not displayed. Results from last 7 days   Lab Units 08/29/23  2100   POC GLUCOSE mg/dl 126         Results from last 7 days   Lab Units 08/29/23  0442 08/28/23  1610   PROCALCITONIN ng/ml <0.05 <0.05       Lines/Drains:  Invasive Devices     Peripheral Intravenous Line  Duration           Peripheral IV 08/28/23 Left Antecubital 3 days                      Imaging: No pertinent imaging reviewed. Recent Cultures (last 7 days):   Results from last 7 days   Lab Units 08/30/23  0202 08/28/23  1613 08/28/23  1610   BLOOD CULTURE   --  No Growth at 48 hrs. No Growth at 48 hrs.    GRAM STAIN RESULT  Rare Epithelial cells per low power field*  Rare Polys*  1+ Gram positive cocci in chains*  Rare Gram negative rods*  --   --        Last 24 Hours Medication List:   Current Facility-Administered Medications   Medication Dose Route Frequency Provider Last Rate   • acetaminophen  650 mg Oral Q6H PRN Nataly Melendez MD     • albuterol  2 puff Inhalation Q6H PRN Miguel Cruz MD     • ARIPiprazole  10 mg Oral HS Miguel Cruz MD     • azithromycin  500 mg Oral Q24H Miguel Cruz MD     • benzonatate  100 mg Oral TID PRN Vince Narvaez PA-C     • calcium carbonate  1,000 mg Oral Daily PRN Miguel Cruz MD     • clonazePAM  0.5 mg Oral QAM Miguel Cruz MD     • diphenhydrAMINE  25 mg Oral Q6H PRN Azucena Rodas PA-C     • docusate sodium  100 mg Oral BID Miguel Cruz MD     • DULoxetine  60 mg Oral BID Miguel Cruz MD     • Fluticasone Furoate-Vilanterol  1 puff Inhalation Daily Miguel Cruz MD     • folic acid  1 mg Oral Daily Miguel Cruz MD     • guaiFENesin  600 mg Oral BID Miguel Cruz MD     • heparin (porcine)  5,000 Units Subcutaneous [facility] Miguel Cruz MD     • hydrOXYzine HCL  50 mg Oral TID PRN Shirlene Watson MD     • ipratropium  0.5 mg Nebulization TID Isidra Damon MD     • levalbuterol  1.25 mg Nebulization TID Isidra Damon MD     • methylPREDNISolone sodium succinate  40 mg Intravenous Q12H [address] [facility] Ashley Rodgers MD     • metoclopramide  10 mg Intravenous Q6H PRN Guillaume Ott PA-C     • multivitamin-minerals  1 tablet Oral Daily Shirlene Watson MD     • nicotine  1 patch Transdermal Daily Shirlene Watson MD     • pantoprazole  40 mg Oral Early Morning Shirlene Watson MD     • polyethylene glycol  17 g Oral Daily Shirlene Watson MD     • thiamine  100 mg Oral Daily Shirlene Watson MD     • traZODone  200 mg Oral HS PRN Shirlene Watson MD          Today, Patient Was Seen By: Rosalva Bean PA-C    **Please Note: This note may have been constructed using a voice recognition system. **

## 2023-08-31 NOTE — PROGRESS NOTES
Progress Note - Pulmonary   Arn Kiser 62 y.o. female MRN: [id number]  Unit/Bed#: [address] [address] -01 Encounter: [id number]        Assessment/Plan:  61 y/o woman presented w/ sob, wheezing, presented to ED and initially required bipap admitted w/ AECOPD     Problems  1. Acute hypoxemic and hypercapneic respiratory failure   2. Acute exacerbation of copd   3. Obesity with suspected sleep apnea  4. Nicotine dependence      Recommendations      Clinically improving.-Change Solu-Medrol to 40 mg every 12 hours. Consider transition to p.o. prednisone tomorrow  Cont supplemental 02 and wean to keep sats > 88%. Perform ambulatory desaturation study prior to discharge. Patient is not on home oxygen. Cont bronchodilators: curently fluticasone-furoate-vilanterol, xopenex/atrovent q6 hours; would discharge with breztri or trelegy   Azithromycin 500 mg x 3 days total (day 3/3)     Pulmonary hygeine: out of bed to chair, IS, ambulation   Will need outpatient pfts and pulmonary followup; qualifies for lung cancer screening  Smoking cessation recommended: has nicotine patch   Recommend outpatient sleep study       Subjective:   States she feels much better. No new complaints today. Objective:       Vitals: Blood pressure 127/81, pulse 80, temperature 97.6 °F (36.4 °C), resp. rate 16, height 5' 3" (1.6 m), weight 87.4 kg (192 lb 10.9 oz), SpO2 97 %, not currently breastfeeding., 3L, Body mass index is 34.13 kg/m².       Intake/Output Summary (Last 24 hours) at 8/31/2023 0936  Last data filed at 8/31/2023 0052  Gross per 24 hour   Intake 360 ml   Output --   Net 360 ml         Physical Exam  Gen: Awake, alert, oriented x 3, no acute distress  Head: no masses, lesions   HEENT: Mucous membranes moist, no oral lesions, no thrush  NECK: No accessory muscle use,   Cardiac: Regular, single S1, single S2, no murmurs, no rubs, no gallops  Lungs: diffuse posterior wheezes, improved   Extremities: no cyanosis, no clubbing, no edema  Skin: no lesions, rashes  Neuro: CN grossly intact, no gross neurological deficits     Labs: I have personally reviewed pertinent lab results.   Results from last 7 days   Lab Units 08/31/23  0511 08/30/23  0537 08/28/23  1610 08/28/23  0922   WBC Thousand/uL 8.83 14.42*  --  7.53   HEMOGLOBIN g/dL 12.7 13.5  --  14.7   HEMATOCRIT % 40.6 42.9  --  49.0*   PLATELETS Thousands/uL 205 238 203 196   NEUTROS PCT %  --   --   --  67   MONOS PCT %  --   --   --  7   EOS PCT %  --   --   --  6      Results from last 7 days   Lab Units 08/31/23  0511 08/30/23  0537 08/28/23  0922   POTASSIUM mmol/L 5.0 4.7 4.8   CHLORIDE mmol/L 102 102 105   CO2 mmol/L 34* 32 34*   BUN mg/dL 21 18 10   CREATININE mg/dL 0.99 0.82 0.99   CALCIUM mg/dL 8.9 9.4 8.9   ALK PHOS U/L  --   --  89   ALT U/L  --   --  11   AST U/L  --   --  10*     Results from last 7 days   Lab Units 08/31/23  0511   MAGNESIUM mg/dL 2.7                  0   Lab Value Date/Time    TROPONINI <0.01 05/25/2021 1713    TROPONINI <0.01 02/27/2021 1705    TROPONINI <0.01 02/04/2021 2058    TROPONINI <0.02 01/11/2017 0349           Jeanmarie Jerez MD  [facility]

## 2023-09-01 LAB
ANION GAP SERPL CALCULATED.3IONS-SCNC: 3 MMOL/L
BACTERIA SPT RESP CULT: ABNORMAL
BUN SERPL-MCNC: 17 MG/DL (ref 5–25)
CALCIUM SERPL-MCNC: 9.4 MG/DL (ref 8.4–10.2)
CHLORIDE SERPL-SCNC: 101 MMOL/L (ref 96–108)
CO2 SERPL-SCNC: 36 MMOL/L (ref 21–32)
CREAT SERPL-MCNC: 0.82 MG/DL (ref 0.6–1.3)
ERYTHROCYTE [DISTWIDTH] IN BLOOD BY AUTOMATED COUNT: 14.2 % (ref 11.6–15.1)
GFR SERPL CREATININE-BSD FRML MDRD: 79 ML/MIN/1.73SQ M
GLUCOSE SERPL-MCNC: 129 MG/DL (ref 65–140)
GRAM STN SPEC: ABNORMAL
HCT VFR BLD AUTO: 45.4 % (ref 34.8–46.1)
HGB BLD-MCNC: 13.7 G/DL (ref 11.5–15.4)
MCH RBC QN AUTO: 30.2 PG (ref 26.8–34.3)
MCHC RBC AUTO-ENTMCNC: 30.2 G/DL (ref 31.4–37.4)
MCV RBC AUTO: 100 FL (ref 82–98)
PLATELET # BLD AUTO: 223 THOUSANDS/UL (ref 149–390)
PMV BLD AUTO: 9.3 FL (ref 8.9–12.7)
POTASSIUM SERPL-SCNC: 5.1 MMOL/L (ref 3.5–5.3)
RBC # BLD AUTO: 4.54 MILLION/UL (ref 3.81–5.12)
SODIUM SERPL-SCNC: 140 MMOL/L (ref 135–147)
WBC # BLD AUTO: 9.24 THOUSAND/UL (ref 4.31–10.16)

## 2023-09-01 PROCEDURE — 85027 COMPLETE CBC AUTOMATED: CPT

## 2023-09-01 PROCEDURE — 94640 AIRWAY INHALATION TREATMENT: CPT

## 2023-09-01 PROCEDURE — 99232 SBSQ HOSP IP/OBS MODERATE 35: CPT

## 2023-09-01 PROCEDURE — 80048 BASIC METABOLIC PNL TOTAL CA: CPT

## 2023-09-01 PROCEDURE — 94760 N-INVAS EAR/PLS OXIMETRY 1: CPT

## 2023-09-01 PROCEDURE — 99232 SBSQ HOSP IP/OBS MODERATE 35: CPT | Performed by: INTERNAL MEDICINE

## 2023-09-01 RX ORDER — INSULIN DEGLUDEC 100 U/ML
INJECTION, SOLUTION SUBCUTANEOUS
COMMUNITY

## 2023-09-01 RX ORDER — PREDNISONE 20 MG/1
60 TABLET ORAL DAILY
Status: DISCONTINUED | OUTPATIENT
Start: 2023-09-02 | End: 2023-09-02 | Stop reason: HOSPADM

## 2023-09-01 RX ORDER — PREDNISONE 10 MG/1
10 TABLET ORAL DAILY
Status: DISCONTINUED | OUTPATIENT
Start: 2023-09-17 | End: 2023-09-02 | Stop reason: HOSPADM

## 2023-09-01 RX ORDER — PREDNISONE 20 MG/1
40 TABLET ORAL DAILY
Status: DISCONTINUED | OUTPATIENT
Start: 2023-09-08 | End: 2023-09-02 | Stop reason: HOSPADM

## 2023-09-01 RX ORDER — PREDNISONE 20 MG/1
20 TABLET ORAL DAILY
Status: DISCONTINUED | OUTPATIENT
Start: 2023-09-14 | End: 2023-09-02 | Stop reason: HOSPADM

## 2023-09-01 RX ADMIN — IPRATROPIUM BROMIDE 0.5 MG: 0.5 SOLUTION RESPIRATORY (INHALATION) at 07:15

## 2023-09-01 RX ADMIN — TRAZODONE HYDROCHLORIDE 200 MG: 100 TABLET ORAL at 21:52

## 2023-09-01 RX ADMIN — LEVALBUTEROL HYDROCHLORIDE 1.25 MG: 1.25 SOLUTION RESPIRATORY (INHALATION) at 07:15

## 2023-09-01 RX ADMIN — HEPARIN SODIUM 5000 UNITS: 5000 INJECTION INTRAVENOUS; SUBCUTANEOUS at 13:21

## 2023-09-01 RX ADMIN — GUAIFENESIN 600 MG: 600 TABLET, EXTENDED RELEASE ORAL at 09:26

## 2023-09-01 RX ADMIN — IPRATROPIUM BROMIDE 0.5 MG: 0.5 SOLUTION RESPIRATORY (INHALATION) at 14:54

## 2023-09-01 RX ADMIN — LEVALBUTEROL HYDROCHLORIDE 1.25 MG: 1.25 SOLUTION RESPIRATORY (INHALATION) at 19:26

## 2023-09-01 RX ADMIN — DULOXETINE HYDROCHLORIDE 60 MG: 60 CAPSULE, DELAYED RELEASE ORAL at 09:26

## 2023-09-01 RX ADMIN — METHYLPREDNISOLONE SODIUM SUCCINATE 40 MG: 40 INJECTION, POWDER, FOR SOLUTION INTRAMUSCULAR; INTRAVENOUS at 09:25

## 2023-09-01 RX ADMIN — DULOXETINE HYDROCHLORIDE 60 MG: 60 CAPSULE, DELAYED RELEASE ORAL at 17:11

## 2023-09-01 RX ADMIN — ARIPIPRAZOLE 10 MG: 10 TABLET ORAL at 21:51

## 2023-09-01 RX ADMIN — HEPARIN SODIUM 5000 UNITS: 5000 INJECTION INTRAVENOUS; SUBCUTANEOUS at 05:00

## 2023-09-01 RX ADMIN — ALBUTEROL SULFATE 2 PUFF: 90 AEROSOL, METERED RESPIRATORY (INHALATION) at 17:11

## 2023-09-01 RX ADMIN — IPRATROPIUM BROMIDE 0.5 MG: 0.5 SOLUTION RESPIRATORY (INHALATION) at 19:26

## 2023-09-01 RX ADMIN — FOLIC ACID 1 MG: 1 TABLET ORAL at 09:26

## 2023-09-01 RX ADMIN — MULTIPLE VITAMINS W/ MINERALS TAB 1 TABLET: TAB ORAL at 09:26

## 2023-09-01 RX ADMIN — CLONAZEPAM 0.5 MG: 0.5 TABLET ORAL at 09:26

## 2023-09-01 RX ADMIN — LEVALBUTEROL HYDROCHLORIDE 1.25 MG: 1.25 SOLUTION RESPIRATORY (INHALATION) at 14:55

## 2023-09-01 RX ADMIN — GUAIFENESIN 600 MG: 600 TABLET, EXTENDED RELEASE ORAL at 17:11

## 2023-09-01 RX ADMIN — THIAMINE HCL TAB 100 MG 100 MG: 100 TAB at 09:26

## 2023-09-01 RX ADMIN — FLUTICASONE FUROATE AND VILANTEROL TRIFENATATE 1 PUFF: 100; 25 POWDER RESPIRATORY (INHALATION) at 09:25

## 2023-09-01 RX ADMIN — PANTOPRAZOLE SODIUM 40 MG: 40 TABLET, DELAYED RELEASE ORAL at 05:00

## 2023-09-01 RX ADMIN — HEPARIN SODIUM 5000 UNITS: 5000 INJECTION INTRAVENOUS; SUBCUTANEOUS at 21:51

## 2023-09-01 NOTE — PLAN OF CARE
Problem: Potential for Falls  Goal: Patient will remain free of falls  Description: INTERVENTIONS:  - Educate patient/family on patient safety including physical limitations  - Instruct patient to call for assistance with activity   - Consult OT/PT to assist with strengthening/mobility   - Keep Call bell within reach  - Keep bed low and locked with side rails adjusted as appropriate  - Keep care items and personal belongings within reach  - Initiate and maintain comfort rounds  - Make Fall Risk Sign visible to staff  - Offer Toileting every 2 Hours, in advance of need  - Apply yellow socks and bracelet for high fall risk patients  - Consider moving patient to room near nurses station  Outcome: Progressing     Problem: NEUROSENSORY - ADULT  Goal: Achieves maximal functionality and self care  Description: INTERVENTIONS  - Monitor swallowing and airway patency with patient fatigue and changes in neurological status  - Encourage and assist patient to increase activity and self care.    - Encourage visually impaired, hearing impaired and aphasic patients to use assistive/communication devices  Outcome: Progressing     Problem: CARDIOVASCULAR - ADULT  Goal: Maintains optimal cardiac output and hemodynamic stability  Description: INTERVENTIONS:  - Monitor I/O, vital signs and rhythm  - Monitor for S/S and trends of decreased cardiac output  - Administer and titrate ordered vasoactive medications to optimize hemodynamic stability  - Assess quality of pulses, skin color and temperature  - Assess for signs of decreased coronary artery perfusion  - Instruct patient to report change in severity of symptoms  Outcome: Progressing  Goal: Absence of cardiac dysrhythmias or at baseline rhythm  Description: INTERVENTIONS:  - Continuous cardiac monitoring, vital signs, obtain 12 lead EKG if ordered  - Administer antiarrhythmic and heart rate control medications as ordered  - Monitor electrolytes and administer replacement therapy as ordered  Outcome: Progressing     Problem: RESPIRATORY - ADULT  Goal: Achieves optimal ventilation and oxygenation  Description: INTERVENTIONS:  - Assess for changes in respiratory status  - Assess for changes in mentation and behavior  - Position to facilitate oxygenation and minimize respiratory effort  - Oxygen administered by appropriate delivery if ordered  - Initiate smoking cessation education as indicated  - Encourage broncho-pulmonary hygiene including cough, deep breathe, Incentive Spirometry  - Assess the need for suctioning and aspirate as needed  - Assess and instruct to report SOB or any respiratory difficulty  - Respiratory Therapy support as indicated  Outcome: Progressing     Problem: SKIN/TISSUE INTEGRITY - ADULT  Goal: Skin Integrity remains intact(Skin Breakdown Prevention)  Description: Assess:  -Perform Ty assessment every shfit  -Clean and moisturize skin every day  -Inspect skin when repositioning, toileting, and assisting with ADLS  -Assess under medical devices such as masimo every shift  -Assess extremities for adequate circulation and sensation     Bed Management:  -Have minimal linens on bed & keep smooth, unwrinkled  -Change linens as needed when moist or perspiring  -Avoid sitting or lying in one position for more than 2 hours while in bed  -Keep HOB at 30 degrees     Toileting:  -Offer bedside commode  -Assess for incontinence every 2 hours  -Use incontinent care products after each incontinent episode such as hydroguard    Activity:  -Mobilize patient 4 times a day  -Encourage activity and walks on unit  -Encourage or provide ROM exercises   -Turn and reposition patient every 2 Hours  -Use appropriate equipment to lift or move patient in bed  -Instruct/ Assist with weight shifting every 10 min when out of bed in chair  -Consider limitation of chair time 2 hour intervals    Skin Care:  -Avoid use of baby powder, tape, friction and shearing, hot water or constrictive clothing  -Relieve pressure over bony prominences using allevyn, pillows  -Do not massage red bony areas    Next Steps:  -Teach patient strategies to minimize risks such as turning   -Consider consults to  interdisciplinary teams such as wound care  Outcome: Progressing     Problem: SAFETY ADULT  Goal: Patient will remain free of falls  Description: INTERVENTIONS:  - Educate patient/family on patient safety including physical limitations  - Instruct patient to call for assistance with activity   - Consult OT/PT to assist with strengthening/mobility   - Keep Call bell within reach  - Keep bed low and locked with side rails adjusted as appropriate  - Keep care items and personal belongings within reach  - Initiate and maintain comfort rounds  - Make Fall Risk Sign visible to staff  - Apply yellow socks and bracelet for high fall risk patients  - Consider moving patient to room near nurses station  Outcome: Progressing  Goal: Maintain or return to baseline ADL function  Description: INTERVENTIONS:  -  Assess patient's ability to carry out ADLs; assess patient's baseline for ADL function and identify physical deficits which impact ability to perform ADLs (bathing, care of mouth/teeth, toileting, grooming, dressing, etc.)  - Assess/evaluate cause of self-care deficits   - Assess range of motion  - Assess patient's mobility; develop plan if impaired  - Assess patient's need for assistive devices and provide as appropriate  - Encourage maximum independence but intervene and supervise when necessary  - Involve family in performance of ADLs  - Assess for home care needs following discharge   - Consider OT consult to assist with ADL evaluation and planning for discharge  - Provide patient education as appropriate  Outcome: Progressing  Goal: Maintains/Returns to pre admission functional level  Description: INTERVENTIONS:  - Perform BMAT or MOVE assessment daily.   - Set and communicate daily mobility goal to care team and patient/family/caregiver. - Collaborate with rehabilitation services on mobility goals if consulted  - Perform Range of Motion 4 times a day. - Reposition patient every 2 hours. - Dangle patient 3 times a day  - Stand patient 3 times a day  - Ambulate patient 3 times a day  - Out of bed to chair 3 times a day   - Out of bed for meals 3 times a day  - Out of bed for toileting  - Record patient progress and toleration of activity level   Outcome: Progressing     Problem: DISCHARGE PLANNING  Goal: Discharge to home or other facility with appropriate resources  Description: INTERVENTIONS:  - Identify barriers to discharge w/patient and caregiver  - Arrange for needed discharge resources and transportation as appropriate  - Identify discharge learning needs (meds, wound care, etc.)  - Arrange for interpretive services to assist at discharge as needed  - Refer to Case Management Department for coordinating discharge planning if the patient needs post-hospital services based on physician/advanced practitioner order or complex needs related to functional status, cognitive ability, or social support system  Outcome: Progressing     Problem: Knowledge Deficit  Goal: Patient/family/caregiver demonstrates understanding of disease process, treatment plan, medications, and discharge instructions  Description: Complete learning assessment and assess knowledge base.   Interventions:  - Provide teaching at level of understanding  - Provide teaching via preferred learning methods  Outcome: Progressing     Problem: MOBILITY - ADULT  Goal: Maintain or return to baseline ADL function  Description: INTERVENTIONS:  -  Assess patient's ability to carry out ADLs; assess patient's baseline for ADL function and identify physical deficits which impact ability to perform ADLs (bathing, care of mouth/teeth, toileting, grooming, dressing, etc.)  - Assess/evaluate cause of self-care deficits   - Assess range of motion  - Assess patient's mobility; develop plan if impaired  - Assess patient's need for assistive devices and provide as appropriate  - Encourage maximum independence but intervene and supervise when necessary  - Involve family in performance of ADLs  - Assess for home care needs following discharge   - Consider OT consult to assist with ADL evaluation and planning for discharge  - Provide patient education as appropriate  Outcome: Progressing  Goal: Maintains/Returns to pre admission functional level  Description: INTERVENTIONS:  - Perform BMAT or MOVE assessment daily.   - Set and communicate daily mobility goal to care team and patient/family/caregiver. - Collaborate with rehabilitation services on mobility goals if consulted  - Perform Range of Motion 4 times a day. - Reposition patient every 2 hours.   - Dangle patient 3 times a day  - Stand patient 3 times a day  - Ambulate patient 3 times a day  - Out of bed to chair 3 times a day   - Out of bed for meals 3 times a day  - Out of bed for toileting  - Record patient progress and toleration of activity level   Outcome: Progressing

## 2023-09-01 NOTE — PLAN OF CARE
Problem: Potential for Falls  Goal: Patient will remain free of falls  Description: INTERVENTIONS:  - Educate patient/family on patient safety including physical limitations  - Instruct patient to call for assistance with activity   - Consult OT/PT to assist with strengthening/mobility   - Keep Call bell within reach  - Keep bed low and locked with side rails adjusted as appropriate  - Keep care items and personal belongings within reach  - Initiate and maintain comfort rounds  - Make Fall Risk Sign visible to staff  - Offer Toileting every  Hours, in advance of need  - Initiate/Maintain alarm  - Obtain necessary fall risk management equipment:   - Apply yellow socks and bracelet for high fall risk patients  - Consider moving patient to room near nurses station  Outcome: Progressing     Problem: NEUROSENSORY - ADULT  Goal: Achieves maximal functionality and self care  Description: INTERVENTIONS  - Monitor swallowing and airway patency with patient fatigue and changes in neurological status  - Encourage and assist patient to increase activity and self care.    - Encourage visually impaired, hearing impaired and aphasic patients to use assistive/communication devices  Outcome: Progressing     Problem: CARDIOVASCULAR - ADULT  Goal: Maintains optimal cardiac output and hemodynamic stability  Description: INTERVENTIONS:  - Monitor I/O, vital signs and rhythm  - Monitor for S/S and trends of decreased cardiac output  - Administer and titrate ordered vasoactive medications to optimize hemodynamic stability  - Assess quality of pulses, skin color and temperature  - Assess for signs of decreased coronary artery perfusion  - Instruct patient to report change in severity of symptoms  Outcome: Progressing  Goal: Absence of cardiac dysrhythmias or at baseline rhythm  Description: INTERVENTIONS:  - Continuous cardiac monitoring, vital signs, obtain 12 lead EKG if ordered  - Administer antiarrhythmic and heart rate control medications as ordered  - Monitor electrolytes and administer replacement therapy as ordered  Outcome: Progressing     Problem: RESPIRATORY - ADULT  Goal: Achieves optimal ventilation and oxygenation  Description: INTERVENTIONS:  - Assess for changes in respiratory status  - Assess for changes in mentation and behavior  - Position to facilitate oxygenation and minimize respiratory effort  - Oxygen administered by appropriate delivery if ordered  - Initiate smoking cessation education as indicated  - Encourage broncho-pulmonary hygiene including cough, deep breathe, Incentive Spirometry  - Assess the need for suctioning and aspirate as needed  - Assess and instruct to report SOB or any respiratory difficulty  - Respiratory Therapy support as indicated  Outcome: Progressing     Problem: SKIN/TISSUE INTEGRITY - ADULT  Goal: Skin Integrity remains intact(Skin Breakdown Prevention)  Description: Assess:  -Perform Ty assessment every   -Clean and moisturize skin every   -Inspect skin when repositioning, toileting, and assisting with ADLS  -Assess under medical devices such as  every   -Assess extremities for adequate circulation and sensation     Bed Management:  -Have minimal linens on bed & keep smooth, unwrinkled  -Change linens as needed when moist or perspiring  -Avoid sitting or lying in one position for more than  hours while in bed  -Keep HOB at degrees     Toileting:  -Offer bedside commode  -Assess for incontinence every   -Use incontinent care products after each incontinent episode such as     Activity:  -Mobilize patient  times a day  -Encourage activity and walks on unit  -Encourage or provide ROM exercises   -Turn and reposition patient every  Hours  -Use appropriate equipment to lift or move patient in bed  -Instruct/ Assist with weight shifting every  when out of bed in chair  -Consider limitation of chair time  hour intervals    Skin Care:  -Avoid use of baby powder, tape, friction and shearing, hot water or constrictive clothing  -Relieve pressure over bony prominences using   -Do not massage red bony areas    Next Steps:  -Teach patient strategies to minimize risks such as    -Consider consults to  interdisciplinary teams such as   Outcome: Progressing     Problem: SAFETY ADULT  Goal: Patient will remain free of falls  Description: INTERVENTIONS:  - Educate patient/family on patient safety including physical limitations  - Instruct patient to call for assistance with activity   - Consult OT/PT to assist with strengthening/mobility   - Keep Call bell within reach  - Keep bed low and locked with side rails adjusted as appropriate  - Keep care items and personal belongings within reach  - Initiate and maintain comfort rounds  - Make Fall Risk Sign visible to staff  - Offer Toileting every  Hours, in advance of need  - Initiate/Maintain alarm  - Obtain necessary fall risk management equipment:   - Apply yellow socks and bracelet for high fall risk patients  - Consider moving patient to room near nurses station  Outcome: Progressing  Goal: Maintain or return to baseline ADL function  Description: INTERVENTIONS:  -  Assess patient's ability to carry out ADLs; assess patient's baseline for ADL function and identify physical deficits which impact ability to perform ADLs (bathing, care of mouth/teeth, toileting, grooming, dressing, etc.)  - Assess/evaluate cause of self-care deficits   - Assess range of motion  - Assess patient's mobility; develop plan if impaired  - Assess patient's need for assistive devices and provide as appropriate  - Encourage maximum independence but intervene and supervise when necessary  - Involve family in performance of ADLs  - Assess for home care needs following discharge   - Consider OT consult to assist with ADL evaluation and planning for discharge  - Provide patient education as appropriate  Outcome: Progressing  Goal: Maintains/Returns to pre admission functional level  Description: INTERVENTIONS:  - Perform BMAT or MOVE assessment daily.   - Set and communicate daily mobility goal to care team and patient/family/caregiver. - Collaborate with rehabilitation services on mobility goals if consulted  - Perform Range of Motion  times a day. - Reposition patient every  hours. - Dangle patient  times a day  - Stand patient  times a day  - Ambulate patient  times a day  - Out of bed to chair  times a day   - Out of bed for meals  times a day  - Out of bed for toileting  - Record patient progress and toleration of activity level   Outcome: Progressing     Problem: DISCHARGE PLANNING  Goal: Discharge to home or other facility with appropriate resources  Description: INTERVENTIONS:  - Identify barriers to discharge w/patient and caregiver  - Arrange for needed discharge resources and transportation as appropriate  - Identify discharge learning needs (meds, wound care, etc.)  - Arrange for interpretive services to assist at discharge as needed  - Refer to Case Management Department for coordinating discharge planning if the patient needs post-hospital services based on physician/advanced practitioner order or complex needs related to functional status, cognitive ability, or social support system  Outcome: Progressing     Problem: Knowledge Deficit  Goal: Patient/family/caregiver demonstrates understanding of disease process, treatment plan, medications, and discharge instructions  Description: Complete learning assessment and assess knowledge base.   Interventions:  - Provide teaching at level of understanding  - Provide teaching via preferred learning methods  Outcome: Progressing     Problem: MOBILITY - ADULT  Goal: Maintain or return to baseline ADL function  Description: INTERVENTIONS:  -  Assess patient's ability to carry out ADLs; assess patient's baseline for ADL function and identify physical deficits which impact ability to perform ADLs (bathing, care of mouth/teeth, toileting, grooming, dressing, etc.)  - Assess/evaluate cause of self-care deficits   - Assess range of motion  - Assess patient's mobility; develop plan if impaired  - Assess patient's need for assistive devices and provide as appropriate  - Encourage maximum independence but intervene and supervise when necessary  - Involve family in performance of ADLs  - Assess for home care needs following discharge   - Consider OT consult to assist with ADL evaluation and planning for discharge  - Provide patient education as appropriate  Outcome: Progressing  Goal: Maintains/Returns to pre admission functional level  Description: INTERVENTIONS:  - Perform BMAT or MOVE assessment daily.   - Set and communicate daily mobility goal to care team and patient/family/caregiver. - Collaborate with rehabilitation services on mobility goals if consulted  - Perform Range of Motion  times a day. - Reposition patient every  hours.   - Dangle patient  times a day  - Stand patient  times a day  - Ambulate patient  times a day  - Out of bed to chair  times a day   - Out of bed for meal times a day  - Out of bed for toileting  - Record patient progress and toleration of activity level   Outcome: Progressing

## 2023-09-01 NOTE — PROGRESS NOTES
Progress Note - Pulmonary   Marcelotiarra Diaz 62 y.o. female MRN: [id number]  Unit/Bed#: [address] [address] -01 Encounter: [id number]            Assessment/Plan:  63 y/o woman presented w/ sob, wheezing, presented to ED and initially required bipap admitted w/ AECOPD     Problems  1. Acute hypoxemic and hypercapneic respiratory failure   2. Acute exacerbation of copd   3. Obesity with suspected sleep apnea  4. Nicotine dependence      Recommendations      Clinically improving, oxygen being wean. Okay to transition to prednisone 60 mg po daily and wean by 10 mg eery 3 days. wean supplemental 02 and keep sats > 88%. Perform ambulatory desaturation study prior to discharge. Patient is not on home oxygen. Patient reports she would not want home oxygen. Cont bronchodilators: curently fluticasone-furoate-vilanterol, xopenex/atrovent q6 hours; would discharge with breztri or trelegy   Pulmonary hygeine: out of bed to chair, IS, ambulation   Will need outpatient pfts and pulmonary followup; qualifies for lung cancer screening  Smoking cessation recommended: has nicotine patch   Recommend outpatient sleep study     D/w ALEJANDRINA Forte     Subjective:   Patient reports that she feels much better. States that she would not want to be discharged with home oxygen. She had no other acute complaints. Objective:       Vitals: Blood pressure 128/74, pulse 95, temperature 97.7 °F (36.5 °C), resp. rate 15, height 5' 3" (1.6 m), weight 87.1 kg (192 lb 0.3 oz), SpO2 92 %, not currently breastfeeding. , 3L NC, Body mass index is 34.01 kg/m².       Intake/Output Summary (Last 24 hours) at 9/1/2023 0836  Last data filed at 9/1/2023 0506  Gross per 24 hour   Intake 300 ml   Output --   Net 300 ml         Physical Exam  Gen: Awake, alert, oriented x 3, no acute distress  Head: no masses, lesions   HEENT: Mucous membranes moist, no oral lesions, no thrush  NECK: No accessory muscle use,  Cardiac: Regular, single S1, single S2, no murmurs, no rubs, no gallops  Lungs: diffuse posterior wheezes, improved but still present  Abdomen: normoactive bowel sounds, soft nontender, nondistended, no rebound or rigidity, no guarding  Extremities: no cyanosis, no clubbing, no edema  Skin: no lesions, rashes  Neuro: CN grossly intact, no gross neurological deficits     Labs: I have personally reviewed pertinent lab results. Results from last 7 days   Lab Units 09/01/23 0444 08/31/23  0511 08/30/23  0537 08/28/23  1610 08/28/23  0922   WBC Thousand/uL 9.24 8.83 14.42*  --  7.53   HEMOGLOBIN g/dL 13.7 12.7 13.5  --  14.7   HEMATOCRIT % 45.4 40.6 42.9  --  49.0*   PLATELETS Thousands/uL 223 205 238   < > 196   NEUTROS PCT %  --   --   --   --  67   MONOS PCT %  --   --   --   --  7   EOS PCT %  --   --   --   --  6    < > = values in this interval not displayed.       Results from last 7 days   Lab Units 09/01/23 0444 08/31/23 0511 08/30/23  0537 08/28/23  0922   POTASSIUM mmol/L 5.1 5.0 4.7 4.8   CHLORIDE mmol/L 101 102 102 105   CO2 mmol/L 36* 34* 32 34*   BUN mg/dL 17 21 18 10   CREATININE mg/dL 0.82 0.99 0.82 0.99   CALCIUM mg/dL 9.4 8.9 9.4 8.9   ALK PHOS U/L  --   --   --  89   ALT U/L  --   --   --  11   AST U/L  --   --   --  10*     Results from last 7 days   Lab Units 08/31/23  0511   MAGNESIUM mg/dL 2.7                  0   Lab Value Date/Time    TROPONINI <0.01 05/25/2021 1713    TROPONINI <0.01 02/27/2021 1705    TROPONINI <0.01 02/04/2021 2058    TROPONINI <0.02 01/11/2017 0349       Saul Salvador MD  [facility]

## 2023-09-01 NOTE — PROGRESS NOTES
[address] [facility]  Progress Note  Name: Luke Chan I  MRN: [id number]  Unit/Bed#: [address] -01 I Date of Admission: 8/28/2023   Date of Service: 9/1/2023 I Hospital Day: 4    Assessment/Plan   * COPD exacerbation [facility])  Assessment & Plan  Patient is a 54-year-old female with past medical history significant for COPD and tobacco abuse who presented due to cough, dyspnea and acute COPD exacerbation. Patient also was admitted May 2023, and June 2023 for COPD exacerbation. · Continue IV steroids q 12 hours, and Xopenex/Atrovent nebs  · Continue azithromycin 500 mg completed 3 days  · COVID/flu/RSV negative  · Chest x-ray without any evidence of infiltrates  · Hypercapnia and somnolence improved with BiPAP  · Pulmonology consulted, appreciate recommendations  · We will need outpatient PFTs  · Continue scheduled nebulizers  · Continue IV steroids however begin taper to every 12 hours dosing with hopeful transition to oral prednisone tomorrow      Acute respiratory failure with hypoxia and hypercapnia (HCC)  Assessment & Plan  Currently requiring 3 L of nasal cannula oxygen to maintain oxygen saturations greater than 88%, continue to wean as tolerated  · See assessment and plan under COPD exacerbation  · Home oxygen evaluation prior to discharge, ordered     Generalized anxiety disorder  Assessment & Plan  Continue home Abilify, clonazepam, Cymbalta, trazodone, and as needed Atarax  · [place]ied: Confirmed patient's clonazepam prescription.   No red flags    Lung nodule  Assessment & Plan  During prior admission 5/23 3 mm left lower lung nodule was noted with repeat CT scan recommended in 12 months  · Patient has been [facility] pulmonology as an outpatient    Somnolence  Assessment & Plan  · Patient noted to be somnolent during admission  · Patient has previous history of hypercapnea on prior admissions, however notes states she had refused BiPAP  · Improved with bipap    History of alcohol abuse  Assessment & Plan  Per records, patient has a history of alcohol abuse  · Per her sister, patient has been abstinent for 6 years: No longer an active problem and will discontinue if no signs or symptoms of withdrawal    Tobacco abuse  Assessment & Plan  Nicotine patch  · Smoking cessation counseled         VTE Pharmacologic Prophylaxis: VTE Score: 3 Moderate Risk (Score 3-4) - Pharmacological DVT Prophylaxis Ordered: heparin. Patient Centered Rounds: I performed bedside rounds with nursing staff today. Discussions with Specialists or Other Care Team Provider: pulmonology      Education and Discussions with Family / Patient: Patient declined call to . Total Time Spent on Date of Encounter in care of patient: 45 minutes This time was spent on one or more of the following: performing physical exam; counseling and coordination of care; obtaining or reviewing history; documenting in the medical record; reviewing/ordering tests, medications or procedures; communicating with other healthcare professionals and discussing with patient's family/caregivers. Current Length of Stay: 4 day(s)  Current Patient Status: Inpatient   Certification Statement: The patient will continue to require additional inpatient hospital stay due to continue iv steroids for copd  Discharge Plan: Anticipate discharge tomorrow to home. Code Status: Level 3 - DNAR and DNI    Subjective:   Patient was seen laying in bed today. She reports continued wheezing. She denies any other acute complaints     Objective:     Vitals:   Temp (24hrs), Av.3 °F (36.8 °C), Min:97.7 °F (36.5 °C), Max:98.6 °F (37 °C)    Temp:  [97.7 °F (36.5 °C)-98.6 °F (37 °C)] 97.7 °F (36.5 °C)  HR:  [78-95] 95  Resp:  [15-18] 15  BP: (118-140)/(67-75) 128/74  SpO2:  [91 %-96 %] 92 %  Body mass index is 34.01 kg/m². Input and Output Summary (last 24 hours):      Intake/Output Summary (Last 24 hours) at 9/1/2023 1103  Last data filed at 9/1/2023 0506  Gross per 24 hour   Intake 300 ml   Output --   Net 300 ml       Physical Exam:   Physical Exam  Vitals and nursing note reviewed. Constitutional:       Appearance: Normal appearance. HENT:      Head: Normocephalic and atraumatic. Eyes:      General: No scleral icterus. Cardiovascular:      Rate and Rhythm: Normal rate and regular rhythm. Pulmonary:      Effort: Pulmonary effort is normal.      Breath sounds: Wheezing present. Abdominal:      General: Abdomen is flat. Bowel sounds are normal.      Palpations: Abdomen is soft. Tenderness: There is no abdominal tenderness. Musculoskeletal:      Right lower leg: No edema. Left lower leg: No edema. Skin:     General: Skin is warm and dry. Neurological:      Mental Status: She is alert. Mental status is at baseline. Psychiatric:         Mood and Affect: Mood normal.         Behavior: Behavior normal.     Additional Data:     Labs:  Results from last 7 days   Lab Units 09/01/23  0444 08/28/23  1610 08/28/23  0922   WBC Thousand/uL 9.24   < > 7.53   HEMOGLOBIN g/dL 13.7   < > 14.7   HEMATOCRIT % 45.4   < > 49.0*   PLATELETS Thousands/uL 223   < > 196   NEUTROS PCT %  --   --  67   LYMPHS PCT %  --   --  18   MONOS PCT %  --   --  7   EOS PCT %  --   --  6    < > = values in this interval not displayed. Results from last 7 days   Lab Units 09/01/23  0444 08/30/23  0537 08/28/23  0922   SODIUM mmol/L 140   < > 140   POTASSIUM mmol/L 5.1   < > 4.8   CHLORIDE mmol/L 101   < > 105   CO2 mmol/L 36*   < > 34*   BUN mg/dL 17   < > 10   CREATININE mg/dL 0.82   < > 0.99   ANION GAP mmol/L 3   < > 1   CALCIUM mg/dL 9.4   < > 8.9   ALBUMIN g/dL  --   --  4.0   TOTAL BILIRUBIN mg/dL  --   --  0.34   ALK PHOS U/L  --   --  89   ALT U/L  --   --  11   AST U/L  --   --  10*   GLUCOSE RANDOM mg/dL 129   < > 119    < > = values in this interval not displayed.          Results from last 7 days   Lab Units 08/29/23  2100   POC GLUCOSE mg/dl 126         Results from last 7 days   Lab Units 08/29/23  0442 08/28/23  1610   PROCALCITONIN ng/ml <0.05 <0.05       Lines/Drains:  Invasive Devices     Peripheral Intravenous Line  Duration           Peripheral IV 09/01/23 Right Antecubital <1 day                      Imaging: No pertinent imaging reviewed. Recent Cultures (last 7 days):   Results from last 7 days   Lab Units 08/30/23  0202 08/28/23  1613 08/28/23  1610   BLOOD CULTURE   --  No Growth at 72 hrs. No Growth at 72 hrs.    SPUTUM CULTURE  4+ Growth of  --   --    GRAM STAIN RESULT  Rare Epithelial cells per low power field*  Rare Polys*  1+ Gram positive cocci in chains*  Rare Gram negative rods*  --   --        Last 24 Hours Medication List:   Current Facility-Administered Medications   Medication Dose Route Frequency Provider Last Rate   • acetaminophen  650 mg Oral Q6H PRN Harlan Cunningham MD     • albuterol  2 puff Inhalation Q6H PRN Nena Issa MD     • ARIPiprazole  10 mg Oral HS Nena Issa MD     • benzonatate  100 mg Oral TID PRN No Munoz PA-C     • calcium carbonate  1,000 mg Oral Daily PRN Nena Issa MD     • clonazePAM  0.5 mg Oral QAM Nena Issa MD     • diphenhydrAMINE  25 mg Oral Q6H PRN Varinder Fry PA-C     • docusate sodium  100 mg Oral BID Nena Issa MD     • DULoxetine  60 mg Oral BID Nena Issa MD     • Fluticasone Furoate-Vilanterol  1 puff Inhalation Daily Nnea Issa MD     • folic acid  1 mg Oral Daily Nena Issa MD     • guaiFENesin  600 mg Oral BID Nena Issa MD     • heparin (porcine)  5,000 Units Subcutaneous [facility] Nena Issa MD     • hydrOXYzine HCL  50 mg Oral TID PRN Nena Issa MD     • ipratropium  0.5 mg Nebulization TID Harlan Cunningham MD     • levalbuterol  1.25 mg Nebulization TID Harlan Cunningham MD     • methylPREDNISolone sodium succinate  40 mg Intravenous Q12H [address] Shonna Dia MD     • metoclopramide  10 mg Intravenous Q6H PRN Amena Goodrich PA-C     • multivitamin-minerals  1 tablet Oral Daily Christine Cuba MD     • nicotine  1 patch Transdermal Daily Christine Cuba MD     • pantoprazole  40 mg Oral Early Morning Christine Cuba MD     • polyethylene glycol  17 g Oral Daily Christine Cuba MD     • thiamine  100 mg Oral Daily Christine Cuba MD     • traZODone  200 mg Oral HS PRN Christine Cuba MD          Today, Patient Was Seen By: Lulu Ledesma PA-C    **Please Note: This note may have been constructed using a voice recognition system. **

## 2023-09-02 VITALS
HEIGHT: 63 IN | BODY MASS INDEX: 33.71 KG/M2 | SYSTOLIC BLOOD PRESSURE: 139 MMHG | OXYGEN SATURATION: 91 % | RESPIRATION RATE: 15 BRPM | WEIGHT: 190.26 LBS | DIASTOLIC BLOOD PRESSURE: 94 MMHG | TEMPERATURE: 97.9 F | HEART RATE: 90 BPM

## 2023-09-02 PROBLEM — J44.1 COPD EXACERBATION (HCC): Status: RESOLVED | Noted: 2023-05-20 | Resolved: 2023-09-02

## 2023-09-02 PROBLEM — R40.0 SOMNOLENCE: Status: RESOLVED | Noted: 2023-08-28 | Resolved: 2023-09-02

## 2023-09-02 PROBLEM — R65.10 SIRS (SYSTEMIC INFLAMMATORY RESPONSE SYNDROME) (HCC): Status: RESOLVED | Noted: 2023-06-16 | Resolved: 2023-09-02

## 2023-09-02 PROBLEM — J96.01 ACUTE RESPIRATORY FAILURE WITH HYPOXIA AND HYPERCAPNIA (HCC): Status: RESOLVED | Noted: 2023-05-20 | Resolved: 2023-09-02

## 2023-09-02 PROBLEM — J96.02 ACUTE RESPIRATORY FAILURE WITH HYPOXIA AND HYPERCAPNIA (HCC): Status: RESOLVED | Noted: 2023-05-20 | Resolved: 2023-09-02

## 2023-09-02 LAB
ANION GAP SERPL CALCULATED.3IONS-SCNC: 4 MMOL/L
BACTERIA BLD CULT: NORMAL
BACTERIA BLD CULT: NORMAL
BUN SERPL-MCNC: 17 MG/DL (ref 5–25)
CALCIUM SERPL-MCNC: 9.1 MG/DL (ref 8.4–10.2)
CHLORIDE SERPL-SCNC: 102 MMOL/L (ref 96–108)
CO2 SERPL-SCNC: 35 MMOL/L (ref 21–32)
CREAT SERPL-MCNC: 0.97 MG/DL (ref 0.6–1.3)
ERYTHROCYTE [DISTWIDTH] IN BLOOD BY AUTOMATED COUNT: 14.6 % (ref 11.6–15.1)
GFR SERPL CREATININE-BSD FRML MDRD: 64 ML/MIN/1.73SQ M
GLUCOSE SERPL-MCNC: 91 MG/DL (ref 65–140)
HCT VFR BLD AUTO: 41.7 % (ref 34.8–46.1)
HGB BLD-MCNC: 13.1 G/DL (ref 11.5–15.4)
MCH RBC QN AUTO: 30.9 PG (ref 26.8–34.3)
MCHC RBC AUTO-ENTMCNC: 31.4 G/DL (ref 31.4–37.4)
MCV RBC AUTO: 98 FL (ref 82–98)
PLATELET # BLD AUTO: 217 THOUSANDS/UL (ref 149–390)
PMV BLD AUTO: 9.7 FL (ref 8.9–12.7)
POTASSIUM SERPL-SCNC: 4 MMOL/L (ref 3.5–5.3)
RBC # BLD AUTO: 4.24 MILLION/UL (ref 3.81–5.12)
SODIUM SERPL-SCNC: 141 MMOL/L (ref 135–147)
WBC # BLD AUTO: 12.37 THOUSAND/UL (ref 4.31–10.16)

## 2023-09-02 PROCEDURE — 94640 AIRWAY INHALATION TREATMENT: CPT

## 2023-09-02 PROCEDURE — 94760 N-INVAS EAR/PLS OXIMETRY 1: CPT

## 2023-09-02 PROCEDURE — 80048 BASIC METABOLIC PNL TOTAL CA: CPT

## 2023-09-02 PROCEDURE — 99239 HOSP IP/OBS DSCHRG MGMT >30: CPT | Performed by: PHYSICIAN ASSISTANT

## 2023-09-02 PROCEDURE — 99232 SBSQ HOSP IP/OBS MODERATE 35: CPT | Performed by: INTERNAL MEDICINE

## 2023-09-02 PROCEDURE — 85027 COMPLETE CBC AUTOMATED: CPT

## 2023-09-02 RX ORDER — PREDNISONE 10 MG/1
TABLET ORAL
Qty: 57 TABLET | Refills: 0 | Status: SHIPPED | OUTPATIENT
Start: 2023-09-02

## 2023-09-02 RX ADMIN — MULTIPLE VITAMINS W/ MINERALS TAB 1 TABLET: TAB ORAL at 08:40

## 2023-09-02 RX ADMIN — PREDNISONE 60 MG: 20 TABLET ORAL at 08:39

## 2023-09-02 RX ADMIN — LEVALBUTEROL HYDROCHLORIDE 1.25 MG: 1.25 SOLUTION RESPIRATORY (INHALATION) at 07:19

## 2023-09-02 RX ADMIN — DULOXETINE HYDROCHLORIDE 60 MG: 60 CAPSULE, DELAYED RELEASE ORAL at 08:40

## 2023-09-02 RX ADMIN — CLONAZEPAM 0.5 MG: 0.5 TABLET ORAL at 08:39

## 2023-09-02 RX ADMIN — THIAMINE HCL TAB 100 MG 100 MG: 100 TAB at 08:39

## 2023-09-02 RX ADMIN — FLUTICASONE FUROATE AND VILANTEROL TRIFENATATE 1 PUFF: 100; 25 POWDER RESPIRATORY (INHALATION) at 08:40

## 2023-09-02 RX ADMIN — PANTOPRAZOLE SODIUM 40 MG: 40 TABLET, DELAYED RELEASE ORAL at 05:51

## 2023-09-02 RX ADMIN — HEPARIN SODIUM 5000 UNITS: 5000 INJECTION INTRAVENOUS; SUBCUTANEOUS at 05:51

## 2023-09-02 RX ADMIN — FOLIC ACID 1 MG: 1 TABLET ORAL at 08:40

## 2023-09-02 RX ADMIN — GUAIFENESIN 600 MG: 600 TABLET, EXTENDED RELEASE ORAL at 08:39

## 2023-09-02 RX ADMIN — IPRATROPIUM BROMIDE 0.5 MG: 0.5 SOLUTION RESPIRATORY (INHALATION) at 07:19

## 2023-09-02 NOTE — CASE MANAGEMENT
Case Management Discharge Planning Note    Patient name Nancy Braden  Location [address] [address] [facility] [address] Tommy Garza* MRN [id number]  : 1965 Date 2023       Current Admission Date: 2023  Current Admission Diagnosis:Generalized anxiety disorder   Patient Active Problem List    Diagnosis Date Noted   • Gallbladder sludge 2023   • Lung nodule 2023   • Major depressive disorder, recurrent episode, moderate ([address]) 10/25/2022   • AMS (altered mental status) 2022   • High cholesterol 2022   • Chronic pain 2022   • Vision loss 2022   • Stroke-like symptoms 2021   • Acute bilateral low back pain with bilateral sciatica 2021   • Tobacco abuse 2021   • Severe episode of recurrent major depressive disorder, without psychotic features ([address]) 2017   • Generalized anxiety disorder 2017   • History of alcohol abuse 2017   • Cannabis abuse 2017      LOS (days): 5  Geometric Mean LOS (GMLOS) (days):   Days to GMLOS:     OBJECTIVE:  Risk of Unplanned Readmission Score: 22.95         Current admission status: Inpatient   Preferred Pharmacy:   [address] [place] [address]  Phone: [phone] Fax: [address] [place] [address],  [address] [place] [address]  Phone: [phone] Fax: [phone]    Primary Care Provider: Sammy Rios MD    Primary Insurance: Casie Light  Secondary Insurance:     DISCHARGE DETAILS:      Freedom of Choice: Yes    Discharge Destination Plan[de-identified] Home  Transport at Discharge : Family, Automobile  ETA of Transport (Date): 23    Additional Comments: Pt discharging home today with prednisone script sent to [facility]. Copay $1.00. Pt will follow up with her Providers. Pt will transport home by car w/ her daughter and a friend. CM following.

## 2023-09-02 NOTE — DISCHARGE SUMMARY
[address] [facility]  Discharge- Kaylah Eddie 1965, 62 y.o. female MRN: [id number]  Unit/Bed#: [facility] [address] [place] [address] [place] [id number] Encounter: [id number]  Primary Care Provider: Carolyn Ron MD   Date and time admitted to hospital: 8/28/2023  9:11 AM    * COPD exacerbation (HCC)-resolved as of 9/2/2023  Assessment & Plan  Patient is a 80-year-old female with past medical history significant for COPD and tobacco abuse who presented due to cough, dyspnea and acute COPD exacerbation. Patient also was admitted May 2023, and June 2023 for COPD exacerbation. · Continue azithromycin 500 mg completed 3 days  · COVID/flu/RSV negative  · Chest x-ray without any evidence of infiltrates  · Hypercapnia and somnolence improved with BiPAP  · Pulmonology consulted, appreciate recommendations  · We will need outpatient PFTs  · Initiated on slow oral steroid taper today, sent to pharmacy  · Follow-up in the outpatient setting with pulmonology      Lung nodule  Assessment & Plan  During prior admission 5/23 3 mm left lower lung nodule was noted with repeat CT scan recommended in 12 months  · Patient has been referred to [facility] pulmonology as an outpatient    Tobacco abuse  Assessment & Plan  Nicotine patch  · Smoking cessation counseled    History of alcohol abuse  Assessment & Plan  Per records, patient has a history of alcohol abuse  · Per her sister, patient has been abstinent for 6 years: No longer an active problem and will discontinue if no signs or symptoms of withdrawal    Generalized anxiety disorder  Assessment & Plan  Continue home Abilify, clonazepam, Cymbalta, trazodone, and as needed Atarax  · [place]: Confirmed patient's clonazepam prescription.   No red flags    Respiratory distress-resolved as of 8/29/2023  Assessment & Plan  Patient presented to the ER with a COPD exacerbation, and tachypnea with respiratory distress  · Old records note a history of hypercapnia  · Resolved    Acute respiratory failure with hypoxia and hypercapnia (HCC)-resolved as of 9/2/2023  Assessment & Plan  Currently requiring 3 L of nasal cannula oxygen to maintain oxygen saturations greater than 88%, continue to wean as tolerated  · See assessment and plan under COPD exacerbation  · Home oxygen evaluation prior to discharge, no need for oxygen  · Now resolved      Medical Problems     Resolved Problems  Date Reviewed: 9/1/2023          Resolved    * (Principal) COPD exacerbation ([address]) 9/2/2023     Resolved by  Mary Diaz PA-C    Acute respiratory failure with hypoxia and hypercapnia ([address]) 9/2/2023     Resolved by  Mary Diaz PA-C    SIRS (systemic inflammatory response syndrome) ([address]) 9/2/2023     Resolved by  Mary Diaz PA-C    Somnolence 9/2/2023     Resolved by  Mary Diaz PA-C    Respiratory distress 8/29/2023     Resolved by  Mary Diaz PA-C    Leukocytosis 8/31/2023     Resolved by  Twin Costa PA-C        Discharging Physician / Practitioner: Mary Diaz PA-C  PCP: Mariya Carty MD  Admission Date:   Admission Orders (From admission, onward)     Ordered        08/28/23 1208  INPATIENT ADMISSION  Once                      Discharge Date: 09/02/23    Consultations During Hospital Stay:  · Pulmonology    Procedures Performed:   · None    Significant Findings / Test Results:   XR chest 1 view portable  Result Date: 8/28/2023    Impression: No acute cardiopulmonary disease. Workstation performed: [id number]     Incidental Findings:   · none     Test Results Pending at Discharge (will require follow up):   · none     Outpatient Tests Requested:  · PFTs    Complications:  None    Reason for Admission: copd    Hospital Course:   Parmjit Fuentes is a 62 y.o. female patient with a positive history of COPD, tobacco abuse, generalized anxiety disorder who originally presented to the hospital on 8/28/2023 due to cough, dyspnea, and wheezing.   The patient was also admitted in May 2023 as well as June 2023 for COPD exacerbations. Upon arrival to the emergency department, the patient was noted to have significant respiratory acidosis and was promptly placed on BiPAP with significant improvement in both her somnolence as well as her ABGs. She was continued on BiPAP overnight with improvement. Ultimately, she was also seen in consultation by pulmonology who recommended IV steroids as well as scheduled nebulizer treatments. She received IV steroids for approximately 4 days and was subsequently transition to a slow oral prednisone taper on 9/2/2023. She will follow-up in the outpatient setting with pulmonology for formal PFTs. On the day of discharge, a home oxygen evaluation was completed and she did not qualify for any oxygen. She was discharged home in stable condition on 9/2/2023 and return precautions were discussed with the patient at length. Please see above list of diagnoses and related plan for additional information. Condition at Discharge: stable    Discharge Day Visit / Exam:   Subjective: The patient is seen resting comfortably in bed. She is eager to go home today, states that her wheezing is improved significantly. She denies any nausea or vomiting, diarrhea, constipation, palpitations, worsening shortness of breath. Reports that she is feeling significantly improved when she initially presented. Vitals: Blood Pressure: 139/94 (09/02/23 0745)  Pulse: 90 (09/01/23 2249)  Temperature: 97.9 °F (36.6 °C) (09/02/23 0745)  Temp Source: Oral (08/31/23 1903)  Respirations: 15 (09/02/23 0745)  Height: 5' 3" (160 cm) (08/28/23 1735)  Weight - Scale: 86.3 kg (190 lb 4.1 oz) (09/02/23 0539)  SpO2: 91 % (09/02/23 0753)  Exam:   Physical Exam  Vitals and nursing note reviewed. Constitutional:       General: She is not in acute distress. Appearance: Normal appearance. She is not ill-appearing, toxic-appearing or diaphoretic.    HENT:      Head: Normocephalic and atraumatic. Cardiovascular:      Rate and Rhythm: Normal rate and regular rhythm. Heart sounds: No murmur heard. No friction rub. No gallop. Pulmonary:      Effort: Pulmonary effort is normal. No respiratory distress. Breath sounds: Normal breath sounds. No wheezing, rhonchi or rales. Abdominal:      General: Abdomen is flat. Bowel sounds are normal. There is no distension. Palpations: Abdomen is soft. Tenderness: There is no abdominal tenderness. Musculoskeletal:      Right lower leg: No edema. Left lower leg: No edema. Skin:     General: Skin is warm and dry. Coloration: Skin is not jaundiced or pale. Neurological:      General: No focal deficit present. Mental Status: She is alert. Mental status is at baseline. Discussion with Family: Patient declined call to . Discharge instructions/Information to patient and family:   See after visit summary for information provided to patient and family. Provisions for Follow-Up Care:  See after visit summary for information related to follow-up care and any pertinent home health orders. Disposition:   Home    Planned Readmission: n/a     Discharge Statement:  I spent 40 minutes discharging the patient. This time was spent on the day of discharge. I had direct contact with the patient on the day of discharge. Greater than 50% of the total time was spent examining patient, answering all patient questions, arranging and discussing plan of care with patient as well as directly providing post-discharge instructions. Additional time then spent on discharge activities. Discharge Medications:  See after visit summary for reconciled discharge medications provided to patient and/or family.       **Please Note: This note may have been constructed using a voice recognition system**

## 2023-09-02 NOTE — ASSESSMENT & PLAN NOTE
Continue home Abilify, clonazepam, Cymbalta, trazodone, and as needed Atarax  [place]: Confirmed patient's clonazepam prescription.   No red flags
Continue home Abilify, clonazepam, Cymbalta, trazodone, and as needed Atarax  · [place]: Confirmed patient's clonazepam prescription.   No red flags
Currently requiring 3 L of nasal cannula oxygen to maintain oxygen saturations greater than 88%  · See assessment and plan under COPD exacerbation
Currently requiring 3 L of nasal cannula oxygen to maintain oxygen saturations greater than 88%  · See assessment and plan under COPD exacerbation  · Home oxygen evaluation prior to discharge
Currently requiring 3 L of nasal cannula oxygen to maintain oxygen saturations greater than 88%, continue to wean as tolerated  · See assessment and plan under COPD exacerbation  · Home oxygen evaluation prior to discharge, no need for oxygen  · Now resolved
Currently requiring 3 L of nasal cannula oxygen to maintain oxygen saturations greater than 88%, continue to wean as tolerated  · See assessment and plan under COPD exacerbation  · Home oxygen evaluation prior to discharge, ordered
Currently requiring 5 L of nasal cannula oxygen to maintain oxygen saturations greater than 88%  · See assessment and plan under COPD exacerbation
During prior admission 5/23 3 mm left lower lung nodule was noted with repeat CT scan recommended in 12 months  · Patient has been referred to [facility] pulmonology as an outpatient
During prior admission 5/23 3 mm left lower lung nodule was noted with repeat CT scan recommended in 12 months  · Patient has been referred to [facility] pulmonology as an outpatient
During prior admission 5/23 3 mm left lower lung nodule was noted with repeat CT scan recommended in 12 months  · Patient has been referred to [facility] pulmonology as an outpatient
During prior admission 5/23 3 mm left lower lung nodule was noted with repeat CT scan recommended in 12 months  · Patient has been referred to [facility] pulmonology as an outpatient
During prior admission 5/23 3 mm left lower lung nodule was noted with repeat CT scan recommended in 12 months  · Patient has been referred to [facility] pulmonology as an outpatient
Nicotine patch  Smoking cessation counseled
Nicotine patch  · Smoking cessation counseled
Patient is a 60-year-old female with past medical history significant for COPD and tobacco abuse who presents to the ER with cough, dyspnea and acute COPD exacerbation. Patient also was admitted May 2023, and June 2023 for COPD exacerbation. · Patient presents with a 1 week history of worsening dyspnea, chest congestion, productive cough.   She notes she was using her home albuterol nebulizers and albuterol/Advair inhalers without any improvement  · She was given a DuoNeb by EMS, as well as 10 mg albuterol nebulizer and 125 mg of Solu-Medrol in the ER without significant improvement  · Continue IV steroids, and Xopenex/Atrovent nebs  · Continue azithromycin 500 mg x 3 days total, currently on day 2/3  · COVID/flu/RSV negative  · Chest x-ray without any evidence of infiltrates  · Hypercapnia and somnolence improved with BiPAP  · Pulmonology consulted, appreciate recommendations  · We will need outpatient PFTs  · Continue scheduled nebulizers  · Continue IV steroids however begin taper to every 12 hours dosing with hopeful transition to oral prednisone tomorrow
Patient is a 70-year-old female with past medical history significant for COPD and tobacco abuse who presented due to cough, dyspnea and acute COPD exacerbation. Patient also was admitted May 2023, and June 2023 for COPD exacerbation.   · Continue IV steroids q 12 hours, and Xopenex/Atrovent nebs  · Continue azithromycin 500 mg x 3 days total, currently on day 3/3  · COVID/flu/RSV negative  · Chest x-ray without any evidence of infiltrates  · Hypercapnia and somnolence improved with BiPAP  · Pulmonology consulted, appreciate recommendations  · We will need outpatient PFTs  · Continue scheduled nebulizers  · Continue IV steroids however begin taper to every 12 hours dosing with hopeful transition to oral prednisone tomorrow
Patient is a 80-year-old female with past medical history significant for COPD and tobacco abuse who presented due to cough, dyspnea and acute COPD exacerbation. Patient also was admitted May 2023, and June 2023 for COPD exacerbation.   · Continue IV steroids q 12 hours, and Xopenex/Atrovent nebs  · Continue azithromycin 500 mg completed 3 days  · COVID/flu/RSV negative  · Chest x-ray without any evidence of infiltrates  · Hypercapnia and somnolence improved with BiPAP  · Pulmonology consulted, appreciate recommendations  · We will need outpatient PFTs  · Continue scheduled nebulizers  · Continue IV steroids however begin taper to every 12 hours dosing with hopeful transition to oral prednisone tomorrow
Patient is a 80-year-old female with past medical history significant for COPD and tobacco abuse who presented due to cough, dyspnea and acute COPD exacerbation. Patient also was admitted May 2023, and June 2023 for COPD exacerbation.   · Continue azithromycin 500 mg completed 3 days  · COVID/flu/RSV negative  · Chest x-ray without any evidence of infiltrates  · Hypercapnia and somnolence improved with BiPAP  · Pulmonology consulted, appreciate recommendations  · We will need outpatient PFTs  · Initiated on slow oral steroid taper today, sent to pharmacy  · Follow-up in the outpatient setting with pulmonology
Patient is a 80-year-old female with past medical history significant for COPD and tobacco abuse who presents to the ER with cough, dyspnea and acute COPD exacerbation. Patient also was admitted May 2023, and June 2023 for COPD exacerbation. · Patient presents with a 1 week history of worsening dyspnea, chest congestion, productive cough.   She notes she was using her home albuterol nebulizers and albuterol/Advair inhalers without any improvement  · She was given a DuoNeb by EMS, as well as 10 mg albuterol nebulizer and 125 mg of Solu-Medrol in the ER without significant improvement  · Continue IV steroids, and Xopenex/Atrovent nebs  · Continue azithromycin 500 mg x 3 days total, currently on day 2  · COVID/flu/RSV negative  · Chest x-ray without any evidence of infiltrates  · Hypercapnia and somnolence improved with BiPAP  · Pulmonology consulted, appreciate recommendations  · We will need outpatient PFTs  · Continue scheduled nebulizers  · Continue IV antibiotics
Patient is a 80-year-old female with past medical history significant for COPD and tobacco abuse who presents to the ER with cough, dyspnea and acute COPD exacerbation. Patient also was admitted May 2023, and June 2023 for COPD exacerbation. · Patient presents with a 1 week history of worsening dyspnea, chest congestion, productive cough. She notes she was using her home albuterol nebulizers and albuterol/Advair inhalers without any improvement  · In the ER patient was noted to be tachypneic, but not hypoxic: Lungs with very poor air movement  · She was given a DuoNeb by EMS, as well as 10 mg albuterol nebulizer and 125 mg of Solu-Medrol in the ER without significant improvement  · She will be admitted to the hospital for COPD exacerbation  · Continue IV steroids, and Xopenex/Atrovent nebs  · Azithromycin for COPD exacerbation  · Patient currently afebrile, chest x-ray without focal infiltrates, and no evidence of leukocytosis  · No current evidence of infectious etiology  · Check procalcitonin for confirmation  · COVID/influenza/RSV PCR negative  · Sputum culture/blood cultures pending  · We will confer with the pulmonary team  · Discussed with patient if her condition were to worsen, possible treatment options including BiPAP, and transferred to the ICU. · Patient with previous hypercarbia on ABG 5/23: Current ABG pending  · Patient currently agreeable to BiPAP if indicated.   She confirms she is a DNR/DNI and notes she would not wish transfer to the ICU
Patient presented to the ER with a COPD exacerbation, and tachypnea with respiratory distress  · Old records note a history of hypercapnia  · Resolved
Patient presented to the ER with a COPD exacerbation, and tachypnea with respiratory distress  · Old records note a history of hypercapnia  · Resolved
Per records, patient has a history of alcohol abuse  · CIWA scale, thiamine and folic acid  · Per her sister, patient has been abstinent for 6 years: No longer an active problem and will discontinue if no signs or symptoms of withdrawal
Per records, patient has a history of alcohol abuse  · Per her sister, patient has been abstinent for 6 years: No longer an active problem and will discontinue if no signs or symptoms of withdrawal
With leukocytosis of 14.42  · Do not suspect infection, procalcitonin negative, patient has remained afebrile  · Suspect likely secondary to high-dose IV steroids  · CBC in a.m.
With leukocytosis of 14.42 yesterday, resolved today  · Do not suspect infection, procalcitonin negative, patient has remained afebrile  · Suspect likely secondary to high-dose IV steroids  · CBC in a.m.
· During prior admission 5/23 3 mm left lower lung nodule was noted with repeat CT scan recommended in 12 months  · Patient has been referred to [facility] pulmonology as an outpatient
· Patient noted to be somnolent during admission  · Patient has previous history of hypercapnea on prior admissions, however notes states she had refused BiPAP  · Improved with bipap
· Patient noted to be somnolent during admission  · Patient has previous history of hypercapnea on prior admissions, however notes states she had refused BiPAP  · Improved with bipap
· Patient noted to be somnolent during exam  · She notes she did not sleep at all last night over the past few nights  · Patient has previous history of hypercapnea on prior admissions, however notes states she had refused BiPAP  · check ABG
· Patient presented to the ER with a COPD exacerbation, and tachypnea with respiratory distress  · Old records note a history of hypercapnia  · ABG pending  · Case discussed with pulmonary team, monitor for any deterioration in status
· Per records, patient has a history of alcohol abuse  · CIWA scale, thiamine and folic acid  · Per her sister, patient has been abstinent for 6 years: No longer an active problem and will discontinue if no signs or symptoms of withdrawal
Other

## 2023-09-02 NOTE — NURSING NOTE
Prednisone prescription delivered to patient. Reviewed AVS instructions with patient. Answered all questions at time of discharge. IV removed. Patient left with all belongings.

## 2023-09-02 NOTE — PROGRESS NOTES
Progress Note - Pulmonary   Arn Kiser 62 y.o. female MRN: [id number]  Unit/Bed#: [address] [address] -01 Encounter: [id number]          Assessment/Plan:  63 y/o woman presented w/ sob, wheezing, presented to ED and initially required bipap admitted w/ AECOPD     Problems  1. Acute hypoxemic and hypercapneic respiratory failure   2. Acute exacerbation of copd   3. Obesity with suspected sleep apnea  4. Nicotine dependence      Recommendations      Clinically improving, oxygen being wean. prednisone 60 mg po daily and wean by 10 mg  3 days. Oxygen successfully weaned off   Cont bronchodilators: curently fluticasone-furoate-vilanterol, xopenex/atrovent q6 hours; would discharge with breztri or trelegy   Pulmonary hygeine: out of bed to chair, IS, ambulation   Will need outpatient pfts and pulmonary followup; qualifies for lung cancer screening  Smoking cessation recommended: has nicotine patch   Recommend outpatient sleep study   Patient to be discharged to home today.      Subjective:   Patient is feeling much better. She is being discharged to home today. Objective:       Vitals: Blood pressure 139/94, pulse 90, temperature 97.9 °F (36.6 °C), resp. rate 15, height 5' 3" (1.6 m), weight 86.3 kg (190 lb 4.1 oz), SpO2 91 %, not currently breastfeeding., RA , Body mass index is 33.7 kg/m². No intake or output data in the 24 hours ending 09/02/23 1239      Physical Exam  Gen: Awake, alert, oriented x 3, no acute distress  Head: no masses, lesions   HEENT: Mucous membranes moist, no oral lesions, no thrush  NECK: No accessory muscle use, JVP not elevated  Cardiac: Regular, single S1, single S2, no murmurs, no rubs, no gallops  Lungs:  Mild wheezes   Extremities: no cyanosis, no clubbing, no edema  Skin: no lesions, rashes  Neuro: CN grossly intact, no gross neurological deficits     Labs: I have personally reviewed pertinent lab results.   Results from last 7 days   Lab Units 09/02/23  0539 09/01/23  4794 08/31/23  0511 08/28/23  1610 08/28/23  0922   WBC Thousand/uL 12.37* 9.24 8.83   < > 7.53   HEMOGLOBIN g/dL 13.1 13.7 12.7   < > 14.7   HEMATOCRIT % 41.7 45.4 40.6   < > 49.0*   PLATELETS Thousands/uL 217 223 205   < > 196   NEUTROS PCT %  --   --   --   --  67   MONOS PCT %  --   --   --   --  7   EOS PCT %  --   --   --   --  6    < > = values in this interval not displayed. Results from last 7 days   Lab Units 09/02/23  0539 09/01/23  0444 08/31/23  0511 08/30/23  0537 08/28/23  0922   POTASSIUM mmol/L 4.0 5.1 5.0   < > 4.8   CHLORIDE mmol/L 102 101 102   < > 105   CO2 mmol/L 35* 36* 34*   < > 34*   BUN mg/dL 17 17 21   < > 10   CREATININE mg/dL 0.97 0.82 0.99   < > 0.99   CALCIUM mg/dL 9.1 9.4 8.9   < > 8.9   ALK PHOS U/L  --   --   --   --  89   ALT U/L  --   --   --   --  11   AST U/L  --   --   --   --  10*    < > = values in this interval not displayed.      Results from last 7 days   Lab Units 08/31/23  0511   MAGNESIUM mg/dL 2.7                  0   Lab Value Date/Time    TROPONINI <0.01 05/25/2021 1713    TROPONINI <0.01 02/27/2021 1705    TROPONINI <0.01 02/04/2021 2058    TROPONINI <0.02 01/11/2017 0349       MD Memo Patten Doctor [facility]

## 2023-09-02 NOTE — PLAN OF CARE
Problem: Potential for Falls  Goal: Patient will remain free of falls  Description: INTERVENTIONS:  - Educate patient/family on patient safety including physical limitations  - Instruct patient to call for assistance with activity   - Consult OT/PT to assist with strengthening/mobility   - Keep Call bell within reach  - Keep bed low and locked with side rails adjusted as appropriate  - Keep care items and personal belongings within reach  - Initiate and maintain comfort rounds  - Make Fall Risk Sign visible to staff  - Offer Toileting every 2 Hours, in advance of need  - Initiate/Maintain  bed alarm  - Obtain necessary fall risk management equipment:   - Apply yellow socks and bracelet for high fall risk patients  - Consider moving patient to room near nurses station  Outcome: Progressing     Problem: NEUROSENSORY - ADULT  Goal: Achieves maximal functionality and self care  Description: INTERVENTIONS  - Monitor swallowing and airway patency with patient fatigue and changes in neurological status  - Encourage and assist patient to increase activity and self care.    - Encourage visually impaired, hearing impaired and aphasic patients to use assistive/communication devices  Outcome: Progressing     Problem: CARDIOVASCULAR - ADULT  Goal: Maintains optimal cardiac output and hemodynamic stability  Description: INTERVENTIONS:  - Monitor I/O, vital signs and rhythm  - Monitor for S/S and trends of decreased cardiac output  - Administer and titrate ordered vasoactive medications to optimize hemodynamic stability  - Assess quality of pulses, skin color and temperature  - Assess for signs of decreased coronary artery perfusion  - Instruct patient to report change in severity of symptoms  Outcome: Progressing  Goal: Absence of cardiac dysrhythmias or at baseline rhythm  Description: INTERVENTIONS:  - Continuous cardiac monitoring, vital signs, obtain 12 lead EKG if ordered  - Administer antiarrhythmic and heart rate control medications as ordered  - Monitor electrolytes and administer replacement therapy as ordered  Outcome: Progressing     Problem: RESPIRATORY - ADULT  Goal: Achieves optimal ventilation and oxygenation  Description: INTERVENTIONS:  - Assess for changes in respiratory status  - Assess for changes in mentation and behavior  - Position to facilitate oxygenation and minimize respiratory effort  - Oxygen administered by appropriate delivery if ordered  - Initiate smoking cessation education as indicated  - Encourage broncho-pulmonary hygiene including cough, deep breathe, Incentive Spirometry  - Assess the need for suctioning and aspirate as needed  - Assess and instruct to report SOB or any respiratory difficulty  - Respiratory Therapy support as indicated  Outcome: Progressing     Problem: SKIN/TISSUE INTEGRITY - ADULT  Goal: Skin Integrity remains intact(Skin Breakdown Prevention)  Description: Assess:  -Perform Ty assessment every   -Clean and moisturize skin every   -Inspect skin when repositioning, toileting, and assisting with ADLS  -Assess under medical devices such as  every   -Assess extremities for adequate circulation and sensation     Bed Management:  -Have minimal linens on bed & keep smooth, unwrinkled  -Change linens as needed when moist or perspiring  -Avoid sitting or lying in one position for more than  hours while in bed  -Keep HOB at degrees     Toileting:  -Offer bedside commode  -Assess for incontinence every   -Use incontinent care products after each incontinent episode such as     Activity:  -Mobilize patient  times a day  -Encourage activity and walks on unit  -Encourage or provide ROM exercises   -Turn and reposition patient every  Hours  -Use appropriate equipment to lift or move patient in bed  -Instruct/ Assist with weight shifting every  when out of bed in chair  -Consider limitation of chair time  hour intervals    Skin Care:  -Avoid use of baby powder, tape, friction and shearing, hot water or constrictive clothing  -Relieve pressure over bony prominences using   -Do not massage red bony areas    Next Steps:  -Teach patient strategies to minimize risks such as    -Consider consults to  interdisciplinary teams such as   Outcome: Progressing     Problem: SAFETY ADULT  Goal: Patient will remain free of falls  Description: INTERVENTIONS:  - Educate patient/family on patient safety including physical limitations  - Instruct patient to call for assistance with activity   - Consult OT/PT to assist with strengthening/mobility   - Keep Call bell within reach  - Keep bed low and locked with side rails adjusted as appropriate  - Keep care items and personal belongings within reach  - Initiate and maintain comfort rounds  - Make Fall Risk Sign visible to staff  - Offer Toileting every  Hours, in advance of need  - Initiate/Maintain alarm  - Obtain necessary fall risk management equipment:   - Apply yellow socks and bracelet for high fall risk patients  - Consider moving patient to room near nurses station  Outcome: Progressing  Goal: Maintain or return to baseline ADL function  Description: INTERVENTIONS:  -  Assess patient's ability to carry out ADLs; assess patient's baseline for ADL function and identify physical deficits which impact ability to perform ADLs (bathing, care of mouth/teeth, toileting, grooming, dressing, etc.)  - Assess/evaluate cause of self-care deficits   - Assess range of motion  - Assess patient's mobility; develop plan if impaired  - Assess patient's need for assistive devices and provide as appropriate  - Encourage maximum independence but intervene and supervise when necessary  - Involve family in performance of ADLs  - Assess for home care needs following discharge   - Consider OT consult to assist with ADL evaluation and planning for discharge  - Provide patient education as appropriate  Outcome: Progressing  Goal: Maintains/Returns to pre admission functional level  Description: INTERVENTIONS:  - Perform BMAT or MOVE assessment daily.   - Set and communicate daily mobility goal to care team and patient/family/caregiver. - Collaborate with rehabilitation services on mobility goals if consulted  - Perform Range of Motion  times a day. - Reposition patient every  hours. - Dangle patient  times a day  - Stand patient  times a day  - Ambulate patient  times a day  - Out of bed to chair  times a day   - Out of bed for meals  times a day  - Out of bed for toileting  - Record patient progress and toleration of activity level   Outcome: Progressing     Problem: DISCHARGE PLANNING  Goal: Discharge to home or other facility with appropriate resources  Description: INTERVENTIONS:  - Identify barriers to discharge w/patient and caregiver  - Arrange for needed discharge resources and transportation as appropriate  - Identify discharge learning needs (meds, wound care, etc.)  - Arrange for interpretive services to assist at discharge as needed  - Refer to Case Management Department for coordinating discharge planning if the patient needs post-hospital services based on physician/advanced practitioner order or complex needs related to functional status, cognitive ability, or social support system  Outcome: Progressing     Problem: Knowledge Deficit  Goal: Patient/family/caregiver demonstrates understanding of disease process, treatment plan, medications, and discharge instructions  Description: Complete learning assessment and assess knowledge base.   Interventions:  - Provide teaching at level of understanding  - Provide teaching via preferred learning methods  Outcome: Progressing     Problem: MOBILITY - ADULT  Goal: Maintain or return to baseline ADL function  Description: INTERVENTIONS:  -  Assess patient's ability to carry out ADLs; assess patient's baseline for ADL function and identify physical deficits which impact ability to perform ADLs (bathing, care of mouth/teeth, toileting, grooming, dressing, etc.)  - Assess/evaluate cause of self-care deficits   - Assess range of motion  - Assess patient's mobility; develop plan if impaired  - Assess patient's need for assistive devices and provide as appropriate  - Encourage maximum independence but intervene and supervise when necessary  - Involve family in performance of ADLs  - Assess for home care needs following discharge   - Consider OT consult to assist with ADL evaluation and planning for discharge  - Provide patient education as appropriate  Outcome: Progressing  Goal: Maintains/Returns to pre admission functional level  Description: INTERVENTIONS:  - Perform BMAT or MOVE assessment daily.   - Set and communicate daily mobility goal to care team and patient/family/caregiver. - Collaborate with rehabilitation services on mobility goals if consulted  - Perform Range of Motion 4 times a day. - Reposition patient every 2 hours.   - Dangle patient 3 times a day  - Stand patient 3 times a day  - Ambulate patient 3 times a day  - Out of bed to chair 3 times a day   - Out of bed for meals 3 times a day  - Out of bed for toileting  - Record patient progress and toleration of activity level   Outcome: Progressing

## 2023-09-02 NOTE — PROGRESS NOTES
Home Oxygen Qualifying Test       Patient name: Bertha Boeck        : 1965   Date of Test:  2023  Diagnosis:    Home Oxygen Test:      1. Baseline SPO2 on Room Air at rest 93 %. 2.  SPO2 during exercise on Room Air 90 %. During exercise monitor SpO2. If SPO2 increases >=89% with ambulation do not add supplemental oxygen. If <= 88% on room air add O2 via NC and titrate patient. Patient must be ambulated with O2 and titrated to > 88% with exertion. 3.  Exercise performed:          walking        []  Supplemental Home Oxygen is indicated. [x]  Client does not qualify for home oxygen. Respiratory Additional Notes- Pt does fine on Room Air.      Addis Whittaker, RT

## 2023-09-05 NOTE — UTILIZATION REVIEW
NOTIFICATION OF ADMISSION DISCHARGE   This is a Notification of Discharge from [address] [place] [address]. Please be advised that this patient has been discharge from our facility. Below you will find the admission and discharge date and time including the patient’s disposition. UTILIZATION REVIEW CONTACT:  Boyd Persaud  Utilization   Network Utilization Review Department  Phone: [phone] carefully listen to the prompts. All voicemails are confidential.  Email: [email or web address]     ADMISSION INFORMATION  PRESENTATION DATE: 8/28/2023  9:11 AM  OBERVATION ADMISSION DATE:   INPATIENT ADMISSION DATE: 8/28/23 12:08 PM   DISCHARGE DATE: 9/2/2023  2:47 PM   DISPOSITION:Home/Self Care    IMPORTANT INFORMATION:  Send all requests for admission clinical reviews, approved or denied determinations and any other requests to dedicated fax number below belonging to the campus where the patient is receiving treatment.  List of dedicated fax numbers:  [place] DENIALS (Administrative/Medical Necessity) [phone] [address] [facility] (Maternity/NICU/Pediatrics) [phone]   [facility] [phone]   [facility] [phone] 1636 [facility] [phone]   [address] [place] [address] [phone]   [facility] [phone]   [address] [place] [address] 608 [facility] [phone]   [address] [place] [address] [phone] 3[address] [facility] [phone] 2720 [facility] 3000 32[facility] [phone]

## 2023-09-26 ENCOUNTER — OFFICE VISIT (OUTPATIENT)
Dept: PULMONOLOGY | Facility: CLINIC | Age: 58
End: 2023-09-26
Payer: COMMERCIAL

## 2023-09-26 VITALS
HEART RATE: 103 BPM | SYSTOLIC BLOOD PRESSURE: 118 MMHG | WEIGHT: 196.8 LBS | RESPIRATION RATE: 18 BRPM | OXYGEN SATURATION: 96 % | BODY MASS INDEX: 34.87 KG/M2 | HEIGHT: 63 IN | DIASTOLIC BLOOD PRESSURE: 82 MMHG

## 2023-09-26 DIAGNOSIS — J44.1 COPD EXACERBATION (HCC): Primary | ICD-10-CM

## 2023-09-26 DIAGNOSIS — J44.9 CHRONIC OBSTRUCTIVE PULMONARY DISEASE, UNSPECIFIED COPD TYPE (HCC): ICD-10-CM

## 2023-09-26 DIAGNOSIS — F33.1 MAJOR DEPRESSIVE DISORDER, RECURRENT EPISODE, MODERATE (HCC): ICD-10-CM

## 2023-09-26 DIAGNOSIS — F41.1 GENERALIZED ANXIETY DISORDER: ICD-10-CM

## 2023-09-26 PROCEDURE — 99214 OFFICE O/P EST MOD 30 MIN: CPT | Performed by: INTERNAL MEDICINE

## 2023-09-26 RX ORDER — IPRATROPIUM BROMIDE AND ALBUTEROL SULFATE 2.5; .5 MG/3ML; MG/3ML
SOLUTION RESPIRATORY (INHALATION)
COMMUNITY
Start: 2023-05-23

## 2023-09-26 RX ORDER — FLUTICASONE FUROATE, UMECLIDINIUM BROMIDE AND VILANTEROL TRIFENATATE 100; 62.5; 25 UG/1; UG/1; UG/1
1 POWDER RESPIRATORY (INHALATION) DAILY
Qty: 180 BLISTER | Refills: 0 | Status: SHIPPED | OUTPATIENT
Start: 2023-09-26 | End: 2023-12-25

## 2023-09-26 RX ORDER — GUAIFENESIN 600 MG/1
TABLET, EXTENDED RELEASE ORAL
COMMUNITY

## 2023-09-26 RX ORDER — CLONAZEPAM 1 MG/1
TABLET ORAL
Qty: 45 TABLET | Refills: 1 | Status: SHIPPED | OUTPATIENT
Start: 2023-09-26

## 2023-09-26 RX ORDER — FEXOFENADINE HCL 180 MG/1
180 TABLET ORAL DAILY
COMMUNITY
Start: 2023-04-04 | End: 2024-04-03

## 2023-09-26 RX ORDER — BUDESONIDE AND FORMOTEROL FUMARATE DIHYDRATE 80; 4.5 UG/1; UG/1
2 AEROSOL RESPIRATORY (INHALATION) 2 TIMES DAILY
COMMUNITY
Start: 2023-07-03 | End: 2023-09-26

## 2023-09-26 RX ORDER — DILTIAZEM HYDROCHLORIDE 60 MG/1
TABLET, FILM COATED ORAL
COMMUNITY
Start: 2023-08-22 | End: 2023-09-26

## 2023-09-26 NOTE — TELEPHONE ENCOUNTER
Spoke to patient in regards [facility] due to provider retiring. Informed patient due to insurance patient would only be able to go to [facility] office, patient has been added to epic wait list. And informed patient of medication refills sent to pharm.

## 2023-09-26 NOTE — TELEPHONE ENCOUNTER
Pt calling for Rf of clonazepam. Last fill per PDMP: 8/15. Pt previous provider has retired and pt has not been connected to new provider yet.

## 2023-09-26 NOTE — TELEPHONE ENCOUNTER
Pt Roselia Nam  , 1965 , [facility] chart was reviewed  PDMP was reviewed -refilled 08/15/23    Clonazepam 1mg qty 45 was sent to [facility]    NO This note was not shared with the patient due to reasonable likelihood of causing patient harm

## 2023-09-26 NOTE — TELEPHONE ENCOUNTER
Spoke to clt regarding scheduling with a new provider. Clt lives in [place], [place] message sent to [address] [place] [address] office, to reach out to her. Clt looking to be scheduled with a more local doctor. Anastasia Gunn

## 2023-09-26 NOTE — TELEPHONE ENCOUNTER
Spoke to patient in regards to [facility], at this time patient will be placed on wait list at [facility] office due to insurance. Patient requested a call back later today due to being at appointment.

## 2023-09-26 NOTE — PROGRESS NOTES
Clinic Note- Pulmonary Medicine   Alejandra Bae 62 y.o. female MRN: [id number]      Alejandra Bae is a 62 y.o. female hx COPD, smoker, anxiety/depression, who presents for hospital fu. COPD/Asthma  Possible chronic hypercarbia  Chronic Cough  50 Py smoker with daily symptoms and several exacerbations. Only recently started on some inhalers. Eos highest 430, likely some type 2 inflammation also has allergies, benefits from ICS  [facility] inpatient 7.42/75/85 suggesting acute on chronic hypercarbia  3 hospitalizations in 2023 for COPD, cboptimal management without controller meds    - PFts w BD and ABG   - Start ICS/LABA/LABA - Trelegy   - stop symbicort  - Duonebs as needed   - continue allegra and flonase for allergies    Current Smoker  Pulmonary nodule   50 PY smoker, currently 1/3. Tried chantix, patches and neither worked  3mm LL nodule  - fu Ct chest in 1 year (May 2024)  - smoking cessation counseling for 5 min  - pt wants to cu=jett cutting down on her own      There is no immunization history on file for this patient.     ______________________________________________________________________    HPI:    Alejandra Bae is a 62 y.o. female hx COPD, smoker, anxiety/depression, who presents for hospital fu. Pt admitted 8/28 with COPD exacerbation. Required BiPAP with improvement of symptoms. WEas treated wit IV and PO diuretics with slow steroid taper. She was discharged off oxygen. No asthma as a kid or adult. Allergic to trees, grass, flowers    Uses duoneb BID and albutero prn  Using symbicort as needed, every other day. Given by family doc       Occupational/Exposure history: Former [employer], no abnormal exposures  No abnormal exposures in hobbies  Dog + cats at home    Review of Systems:  Review of Systems  Aside from what is mentioned in the HPI, the review of systems otherwise negative.     Current Medications:    Current Outpatient Medications:   •  albuterol (PROVENTIL HFA,VENTOLIN HFA) 90 mcg/act inhaler, Inhale 2 puffs every 6 (six) hours as needed for wheezing or shortness of breath, Disp: 18 g, Rfl: 0  •  ARIPiprazole (ABILIFY) 10 mg tablet, Take 1 Po Q HS, Disp: 90 tablet, Rfl: 1  •  budesonide-formoterol (Symbicort) 80-4.5 MCG/ACT inhaler, Inhale 2 puffs 2 (two) times a day, Disp: , Rfl:   •  clonazePAM (KlonoPIN) 1 mg tablet, Take 1/2 PO Q AM and 1 HS, Disp: 45 tablet, Rfl: 3  •  DULoxetine (CYMBALTA) 60 mg delayed release capsule, Take 1 Po BID (Patient taking differently: 2 times a day), Disp: 180 capsule, Rfl: 1  •  fexofenadine (ALLEGRA) 180 MG tablet, Take 180 mg by mouth daily, Disp: , Rfl:   •  fluticasone (FLONASE) 50 mcg/act nasal spray, , Disp: , Rfl:   •  guaiFENesin (MUCINEX) 600 mg 12 hr tablet, Take 1 tablet every 12 hours by oral route as directed for 7 days. , Disp: , Rfl:   •  hydrOXYzine HCL (ATARAX) 50 mg tablet, Take 1 PO TID PRN (Patient taking differently: 3 per day), Disp: 90 tablet, Rfl: 3  •  ipratropium-albuterol (DUO-NEB) 0.5-2.5 mg/3 mL nebulizer solution, , Disp: , Rfl:   •  meloxicam (MOBIC) 7.5 mg tablet, Take 7.5 mg by mouth daily, Disp: , Rfl:   •  Symbicort 80-4.5 MCG/ACT inhaler, , Disp: , Rfl:   •  traZODone (DESYREL) 100 mg tablet, Take 2 1/2 P HS PRN, Disp: 225 tablet, Rfl: 1  •  atorvastatin (LIPITOR) 20 mg tablet, Take 20 mg by mouth daily (Patient not taking: Reported on 9/26/2023), Disp: , Rfl:   •  Fluticasone-Salmeterol (Advair) 100-50 mcg/dose inhaler, Inhale 1 puff 2 (two) times a day Rinse mouth after use. (Patient not taking: Reported on 9/26/2023), Disp: 60 blister, Rfl: 0  •  Insulin Degludec Lollie [place]) 100 UNIT/ML SOLN, Inject under the skin (Patient not taking: Reported on 9/26/2023), Disp: , Rfl:   •  nicotine (NICODERM CQ) 14 mg/24hr TD 24 hr patch, Place 1 patch on the skin over 24 hours daily Do not start before June 18, 2023.  (Patient not taking: Reported on 8/28/2023), Disp: 28 patch, Rfl: 0  •  predniSONE 10 mg tablet, Take 6 tablets by mouth for 2 days, 5 tablets by mouth for 3 days, 4 tablets by mouth for 3 days, 3 tablets by mouth for 3 days, 2 tablets by mouth for 3 days, and 1 tablet by mouth for 3 days. (Patient not taking: Reported on 9/26/2023), Disp: 57 tablet, Rfl: 0    Historical Information   Past Medical History:   Diagnosis Date   • Anxiety    • Chronic pain     Back Pain   • COPD (chronic obstructive pulmonary disease) (HCC)    • Depression    • Hyperlipidemia    • Migraine    • Psychiatric disorder      Past Surgical History:   Procedure Laterality Date   • OTHER SURGICAL HISTORY      cyst removed from left axila     Social History   Social History     Tobacco Use   Smoking Status Every Day   • Packs/day: 0.25   • Years: 45.00   • Total pack years: 11.25   • Types: Cigarettes   • Start date: 1   • Passive exposure: Current   Smokeless Tobacco Former   Tobacco Comments    Currently smoking 3 cigarettes daily       Family History:   Family History   Problem Relation Age of Onset   • Heart failure Mother    • Heart disease Father          PhysicalExamination:  Vitals:   /82 (BP Location: Left arm, Patient Position: Sitting, Cuff Size: Large)   Pulse 103   Resp 18   Ht 5' 3" (1.6 m)   Wt 89.3 kg (196 lb 12.8 oz)   LMP  (LMP Unknown)   SpO2 96%   BMI 34.86 kg/m²     Appearance -- NAD, speaking full sentences  HEENT -- anicteric sclera, clear OP, MMM  Neck -- no JVD  Heart -- RRR, no murmurs  Lungs -- mild end expiratory wheezing  Abdomen -- soft, NTND, +bs  Extremities -- WWP, no LE edema  Skin -- no rash  Neuro -- A&Ox3, wnl  Psych -- no obvious depression or hallucination        Diagnostic Data:  Labs:   I personally reviewed the most recent laboratory data pertinent to today's visit    Lab Results   Component Value Date    WBC 12.37 (H) 09/02/2023    HGB 13.1 09/02/2023    HCT 41.7 09/02/2023    MCV 98 09/02/2023     09/02/2023     Lab Results   Component Value Date    GLUCOSE 113 01/19/2014    CALCIUM 9.1 09/02/2023     01/19/2014    K 4.0 09/02/2023    CO2 35 (H) 09/02/2023     09/02/2023    BUN 17 09/02/2023    CREATININE 0.97 09/02/2023     No results found for: "IGE"  Lab Results   Component Value Date    ALT 11 08/28/2023    AST 10 (L) 08/28/2023    ALKPHOS 89 08/28/2023    BILITOT 0.2 01/19/2014       PFT results: The most recent pulmonary function tests were reviewed. NONE    Imaging:  I personally reviewed the images on the [facility] system pertinent to today's visit  CTA Chest 5/2023:  No pulmonary embolism  No acute consolidation  3 mm left lower lobe lung nodule. Based on current Fleischner Society 2017 Guidelines on incidental pulmonary nodule, no routine follow-up is needed if the patient is low risk. If the patient is high risk, optional follow-up chest CT at 12 months can be   considered.         Amy Farmer MD  [facility]

## 2023-09-26 NOTE — PATIENT INSTRUCTIONS
- Start ICS/LABA/LABA - Trelegy every  - stop symbicort  - Duonebs as needed   - breahting test + blood test

## 2023-10-09 DIAGNOSIS — F41.1 GENERALIZED ANXIETY DISORDER: ICD-10-CM

## 2023-10-09 DIAGNOSIS — F33.1 MAJOR DEPRESSIVE DISORDER, RECURRENT EPISODE, MODERATE (HCC): ICD-10-CM

## 2023-10-09 RX ORDER — TRAZODONE HYDROCHLORIDE 100 MG/1
TABLET ORAL
Qty: 225 TABLET | Refills: 0 | Status: SHIPPED | OUTPATIENT
Start: 2023-10-09

## 2023-10-09 NOTE — TELEPHONE ENCOUNTER
Pt requested refill of the trazodone 100mg 2 1/2 hs    Serenity Nice pt, on wait list for [facility] with [facility] due to insurance

## 2023-11-06 ENCOUNTER — TELEPHONE (OUTPATIENT)
Dept: PSYCHIATRY | Facility: CLINIC | Age: 58
End: 2023-11-06

## 2023-11-06 NOTE — TELEPHONE ENCOUNTER
Patient is a chris from [facility] due to provider retiring. Patient is scheduled to see [facility] on 12/28 at 10am, will need transportation.

## 2023-12-05 RX ORDER — ALBUTEROL SULFATE 2.5 MG/3ML
2.5 SOLUTION RESPIRATORY (INHALATION) ONCE AS NEEDED
Status: CANCELLED | OUTPATIENT
Start: 2023-12-05

## 2023-12-06 ENCOUNTER — HOSPITAL ENCOUNTER (OUTPATIENT)
Dept: PULMONOLOGY | Facility: HOSPITAL | Age: 58
Discharge: HOME | End: 2023-12-06

## 2023-12-19 ENCOUNTER — TELEPHONE (OUTPATIENT)
Dept: PSYCHIATRY | Facility: CLINIC | Age: 58
End: 2023-12-19

## 2023-12-22 ENCOUNTER — APPOINTMENT (EMERGENCY)
Dept: RADIOLOGY | Facility: HOSPITAL | Age: 58
End: 2023-12-22
Payer: COMMERCIAL

## 2023-12-22 ENCOUNTER — HOSPITAL ENCOUNTER (EMERGENCY)
Facility: HOSPITAL | Age: 58
Discharge: HOME | End: 2023-12-22
Attending: EMERGENCY MEDICINE
Payer: COMMERCIAL

## 2023-12-22 VITALS
WEIGHT: 197.31 LBS | TEMPERATURE: 98 F | DIASTOLIC BLOOD PRESSURE: 98 MMHG | HEIGHT: 63 IN | HEART RATE: 79 BPM | RESPIRATION RATE: 16 BRPM | OXYGEN SATURATION: 94 % | SYSTOLIC BLOOD PRESSURE: 178 MMHG | BODY MASS INDEX: 34.96 KG/M2

## 2023-12-22 DIAGNOSIS — R07.89 COSTOCHONDRAL CHEST PAIN: Primary | ICD-10-CM

## 2023-12-22 LAB
ALBUMIN SERPL BCP-MCNC: 4.3 G/DL (ref 3.5–5)
ALP SERPL-CCNC: 102 U/L (ref 34–104)
ALT SERPL W P-5'-P-CCNC: 15 U/L (ref 7–52)
ANION GAP SERPL CALCULATED.3IONS-SCNC: 6 MMOL/L
AST SERPL W P-5'-P-CCNC: 13 U/L (ref 13–39)
ATRIAL RATE: 79 BPM
BASOPHILS # BLD AUTO: 0.09 THOUSANDS/ÂΜL (ref 0–0.1)
BASOPHILS NFR BLD AUTO: 1 % (ref 0–1)
BILIRUB SERPL-MCNC: 0.44 MG/DL (ref 0.2–1)
BUN SERPL-MCNC: 13 MG/DL (ref 5–25)
CALCIUM SERPL-MCNC: 10.4 MG/DL (ref 8.4–10.2)
CARDIAC TROPONIN I PNL SERPL HS: 3 NG/L
CHLORIDE SERPL-SCNC: 103 MMOL/L (ref 96–108)
CO2 SERPL-SCNC: 29 MMOL/L (ref 21–32)
CREAT SERPL-MCNC: 1.07 MG/DL (ref 0.6–1.3)
EOSINOPHIL # BLD AUTO: 0.13 THOUSAND/ÂΜL (ref 0–0.61)
EOSINOPHIL NFR BLD AUTO: 1 % (ref 0–6)
ERYTHROCYTE [DISTWIDTH] IN BLOOD BY AUTOMATED COUNT: 13.4 % (ref 11.6–15.1)
GFR SERPL CREATININE-BSD FRML MDRD: 57 ML/MIN/1.73SQ M
GLUCOSE SERPL-MCNC: 137 MG/DL (ref 65–140)
HCT VFR BLD AUTO: 48.4 % (ref 34.8–46.1)
HGB BLD-MCNC: 15.7 G/DL (ref 11.5–15.4)
IMM GRANULOCYTES # BLD AUTO: 0.04 THOUSAND/UL (ref 0–0.2)
IMM GRANULOCYTES NFR BLD AUTO: 0 % (ref 0–2)
LIPASE SERPL-CCNC: 75 U/L (ref 11–82)
LYMPHOCYTES # BLD AUTO: 2.47 THOUSANDS/ÂΜL (ref 0.6–4.47)
LYMPHOCYTES NFR BLD AUTO: 26 % (ref 14–44)
MCH RBC QN AUTO: 31.4 PG (ref 26.8–34.3)
MCHC RBC AUTO-ENTMCNC: 32.4 G/DL (ref 31.4–37.4)
MCV RBC AUTO: 97 FL (ref 82–98)
MONOCYTES # BLD AUTO: 0.58 THOUSAND/ÂΜL (ref 0.17–1.22)
MONOCYTES NFR BLD AUTO: 6 % (ref 4–12)
NEUTROPHILS # BLD AUTO: 6.32 THOUSANDS/ÂΜL (ref 1.85–7.62)
NEUTS SEG NFR BLD AUTO: 66 % (ref 43–75)
NRBC BLD AUTO-RTO: 0 /100 WBCS
P AXIS: 62 DEGREES
PLATELET # BLD AUTO: 290 THOUSANDS/UL (ref 149–390)
PMV BLD AUTO: 9.5 FL (ref 8.9–12.7)
POTASSIUM SERPL-SCNC: 4.1 MMOL/L (ref 3.5–5.3)
PR INTERVAL: 160 MS
PROT SERPL-MCNC: 7.7 G/DL (ref 6.4–8.4)
QRS AXIS: 67 DEGREES
QRSD INTERVAL: 86 MS
QT INTERVAL: 372 MS
QTC INTERVAL: 426 MS
RBC # BLD AUTO: 5 MILLION/UL (ref 3.81–5.12)
SODIUM SERPL-SCNC: 138 MMOL/L (ref 135–147)
T WAVE AXIS: 55 DEGREES
VENTRICULAR RATE: 79 BPM
WBC # BLD AUTO: 9.63 THOUSAND/UL (ref 4.31–10.16)

## 2023-12-22 PROCEDURE — 99285 EMERGENCY DEPT VISIT HI MDM: CPT | Performed by: EMERGENCY MEDICINE

## 2023-12-22 PROCEDURE — 93005 ELECTROCARDIOGRAM TRACING: CPT

## 2023-12-22 PROCEDURE — 80053 COMPREHEN METABOLIC PANEL: CPT | Performed by: EMERGENCY MEDICINE

## 2023-12-22 PROCEDURE — 83690 ASSAY OF LIPASE: CPT | Performed by: EMERGENCY MEDICINE

## 2023-12-22 PROCEDURE — 96375 TX/PRO/DX INJ NEW DRUG ADDON: CPT

## 2023-12-22 PROCEDURE — 84484 ASSAY OF TROPONIN QUANT: CPT | Performed by: EMERGENCY MEDICINE

## 2023-12-22 PROCEDURE — 96374 THER/PROPH/DIAG INJ IV PUSH: CPT

## 2023-12-22 PROCEDURE — 36415 COLL VENOUS BLD VENIPUNCTURE: CPT

## 2023-12-22 PROCEDURE — 71045 X-RAY EXAM CHEST 1 VIEW: CPT

## 2023-12-22 PROCEDURE — 99285 EMERGENCY DEPT VISIT HI MDM: CPT

## 2023-12-22 PROCEDURE — 85025 COMPLETE CBC W/AUTO DIFF WBC: CPT | Performed by: EMERGENCY MEDICINE

## 2023-12-22 RX ORDER — KETOROLAC TROMETHAMINE 30 MG/ML
15 INJECTION, SOLUTION INTRAMUSCULAR; INTRAVENOUS ONCE
Status: COMPLETED | OUTPATIENT
Start: 2023-12-22 | End: 2023-12-22

## 2023-12-22 RX ORDER — FAMOTIDINE 10 MG/ML
20 INJECTION, SOLUTION INTRAVENOUS ONCE
Status: COMPLETED | OUTPATIENT
Start: 2023-12-22 | End: 2023-12-22

## 2023-12-22 RX ADMIN — FAMOTIDINE 20 MG: 10 INJECTION INTRAVENOUS at 13:30

## 2023-12-22 RX ADMIN — KETOROLAC TROMETHAMINE 15 MG: 30 INJECTION, SOLUTION INTRAMUSCULAR; INTRAVENOUS at 13:31

## 2023-12-22 NOTE — ED PROVIDER NOTES
History  Chief Complaint   Patient presents with    Chest Pain     Reports midsternal non radiating chest pain starting at 0630 today. C/O nausea.      58y F here for evaluation of chest pain that started around 0630 this am and has been constant since onset. Pain is sharp along the right sternal border, worse w/ movement or inspiration, no radiation of the pain.  Pain is making her mildly nauseated and reports some episodes of diaphoresis. Denies f/c/s, no cough/congestion, no sob/mtz, no anorexia, no v/d, no gerd/indigestion, no le pain or swelling.    No hx of dvt/pe, no hx of cancer, no exogenous estrogen. Doesn't recall any changes in activity, heavy lifting/etc.      History provided by:  Patient and medical records   used: No    Chest Pain      Prior to Admission Medications   Prescriptions Last Dose Informant Patient Reported? Taking?   ARIPiprazole (ABILIFY) 10 mg tablet   No No   Sig: Take 1 Po Q HS   DULoxetine (CYMBALTA) 60 mg delayed release capsule   No No   Sig: Take 1 Po BID   Patient taking differently: 2 times a day   albuterol (PROVENTIL HFA,VENTOLIN HFA) 90 mcg/act inhaler   No No   Sig: Inhale 2 puffs every 6 (six) hours as needed for wheezing or shortness of breath   clonazePAM (KlonoPIN) 1 mg tablet   No No   Sig: Clonazepam 1m/2 tablet in the morning and 1 tablet  at night   fexofenadine (ALLEGRA) 180 MG tablet   Yes No   Sig: Take 180 mg by mouth daily   fluticasone (FLONASE) 50 mcg/act nasal spray   Yes No   fluticasone-umeclidinium-vilanterol (Trelegy Ellipta) 100-62.5-25 mcg/actuation inhaler   No No   Sig: Inhale 1 puff daily Rinse mouth after use.   guaiFENesin (MUCINEX) 600 mg 12 hr tablet   Yes No   Sig: Take 1 tablet every 12 hours by oral route as directed for 7 days.   hydrOXYzine HCL (ATARAX) 50 mg tablet   No No   Sig: Take 1 PO TID PRN   Patient taking differently: 3 per day   ipratropium-albuterol (DUO-NEB) 0.5-2.5 mg/3 mL nebulizer solution   Yes No    meloxicam (MOBIC) 7.5 mg tablet   Yes No   Sig: Take 7.5 mg by mouth daily   traZODone (DESYREL) 100 mg tablet   No No   Sig: Take 2 1/2 P HS PRN      Facility-Administered Medications: None       Past Medical History:   Diagnosis Date    Anxiety     Chronic pain     Back Pain    COPD (chronic obstructive pulmonary disease) (HCC)     Depression     Hyperlipidemia     Migraine     Psychiatric disorder        Past Surgical History:   Procedure Laterality Date    OTHER SURGICAL HISTORY      cyst removed from left axila       Family History   Problem Relation Age of Onset    Heart failure Mother     Heart disease Father      I have reviewed and agree with the history as documented.    E-Cigarette/Vaping    E-Cigarette Use Never User      E-Cigarette/Vaping Substances    Nicotine No     THC No     CBD No     Flavoring No     Other No     Unknown No      Social History     Tobacco Use    Smoking status: Every Day     Current packs/day: 0.25     Average packs/day: 0.3 packs/day for 46.0 years (11.5 ttl pk-yrs)     Types: Cigarettes     Start date: 1978     Passive exposure: Current    Smokeless tobacco: Former    Tobacco comments:     Currently smoking 3 cigarettes daily   Vaping Use    Vaping status: Never Used   Substance Use Topics    Alcohol use: Not Currently     Comment: Told ED last drink was 6 years ago, told this RN 2 different stories, 3 vs 4 years ago    Drug use: Not Currently     Types: Methamphetamines, Marijuana     Comment: reports last use years ago       Review of Systems   Cardiovascular:  Positive for chest pain.   All other systems reviewed and are negative.      Physical Exam  Physical Exam  Vitals and nursing note reviewed.   Constitutional:       Appearance: Normal appearance.   HENT:      Nose: Nose normal.      Mouth/Throat:      Mouth: Mucous membranes are moist.   Eyes:      Conjunctiva/sclera: Conjunctivae normal.   Cardiovascular:      Rate and Rhythm: Normal rate and regular rhythm.       Heart sounds: No murmur heard.  Pulmonary:      Effort: Pulmonary effort is normal. No respiratory distress.      Breath sounds: Normal breath sounds. No stridor. No wheezing, rhonchi or rales.   Chest:      Chest wall: Tenderness present.       Abdominal:      Palpations: Abdomen is soft.   Musculoskeletal:         General: No swelling or tenderness.      Cervical back: Normal range of motion.   Skin:     General: Skin is warm.      Findings: No rash.   Neurological:      General: No focal deficit present.      Mental Status: She is alert.   Psychiatric:         Mood and Affect: Mood normal.         Vital Signs  ED Triage Vitals [12/22/23 1209]   Temperature Pulse Respirations Blood Pressure SpO2   98 °F (36.7 °C) 82 18 (!) 176/86 97 %      Temp Source Heart Rate Source Patient Position - Orthostatic VS BP Location FiO2 (%)   Oral Monitor Sitting Right arm --      Pain Score       9           Vitals:    12/22/23 1209 12/22/23 1333 12/22/23 1400   BP: (!) 176/86 (!) 171/99 (!) 178/98   Pulse: 82 74 79   Patient Position - Orthostatic VS: Sitting Sitting Sitting         Visual Acuity      ED Medications  Medications   ketorolac (TORADOL) injection 15 mg (15 mg Intravenous Given 12/22/23 1331)   Famotidine (PF) (PEPCID) injection 20 mg (20 mg Intravenous Given 12/22/23 1330)       Diagnostic Studies  Results Reviewed       Procedure Component Value Units Date/Time    Lipase [[id number]]  (Normal) Collected: 12/22/23 1211    Lab Status: Final result Specimen: Blood from Arm, Right Updated: 12/22/23 1346     Lipase 75 u/L     Comprehensive metabolic panel [[id number]]  (Abnormal) Collected: 12/22/23 1211    Lab Status: Final result Specimen: Blood from Arm, Right Updated: 12/22/23 1247     Sodium 138 mmol/L      Potassium 4.1 mmol/L      Chloride 103 mmol/L      CO2 29 mmol/L      ANION GAP 6 mmol/L      BUN 13 mg/dL      Creatinine 1.07 mg/dL      Glucose 137 mg/dL      Calcium 10.4 mg/dL      AST 13 U/L      ALT 15 U/L       Alkaline Phosphatase 102 U/L      Total Protein 7.7 g/dL      Albumin 4.3 g/dL      Total Bilirubin 0.44 mg/dL      eGFR 57 ml/min/1.73sq m     Narrative:      National Kid[employer] Foundation guidelines for Chronic Kidney Disease (CKD):     Stage 1 with normal or high GFR (GFR > 90 mL/min/1.73 square meters)    Stage 2 Mild CKD (GFR = 60-89 mL/min/1.73 square meters)    Stage 3A Moderate CKD (GFR = 45-59 mL/min/1.73 square meters)    Stage 3B Moderate CKD (GFR = 30-44 mL/min/1.73 square meters)    Stage 4 Severe CKD (GFR = 15-29 mL/min/1.73 square meters)    Stage 5 End Stage CKD (GFR <15 mL/min/1.73 square meters)  Note: GFR calculation is accurate only with a steady state creatinine    HS Troponin 0hr (reflex protocol) [[id number]]  (Normal) Collected: 12/22/23 1211    Lab Status: Final result Specimen: Blood from Arm, Right Updated: 12/22/23 1241     hs TnI 0hr 3 ng/L     CBC and differential [[id number]]  (Abnormal) Collected: 12/22/23 1211    Lab Status: Final result Specimen: Blood from Arm, Right Updated: 12/22/23 1216     WBC 9.63 Thousand/uL      RBC 5.00 Million/uL      Hemoglobin 15.7 g/dL      Hematocrit 48.4 %      MCV 97 fL      MCH 31.4 pg      MCHC 32.4 g/dL      RDW 13.4 %      MPV 9.5 fL      Platelets 290 Thousands/uL      nRBC 0 /100 WBCs      Neutrophils Relative 66 %      Immat GRANS % 0 %      Lymphocytes Relative 26 %      Monocytes Relative 6 %      Eosinophils Relative 1 %      Basophils Relative 1 %      Neutrophils Absolute 6.32 Thousands/µL      Immature Grans Absolute 0.04 Thousand/uL      Lymphocytes Absolute 2.47 Thousands/µL      Monocytes Absolute 0.58 Thousand/µL      Eosinophils Absolute 0.13 Thousand/µL      Basophils Absolute 0.09 Thousands/µL                    XR chest 1 view portable   ED Interpretation by Sushma Modi DO (12/22 0424)   Xray reviewed and independently interpreted by me: no acute findings        Final Result by Suman Dias MD (12/22 0757)      No  acute cardiopulmonary disease.                  Workstation performed: [id number]                    Procedures  ECG 12 Lead Documentation Only    Date/Time: 12/22/2023 12:50 PM    Performed by: Sushma Modi DO  Authorized by: Sushma Modi DO    Indications / Diagnosis:  Chest pain  ECG reviewed by me, the ED Provider: yes    Patient location:  ED  Previous ECG:     Previous ECG:  Compared to current    Similarity:  No change  Interpretation:     Interpretation: normal    Rate:     ECG rate:  79    ECG rate assessment: normal    Rhythm:     Rhythm: sinus rhythm    Ectopy:     Ectopy: none    QRS:     QRS axis:  Normal  Conduction:     Conduction: normal    ST segments:     ST segments:  Normal  T waves:     T waves: normal             ED Course  ED Course as of 12/22/23 1606   Fri Dec 22, 2023   1407 hs TnI 0hr: 3  HEART score 2    Given pt has had constant pain for more than 3 hours, no need for repeat troponin    Exam c/w costochondritis - will recommend symptomatic treatment   1407 Lipase: 75             HEART Risk Score      Flowsheet Row Most Recent Value   Heart Score Risk Calculator    History 0 Filed at: 12/22/2023 1407   ECG 0 Filed at: 12/22/2023 1407   Age 1 Filed at: 12/22/2023 1407   Risk Factors 1 Filed at: 12/22/2023 1407   Troponin 0 Filed at: 12/22/2023 1407   HEART Score 2 Filed at: 12/22/2023 1407                              Aroldo' Criteria for PE      Flowsheet Row Most Recent Value   Wells' Criteria for PE    Clinical signs and symptoms of DVT 0 Filed at: 12/22/2023 1407   PE is primary diagnosis or equally likely 0 Filed at: 12/22/2023 1407   HR >100 0 Filed at: 12/22/2023 1407   Immobilization at least 3 days or Surgery in the previous 4 weeks 0 Filed at: 12/22/2023 1407   Previous, objectively diagnosed PE or DVT 0 Filed at: 12/22/2023 1407   Hemoptysis 0 Filed at: 12/22/2023 1407   Malignancy with treatment within 6 months or palliative 0 Filed at: 12/22/2023 1407   Oscar  Criteria Total 0 Filed at: 2023 1407                  Medical Decision Making  Will get EKG to r/o arrhythmia, ischemic changes.  Will get labs to r/o acute life threatening metabolic abnl, cardiac ischemia, significant anemia.  Will get CXR to r/o occult pna, ptx    Problems Addressed:  Costochondral chest pain: acute illness or injury that poses a threat to life or bodily functions     Details: No evidence of acute life threatening arrhythmia or ischemia    Amount and/or Complexity of Data Reviewed  Labs: ordered. Decision-making details documented in ED Course.  Radiology: ordered and independent interpretation performed.    Risk  Prescription drug management.             Disposition  Final diagnoses:   Costochondral chest pain     Time reflects when diagnosis was documented in both MDM as applicable and the Disposition within this note       Time User Action Codes Description Comment    2023  2:08 PM Sushma Modi Add [R07.89] Costochondral chest pain           ED Disposition       ED Disposition   Discharge    Condition   Stable    Date/Time   Fri Dec 22, 2023 1408    Comment   Krystin Francis discharge to home/self care.                   Follow-up Information       Follow up With Specialties Details Why Contact Info    Douglas C Shoenberger, MD Family Medicine Schedule an appointment as soon as possible for a visit in 1 week for further evaluation and treatment [address] [place] [id number]  [phone]              Discharge Medication List as of 2023  2:09 PM        CONTINUE these medications which have NOT CHANGED    Details   albuterol (PROVENTIL HFA,VENTOLIN HFA) 90 mcg/act inhaler Inhale 2 puffs every 6 (six) hours as needed for wheezing or shortness of breath, Starting 2023, Normal      ARIPiprazole (ABILIFY) 10 mg tablet Take 1 Po Q HS, Normal      clonazePAM (KlonoPIN) 1 mg tablet Clonazepam 1m/2 tablet in the morning and 1 tablet  at night,  Normal      DULoxetine (CYMBALTA) 60 mg delayed release capsule Take 1 Po BID, Normal      fexofenadine (ALLEGRA) 180 MG tablet Take 180 mg by mouth daily, Starting Tue 4/4/2023, Until Wed 4/3/2024, Historical Med      fluticasone (FLONASE) 50 mcg/act nasal spray Starting Mon 10/25/2021, Historical Med      fluticasone-umeclidinium-vilanterol (Trelegy Ellipta) 100-62.5-25 mcg/actuation inhaler Inhale 1 puff daily Rinse mouth after use., Starting Tue 9/26/2023, Until Mon 12/25/2023, Normal      guaiFENesin (MUCINEX) 600 mg 12 hr tablet Take 1 tablet every 12 hours by oral route as directed for 7 days., Historical Med      hydrOXYzine HCL (ATARAX) 50 mg tablet Take 1 PO TID PRN, Normal      ipratropium-albuterol (DUO-NEB) 0.5-2.5 mg/3 mL nebulizer solution Starting Tue 5/23/2023, Historical Med      meloxicam (MOBIC) 7.5 mg tablet Take 7.5 mg by mouth daily, Starting Fri 9/23/2022, Until Tue 9/26/2023, Historical Med      traZODone (DESYREL) 100 mg tablet Take 2 1/2 P HS PRN, Normal             No discharge procedures on file.    PDMP Review         Value Time User    PDMP Reviewed  Yes 9/26/2023  3:21 PM Aleisha Arenas DO            ED Provider  Electronically Signed by             Sushma Modi DO  12/22/23 6656

## 2023-12-22 NOTE — DISCHARGE INSTRUCTIONS
You can alternate acetaminophen 1000mg and ibuprofen 600mg every 3 hours for pain.  Try to time your ibuprofen so you can take it when you eat.  Additionally you can apply ice for 15-20 minutes every 1-2 hours while awake for additional pain control.      Return for any trouble breathing, persistent vomiting, fever more than 101, worsening symptoms or for any concerns.

## 2023-12-28 ENCOUNTER — OFFICE VISIT (OUTPATIENT)
Dept: PSYCHIATRY | Facility: CLINIC | Age: 58
End: 2023-12-28
Payer: COMMERCIAL

## 2023-12-28 VITALS
SYSTOLIC BLOOD PRESSURE: 104 MMHG | WEIGHT: 187.2 LBS | HEART RATE: 65 BPM | BODY MASS INDEX: 33.17 KG/M2 | HEIGHT: 63 IN | DIASTOLIC BLOOD PRESSURE: 71 MMHG

## 2023-12-28 DIAGNOSIS — F12.10 CANNABIS ABUSE: ICD-10-CM

## 2023-12-28 DIAGNOSIS — F41.0 PANIC ATTACKS: ICD-10-CM

## 2023-12-28 DIAGNOSIS — F33.1 MAJOR DEPRESSIVE DISORDER, RECURRENT EPISODE, MODERATE (HCC): Primary | ICD-10-CM

## 2023-12-28 DIAGNOSIS — F41.1 GENERALIZED ANXIETY DISORDER: ICD-10-CM

## 2023-12-28 DIAGNOSIS — F10.11 HISTORY OF ALCOHOL ABUSE: ICD-10-CM

## 2023-12-28 DIAGNOSIS — G47.00 INSOMNIA, UNSPECIFIED TYPE: ICD-10-CM

## 2023-12-28 PROCEDURE — 90792 PSYCH DIAG EVAL W/MED SRVCS: CPT | Performed by: PHYSICIAN ASSISTANT

## 2023-12-28 RX ORDER — DULOXETIN HYDROCHLORIDE 60 MG/1
CAPSULE, DELAYED RELEASE ORAL
Qty: 180 CAPSULE | Refills: 0 | Status: SHIPPED | OUTPATIENT
Start: 2023-12-28

## 2023-12-28 RX ORDER — ARIPIPRAZOLE 10 MG/1
TABLET ORAL
Qty: 90 TABLET | Refills: 0 | Status: SHIPPED | OUTPATIENT
Start: 2023-12-28

## 2023-12-28 RX ORDER — DOXEPIN HYDROCHLORIDE 25 MG/1
CAPSULE ORAL
Qty: 180 CAPSULE | Refills: 0 | Status: SHIPPED | OUTPATIENT
Start: 2023-12-28

## 2023-12-28 RX ORDER — HYDROXYZINE 50 MG/1
TABLET, FILM COATED ORAL
Qty: 360 TABLET | Refills: 0 | Status: SHIPPED | OUTPATIENT
Start: 2023-12-28

## 2023-12-28 NOTE — BH TREATMENT PLAN
"TREATMENT PLAN (Medication Management Only)        [facility]    Name/Date of Birth/MRN:  Krystin Francis 58 y.o. 1965 MRN: [id number]  Date of Treatment Plan: December 28, 2023  Diagnosis/Diagnoses:   1. Major depressive disorder, recurrent episode, moderate (HCC)    2. Generalized anxiety disorder    3. History of alcohol abuse    4. Cannabis abuse    5. Panic attacks    6. Insomnia, unspecified type      Strengths/Personal Resources for Self-Care: \"My cats: I write spiritual sayings I find on Facebook or people message me.\"  Area/Areas of need (in own words): \"I can't get my thoughts in.., and then I found out my ex-boyfriend of 8 years is getting \".  1. Long Term Goal:   Maintain mood stability and control of anxiety and panic.  Prevent ETOH and drug relapses  Target Date:  3-6 months  Person/Persons responsible for completion of goal: Geovanna  2.  Short Term Objective (s) - How will we reach this goal?:   A.  Provider new recommended medication/dosage changes and/or continue medication(s):   Pt is having mild to moderate level depression, anxiety, panic attacks and PTSD type Sxs -- this latter Dx is likely, but I will observe a bit further before formalizing it.  Surveys done 12/21/2023 SAJAN 7 score 7, PHQ 9 score finished out today: 7.  The Mdq was indicative of two Sxs-- which based on Hx, associate with anxiety.   Pt denies any recent ETOH abuse or illicit drug use/abuse.  Advised to avoid any ETOH.  Tx options discussed and Pt had a 10 day lag in having her Aripiprazole, Clonazepam, and Trazodone.  I will restart and titrate the SGA and Duloxetine, and restart and increase the Hydroxyzine for anxiety.  Given her substance/ETOH Hx and COPD Hx-- multiple ER visits for COPD in the past year, I will not be restarting Clonazepam.  Pt is not in withdrawal per her attestation, does not appear so, and vital signs are WNL.  No further " Trazodone as it was not helping enough, and Pt is willing to try Doxepin for insomnia.  I recommended psychotherapy and Pt is interested -- she accepts referral to the [facility] therapy team, but also community resources of [facility] and [email or web address].  Pt given Warm Line and Crisis Line #s to have on hand.  Treatment plan done and Pt accepts the plan.  D/C Clonazepam -- due to Hx and associated risks  D/C Trazodone   Start Doxepin 50mg (1 - 2) caps po qhs # 180  Restart and increase dosing of:  Hydroxyzine 50mg (1 1/2 - 2) tabs po qd-bid prn anxiety # 360  Restart:   Aripiprazole 10mg (1/2) tab po qhs x 1 week, then (1) tab qhs # 90  Duloxetine 60mg (1) cap po qd x 1 week, then bid # 180  Get TSH, UDS with Ethanol  Return 1/25/2023 as scheduled.  Can call any time sooner prn.  Pt made aware of the 24-7 on call service line.      Target Date:  3-6 months  Person/Persons Responsible for Completion of Goal: Geovanna   Progress Towards Goals: stable, starting treatment  Treatment Modality:  Medication mgt  Review due 180 days from date of this plan: 6 months - 6/28/2024  Expected length of service: ongoing treatment unless revised  My Physician/PA/NP and I have developed this plan together and I agree to work on the goals and objectives. I understand the treatment goals that were developed for my treatment.  Electronic Signatures: on file (unless signed below)    Annie Lopez PA-C 12/28/23
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
## 2024-01-02 ENCOUNTER — TELEPHONE (OUTPATIENT)
Dept: PSYCHIATRY | Facility: CLINIC | Age: 59
End: 2024-01-02
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
## 2024-01-02 NOTE — TELEPHONE ENCOUNTER
"Behavioral Health Outpatient Intake Questions    Referred By   : Annie Lopez    Please advise interviewee that they need to answer all questions truthfully to allow for best care, and any misrepresentations of information may affect their ability to be seen at this clinic   => Was this discussed? Yes     If Minor Child (under age 18)    Who is/are the legal guardian(s) of the child?     Is there a custody agreement? No     If \"YES\"- Custody orders must be obtained prior to scheduling the first appointment  In addition, Consent to Treatment must be signed by all legal guardians prior to scheduling the first appointment    If \"NO\"- Consent to Treatment must be signed by all legal guardians prior to scheduling the first appointment    Behavioral Health Outpatient Intake History -     Presenting Problem (in patient's own words):     Just would like someone to talk to that is not family/judgemental    Are there any communication barriers for this patient?     No                                               If yes, please describe barriers:  none  If there is a unique situation, please refer to Carlos Rivas/Soha Green for final determination.    Are you taking any psychiatric medications? Yes     If \"YES\" -What are they Cymbalta, Abilify, Atarax, Klonopin     If \"YES\" -Who prescribes? Annie Lopez    Has the Patient previously received outpatient Talk Therapy or Medication Management from [facility]  Yes        If \"YES\"- When, Where and with Whom? Annie Lopez - med mgmt         If \"NO\" -Has Patient received these services elsewhere?       If \"YES\" -When, Where, and with Whom?    Has the Patient abused alcohol or other substances in the last 6 months ? No  No concerns of substance abuse are reported.     If \"YES\" -What substance, How much, How often?     If illegal substance: Refer to [facility] (for BAKARI) or [facility].   If Alcohol in excess of 10 drinks per week:  Refer to [facility] " "(for BAKARI) or [facility]    Legal History-     Is this treatment court ordered? No   If \"yes \"send to :  Talk Therapy : Send to Carlos Rivas/Soha Green for final determination   Med Management: Send to Dr Murcia for final determination     Has the Patient been convicted of a felony?  No   If \"Yes\" send to -When, What?  Talk Therapy : Send to Carlos Green for final determination   Med Management: Send to Dr Murcia for final determination     ACCEPTED as a patient Yes  If \"Yes\" Appointment Date: 1/4 at 10am with Mariah    Referred Elsewhere? No  If “Yes” - (Where? Ex: [facility], [facility], [facility], [facility], etc.)       Name of Insurance Co:[employer]  Insurance ID#  Insurance Phone #  If ins is primary or secondary?Primary  If patient is a minor, parents information such as Name, D.O.B of guarantor.  "

## 2024-01-02 NOTE — TELEPHONE ENCOUNTER
Contacted patient off of Talk Therapy  to verify needs of services in attempts to offer patient an appointment at [facility] . spoke with patient whom stated they were interested.

## 2024-01-04 ENCOUNTER — TELEPHONE (OUTPATIENT)
Dept: PSYCHIATRY | Facility: CLINIC | Age: 59
End: 2024-01-04

## 2024-01-04 DIAGNOSIS — F33.1 MAJOR DEPRESSIVE DISORDER, RECURRENT EPISODE, MODERATE (HCC): ICD-10-CM

## 2024-01-04 DIAGNOSIS — F41.1 GENERALIZED ANXIETY DISORDER: ICD-10-CM

## 2024-01-04 RX ORDER — CLONAZEPAM 1 MG/1
TABLET ORAL
Qty: 45 TABLET | Refills: 1 | OUTPATIENT
Start: 2024-01-04

## 2024-01-04 NOTE — TELEPHONE ENCOUNTER
Medication Refill Request     Name of Medication klonopin  Dose/Frequency 1mg  1/2 tablet in the moring and 1 tablet at night  Quantity 45  Verified pharmacy   [x]  Verified ordering Provider   [x]  Does patient have enough for the next 3 days? Yes [] No [x]  Does patient have a follow-up appointment scheduled? Yes [x] No []   If so when is appointment: 1/25/2024 12:30pm

## 2024-01-04 NOTE — TELEPHONE ENCOUNTER
Patient is calling regarding cancelling an appointment.    Date/Time: 1/4/24    Reason: No reason was given    Patient was rescheduled: YES [x] NO []  If yes, when was Patient reschedule for: 1/18/24    Patient requesting call back to reschedule: YES [] NO [x]

## 2024-01-09 ENCOUNTER — TELEPHONE (OUTPATIENT)
Dept: PSYCHIATRY | Facility: CLINIC | Age: 59
End: 2024-01-09

## 2024-01-09 NOTE — TELEPHONE ENCOUNTER
Patient called to reschedule new pt appt on 1/18 at 10 am with provider. Writer transferred caller to [facility] location to assist with scheduling. Please review, pt can be reached at [phone]

## 2024-01-18 ENCOUNTER — TELEPHONE (OUTPATIENT)
Dept: PSYCHIATRY | Facility: CLINIC | Age: 59
End: 2024-01-18

## 2024-01-18 NOTE — TELEPHONE ENCOUNTER
"Pt called in regarding her doxepin 25 mg. Pt states that she does not feel the medication is doing what it is supposed to do. Pt is not sleeping and says due to that she has become very \" cranky\" with her family. Pt would like to restart the trazodone 100 mg tablets as that seemed to help more in the past. Please advise.   "

## 2024-01-19 DIAGNOSIS — J44.9 CHRONIC OBSTRUCTIVE PULMONARY DISEASE, UNSPECIFIED COPD TYPE (HCC): ICD-10-CM

## 2024-01-19 RX ORDER — FLUTICASONE FUROATE, UMECLIDINIUM BROMIDE AND VILANTEROL TRIFENATATE 100; 62.5; 25 UG/1; UG/1; UG/1
1 POWDER RESPIRATORY (INHALATION) DAILY
Qty: 180 BLISTER | Refills: 0 | Status: SHIPPED | OUTPATIENT
Start: 2024-01-19 | End: 2024-04-18

## 2024-01-19 NOTE — TELEPHONE ENCOUNTER
Spoke with Krystin. She reports doxepin is making things worse and she's not sleeping - only 2-3 hours a night. Reviewed previous dosing of trazodone 100 mg tab, 2 and 1/2 tabs every night. Her preferred pharmacy would be [facility].    Krystin also asked about a refill of clonazepam. Office notes reflect this medication was not to be continued and reviewed with Krystin. She does not recall talking about this.

## 2024-01-19 NOTE — TELEPHONE ENCOUNTER
I called Krystin via her cell/home phone # of record and she reports the Doxepin is not helping enough-- that she sleeps for 3 hrs on a 100mg dose and then awakens.  She also reports that the pharmacy gave her 1 month's worth with refills rather than the 90 day supply my Rx indicated in 12/2023.  She states the Trazodone was working well enough for sleep (however at the evaluation visit 12/28/2023 she had said it was not which was the impetus for the switch to Doxepin).  She agreed to try an add on dose of Doxepin-- I instructed her to take 1 additional 50mg cap if/and upon awakening in the night.  She agreed to try this and I called  [facility] and left a voice msg that I authorize the early fill of the next refill of Doxepin.  Pt also mentions that her papers from the office did not say that the Clonazepam was discontinued, but we did talk about this.  It is possible when she received an AVS at the 12/2023 visit, that I was not finished with my documentation at the time she left.  Pt verbalized understanding and acceptance of all that was discussed above.  I asked if Hydroxyzine is helping her anxiety and she stated it was.

## 2024-01-19 NOTE — TELEPHONE ENCOUNTER
Reason for call:   [x] Refill   [] Prior Auth  [] Other:     Office:   [] PCP/Provider -   [x] Specialty/Provider - Pulmonary    Medication: fluticasone-umeclidinium-vilanterol (Trelegy Ellipta) 100-62.5-25 mcg/actuation inhaler     Dose/Frequency: Inhale 1 puff daily Rinse mouth after use     Quantity: 180     Pharmacy:    [facility] [place] [id number]  Phone: [phone]  Fax: [phone]     Does the patient have enough for 3 days?   [] Yes   [x] No - Send as HP to POD

## 2024-01-19 NOTE — TELEPHONE ENCOUNTER
Krystin left message that she called on 1/18 about a prescription and no one called her back.  She is requesting a call back.    Called and spoke to Krystin, she stated that the doxepin 25 mg is not working and she would to know if she can go back on Trazodone.    She also request refill for Klonopin     [facility] prescriptions [facility]:  [facility]

## 2024-01-19 NOTE — TELEPHONE ENCOUNTER
Patient called and stated she called yesterday 1/18/2024 regarding doxepin and trazodone and noone has reached out to her
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
## 2024-01-22 DIAGNOSIS — F41.1 GENERALIZED ANXIETY DISORDER: ICD-10-CM

## 2024-01-22 DIAGNOSIS — F33.1 MAJOR DEPRESSIVE DISORDER, RECURRENT EPISODE, MODERATE (HCC): ICD-10-CM

## 2024-01-22 DIAGNOSIS — G47.00 INSOMNIA, UNSPECIFIED TYPE: Primary | ICD-10-CM

## 2024-01-22 RX ORDER — ZOLPIDEM TARTRATE 10 MG/1
5-10 TABLET ORAL
Qty: 15 TABLET | Refills: 0 | Status: SHIPPED | OUTPATIENT
Start: 2024-01-22 | End: 2024-01-25 | Stop reason: SDUPTHER

## 2024-01-22 RX ORDER — TRAZODONE HYDROCHLORIDE 100 MG/1
TABLET ORAL
Qty: 225 TABLET | Refills: 0 | OUTPATIENT
Start: 2024-01-22

## 2024-01-22 NOTE — TELEPHONE ENCOUNTER
Medication Refill Request     Name of Medication Trazodone  Dose/Frequency 100 mg/ Take 2 1/2 P HS PRN  Quantity 225  Verified pharmacy   [x]  Verified ordering Provider   [x]  Does patient have enough for the next 3 days? Yes [] No [x]  Does patient have a follow-up appointment scheduled? Yes [x] No []   If so when is appointment: 1/25/2024

## 2024-01-22 NOTE — TELEPHONE ENCOUNTER
Krystin left an additional message with nursing this afternoon, requesting Annie call her about the trazodone, asap.

## 2024-01-23 ENCOUNTER — TELEPHONE (OUTPATIENT)
Dept: PSYCHIATRY | Facility: CLINIC | Age: 59
End: 2024-01-23

## 2024-01-23 NOTE — TELEPHONE ENCOUNTER
I contacted Krystin via her cell/home phone # of record and she reports that she tried the add on dose of Doxepin in the night and it did not help and the drug us making her feel jittery with racy thoughts.  I advised that she discontinue the Doxepin effective immediately.  She feels the Hydroxyzine works well for anxiety, but does not help her get to sleep -- even at the 100mg dose level.  Reviewed medication trials and discussed options.  She reports having tried Melatonin up to 5mg without benefit.   She has used up to 250mg Trazodone with insufficient benefit.  She has not tried Ambien or Zaleplon and I discussed the risks, benefits and SE of these medicines and that I would prescribe 15 doses per month and Pt verbalized understanding and acceptance to try this.  Sleep hygiene discussed --  avoid caffeine for 4-5 hours before bedtime, do a relaxing activity 2 hours leading into bedtime-- Pt identifies reading, listen to calming relaxation music or sound Apps, avoid napping in the day if possible-- or keep daytime naps short-- ie not more than 30min.  She does not like warm milk.  I e-scribed the following to her designated pharmacy:  Zolpidem 10mg (1/2-1) tab po qhs prn insomnia # 15

## 2024-01-23 NOTE — TELEPHONE ENCOUNTER
Patients Name: Krystin Francis  : 1965  Phone Number: [phone]  Appointment Date: 2024  Appointment time: 12:30pm   Address: [address] [place] [id number]  Drop off Facility/Office Name: [facility]  Drop off  Address: [facility] [address], [place] [address]  Cost Center: [id number]  Special notes/directions for   Note for scheduling team

## 2024-01-25 ENCOUNTER — OFFICE VISIT (OUTPATIENT)
Dept: PSYCHIATRY | Facility: CLINIC | Age: 59
End: 2024-01-25
Payer: COMMERCIAL

## 2024-01-25 VITALS — HEIGHT: 63 IN | BODY MASS INDEX: 33.2 KG/M2 | WEIGHT: 187.4 LBS

## 2024-01-25 DIAGNOSIS — G47.00 INSOMNIA, UNSPECIFIED TYPE: ICD-10-CM

## 2024-01-25 DIAGNOSIS — F12.11 CANNABIS ABUSE, IN REMISSION: ICD-10-CM

## 2024-01-25 DIAGNOSIS — F10.11 HISTORY OF ALCOHOL ABUSE: ICD-10-CM

## 2024-01-25 DIAGNOSIS — F41.0 PANIC ATTACKS: ICD-10-CM

## 2024-01-25 DIAGNOSIS — F41.1 GENERALIZED ANXIETY DISORDER: ICD-10-CM

## 2024-01-25 DIAGNOSIS — F33.1 MAJOR DEPRESSIVE DISORDER, RECURRENT EPISODE, MODERATE (HCC): Primary | ICD-10-CM

## 2024-01-25 PROCEDURE — 99214 OFFICE O/P EST MOD 30 MIN: CPT | Performed by: PHYSICIAN ASSISTANT

## 2024-01-25 RX ORDER — ARIPIPRAZOLE 2 MG/1
2 TABLET ORAL DAILY
Qty: 90 TABLET | Refills: 0 | Status: SHIPPED | OUTPATIENT
Start: 2024-01-25

## 2024-01-25 RX ORDER — ZOLPIDEM TARTRATE 10 MG/1
5-10 TABLET ORAL
Qty: 15 TABLET | Refills: 5 | Status: SHIPPED | OUTPATIENT
Start: 2024-01-25

## 2024-02-01 ENCOUNTER — TELEPHONE (OUTPATIENT)
Dept: PSYCHIATRY | Facility: CLINIC | Age: 59
End: 2024-02-01

## 2024-02-01 NOTE — TELEPHONE ENCOUNTER
Patient is calling regarding cancelling an appointment.    Date/Time: 2/1/24 at 11 am     Reason: Transportation Issues     Patient was rescheduled: YES [x] NO []  If yes, when was Patient reschedule for: 2/9/24    Patient requesting call back to reschedule: YES [] NO [x]

## 2024-02-09 ENCOUNTER — TELEPHONE (OUTPATIENT)
Dept: PSYCHIATRY | Facility: CLINIC | Age: 59
End: 2024-02-09

## 2024-02-09 DIAGNOSIS — G47.00 INSOMNIA, UNSPECIFIED TYPE: Primary | ICD-10-CM

## 2024-02-09 RX ORDER — TRAZODONE HYDROCHLORIDE 100 MG/1
TABLET ORAL
Qty: 270 TABLET | Refills: 0 | Status: SHIPPED | OUTPATIENT
Start: 2024-02-09

## 2024-02-09 NOTE — TELEPHONE ENCOUNTER
"I called Krystin via cell/home phone # of record and she reported still not sleeping well despite having used the full 10mg tab of Zolpidem with a 10mg tab of OTC Melatonin.  She feels that both medicines have done nothing for her sleep.  She denies mood and anxiety factors and states she has \"Been doing pretty good\" since the increase in the Aripiprazole and that presently, \"My only issue is sleep.\"   Tx options discussed and she felt that Trazodone at the 225mg dose level helped at least partially in the past, and she is willing to retrial this medication.  I advised her to stop the Zolpidem and Melatonin effective immediately and I will restart the Trazodone.  I discussed starting her at 100mg and giving leeway to go up to 3 tabs a night, but also EMPHASIZING that she try 1 tab or even 1/2 tab first, since she has not used it in a while and she is also on a higher dose of the SGA than she was before.  Pt verbalized understanding and acceptance of all that was discussed above.  Pt returns for f/u on 2/22/2024. I e-scribed the following to her designated  Pharmacy [facility]. pharmacy:  Trazodone 100mg (1-2) tabs po qhs prn insomnia # 270    "

## 2024-02-22 ENCOUNTER — TELEPHONE (OUTPATIENT)
Dept: PSYCHIATRY | Facility: CLINIC | Age: 59
End: 2024-02-22

## 2024-02-26 DIAGNOSIS — F33.1 MAJOR DEPRESSIVE DISORDER, RECURRENT EPISODE, MODERATE (HCC): ICD-10-CM

## 2024-03-07 RX ORDER — ARIPIPRAZOLE 10 MG/1
10 TABLET ORAL
Qty: 90 TABLET | Refills: 0 | Status: SHIPPED | OUTPATIENT
Start: 2024-03-07

## 2024-03-21 DIAGNOSIS — F41.1 GENERALIZED ANXIETY DISORDER: ICD-10-CM

## 2024-03-21 DIAGNOSIS — F33.1 MAJOR DEPRESSIVE DISORDER, RECURRENT EPISODE, MODERATE (HCC): ICD-10-CM

## 2024-03-21 RX ORDER — ARIPIPRAZOLE 2 MG/1
TABLET ORAL
Qty: 180 TABLET | Refills: 0 | Status: SHIPPED | OUTPATIENT
Start: 2024-03-21

## 2024-03-21 RX ORDER — DULOXETIN HYDROCHLORIDE 60 MG/1
CAPSULE, DELAYED RELEASE ORAL
Qty: 180 CAPSULE | Refills: 0 | Status: SHIPPED | OUTPATIENT
Start: 2024-03-21

## 2024-03-21 RX ORDER — ARIPIPRAZOLE 10 MG/1
10 TABLET ORAL
Qty: 90 TABLET | Refills: 0 | Status: SHIPPED | OUTPATIENT
Start: 2024-03-21

## 2024-03-21 RX ORDER — HYDROXYZINE 50 MG/1
TABLET, FILM COATED ORAL
Qty: 360 TABLET | Refills: 0 | Status: SHIPPED | OUTPATIENT
Start: 2024-03-21

## 2024-03-21 NOTE — TELEPHONE ENCOUNTER
Krystin was unable to connect virtually for her appt earlier this morning and would not be available to reschedule to later this afternoon when I offered a few available slots.   She will call to reschedule.  In the mean time she required a few renewals and I e-scribed the following to her designated  Pharmacy [facility]:  Aripiprazole 2mg (2) tabs po qAM # 180  Aripiprazole 10mg (1) tab po qhs # 90  Duloxetine 60mg (1) cap po bid # 180  Hydroxyzine 50mg (1 1/2 - 2) tabs po qd-bid prn anxiety 360

## 2024-03-26 ENCOUNTER — TELEPHONE (OUTPATIENT)
Dept: PSYCHIATRY | Facility: CLINIC | Age: 59
End: 2024-03-26

## 2024-03-26 NOTE — TELEPHONE ENCOUNTER
Patient contacted the office to schedule a follow up visit with provider. Patient is now scheduled for 4/5/2024   at 9:30 am in office.
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
## 2024-04-04 ENCOUNTER — TELEPHONE (OUTPATIENT)
Dept: PSYCHIATRY | Facility: CLINIC | Age: 59
End: 2024-04-04

## 2024-04-04 NOTE — TELEPHONE ENCOUNTER
Patient called the office seeking to reschedule her appt due to no transportation.  offered the [employer] service so patient doesn't have to miss her appt.  called Bri and schedule a Lyft  for 8:50 am on 4/5/2024.

## 2024-04-05 ENCOUNTER — OFFICE VISIT (OUTPATIENT)
Dept: PSYCHIATRY | Facility: CLINIC | Age: 59
End: 2024-04-05

## 2024-04-05 VITALS — HEIGHT: 63 IN | WEIGHT: 184.5 LBS | BODY MASS INDEX: 32.69 KG/M2

## 2024-04-05 DIAGNOSIS — F41.0 PANIC ATTACKS: ICD-10-CM

## 2024-04-05 DIAGNOSIS — F12.11 CANNABIS ABUSE, IN REMISSION: ICD-10-CM

## 2024-04-05 DIAGNOSIS — F33.1 MAJOR DEPRESSIVE DISORDER, RECURRENT EPISODE, MODERATE (HCC): ICD-10-CM

## 2024-04-05 DIAGNOSIS — F41.1 GENERALIZED ANXIETY DISORDER: Primary | ICD-10-CM

## 2024-04-05 DIAGNOSIS — F10.11 HISTORY OF ALCOHOL ABUSE: ICD-10-CM

## 2024-04-05 DIAGNOSIS — G47.00 INSOMNIA, UNSPECIFIED TYPE: ICD-10-CM

## 2024-04-05 RX ORDER — ARIPIPRAZOLE 2 MG/1
TABLET ORAL
Qty: 180 TABLET | Refills: 0 | Status: SHIPPED | OUTPATIENT
Start: 2024-04-05

## 2024-04-05 RX ORDER — TRAZODONE HYDROCHLORIDE 100 MG/1
TABLET ORAL
Qty: 270 TABLET | Refills: 0 | Status: SHIPPED | OUTPATIENT
Start: 2024-04-05

## 2024-04-05 RX ORDER — DULOXETIN HYDROCHLORIDE 60 MG/1
60 CAPSULE, DELAYED RELEASE ORAL 2 TIMES DAILY
Qty: 180 CAPSULE | Refills: 0 | Status: SHIPPED | OUTPATIENT
Start: 2024-04-05

## 2024-04-05 RX ORDER — ARIPIPRAZOLE 10 MG/1
10 TABLET ORAL
Qty: 90 TABLET | Refills: 0 | Status: SHIPPED | OUTPATIENT
Start: 2024-04-05

## 2024-04-05 RX ORDER — HYDROXYZINE 50 MG/1
TABLET, FILM COATED ORAL
Qty: 360 TABLET | Refills: 0 | Status: SHIPPED | OUTPATIENT
Start: 2024-04-05
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
## 2024-06-06 ENCOUNTER — HOSPITAL ENCOUNTER (EMERGENCY)
Facility: HOSPITAL | Age: 59
Discharge: HOME | End: 2024-06-06
Attending: INTERNAL MEDICINE
Payer: COMMERCIAL

## 2024-06-06 VITALS
TEMPERATURE: 98.1 F | RESPIRATION RATE: 20 BRPM | SYSTOLIC BLOOD PRESSURE: 153 MMHG | HEART RATE: 80 BPM | OXYGEN SATURATION: 97 % | BODY MASS INDEX: 32.02 KG/M2 | WEIGHT: 180.78 LBS | DIASTOLIC BLOOD PRESSURE: 80 MMHG

## 2024-06-06 DIAGNOSIS — G43.909 MIGRAINE: Primary | ICD-10-CM

## 2024-06-06 PROCEDURE — 96375 TX/PRO/DX INJ NEW DRUG ADDON: CPT

## 2024-06-06 PROCEDURE — 96365 THER/PROPH/DIAG IV INF INIT: CPT

## 2024-06-06 PROCEDURE — 99284 EMERGENCY DEPT VISIT MOD MDM: CPT | Performed by: INTERNAL MEDICINE

## 2024-06-06 PROCEDURE — 96372 THER/PROPH/DIAG INJ SC/IM: CPT

## 2024-06-06 PROCEDURE — 99284 EMERGENCY DEPT VISIT MOD MDM: CPT

## 2024-06-06 RX ORDER — KETOROLAC TROMETHAMINE 30 MG/ML
30 INJECTION, SOLUTION INTRAMUSCULAR; INTRAVENOUS ONCE
Status: DISCONTINUED | OUTPATIENT
Start: 2024-06-06 | End: 2024-06-06

## 2024-06-06 RX ORDER — METOCLOPRAMIDE HYDROCHLORIDE 5 MG/ML
10 INJECTION INTRAMUSCULAR; INTRAVENOUS ONCE
Status: COMPLETED | OUTPATIENT
Start: 2024-06-06 | End: 2024-06-06

## 2024-06-06 RX ORDER — ACETAMINOPHEN 325 MG/1
975 TABLET ORAL ONCE
Status: COMPLETED | OUTPATIENT
Start: 2024-06-06 | End: 2024-06-06

## 2024-06-06 RX ORDER — MAGNESIUM SULFATE HEPTAHYDRATE 40 MG/ML
2 INJECTION, SOLUTION INTRAVENOUS ONCE
Status: COMPLETED | OUTPATIENT
Start: 2024-06-06 | End: 2024-06-06

## 2024-06-06 RX ORDER — SUMATRIPTAN 6 MG/.5ML
6 INJECTION, SOLUTION SUBCUTANEOUS ONCE
Status: COMPLETED | OUTPATIENT
Start: 2024-06-06 | End: 2024-06-06

## 2024-06-06 RX ORDER — DIPHENHYDRAMINE HYDROCHLORIDE 50 MG/ML
25 INJECTION INTRAMUSCULAR; INTRAVENOUS ONCE
Status: COMPLETED | OUTPATIENT
Start: 2024-06-06 | End: 2024-06-06

## 2024-06-06 RX ADMIN — SODIUM CHLORIDE 1000 ML: 0.9 INJECTION, SOLUTION INTRAVENOUS at 19:58

## 2024-06-06 RX ADMIN — MAGNESIUM SULFATE HEPTAHYDRATE 2 G: 40 INJECTION, SOLUTION INTRAVENOUS at 19:54

## 2024-06-06 RX ADMIN — SUMATRIPTAN 6 MG: 6 INJECTION, SOLUTION SUBCUTANEOUS at 19:50

## 2024-06-06 RX ADMIN — METOCLOPRAMIDE 10 MG: 5 INJECTION, SOLUTION INTRAMUSCULAR; INTRAVENOUS at 19:50

## 2024-06-06 RX ADMIN — DIPHENHYDRAMINE HYDROCHLORIDE 25 MG: 50 INJECTION, SOLUTION INTRAMUSCULAR; INTRAVENOUS at 19:49

## 2024-06-06 RX ADMIN — ACETAMINOPHEN 325MG 975 MG: 325 TABLET ORAL at 19:57

## 2024-06-06 NOTE — ED PROVIDER NOTES
History  Chief Complaint   Patient presents with    Headache     Last 4 days, intensified today, hx of migraines, last 4 years ago, +N, no vomiting, diarrhea, tried tylenol and a medication from a friend to help with migraines.        History provided by:  Patient   used: No    Headache  Pain location:  Frontal and R temporal  Quality:  Dull and stabbing  Radiates to:  Does not radiate  Onset quality:  Gradual  Duration:  4 days  Timing:  Intermittent  Progression:  Waxing and waning  Chronicity:  Recurrent  Similar to prior headaches: yes    Context: bright light and loud noise    Relieved by:  Resting in a darkened room, prescription medications and NSAIDs  Worsened by:  Light and sound  Ineffective treatments:  NSAIDs and resting in a darkened room  Associated symptoms: fatigue, nausea and photophobia    Associated symptoms: no blurred vision, no congestion, no near-syncope, no neck pain, no neck stiffness, no numbness, no paresthesias, no vomiting and no weakness        Prior to Admission Medications   Prescriptions Last Dose Informant Patient Reported? Taking?   ARIPiprazole (ABILIFY) 10 mg tablet   No No   Sig: Take 1 tablet (10 mg total) by mouth daily at bedtime   ARIPiprazole (ABILIFY) 2 mg tablet   No No   Sig: Take 2 tabs po qAM   DULoxetine (CYMBALTA) 60 mg delayed release capsule   No No   Sig: Take 1 capsule (60 mg total) by mouth 2 (two) times a day   albuterol (PROVENTIL HFA,VENTOLIN HFA) 90 mcg/act inhaler   No No   Sig: Inhale 2 puffs every 6 (six) hours as needed for wheezing or shortness of breath   fexofenadine (ALLEGRA) 180 MG tablet   Yes No   Sig: Take 180 mg by mouth daily   fluticasone (FLONASE) 50 mcg/act nasal spray   Yes No   fluticasone-umeclidinium-vilanterol (Trelegy Ellipta) 100-62.5-25 mcg/actuation inhaler   No No   Sig: Inhale 1 puff daily Rinse mouth after use.   guaiFENesin (MUCINEX) 600 mg 12 hr tablet   Yes No   Sig: Take 1 tablet every 12 hours by oral  route as directed for 7 days.   hydrOXYzine HCL (ATARAX) 50 mg tablet   No No   Sig: Take 1 and 1/2 to 2 tabs po qd-bid prn anxiety   ipratropium-albuterol (DUO-NEB) 0.5-2.5 mg/3 mL nebulizer solution   Yes No   meloxicam (MOBIC) 7.5 mg tablet   Yes No   Sig: Take 7.5 mg by mouth daily   traZODone (DESYREL) 100 mg tablet   No No   Sig: Take 1 to 3 tabs po qhs prn insomnia      Facility-Administered Medications: None       Past Medical History:   Diagnosis Date    Anxiety     Chronic pain     Back Pain    COPD (chronic obstructive pulmonary disease) ([facility])     Depression     Hyperlipidemia     Migraine     Psychiatric disorder        Past Surgical History:   Procedure Laterality Date    OTHER SURGICAL HISTORY      cyst removed from left axila       Family History   Problem Relation Age of Onset    Heart failure Mother     Heart disease Father     Bipolar disorder Father     Alzheimer's disease Paternal Grandmother     Alzheimer's disease Paternal Grandfather     Depression Son      I have reviewed and agree with the history as documented.    E-Cigarette/Vaping    E-Cigarette Use Never User      E-Cigarette/Vaping Substances    Nicotine No     THC No     CBD No     Flavoring No     Other No     Unknown No      Social History     Tobacco Use    Smoking status: Every Day     Current packs/day: 0.25     Average packs/day: 0.3 packs/day for 46.4 years (11.6 ttl pk-yrs)     Types: Cigarettes     Start date: 1978     Passive exposure: Current    Smokeless tobacco: Former    Tobacco comments:     Currently smoking 3 cigarettes daily   Vaping Use    Vaping status: Never Used   Substance Use Topics    Alcohol use: Not Currently     Comment: Told ED last drink was 6 years ago, told this RN 2 different stories, 3 vs 4 years ago    Drug use: Not Currently     Types: Methamphetamines, Marijuana     Comment: reports last use years ago       Review of Systems   Constitutional:  Positive for fatigue.   HENT:  Negative for congestion.     Eyes:  Positive for photophobia. Negative for blurred vision.   Cardiovascular:  Negative for near-syncope.   Gastrointestinal:  Positive for nausea. Negative for vomiting.   Musculoskeletal:  Negative for neck pain and neck stiffness.   Neurological:  Positive for headaches. Negative for weakness, numbness and paresthesias.       Physical Exam  Physical Exam  On exam the patient is awake, alert, and comfortable. Mucous membranes are moist. The patient's heart is regular. No respiratory distress.  Abdomen non-distended. Moves all extremities. Patient neurologically nonfocal, no facial asymmetry, cranial nerves II through XII intact. No skin rashes. Back without deformities.       Vital Signs  ED Triage Vitals [06/06/24 1821]   Temperature Pulse Respirations Blood Pressure SpO2   98.1 °F (36.7 °C) 80 20 153/80 97 %      Temp src Heart Rate Source Patient Position - Orthostatic VS BP Location FiO2 (%)   -- Monitor Sitting Right arm --      Pain Score       10 - Worst Possible Pain           Vitals:    06/06/24 1821   BP: 153/80   Pulse: 80   Patient Position - Orthostatic VS: Sitting         Visual Acuity      ED Medications  Medications   metoclopramide (REGLAN) injection 10 mg (10 mg Intravenous Given 6/6/24 1950)   diphenhydrAMINE (BENADRYL) injection 25 mg (25 mg Intravenous Given 6/6/24 1949)   SUMAtriptan (IMITREX) subcutaneous injection 6 mg (6 mg Subcutaneous Given 6/6/24 1950)   magnesium sulfate 2 g/50 mL IVPB (premix) 2 g (0 g Intravenous Stopped 6/6/24 2055)   sodium chloride 0.9 % bolus 1,000 mL (0 mL Intravenous Stopped 6/6/24 2055)   acetaminophen (TYLENOL) tablet 975 mg (975 mg Oral Given 6/6/24 1957)       Diagnostic Studies  Results Reviewed       None                   No orders to display              Procedures  Procedures         ED Course  ED Course as of 06/06/24 2113   Thu Jun 06, 202406, 2024 2041 Patient reports her headache is significantly improved and asking to be discharged                                SBIRT 20yo+      Flowsheet Row Most Recent Value   Initial Alcohol Screen: US AUDIT-C     1. How often do you have a drink containing alcohol? 0 Filed at: 06/06/2024 1839   2. How many drinks containing alcohol do you have on a typical day you are drinking?  0 Filed at: 06/06/2024 1839   3a. Male UNDER 65: How often do you have five or more drinks on one occasion? 0 Filed at: 06/06/2024 1839   3b. FEMALE Any Age, or MALE 65+: How often do you have 4 or more drinks on one occassion? 0 Filed at: 06/06/2024 1839   Audit-C Score 0 Filed at: 06/06/2024 1839   MANOJ: How many times in the past year have you...    Used an illegal drug or used a prescription medication for non-medical reasons? Never Filed at: 06/06/2024 1839                      Medical Decision Making  Patient reports headache is similar to prior chronic headaches and is thus likely associated with a headache syndrome such as migraine headaches, tension headaches etc.  There are no focal neurologic findings on exam to indicate a neurologic dysfunction or stroke.  Patient's headache was not sudden in onset and is not maximal intensity at onset to suggest a subarachnoid hemorrhage.  Patient does not have a fever and is not immunocompromised and denies any neck stiffness to suggest meningitis.  Patient has not had a progressively worsening headache or headache worse in the morning to suggest malignancy.  Patient denies any jaw claudication, muscle aches, temporal artery pain to suggest temporal arteritis.  Unlikely carbon monoxide poisoning as there are not multiple patients with similar complaints at home.  Patient is not pregnant and is not newly post pregnancy with hypertension to suggest a press syndrome or pregnancy related headache.  Patient denies a history of clotting disorders to suggest a thrombus or thromboembolic event.  Patient has not had any trauma and denies any traumatic inciting event to suggest intracranial  hemorrhage.  Patient denies any eye pain or facial pain to suggest ophthalmologic origin such as glaucoma or trigeminal neuralgia.  Patient denies any cervical manipulation, injections into the spine, facial pain or headaches to suggest cervical spinal cause or spinal headache.  Due to the reasoning listed above, blood work and imaging was not pursued as it was not indicated at this time.           Amount and/or Complexity of Data Reviewed  External Data Reviewed: labs, radiology and notes.    Risk  OTC drugs.  Prescription drug management.             Disposition  Final diagnoses:   Migraine     Time reflects when diagnosis was documented in both MDM as applicable and the Disposition within this note       Time User Action Codes Description Comment    6/6/2024  8:50 PM Tila Bargg Add [G43.909] Migraine           ED Disposition       ED Disposition   Discharge    Condition   Stable    Date/Time   Thu Jun 6, 2024 2050    Comment   Krystin Francis discharge to home/self care.                   Follow-up Information       Follow up With Specialties Details Why Contact Info    Douglas C Shoenberger, MD Family Medicine Call  As needed [address] [facility]  [address] [place] [id number]  [phone]              Patient's Medications   Discharge Prescriptions    ASPIRIN-ACETAMINOPHEN-CAFFEINE (EXCEDRIN MIGRAINE) 250-250-65 MG PER TABLET    Take 1 tablet by mouth daily as needed for headaches       Start Date: 6/6/2024  End Date: --       Order Dose: 1 tablet       Quantity: 30 tablet    Refills: 0       No discharge procedures on file.    PDMP Review         Value Time User    PDMP Reviewed  Yes 4/5/2024 10:23 AM Annie Lopez PA-C            ED Provider  Electronically Signed by             Tila Bragg MD  06/06/24 0225

## 2024-06-06 NOTE — Clinical Note
Krystin Francis was seen and treated in our emergency department on 6/6/2024.                Diagnosis:     Krystin  .    She may return on this date: 06/10/2024         If you have any questions or concerns, please don't hesitate to call.      Tila Bragg MD    ______________________________           _______________          _______________  Hospital Representative                              Date                                Time

## 2024-06-07 ENCOUNTER — TELEPHONE (OUTPATIENT)
Dept: PSYCHIATRY | Facility: CLINIC | Age: 59
End: 2024-06-07

## 2024-06-07 NOTE — TELEPHONE ENCOUNTER
Patients Name: Krystin Francis    : 1965    Phone Number: [phone]    Appointment Date: 6/10/24    Appointment time: 8:30am     Address: [address] [place] [id number]    Drop Off Facility/Office: [facility]    Drop Off Address: [facility] [address], [place] [address]    Cost Center Number: [id number]    Special notes/directions for : N/A    Note for scheduling team: N/A    Send to Ride Booking Pool: [id number] [facility]

## 2024-06-10 ENCOUNTER — OFFICE VISIT (OUTPATIENT)
Dept: PSYCHIATRY | Facility: CLINIC | Age: 59
End: 2024-06-10
Payer: COMMERCIAL

## 2024-06-10 VITALS — WEIGHT: 181.2 LBS | BODY MASS INDEX: 32.11 KG/M2 | HEIGHT: 63 IN

## 2024-06-10 DIAGNOSIS — G47.00 INSOMNIA, UNSPECIFIED TYPE: ICD-10-CM

## 2024-06-10 DIAGNOSIS — F12.11 CANNABIS ABUSE, IN REMISSION: ICD-10-CM

## 2024-06-10 DIAGNOSIS — F43.12 CHRONIC POST-TRAUMATIC STRESS DISORDER (PTSD): ICD-10-CM

## 2024-06-10 DIAGNOSIS — F41.1 GENERALIZED ANXIETY DISORDER: Primary | ICD-10-CM

## 2024-06-10 DIAGNOSIS — F41.0 PANIC ATTACKS: ICD-10-CM

## 2024-06-10 DIAGNOSIS — F33.1 MAJOR DEPRESSIVE DISORDER, RECURRENT EPISODE, MODERATE (HCC): ICD-10-CM

## 2024-06-10 PROCEDURE — 99214 OFFICE O/P EST MOD 30 MIN: CPT | Performed by: PHYSICIAN ASSISTANT

## 2024-06-10 RX ORDER — DULOXETIN HYDROCHLORIDE 60 MG/1
60 CAPSULE, DELAYED RELEASE ORAL DAILY
Qty: 90 CAPSULE | Refills: 0 | Status: SHIPPED | OUTPATIENT
Start: 2024-06-10

## 2024-06-10 RX ORDER — ARIPIPRAZOLE 2 MG/1
TABLET ORAL
Qty: 180 TABLET | Refills: 0 | Status: SHIPPED | OUTPATIENT
Start: 2024-06-10

## 2024-06-10 RX ORDER — ARIPIPRAZOLE 10 MG/1
10 TABLET ORAL
Qty: 90 TABLET | Refills: 0 | Status: SHIPPED | OUTPATIENT
Start: 2024-06-10

## 2024-06-10 RX ORDER — DULOXETIN HYDROCHLORIDE 30 MG/1
30 CAPSULE, DELAYED RELEASE ORAL DAILY
Qty: 90 CAPSULE | Refills: 0 | Status: SHIPPED | OUTPATIENT
Start: 2024-06-10

## 2024-06-10 RX ORDER — TRAZODONE HYDROCHLORIDE 100 MG/1
TABLET ORAL
Qty: 270 TABLET | Refills: 0 | Status: SHIPPED | OUTPATIENT
Start: 2024-06-10

## 2024-06-10 RX ORDER — HYDROXYZINE 50 MG/1
TABLET, FILM COATED ORAL
Qty: 360 TABLET | Refills: 0 | Status: SHIPPED | OUTPATIENT
Start: 2024-06-10

## 2024-06-10 NOTE — BH TREATMENT PLAN
"TREATMENT PLAN (Medication Management Only)        [facility]    Name/Date of Birth/MRN:  Krystin Francis 58 y.o. 1965 MRN: [id number]  Date of Treatment Plan: Tiffanie 10, 2024  Diagnosis/Diagnoses:   1. Generalized anxiety disorder    2. Major depressive disorder, recurrent episode, moderate (HCC)    3. Chronic post-traumatic stress disorder (PTSD)    4. Cannabis abuse, in remission    5. Panic attacks    6. Insomnia, unspecified type      Strengths/Personal Resources for Self-Care: \"Writing\".  Area/Areas of need (in own words): \"I'll be honest with you, I've been getting high. Smoking marijuana. \".  1. Long Term Goal:   Maintain mood stability and control of anxiety  Target Date:  3-6 months  Person/Persons responsible for completion of goal: Geovanna  2.  Short Term Objective (s) - How will we reach this goal?:   A.  Provider new recommended medication/dosage changes and/or continue medication(s):  Pt is having less anxiety and depression, no recent panic attacks and is sleeping better partly due to her medication, partly to putting her foot down and partly to medical cannabis-- which she admits to getting \"High\" on and receives from a family member.  She is advised against use of cannabis of any form given her addiction Hx with street THC, but at the very least, if she absolutely intends to continue it, I advised her to get her own medical card in that case an gave the [place].  Tx options discussed and Pt has never tried Sertraline, Fluoxetine, or Paroxetine which cover a wider range than the Duloxetine.  Pt is open to transitioning to Sertraline-- by her preference at this time and I will start this next visit.  No change in other medications at this time and will follow closely while weaning Duloxetine.  Serotonin Syndrome and what to do in the event this occurs.  I advised her to consider D&A counseling.  Treatment plan done and Pt accepts the " plan.   Reduce Duloxetine to 90mg total per day given as:  Duloxetine 30mg + 60mg (1) cap po qd # 90 each  Continue:   Trazodone 100mg (1-3) tabs po qhs prn insomnia # 270   Aripiprazole 2mg (2) tabs po qAM # 90  Aripiprazole 10mg (1) tab po qhs # 90  Hydroxyzine 50mg (1 1/2 - 2) tabs po qd-bid prn anxiety # 360  Pt reports her intention to make a new intake appt for psychotherapy with Mariah Rico   4th request for UDS and TSH   Return 7/9/2024 and 8/6/2024, call sooner prn           Target Date:  3-6 months  Person/Persons Responsible for Completion of Goal: Geovanna   Progress Towards Goals: stable, continuing treatment  Treatment Modality:  3-6 months  Review due 180 days from date of this plan: 6 months - 12/10/2024  Expected length of service: ongoing treatment unless revised  My Physician/PA/NP and I have developed this plan together and I agree to work on the goals and objectives. I understand the treatment goals that were developed for my treatment.  Electronic Signatures: on file (unless signed below)    Annie Lopez PA-C 06/10/24

## 2024-06-26 ENCOUNTER — TELEPHONE (OUTPATIENT)
Dept: PSYCHIATRY | Facility: CLINIC | Age: 59
End: 2024-06-26

## 2024-06-26 NOTE — TELEPHONE ENCOUNTER
Contacted patient off of [facility]  to verify needs of services in attempts to offer patient an appointment at [facility]. spoke with patient whom stated is interested in appointment, but will need Lyft service for appts. IC shared Lyft service will be provided and noted in appt notes.  IC verified patients information and insurance. See intake taken on 1/2/24 in chart, established pt with med mgmt. Patient is aware time slot for appts will be biweekly Thursdays at 10 am.     NP appt 7/25 at 10 am with Carlos  2 week f/u's 8/8 at 10 and 8/22 at 10 am.

## 2024-06-28 DIAGNOSIS — J44.9 CHRONIC OBSTRUCTIVE PULMONARY DISEASE, UNSPECIFIED COPD TYPE (HCC): ICD-10-CM

## 2024-06-28 RX ORDER — FLUTICASONE FUROATE, UMECLIDINIUM BROMIDE AND VILANTEROL TRIFENATATE 100; 62.5; 25 UG/1; UG/1; UG/1
1 POWDER RESPIRATORY (INHALATION) DAILY
Qty: 180 BLISTER | Refills: 0 | Status: SHIPPED | OUTPATIENT
Start: 2024-06-28 | End: 2024-09-26
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
## 2024-07-05 ENCOUNTER — TELEPHONE (OUTPATIENT)
Dept: PSYCHIATRY | Facility: CLINIC | Age: 59
End: 2024-07-05

## 2024-07-05 NOTE — TELEPHONE ENCOUNTER
Patients Name: Krystin Francis    : 1965    Phone Number: [phone]    Appointment Date:     Appointment time: 11 am     Address: [address] [place] [address]  [place] [address]    Drop Off Facility/Office: [facility]    Drop Off Address: [facility] [address], [place] [address]    Cost Center Number: [phone]    Special notes/directions for :    Note for scheduling team:    Send to Ride Booking Pool: [id number] ([facility]

## 2024-07-09 ENCOUNTER — OFFICE VISIT (OUTPATIENT)
Dept: PSYCHIATRY | Facility: CLINIC | Age: 59
End: 2024-07-09
Payer: COMMERCIAL

## 2024-07-09 VITALS — BODY MASS INDEX: 32.6 KG/M2 | HEIGHT: 63 IN | WEIGHT: 184 LBS

## 2024-07-09 DIAGNOSIS — F12.90 CANNABIS USE DISORDER: ICD-10-CM

## 2024-07-09 DIAGNOSIS — F41.0 PANIC ATTACKS: ICD-10-CM

## 2024-07-09 DIAGNOSIS — G47.00 INSOMNIA, UNSPECIFIED TYPE: ICD-10-CM

## 2024-07-09 DIAGNOSIS — F41.1 GENERALIZED ANXIETY DISORDER: Primary | ICD-10-CM

## 2024-07-09 DIAGNOSIS — F43.12 CHRONIC POST-TRAUMATIC STRESS DISORDER (PTSD): ICD-10-CM

## 2024-07-09 DIAGNOSIS — F33.1 MAJOR DEPRESSIVE DISORDER, RECURRENT EPISODE, MODERATE (HCC): ICD-10-CM

## 2024-07-09 PROBLEM — F33.2 SEVERE EPISODE OF RECURRENT MAJOR DEPRESSIVE DISORDER, WITHOUT PSYCHOTIC FEATURES (HCC): Status: RESOLVED | Noted: 2017-12-12 | Resolved: 2024-07-09

## 2024-07-09 PROCEDURE — 99214 OFFICE O/P EST MOD 30 MIN: CPT | Performed by: PHYSICIAN ASSISTANT

## 2024-07-09 RX ORDER — TRAZODONE HYDROCHLORIDE 100 MG/1
TABLET ORAL
Qty: 270 TABLET | Refills: 0 | Status: SHIPPED | OUTPATIENT
Start: 2024-07-09

## 2024-07-09 RX ORDER — SERTRALINE HYDROCHLORIDE 25 MG/1
25 TABLET, FILM COATED ORAL
Qty: 90 TABLET | Refills: 0 | Status: SHIPPED | OUTPATIENT
Start: 2024-07-09

## 2024-07-09 RX ORDER — HYDROXYZINE 50 MG/1
TABLET, FILM COATED ORAL
Qty: 360 TABLET | Refills: 0 | Status: SHIPPED | OUTPATIENT
Start: 2024-07-09

## 2024-07-09 RX ORDER — DULOXETIN HYDROCHLORIDE 30 MG/1
30 CAPSULE, DELAYED RELEASE ORAL DAILY
Qty: 1 CAPSULE | Refills: 0 | Status: SHIPPED | OUTPATIENT
Start: 2024-07-09

## 2024-07-09 RX ORDER — SERTRALINE HYDROCHLORIDE 25 MG/1
25 TABLET, FILM COATED ORAL
Qty: 90 TABLET | Refills: 0 | Status: SHIPPED | OUTPATIENT
Start: 2024-07-09 | End: 2024-07-09 | Stop reason: SDUPTHER

## 2024-07-09 RX ORDER — ARIPIPRAZOLE 2 MG/1
TABLET ORAL
Qty: 180 TABLET | Refills: 0 | Status: SHIPPED | OUTPATIENT
Start: 2024-07-09

## 2024-07-09 RX ORDER — ARIPIPRAZOLE 10 MG/1
10 TABLET ORAL
Qty: 90 TABLET | Refills: 0 | Status: SHIPPED | OUTPATIENT
Start: 2024-07-09

## 2024-07-15 ENCOUNTER — TELEPHONE (OUTPATIENT)
Dept: PSYCHIATRY | Facility: CLINIC | Age: 59
End: 2024-07-15

## 2024-07-15 NOTE — TELEPHONE ENCOUNTER
Patients Name: Krystin Francis    : 1965    Phone Number: [phone]    Appointment Date:     Appointment time: 10 am     Address: [address] [place] [address]  [place] [address]    Drop Off Facility/Office: [facility]    Drop Off Address: [facility] [address], [place] [address]    Cost Center Number: [phone]    Special notes/directions for :    Note for scheduling team:    Send to Ride Booking Pool: [id number] ([facility]

## 2024-07-19 ENCOUNTER — TELEPHONE (OUTPATIENT)
Dept: PSYCHIATRY | Facility: CLINIC | Age: 59
End: 2024-07-19

## 2024-07-19 NOTE — TELEPHONE ENCOUNTER
Writer called patient due to canceling via [employer]. Patient stated that it'll be better to give a call on Monday at anytime. Writer will call patient on Monday.

## 2024-07-22 NOTE — TELEPHONE ENCOUNTER
Called patient per their request on Friday 7/19 to reschedule. Patient was reschedule for 9/16 for new patient and 10/03 for their follow up. Patient also needs lyft (it was out in the appt notes)
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
## 2024-07-29 ENCOUNTER — TELEPHONE (OUTPATIENT)
Dept: PSYCHIATRY | Facility: CLINIC | Age: 59
End: 2024-07-29

## 2024-07-29 NOTE — TELEPHONE ENCOUNTER
One week follow up call for New Patient appointment with Tejas Rivas [[id number]] on 9/16  was made on 7/22. Writer informed patient of New Patient paperwork needing to be completed 5 days prior to the appointment. Writer confirmed paperwork has been sent via My Chart.

## 2024-08-01 ENCOUNTER — TELEPHONE (OUTPATIENT)
Dept: PSYCHIATRY | Facility: CLINIC | Age: 59
End: 2024-08-01

## 2024-08-01 NOTE — TELEPHONE ENCOUNTER
Patients Name: Krystin Francis    : 1965    Phone Number: [phone]    Appointment Date: 2024    Appointment time: 11:30AM     Address: [address] [place] [id number]    Drop Off Facility/Office: [facility]    Drop Off Address: [facility] [address], [place] [address]    Cost Center Number: [id number]    Special notes/directions for :    Note for scheduling team:    Send to Ride Booking Pool: [phone] (Patient [employer])

## 2024-08-06 ENCOUNTER — OFFICE VISIT (OUTPATIENT)
Dept: PSYCHIATRY | Facility: CLINIC | Age: 59
End: 2024-08-06
Payer: COMMERCIAL

## 2024-08-06 VITALS — WEIGHT: 181.8 LBS | BODY MASS INDEX: 32.21 KG/M2 | HEIGHT: 63 IN

## 2024-08-06 DIAGNOSIS — F43.12 CHRONIC POST-TRAUMATIC STRESS DISORDER (PTSD): ICD-10-CM

## 2024-08-06 DIAGNOSIS — F33.1 MAJOR DEPRESSIVE DISORDER, RECURRENT EPISODE, MODERATE (HCC): Primary | ICD-10-CM

## 2024-08-06 DIAGNOSIS — G47.00 INSOMNIA, UNSPECIFIED TYPE: ICD-10-CM

## 2024-08-06 DIAGNOSIS — F41.0 PANIC ATTACKS: ICD-10-CM

## 2024-08-06 DIAGNOSIS — F41.1 GENERALIZED ANXIETY DISORDER: ICD-10-CM

## 2024-08-06 DIAGNOSIS — F12.90 CANNABIS USE DISORDER: ICD-10-CM

## 2024-08-06 PROCEDURE — 99214 OFFICE O/P EST MOD 30 MIN: CPT | Performed by: PHYSICIAN ASSISTANT

## 2024-08-06 RX ORDER — ARIPIPRAZOLE 2 MG/1
TABLET ORAL
Qty: 180 TABLET | Refills: 0 | Status: SHIPPED | OUTPATIENT
Start: 2024-08-06

## 2024-08-06 RX ORDER — ARIPIPRAZOLE 10 MG/1
10 TABLET ORAL
Qty: 90 TABLET | Refills: 0 | Status: SHIPPED | OUTPATIENT
Start: 2024-08-06

## 2024-08-06 RX ORDER — TRAZODONE HYDROCHLORIDE 100 MG/1
TABLET ORAL
Qty: 270 TABLET | Refills: 0 | Status: SHIPPED | OUTPATIENT
Start: 2024-08-06

## 2024-08-06 RX ORDER — HYDROXYZINE 50 MG/1
TABLET, FILM COATED ORAL
Qty: 360 TABLET | Refills: 0 | Status: SHIPPED | OUTPATIENT
Start: 2024-08-06

## 2024-08-06 NOTE — BH CRISIS PLAN
"Client Name: Krystin Francis       Client YOB: 1965    [facility] Safety Plan      Creation Date: 8/6/24    Created By: Annie Lopez PA-C       Step 1: Warning Signs:   Warning Signs   \"My heartbeat-- it gets faster\"   \"I start shaking\"   \"I get a little lightheaded\"            Step 2: Internal Coping Strategies:   Internal Coping Strategies   \" a pen and start writing\"   \"Word searches\"            Step 3: People and social settings that provide distraction:   Name Contact Information   Sister Mikaela William [phone]   Roommate Peng Live together   Son Mark Vegas text   Boyfriend Bobby Ferguson Telephone    Places   \"I really don't get around people\"           Step 4: People whom I can ask for help during a crisis:      Name Contact Information    As per Step 3       Step 5: Professionals or agencies I can contact during a crisis:      Clinican/Agency Name Phone Emergency Contact    [facility] [address] [place] [address] Crisis [phone]     [facility] [phone]       [facility] Emergency Department Phone Emergency Department Address    [facility]          Crisis Phone Numbers:   Suicide Prevention Lifeline: Call or Text  644 Crisis Text Line: Text HOME to 716-617   Please note: Some [facility] do not have a separate number for Child/Adolescent specific crisis. If your county is not listed under Child/Adolescent, please call the adult number for your county      Adult Crisis Numbers: Child/Adolescent Crisis Numbers   [facility]: [phone] [facility]: [phone]   [facility]: [phone] [facility]: [phone]   [facility]: [phone] [place]: [phone]   [facility]: [phone] [place] [facility]: [phone]   [place] [facility]: [phone]   [facility]: [phone]   [facility]: [phone]   Home Crisis Services: [phone] (daytime) [phone] (after " "hours, weekends, holidays)      Step 6: Making the environment safer (plan for lethal means safety):   Patient did not identify any lethal methods: Yes     Optional: What is most important to me and worth living for?   \"My family, my grandson\"     [facility] Safety Plan. [place] and Don Tilley. Used with permission of the authors.           "

## 2024-09-09 ENCOUNTER — TELEPHONE (OUTPATIENT)
Dept: PSYCHIATRY | Facility: CLINIC | Age: 59
End: 2024-09-09

## 2024-09-09 NOTE — TELEPHONE ENCOUNTER
Patients Name: rKystin Francis    : 1965    Phone Number: [phone]    Appointment Date: 2024    Appointment time: 5:00 PM     Address: [address] [place] [phone]    Drop Off Facility/Office: [facility]    Drop Off Address: [facility] [address], [place] [address]    Cost Center Number: [id number]    Special notes/directions for :    Note for scheduling team:    Send to Ride Booking Pool: [phone] (Patient [employer])

## 2024-09-16 ENCOUNTER — OFFICE VISIT (OUTPATIENT)
Dept: BEHAVIORAL/MENTAL HEALTH CLINIC | Facility: CLINIC | Age: 59
End: 2024-09-16
Payer: COMMERCIAL

## 2024-09-16 DIAGNOSIS — F43.12 CHRONIC POST-TRAUMATIC STRESS DISORDER (PTSD): ICD-10-CM

## 2024-09-16 DIAGNOSIS — F41.1 GENERALIZED ANXIETY DISORDER: Primary | ICD-10-CM

## 2024-09-16 DIAGNOSIS — F41.0 PANIC ATTACKS: ICD-10-CM

## 2024-09-16 DIAGNOSIS — F33.1 MAJOR DEPRESSIVE DISORDER, RECURRENT EPISODE, MODERATE (HCC): ICD-10-CM

## 2024-09-16 PROCEDURE — 90791 PSYCH DIAGNOSTIC EVALUATION: CPT
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
## 2024-09-17 ENCOUNTER — TELEPHONE (OUTPATIENT)
Dept: PSYCHIATRY | Facility: CLINIC | Age: 59
End: 2024-09-17

## 2024-09-17 NOTE — TELEPHONE ENCOUNTER
A medical records request was received 9/17/24 (via [employer]) from the law office of Bautista Hyman Jr. The date range is 1/26/24 to present.    Placed in [employer]'s mailbox for review. Also to be circulated to Carlos. A copy of this medical records request was sent to [facility].

## 2024-09-18 NOTE — TELEPHONE ENCOUNTER
Krystin's  Bautista Hyman Jr requested records and [employer] was sent with the paperwork.  The [employer] request form was filled out for the time frame of 1/26/2024 to the present for [facility]'s records.  One exception among the paperwork is the request for [facility] records-- in which the  requires from 4/27/2023 to the present.  Fransisca French LPC has been her therapist and I placed the paperwork in her mailbox for her to sign as well.

## 2024-09-19 ENCOUNTER — TELEPHONE (OUTPATIENT)
Dept: PULMONOLOGY | Facility: CLINIC | Age: 59
End: 2024-09-19

## 2024-09-19 NOTE — TELEPHONE ENCOUNTER
Received medical records request from Bautista Hyman Jr. [facility] office for pulmonary records from 1/26/24-present. Patient has not had a office visit since 9/26/23. No records to fax over.

## 2024-09-23 ENCOUNTER — TELEPHONE (OUTPATIENT)
Dept: PSYCHIATRY | Facility: CLINIC | Age: 59
End: 2024-09-23

## 2024-09-23 NOTE — TELEPHONE ENCOUNTER
Patients Name: Krystin Francis    : 1965    Phone Number: [phone]    Appointment Date: 2024    Appointment time: 10 am     Address: [address] [place] [address] [place] [address]    Drop Off Facility/Office: [facility]    Drop Off Address: [facility] [address], [place] [address]    Cost Center Number: [id number]    Special notes/directions for :    Note for scheduling team:    Send to Ride Booking Pool: [id number] ([facility]

## 2024-09-26 ENCOUNTER — TELEPHONE (OUTPATIENT)
Dept: PSYCHIATRY | Facility: CLINIC | Age: 59
End: 2024-09-26

## 2024-09-26 NOTE — TELEPHONE ENCOUNTER
Patients Name: Krystin Francis    : 1965    Phone Number: [phone]    Appointment Date: 10/01    Appointment time: 11 am     Address: [address] [place] [address]  [place] [address]    Drop Off Facility/Office: [facility]    Drop Off Address: [facility] [address], [place] [address]    Cost Center Number: [phone]    Special notes/directions for :    Note for scheduling team:    Send to Ride Booking Pool: [id number] [facility]

## 2024-09-30 ENCOUNTER — TELEPHONE (OUTPATIENT)
Age: 59
End: 2024-09-30

## 2024-09-30 ENCOUNTER — TELEPHONE (OUTPATIENT)
Dept: PSYCHIATRY | Facility: CLINIC | Age: 59
End: 2024-09-30

## 2024-09-30 NOTE — TELEPHONE ENCOUNTER
Patients Name: Krystin Francis    : 1965    Phone Number: [phone]    Appointment Date: 10.07.24    Appointment time: 10 am     Address: [address] [place] [address] [place] [address]    Drop Off Facility/Office: [facility]    Drop Off Address: [facility] [address], [place] [address]    Cost Center Number: [phone]    Special notes/directions for :    Note for scheduling team:    Send to Ride Booking Pool: [phone] (Patient [facility])

## 2024-09-30 NOTE — TELEPHONE ENCOUNTER
Krystin called to verify cancellation for 9/30 at 10 am with Carlos Rivas. She states it looks like it wasn't cancelled on her end. Writer verified with Krystin that the appt was cancelled on the Appt Desk and then writer verified appt with her for 10/1 at 11 am with Annie Lopez.

## 2024-10-15 ENCOUNTER — TELEPHONE (OUTPATIENT)
Age: 59
End: 2024-10-15

## 2024-10-15 NOTE — TELEPHONE ENCOUNTER
Patient called in to schedule a follow up with their behavioral health medication management provider.    Patient is now scheduled on 11/8/24 @2

## 2024-10-17 ENCOUNTER — TELEPHONE (OUTPATIENT)
Dept: BEHAVIORAL/MENTAL HEALTH CLINIC | Facility: CLINIC | Age: 59
End: 2024-10-17

## 2024-10-17 NOTE — TELEPHONE ENCOUNTER
Patients Name: Krystin Francis    : 1965    Phone Number: [phone]    Appointment Date: 10/21/24    Appointment time: 10am     Address: [address] [place] [address] [place] [address]    Drop Off Facility/Office: [facility]    Drop Off Address: [facility] [address], [place] [address]    Cost Center Number: [phone]    Special notes/directions for :    Note for scheduling team:    Send to Ride Booking Pool: [phone] [facility]

## 2024-10-18 ENCOUNTER — HOSPITAL ENCOUNTER (INPATIENT)
Facility: HOSPITAL | Age: 59
LOS: 2 days | Discharge: HOME | DRG: 140 | End: 2024-10-20
Attending: EMERGENCY MEDICINE | Admitting: STUDENT IN AN ORGANIZED HEALTH CARE EDUCATION/TRAINING PROGRAM
Payer: COMMERCIAL

## 2024-10-18 ENCOUNTER — APPOINTMENT (EMERGENCY)
Dept: RADIOLOGY | Facility: HOSPITAL | Age: 59
DRG: 140 | End: 2024-10-18
Payer: COMMERCIAL

## 2024-10-18 DIAGNOSIS — J44.1 COPD WITH ACUTE EXACERBATION (HCC): Primary | ICD-10-CM

## 2024-10-18 DIAGNOSIS — R09.02 HYPOXIA: ICD-10-CM

## 2024-10-18 PROBLEM — E66.9 OBESITY (BMI 30-39.9): Status: ACTIVE | Noted: 2024-10-18

## 2024-10-18 LAB
ALBUMIN SERPL BCG-MCNC: 4.4 G/DL (ref 3.5–5)
ALP SERPL-CCNC: 85 U/L (ref 34–104)
ALT SERPL W P-5'-P-CCNC: 15 U/L (ref 7–52)
ANION GAP SERPL CALCULATED.3IONS-SCNC: 7 MMOL/L (ref 4–13)
APTT PPP: 30 SECONDS (ref 23–34)
AST SERPL W P-5'-P-CCNC: 15 U/L (ref 13–39)
ATRIAL RATE: 102 BPM
BASOPHILS # BLD AUTO: 0.03 THOUSANDS/ΜL (ref 0–0.1)
BASOPHILS NFR BLD AUTO: 1 % (ref 0–1)
BILIRUB SERPL-MCNC: 0.42 MG/DL (ref 0.2–1)
BNP SERPL-MCNC: 11 PG/ML (ref 0–100)
BUN SERPL-MCNC: 9 MG/DL (ref 5–25)
CALCIUM SERPL-MCNC: 9.3 MG/DL (ref 8.4–10.2)
CARDIAC TROPONIN I PNL SERPL HS: 5 NG/L
CHLORIDE SERPL-SCNC: 101 MMOL/L (ref 96–108)
CO2 SERPL-SCNC: 29 MMOL/L (ref 21–32)
CREAT SERPL-MCNC: 1.04 MG/DL (ref 0.6–1.3)
EOSINOPHIL # BLD AUTO: 0.05 THOUSAND/ΜL (ref 0–0.61)
EOSINOPHIL NFR BLD AUTO: 1 % (ref 0–6)
ERYTHROCYTE [DISTWIDTH] IN BLOOD BY AUTOMATED COUNT: 13.6 % (ref 11.6–15.1)
FLUAV RNA RESP QL NAA+PROBE: NEGATIVE
FLUBV RNA RESP QL NAA+PROBE: NEGATIVE
GFR SERPL CREATININE-BSD FRML MDRD: 58 ML/MIN/1.73SQ M
GLUCOSE SERPL-MCNC: 115 MG/DL (ref 65–140)
HCT VFR BLD AUTO: 47.2 % (ref 34.8–46.1)
HGB BLD-MCNC: 15.5 G/DL (ref 11.5–15.4)
IMM GRANULOCYTES # BLD AUTO: 0.04 THOUSAND/UL (ref 0–0.2)
IMM GRANULOCYTES NFR BLD AUTO: 1 % (ref 0–2)
INR PPP: 0.91 (ref 0.85–1.19)
LYMPHOCYTES # BLD AUTO: 1.44 THOUSANDS/ΜL (ref 0.6–4.47)
LYMPHOCYTES NFR BLD AUTO: 28 % (ref 14–44)
MAGNESIUM SERPL-MCNC: 2.2 MG/DL (ref 1.9–2.7)
MCH RBC QN AUTO: 31.5 PG (ref 26.8–34.3)
MCHC RBC AUTO-ENTMCNC: 32.8 G/DL (ref 31.4–37.4)
MCV RBC AUTO: 96 FL (ref 82–98)
MONOCYTES # BLD AUTO: 0.54 THOUSAND/ΜL (ref 0.17–1.22)
MONOCYTES NFR BLD AUTO: 11 % (ref 4–12)
NEUTROPHILS # BLD AUTO: 3.03 THOUSANDS/ΜL (ref 1.85–7.62)
NEUTS SEG NFR BLD AUTO: 58 % (ref 43–75)
NRBC BLD AUTO-RTO: 0 /100 WBCS
P AXIS: 75 DEGREES
PLATELET # BLD AUTO: 171 THOUSANDS/UL (ref 149–390)
PMV BLD AUTO: 9.9 FL (ref 8.9–12.7)
POTASSIUM SERPL-SCNC: 3.7 MMOL/L (ref 3.5–5.3)
PR INTERVAL: 148 MS
PROT SERPL-MCNC: 7.8 G/DL (ref 6.4–8.4)
PROTHROMBIN TIME: 12.5 SECONDS (ref 12.3–15)
QRS AXIS: 84 DEGREES
QRSD INTERVAL: 72 MS
QT INTERVAL: 330 MS
QTC INTERVAL: 430 MS
RBC # BLD AUTO: 4.92 MILLION/UL (ref 3.81–5.12)
RSV RNA RESP QL NAA+PROBE: NEGATIVE
SARS-COV-2 RNA RESP QL NAA+PROBE: NEGATIVE
SODIUM SERPL-SCNC: 137 MMOL/L (ref 135–147)
T WAVE AXIS: 74 DEGREES
TSH SERPL DL<=0.05 MIU/L-ACNC: 1.66 UIU/ML (ref 0.45–4.5)
VENTRICULAR RATE: 102 BPM
WBC # BLD AUTO: 5.13 THOUSAND/UL (ref 4.31–10.16)

## 2024-10-18 PROCEDURE — 85610 PROTHROMBIN TIME: CPT | Performed by: EMERGENCY MEDICINE

## 2024-10-18 PROCEDURE — 87070 CULTURE OTHR SPECIMN AEROBIC: CPT | Performed by: STUDENT IN AN ORGANIZED HEALTH CARE EDUCATION/TRAINING PROGRAM

## 2024-10-18 PROCEDURE — 93005 ELECTROCARDIOGRAM TRACING: CPT

## 2024-10-18 PROCEDURE — 71046 X-RAY EXAM CHEST 2 VIEWS: CPT

## 2024-10-18 PROCEDURE — 99223 1ST HOSP IP/OBS HIGH 75: CPT | Performed by: STUDENT IN AN ORGANIZED HEALTH CARE EDUCATION/TRAINING PROGRAM

## 2024-10-18 PROCEDURE — 94760 N-INVAS EAR/PLS OXIMETRY 1: CPT

## 2024-10-18 PROCEDURE — 36415 COLL VENOUS BLD VENIPUNCTURE: CPT | Performed by: EMERGENCY MEDICINE

## 2024-10-18 PROCEDURE — 85730 THROMBOPLASTIN TIME PARTIAL: CPT | Performed by: EMERGENCY MEDICINE

## 2024-10-18 PROCEDURE — 80053 COMPREHEN METABOLIC PANEL: CPT | Performed by: EMERGENCY MEDICINE

## 2024-10-18 PROCEDURE — 99291 CRITICAL CARE FIRST HOUR: CPT | Performed by: EMERGENCY MEDICINE

## 2024-10-18 PROCEDURE — 93010 ELECTROCARDIOGRAM REPORT: CPT | Performed by: INTERNAL MEDICINE

## 2024-10-18 PROCEDURE — 87205 SMEAR GRAM STAIN: CPT | Performed by: STUDENT IN AN ORGANIZED HEALTH CARE EDUCATION/TRAINING PROGRAM

## 2024-10-18 PROCEDURE — 94640 AIRWAY INHALATION TREATMENT: CPT

## 2024-10-18 PROCEDURE — 99285 EMERGENCY DEPT VISIT HI MDM: CPT

## 2024-10-18 PROCEDURE — 96365 THER/PROPH/DIAG IV INF INIT: CPT

## 2024-10-18 PROCEDURE — 85025 COMPLETE CBC W/AUTO DIFF WBC: CPT | Performed by: EMERGENCY MEDICINE

## 2024-10-18 PROCEDURE — 87077 CULTURE AEROBIC IDENTIFY: CPT | Performed by: STUDENT IN AN ORGANIZED HEALTH CARE EDUCATION/TRAINING PROGRAM

## 2024-10-18 PROCEDURE — 94664 DEMO&/EVAL PT USE INHALER: CPT

## 2024-10-18 PROCEDURE — 84443 ASSAY THYROID STIM HORMONE: CPT | Performed by: EMERGENCY MEDICINE

## 2024-10-18 PROCEDURE — 0241U HB NFCT DS VIR RESP RNA 4 TRGT: CPT | Performed by: EMERGENCY MEDICINE

## 2024-10-18 PROCEDURE — 83735 ASSAY OF MAGNESIUM: CPT | Performed by: EMERGENCY MEDICINE

## 2024-10-18 PROCEDURE — 83880 ASSAY OF NATRIURETIC PEPTIDE: CPT | Performed by: EMERGENCY MEDICINE

## 2024-10-18 PROCEDURE — 84484 ASSAY OF TROPONIN QUANT: CPT | Performed by: EMERGENCY MEDICINE

## 2024-10-18 PROCEDURE — 87181 SC STD AGAR DILUTION PER AGT: CPT | Performed by: STUDENT IN AN ORGANIZED HEALTH CARE EDUCATION/TRAINING PROGRAM

## 2024-10-18 PROCEDURE — 94644 CONT INHLJ TX 1ST HOUR: CPT

## 2024-10-18 RX ORDER — GUAIFENESIN 600 MG/1
600 TABLET, EXTENDED RELEASE ORAL 2 TIMES DAILY
Status: DISCONTINUED | OUTPATIENT
Start: 2024-10-18 | End: 2024-10-20 | Stop reason: HOSPADM

## 2024-10-18 RX ORDER — ARIPIPRAZOLE 2 MG/1
4 TABLET ORAL
Status: DISCONTINUED | OUTPATIENT
Start: 2024-10-18 | End: 2024-10-20 | Stop reason: HOSPADM

## 2024-10-18 RX ORDER — SODIUM CHLORIDE FOR INHALATION 0.9 %
12 VIAL, NEBULIZER (ML) INHALATION ONCE
Status: COMPLETED | OUTPATIENT
Start: 2024-10-18 | End: 2024-10-18

## 2024-10-18 RX ORDER — ENOXAPARIN SODIUM 100 MG/ML
40 INJECTION SUBCUTANEOUS DAILY
Status: DISCONTINUED | OUTPATIENT
Start: 2024-10-18 | End: 2024-10-20 | Stop reason: HOSPADM

## 2024-10-18 RX ORDER — METHYLPREDNISOLONE SOD SUCC 125 MG
1 VIAL (EA) INJECTION ONCE
Status: COMPLETED | OUTPATIENT
Start: 2024-10-18 | End: 2024-10-18

## 2024-10-18 RX ORDER — ARIPIPRAZOLE 10 MG/1
10 TABLET ORAL
Status: DISCONTINUED | OUTPATIENT
Start: 2024-10-18 | End: 2024-10-20 | Stop reason: HOSPADM

## 2024-10-18 RX ORDER — DOXYCYCLINE 100 MG/1
100 CAPSULE ORAL EVERY 12 HOURS
Status: DISCONTINUED | OUTPATIENT
Start: 2024-10-18 | End: 2024-10-20 | Stop reason: HOSPADM

## 2024-10-18 RX ORDER — MAGNESIUM HYDROXIDE/ALUMINUM HYDROXICE/SIMETHICONE 120; 1200; 1200 MG/30ML; MG/30ML; MG/30ML
30 SUSPENSION ORAL EVERY 6 HOURS PRN
Status: DISCONTINUED | OUTPATIENT
Start: 2024-10-18 | End: 2024-10-20 | Stop reason: HOSPADM

## 2024-10-18 RX ORDER — IPRATROPIUM BROMIDE AND ALBUTEROL SULFATE .5; 3 MG/3ML; MG/3ML
1 SOLUTION RESPIRATORY (INHALATION) ONCE
Status: COMPLETED | OUTPATIENT
Start: 2024-10-18 | End: 2024-10-18

## 2024-10-18 RX ORDER — DOXYCYCLINE 100 MG/1
100 CAPSULE ORAL ONCE
Status: COMPLETED | OUTPATIENT
Start: 2024-10-18 | End: 2024-10-18

## 2024-10-18 RX ORDER — SODIUM CHLORIDE FOR INHALATION 0.9 %
3 VIAL, NEBULIZER (ML) INHALATION
Status: DISCONTINUED | OUTPATIENT
Start: 2024-10-18 | End: 2024-10-18

## 2024-10-18 RX ORDER — LEVALBUTEROL INHALATION SOLUTION 1.25 MG/3ML
1.25 SOLUTION RESPIRATORY (INHALATION)
Status: DISCONTINUED | OUTPATIENT
Start: 2024-10-18 | End: 2024-10-20 | Stop reason: HOSPADM

## 2024-10-18 RX ORDER — ALBUTEROL SULFATE 0.83 MG/ML
5 SOLUTION RESPIRATORY (INHALATION) ONCE
Status: COMPLETED | OUTPATIENT
Start: 2024-10-18 | End: 2024-10-18

## 2024-10-18 RX ORDER — BUDESONIDE 0.5 MG/2ML
0.5 INHALANT ORAL
Status: DISCONTINUED | OUTPATIENT
Start: 2024-10-18 | End: 2024-10-20 | Stop reason: HOSPADM

## 2024-10-18 RX ORDER — ALBUTEROL SULFATE 5 MG/ML
10 SOLUTION RESPIRATORY (INHALATION) ONCE
Status: COMPLETED | OUTPATIENT
Start: 2024-10-18 | End: 2024-10-18

## 2024-10-18 RX ORDER — MAGNESIUM SULFATE HEPTAHYDRATE 40 MG/ML
2 INJECTION, SOLUTION INTRAVENOUS ONCE
Status: COMPLETED | OUTPATIENT
Start: 2024-10-18 | End: 2024-10-18

## 2024-10-18 RX ORDER — FLUTICASONE PROPIONATE 50 MCG
2 SPRAY, SUSPENSION (ML) NASAL 2 TIMES DAILY
Status: DISCONTINUED | OUTPATIENT
Start: 2024-10-18 | End: 2024-10-20 | Stop reason: HOSPADM

## 2024-10-18 RX ORDER — TRAZODONE HYDROCHLORIDE 100 MG/1
100 TABLET ORAL
Status: DISCONTINUED | OUTPATIENT
Start: 2024-10-18 | End: 2024-10-20 | Stop reason: HOSPADM

## 2024-10-18 RX ORDER — ACETAMINOPHEN 325 MG/1
650 TABLET ORAL EVERY 6 HOURS PRN
Status: DISCONTINUED | OUTPATIENT
Start: 2024-10-18 | End: 2024-10-20 | Stop reason: HOSPADM

## 2024-10-18 RX ORDER — HYDROXYZINE HYDROCHLORIDE 25 MG/1
50 TABLET, FILM COATED ORAL 3 TIMES DAILY PRN
Status: DISCONTINUED | OUTPATIENT
Start: 2024-10-18 | End: 2024-10-20 | Stop reason: HOSPADM

## 2024-10-18 RX ORDER — METHYLPREDNISOLONE SODIUM SUCCINATE 40 MG/ML
40 INJECTION, POWDER, LYOPHILIZED, FOR SOLUTION INTRAMUSCULAR; INTRAVENOUS EVERY 8 HOURS
Status: DISCONTINUED | OUTPATIENT
Start: 2024-10-18 | End: 2024-10-20

## 2024-10-18 RX ADMIN — ACETAMINOPHEN 650 MG: 325 TABLET, FILM COATED ORAL at 14:33

## 2024-10-18 RX ADMIN — LEVALBUTEROL HYDROCHLORIDE 1.25 MG: 1.25 SOLUTION RESPIRATORY (INHALATION) at 13:26

## 2024-10-18 RX ADMIN — IPRATROPIUM BROMIDE 0.5 MG: 0.5 SOLUTION RESPIRATORY (INHALATION) at 19:47

## 2024-10-18 RX ADMIN — DOXYCYCLINE 100 MG: 100 CAPSULE ORAL at 22:15

## 2024-10-18 RX ADMIN — ALBUTEROL SULFATE 10 MG: 2.5 SOLUTION RESPIRATORY (INHALATION) at 09:17

## 2024-10-18 RX ADMIN — IPRATROPIUM BROMIDE 0.5 MG: 0.5 SOLUTION RESPIRATORY (INHALATION) at 13:26

## 2024-10-18 RX ADMIN — ISODIUM CHLORIDE 3 ML: 0.03 SOLUTION RESPIRATORY (INHALATION) at 13:26

## 2024-10-18 RX ADMIN — ENOXAPARIN SODIUM 40 MG: 40 INJECTION SUBCUTANEOUS at 14:25

## 2024-10-18 RX ADMIN — ALBUTEROL SULFATE 5 MG: 2.5 SOLUTION RESPIRATORY (INHALATION) at 11:10

## 2024-10-18 RX ADMIN — FLUTICASONE PROPIONATE 2 SPRAY: 50 SPRAY, METERED NASAL at 20:40

## 2024-10-18 RX ADMIN — MAGNESIUM SULFATE HEPTAHYDRATE 2 G: 40 INJECTION, SOLUTION INTRAVENOUS at 09:24

## 2024-10-18 RX ADMIN — GUAIFENESIN 600 MG: 600 TABLET, EXTENDED RELEASE ORAL at 20:40

## 2024-10-18 RX ADMIN — GUAIFENESIN 600 MG: 600 TABLET, EXTENDED RELEASE ORAL at 14:25

## 2024-10-18 RX ADMIN — BUDESONIDE INHALATION 0.5 MG: 0.5 SUSPENSION RESPIRATORY (INHALATION) at 19:47

## 2024-10-18 RX ADMIN — METHYLPREDNISOLONE SODIUM SUCCINATE 40 MG: 40 INJECTION, POWDER, FOR SOLUTION INTRAMUSCULAR; INTRAVENOUS at 22:15

## 2024-10-18 RX ADMIN — TRAZODONE HYDROCHLORIDE 100 MG: 100 TABLET ORAL at 22:15

## 2024-10-18 RX ADMIN — ARIPIPRAZOLE 4 MG: 2 TABLET ORAL at 22:16

## 2024-10-18 RX ADMIN — IPRATROPIUM BROMIDE 1 MG: 0.5 SOLUTION RESPIRATORY (INHALATION) at 09:17

## 2024-10-18 RX ADMIN — ISODIUM CHLORIDE 12 ML: 0.03 SOLUTION RESPIRATORY (INHALATION) at 09:17

## 2024-10-18 RX ADMIN — SERTRALINE HYDROCHLORIDE 50 MG: 50 TABLET ORAL at 22:15

## 2024-10-18 RX ADMIN — METHYLPREDNISOLONE SODIUM SUCCINATE 40 MG: 40 INJECTION, POWDER, FOR SOLUTION INTRAMUSCULAR; INTRAVENOUS at 14:29

## 2024-10-18 RX ADMIN — FLUTICASONE PROPIONATE 2 SPRAY: 50 SPRAY, METERED NASAL at 14:29

## 2024-10-18 RX ADMIN — ARIPIPRAZOLE 10 MG: 10 TABLET ORAL at 22:15

## 2024-10-18 RX ADMIN — LEVALBUTEROL HYDROCHLORIDE 1.25 MG: 1.25 SOLUTION RESPIRATORY (INHALATION) at 19:47

## 2024-10-18 RX ADMIN — DOXYCYCLINE 100 MG: 100 CAPSULE ORAL at 11:10

## 2024-10-18 NOTE — ASSESSMENT & PLAN NOTE
>>ASSESSMENT AND PLAN FOR TOBACCO ABUSE WRITTEN ON 10/18/2024 11:44 AM BY GABE GUTIERREZ MD    Continues smoke about half pack per day  Nicotine patch

## 2024-10-18 NOTE — H&P
H&P - Hospitalist   Name: Krystin Francis 59 y.o. female I MRN: [id number]  Unit/Bed#: ED-15 I Date of Admission: 10/18/2024   Date of Service: 10/18/2024 I Hospital Day: 0     Assessment & Plan  COPD with acute exacerbation (HCC)  69-year-old female with PMH of tobacco abuse, COPD, obesity and depression/anxiety presented with cough and shortness of breath  Treated with haart nebs with improvement  Reportedly ran out of her trelogy inhaler 3 days prior  Chest x-ray without any acute findings  Slightly hypoxic requiring 2 L  Continue treatment with IV Solu-Medrol 40 every 8 hours  Nebulizers with Xopenex, Atrovent and Pulmicort  Continue doxycycline to complete 5-day course  Recommend tobacco cessation  Generalized anxiety disorder  Continue sertraline  Tobacco abuse  Continues smoke about half pack per day  Nicotine patch  Major depressive disorder, recurrent episode, moderate (HCC)  Continue Abilify, sertraline and trazodone  Obesity (BMI 30-39.9)  Will benefit from weight loss, dietary changes and lifestyle modifications      VTE Pharmacologic Prophylaxis:   Moderate Risk (Score 3-4) - Pharmacological DVT Prophylaxis Ordered: enoxaparin (Lovenox).  Code Status: Level 1 - Full Code   Discussion with family: Patient declined call to .     Anticipated Length of Stay: Patient will be admitted on an inpatient basis with an anticipated length of stay of greater than 2 midnights secondary to IV steroids, Nebs.    History of Present Illness   Chief Complaint: SOB, Cough    Krystin Francis is a 59 y.o. female with a PMH of COPD, tobacco abuse, obesity and anxiety/depression who presents with cough and shortness of breath.  Reported over the last several days, noted increasing shortness of breath.  She was unable to refill her Trelegy inhaler.  Continues to smoke about half pack per day.  Reports cough without sputum production.  Denies any fevers or chills.  No recent sick contacts.    Review of Systems    HENT:  Positive for congestion and sore throat.    Respiratory:  Positive for cough and shortness of breath.        Historical Information   Past Medical History:   Diagnosis Date    Anxiety     Chronic pain     Back Pain    COPD (chronic obstructive pulmonary disease) ([facility])     Depression     Hyperlipidemia     Migraine     Psychiatric disorder     Severe episode of recurrent major depressive disorder, without psychotic features ([facility]) 12/12/2017     Past Surgical History:   Procedure Laterality Date    OTHER SURGICAL HISTORY      cyst removed from left axila     Social History     Tobacco Use    Smoking status: Every Day     Current packs/day: 0.25     Average packs/day: 0.3 packs/day for 46.8 years (11.7 ttl pk-yrs)     Types: Cigarettes     Start date: 1978     Passive exposure: Current    Smokeless tobacco: Former    Tobacco comments:     Currently smoking 3 cigarettes daily   Vaping Use    Vaping status: Never Used   Substance and Sexual Activity    Alcohol use: Not Currently     Comment: Told ED last drink was 6 years ago, told this RN 2 different stories, 3 vs 4 years ago    Drug use: Not Currently     Types: Methamphetamines, Marijuana     Comment: reports last use years ago    Sexual activity: Not Currently     E-Cigarette/Vaping    E-Cigarette Use Never User      E-Cigarette/Vaping Substances    Nicotine No     THC No     CBD No     Flavoring No     Other No     Unknown No      Family history non-contributory  Social History:  Marital Status: /Civil Union   Occupation:   Patient Pre-hospital Living Situation: Home  Patient Pre-hospital Level of Mobility: walks  Patient Pre-hospital Diet Restrictions: none    Meds/Allergies   I have reviewed home medications with patient personally.  Prior to Admission medications    Medication Sig Start Date End Date Taking? Authorizing Provider   albuterol (PROVENTIL HFA,VENTOLIN HFA) 90 mcg/act inhaler Inhale 2 puffs every 6 (six) hours as needed for wheezing or  shortness of breath 5/23/23  Yes Susanna Elias MD   ARIPiprazole (ABILIFY) 10 mg tablet Take 1 tablet (10 mg total) by mouth daily at bedtime 8/6/24  Yes Annie Lopez PA-C   ARIPiprazole (ABILIFY) 2 mg tablet Take 2 tabs po qhs for a total of 14mg nightly 8/6/24  Yes Annie Lopez PA-C   fluticasone (FLONASE) 50 mcg/act nasal spray  10/25/21  Yes Historical Provider, MD   fluticasone-umeclidinium-vilanterol (Trelegy Ellipta) 100-62.5-25 mcg/actuation inhaler Inhale 1 puff daily Rinse mouth after use. 6/28/24 10/18/24 Yes FRANKLIN Reaves   hydrOXYzine HCL (ATARAX) 50 mg tablet Take 1 and 1/2 to 2 tabs po qd-bid prn anxiety 8/6/24  Yes Annie Lopez PA-C   meloxicam (MOBIC) 7.5 mg tablet Take 7.5 mg by mouth daily 9/23/22 10/18/24 Yes Historical Provider, MD   sertraline (ZOLOFT) 50 mg tablet Take 1 tablet (50 mg total) by mouth daily at bedtime 8/6/24  Yes Annie Lopez PA-C   traZODone (DESYREL) 100 mg tablet Take 1 to 3 tabs po qhs prn insomnia 8/6/24  Yes Annie Lopez PA-C   aspirin-acetaminophen-caffeine (EXCEDRIN MIGRAINE) 250-250-65 MG per tablet Take 1 tablet by mouth daily as needed for headaches 6/6/24 10/18/24  Tila Bragg MD   fexofenadine (ALLEGRA) 180 MG tablet Take 180 mg by mouth daily 4/4/23 10/18/24  Historical Provider, MD   guaiFENesin (MUCINEX) 600 mg 12 hr tablet Take 1 tablet every 12 hours by oral route as directed for 7 days.  10/18/24  Historical Provider, MD   ipratropium-albuterol (DUO-NEB) 0.5-2.5 mg/3 mL nebulizer solution  5/23/23 10/18/24  Historical Provider, MD     Allergies   Allergen Reactions    Penicillins Anaphylaxis, Rash and Edema     Anaphylaxis (Tolerates IV ceftriaxone 9/7/22)       Objective :  Temp:  [98.1 °F (36.7 °C)] 98.1 °F (36.7 °C)  HR:  [] 110  BP: (121-138)/(61-71) 138/68  Resp:  [16-36] 30  SpO2:  [89 %-99 %] 92 %  O2 Device: Nasal cannula  Nasal Cannula O2 Flow Rate (L/min):  [2 L/min] 2 L/min    Physical  Exam  Vitals and nursing note reviewed.   Constitutional:       Appearance: Normal appearance. She is obese.   HENT:      Head: Normocephalic.   Eyes:      Conjunctiva/sclera: Conjunctivae normal.   Cardiovascular:      Rate and Rhythm: Normal rate.      Heart sounds: Normal heart sounds.   Pulmonary:      Effort: Pulmonary effort is normal.      Breath sounds: Wheezing and rhonchi present.   Abdominal:      General: Bowel sounds are normal. There is no distension.      Palpations: Abdomen is soft.   Musculoskeletal:         General: No swelling. Normal range of motion.   Skin:     General: Skin is warm and dry.   Neurological:      General: No focal deficit present.      Mental Status: She is alert. Mental status is at baseline.          Lines/Drains:            Lab Results: I have reviewed the following results:  Results from last 7 days   Lab Units 10/18/24  0908   WBC Thousand/uL 5.13   HEMOGLOBIN g/dL 15.5*   HEMATOCRIT % 47.2*   PLATELETS Thousands/uL 171   SEGS PCT % 58   LYMPHO PCT % 28   MONO PCT % 11   EOS PCT % 1     Results from last 7 days   Lab Units 10/18/24  0908   SODIUM mmol/L 137   POTASSIUM mmol/L 3.7   CHLORIDE mmol/L 101   CO2 mmol/L 29   BUN mg/dL 9   CREATININE mg/dL 1.04   ANION GAP mmol/L 7   CALCIUM mg/dL 9.3   ALBUMIN g/dL 4.4   TOTAL BILIRUBIN mg/dL 0.42   ALK PHOS U/L 85   ALT U/L 15   AST U/L 15   GLUCOSE RANDOM mg/dL 115     Results from last 7 days   Lab Units 10/18/24  0908   INR  0.91         Lab Results   Component Value Date    HGBA1C 5.6 09/20/2023    HGBA1C 5.5 05/20/2023    HGBA1C 5.3 02/05/2021           Imaging Results Review: I personally reviewed the following image studies/reports in PACS and discussed pertinent findings with Radiology: chest xray. My interpretation of the radiology images/reports is: normal without PNA.  Other Study Results Review: EKG was reviewed.       ** Please Note: This note has been constructed using a voice recognition system. **

## 2024-10-18 NOTE — SEPSIS NOTE
Sepsis Note   Krystin Francis 59 y.o. female MRN: [id number]  Unit/Bed#: ED-15 Encounter: [id number]       Initial Sepsis Screening       Row Name 10/18/24 1101                Is the patient's history suggestive of a new or worsening infection? No  -CS                  User Key  (r) = Recorded By, (t) = Taken By, (c) = Cosigned By      Initials Name Provider Type    CS Anant Joseph MD Physician                        Body mass index is 33.31 kg/m².  Wt Readings from Last 1 Encounters:   10/18/24 85.3 kg (188 lb 0.8 oz)        Ideal body weight: 52.4 kg (115 lb 8.3 oz)  Adjusted ideal body weight: 65.6 kg (144 lb 8.5 oz)

## 2024-10-18 NOTE — Clinical Note
Case was discussed with MESSI and the patient's admission status was agreed to be Admission Status: observation status to the service of Dr. Mckenna .

## 2024-10-18 NOTE — ED NOTES
Pt noted to have O2 88% RN, placed on 2L NC and increased sat to 92%. Dr. Joseph aware of same,.      Lauren Nash, RN  10/18/24 8946

## 2024-10-18 NOTE — PLAN OF CARE
Problem: PAIN - ADULT  Goal: Verbalizes/displays adequate comfort level or baseline comfort level  Description: Interventions:  - Encourage patient to monitor pain and request assistance  - Assess pain using appropriate pain scale  - Administer analgesics based on type and severity of pain and evaluate response  - Implement non-pharmacological measures as appropriate and evaluate response  - Consider cultural and social influences on pain and pain management  - Notify physician/advanced practitioner if interventions unsuccessful or patient reports new pain  Outcome: Progressing     Problem: INFECTION - ADULT  Goal: Absence or prevention of progression during hospitalization  Description: INTERVENTIONS:  - Assess and monitor for signs and symptoms of infection  - Monitor lab/diagnostic results  - Monitor all insertion sites, i.e. indwelling lines, tubes, and drains  - Monitor endotracheal if appropriate and nasal secretions for changes in amount and color  - [place] appropriate cooling/warming therapies per order  - Administer medications as ordered  - Instruct and encourage patient and family to use good hand hygiene technique  - Identify and instruct in appropriate isolation precautions for identified infection/condition  Outcome: Progressing  Goal: Absence of fever/infection during neutropenic period  Description: INTERVENTIONS:  - Monitor WBC    Outcome: Progressing     Problem: SAFETY ADULT  Goal: Patient will remain free of falls  Description: INTERVENTIONS:  - Educate patient/family on patient safety including physical limitations  - Instruct patient to call for assistance with activity   - Consult OT/PT to assist with strengthening/mobility   - Keep Call bell within reach  - Keep bed low and locked with side rails adjusted as appropriate  - Keep care items and personal belongings within reach  - Initiate and maintain comfort rounds  - Make Fall Risk Sign visible to staff  - Offer Toileting every  Hours,  in advance of need  - Initiate/Maintain alarm  - Obtain necessary fall risk management equipment:   - Apply yellow socks and bracelet for high fall risk patients  - Consider moving patient to room near nurses station  Outcome: Progressing  Goal: Maintain or return to baseline ADL function  Description: INTERVENTIONS:  -  Assess patient's ability to carry out ADLs; assess patient's baseline for ADL function and identify physical deficits which impact ability to perform ADLs (bathing, care of mouth/teeth, toileting, grooming, dressing, etc.)  - Assess/evaluate cause of self-care deficits   - Assess range of motion  - Assess patient's mobility; develop plan if impaired  - Assess patient's need for assistive devices and provide as appropriate  - Encourage maximum independence but intervene and supervise when necessary  - Involve family in performance of ADLs  - Assess for home care needs following discharge   - Consider OT consult to assist with ADL evaluation and planning for discharge  - Provide patient education as appropriate  Outcome: Progressing  Goal: Maintains/Returns to pre admission functional level  Description: INTERVENTIONS:  - Perform AM-PAC 6 Click Basic Mobility/ Daily Activity assessment daily.  - Set and communicate daily mobility goal to care team and patient/family/caregiver.   - Collaborate with rehabilitation services on mobility goals if consulted  - Perform Range of Motion  times a day.  - Reposition patient every  hours.  - Dangle patient  times a day  - Stand patient  times a day  - Ambulate patient  times a day  - Out of bed to chair  times a day   - Out of bed for meals  times a day  - Out of bed for toileting  - Record patient progress and toleration of activity level   Outcome: Progressing     Problem: DISCHARGE PLANNING  Goal: Discharge to home or other facility with appropriate resources  Description: INTERVENTIONS:  - Identify barriers to discharge w/patient and caregiver  - Arrange for  needed discharge resources and transportation as appropriate  - Identify discharge learning needs (meds, wound care, etc.)  - Arrange for interpretive services to assist at discharge as needed  - Refer to Case Management Department for coordinating discharge planning if the patient needs post-hospital services based on physician/advanced practitioner order or complex needs related to functional status, cognitive ability, or social support system  Outcome: Progressing     Problem: Knowledge Deficit  Goal: Patient/family/caregiver demonstrates understanding of disease process, treatment plan, medications, and discharge instructions  Description: Complete learning assessment and assess knowledge base.  Interventions:  - Provide teaching at level of understanding  - Provide teaching via preferred learning methods  Outcome: Progressing

## 2024-10-18 NOTE — ED PROVIDER NOTES
Time reflects when diagnosis was documented in both MDM as applicable and the Disposition within this note       Time User Action Codes Description Comment    10/18/2024 10:49 AM Anant Joseph Add [J44.1] COPD with acute exacerbation (HCC)     10/18/2024 10:49 AM Anant Joseph Add [R09.02] Hypoxia           ED Disposition       ED Disposition   Admit    Condition   Stable    Date/Time   Fri Oct 18, 2024 10:59 AM    Comment   Case was discussed with MESSI and the patient's admission status was agreed to be Admission Status: Inpatient status to the service of Dr. Mckenna .               Assessment & Plan       Medical Decision Making  Amount and/or Complexity of Data Reviewed  Labs: ordered. Decision-making details documented in ED Course.  Radiology: ordered and independent interpretation performed.    Risk  Prescription drug management.  Decision regarding hospitalization.    Patient with most likely COPD exacerbation with hypoxia.  She is now on nasal cannula oxygen and will require admission to the hospital as this is never happened to her before.  She does not have a fever and nothing on x-ray to suggest a pneumonia.  I did give her a dose of doxycycline however because of the COPD exacerbation and that she is a smoker.  She is also received IV magnesium.  There is no evidence of congestive heart failure.  Her troponins were negative.  There is no acute EKG changes.  I did discuss with her that she will need to stop smoking.  COVID flu and RSV were all negative.  Discussion with internal medicine and they agreed to admit to the hospital.    ED Course as of 10/18/24 1634   Fri Oct 18, 2024   1034 Noted to be 88%.  Placed on nasal cannula oxygen.   1043 Patient still wheezing quite considerably.  She is now on oxygen with saturation slightly greater than 90%.  I will give her another breathing treatment and she will need to be admitted to the hospital.   1052 CBC and differential(!)  Normal white  count and no fever to suggest a systemic infection.  Not anemic.  Suspect hemoglobin elevated possibly due to COPD.   1052 FLU/RSV/COVID - if FLU/RSV clinically relevant (2hr TAT)  Negative COVID flu and RSV.   1053 B-Type Natriuretic Peptide(BNP)  Negative for heart failure.   1053 HS Troponin 0hr (reflex protocol)  Negative for ischemia.   1053 Comprehensive metabolic panel  Normal renal function hepatic function and electrolytes.       Medications   nicotine (NICODERM CQ) 7 mg/24hr TD 24 hr patch 1 patch (has no administration in time range)   acetaminophen (TYLENOL) tablet 650 mg (has no administration in time range)   aluminum-magnesium hydroxide-simethicone (MAALOX) oral suspension 30 mL (has no administration in time range)   guaiFENesin (MUCINEX) 12 hr tablet 600 mg (has no administration in time range)   levalbuterol (XOPENEX) inhalation solution 1.25 mg (1.25 mg Nebulization Given 10/18/24 1326)     And   sodium chloride 0.9 % inhalation solution 3 mL (3 mL Nebulization Given 10/18/24 1326)   ipratropium (ATROVENT) 0.02 % inhalation solution 0.5 mg (0.5 mg Nebulization Given 10/18/24 1326)   budesonide (PULMICORT) inhalation solution 0.5 mg (has no administration in time range)   enoxaparin (LOVENOX) subcutaneous injection 40 mg (has no administration in time range)   methylPREDNISolone sodium succinate (Solu-MEDROL) injection 40 mg (has no administration in time range)   doxycycline hyclate (VIBRAMYCIN) capsule 100 mg (has no administration in time range)   traZODone (DESYREL) tablet 100 mg (has no administration in time range)   sertraline (ZOLOFT) tablet 50 mg (has no administration in time range)   hydrOXYzine HCL (ATARAX) tablet 50 mg (has no administration in time range)   fluticasone (FLONASE) 50 mcg/act nasal spray 2 spray (has no administration in time range)   ARIPiprazole (ABILIFY) tablet 10 mg (has no administration in time range)   ARIPiprazole (ABILIFY) tablet 4 mg (has no administration in  time range)   ipratropium-albuterol (FOR EMS ONLY) (DUO-NEB) 0.5-2.5 mg/3 mL inhalation solution 3 mL (0 mL Does not apply Given to EMS 10/18/24 0856)   methylPREDNISolone sodium succinate (FOR EMS ONLY) (Solu-MEDROL) 125 MG injection 125 mg (0 mg Does not apply Given to EMS 10/18/24 0856)   albuterol inhalation solution 10 mg (10 mg Nebulization Given 10/18/24 0917)   ipratropium (ATROVENT) 0.02 % inhalation solution 1 mg (1 mg Nebulization Given 10/18/24 0917)   sodium chloride 0.9 % inhalation solution 12 mL (12 mL Nebulization Given 10/18/24 0917)   magnesium sulfate 2 g/50 mL IVPB (premix) 2 g (0 g Intravenous Stopped 10/18/24 0955)   doxycycline hyclate (VIBRAMYCIN) capsule 100 mg (100 mg Oral Given 10/18/24 1110)   albuterol inhalation solution 5 mg (5 mg Nebulization Given 10/18/24 1110)       ED Risk Strat Scores                           SBIRT 20yo+      Flowsheet Row Most Recent Value   Initial Alcohol Screen: US AUDIT-C     1. How often do you have a drink containing alcohol? 0 Filed at: 10/18/2024 0858   2. How many drinks containing alcohol do you have on a typical day you are drinking?  0 Filed at: 10/18/2024 0858   3b. FEMALE Any Age, or MALE 65+: How often do you have 4 or more drinks on one occassion? 0 Filed at: 10/18/2024 0858   Audit-C Score 0 Filed at: 10/18/2024 0858   MANOJ: How many times in the past year have you...    Used an illegal drug or used a prescription medication for non-medical reasons? Never Filed at: 10/18/2024 0858                            History of Present Illness       Chief Complaint   Patient presents with    Shortness of Breath     Patient came via home. Patient c/os of unproductive cough that has been going on for the past couple of days. Pt denies taking steroid neb. Patients some SOB and hx of COPD. Patient was given duo neb. Patient denies wearing o2 at home. Patient denies C/P.        Past Medical History:   Diagnosis Date    Anxiety     Chronic pain     Back Pain     COPD (chronic obstructive pulmonary disease) (HCC)     Depression     Hyperlipidemia     Migraine     Psychiatric disorder     Severe episode of recurrent major depressive disorder, without psychotic features ([facility]) 12/12/2017      Past Surgical History:   Procedure Laterality Date    OTHER SURGICAL HISTORY      cyst removed from left axila      Family History   Problem Relation Age of Onset    Heart failure Mother     Heart disease Father     Bipolar disorder Father     Alzheimer's disease Paternal Grandmother     Alzheimer's disease Paternal Grandfather     Depression Son       Social History     Tobacco Use    Smoking status: Every Day     Current packs/day: 0.25     Average packs/day: 0.3 packs/day for 46.8 years (11.7 ttl pk-yrs)     Types: Cigarettes     Start date: 1978     Passive exposure: Current    Smokeless tobacco: Former    Tobacco comments:     Currently smoking 3 cigarettes daily   Vaping Use    Vaping status: Never Used   Substance Use Topics    Alcohol use: Not Currently     Comment: Told ED last drink was 6 years ago, told this RN 2 different stories, 3 vs 4 years ago    Drug use: Not Currently     Types: Methamphetamines, Marijuana     Comment: reports last use years ago      E-Cigarette/Vaping    E-Cigarette Use Never User       E-Cigarette/Vaping Substances    Nicotine No     THC No     CBD No     Flavoring No     Other No     Unknown No       I have reviewed and agree with the history as documented.       Shortness of Breath  Patient is a smoker and has a history of COPD, not on oxygen and has been short of breath for the last couple days.  She notices it worse with exertion and with laying down.  No fever.  She is not having chest pain.  She is having some nausea and slight decreased p.o. intake.  No fever.  She does have some congestion.  No abdominal pain.  No edema.  She has no history of coronary artery disease per the patient.  Patient had a DuoNeb and Solu-Medrol prehospital.  Has no  urinary complaints.    Review of Systems   Respiratory:  Positive for shortness of breath.            Objective       ED Triage Vitals   Temperature Pulse Blood Pressure Respirations SpO2 Patient Position - Orthostatic VS   10/18/24 0856 10/18/24 0856 10/18/24 0859 10/18/24 0856 10/18/24 0856 10/18/24 0856   98.1 °F (36.7 °C) 102 132/69 16 95 % Lying      Temp Source Heart Rate Source BP Location FiO2 (%) Pain Score    10/18/24 0856 10/18/24 0856 10/18/24 0856 -- 10/18/24 1145    Oral Monitor Right arm  No Pain      Vitals      Date and Time Temp Pulse SpO2 Resp BP Pain Score FACES Pain Rating User   10/18/24 1445 98 °F (36.7 °C) 103 94 % 20 134/76 -- -- AF   10/18/24 1433 -- -- -- -- -- 8 -- MM   10/18/24 1410 -- -- -- -- -- No Pain -- MM   10/18/24 1408 -- -- -- -- -- No Pain -- MM   10/18/24 1327 -- -- 93 % -- -- -- -- AP   10/18/24 1255 -- 104 93 % 20 111/80 -- -- LD   10/18/24 1200 -- 111 93 % 28 123/89 -- -- JT   10/18/24 1145 98.1 °F (36.7 °C) 107 93 % 16 131/63 No Pain -- JT   10/18/24 1100 -- 110 92 % 30 138/68 -- -- NG   10/18/24 1030 -- 105 89 % 34 128/61 -- -- NG   10/18/24 1000 -- 103 99 % 36 121/71 -- -- NG   10/18/24 0955 -- 94 99 % breathing treatment on at 7L 16 121/71 -- -- JT   10/18/24 0917 -- -- 92 % -- -- -- -- AP   10/18/24 0859 -- -- -- -- 132/69 -- -- JT   10/18/24 0856 98.1 °F (36.7 °C) 102 95 % 16 -- -- -- JT            Physical Exam  Vitals and nursing note reviewed.   Constitutional:       General: She is not in acute distress.     Appearance: Normal appearance. She is well-developed. She is obese. She is not ill-appearing, toxic-appearing or diaphoretic.   HENT:      Head: Normocephalic and atraumatic.      Right Ear: Hearing normal. No drainage or swelling.      Left Ear: Hearing normal. No drainage or swelling.   Eyes:      General: Lids are normal.         Right eye: No discharge.         Left eye: No discharge.      Conjunctiva/sclera: Conjunctivae normal.   Neck:      Vascular: No  JVD.      Trachea: Trachea normal.   Cardiovascular:      Rate and Rhythm: Normal rate and regular rhythm.      Pulses: Normal pulses.      Heart sounds: Normal heart sounds. No murmur heard.     No friction rub. No gallop.   Pulmonary:      Effort: Pulmonary effort is normal. Prolonged expiration present. No accessory muscle usage or respiratory distress.      Breath sounds: No stridor. Decreased breath sounds and wheezing present. No rales.   Chest:      Chest wall: No tenderness.   Abdominal:      Palpations: Abdomen is soft.      Tenderness: There is no abdominal tenderness. There is no guarding or rebound.   Musculoskeletal:         General: No tenderness. Normal range of motion.      Cervical back: Normal range of motion.      Right lower leg: No edema.      Left lower leg: No edema.   Skin:     General: Skin is warm and dry.      Coloration: Skin is not pale.      Findings: No rash.   Neurological:      General: No focal deficit present.      Mental Status: She is alert.      GCS: GCS eye subscore is 4. GCS verbal subscore is 5. GCS motor subscore is 6.      Sensory: No sensory deficit.      Motor: No weakness or abnormal muscle tone.   Psychiatric:         Mood and Affect: Mood normal.         Speech: Speech normal.         Behavior: Behavior is cooperative.         Results Reviewed       Procedure Component Value Units Date/Time    Sputum culture and Gram stain [[id number]] Collected: 10/18/24 1542    Lab Status: In process Specimen: Expectorated Sputum Updated: 10/18/24 1556    FLU/RSV/COVID - if FLU/RSV clinically relevant (2hr TAT) [[id number]]  (Normal) Collected: 10/18/24 0908    Lab Status: Final result Specimen: Nares from Nose Updated: 10/18/24 1024     SARS-CoV-2 Negative     INFLUENZA A PCR Negative     INFLUENZA B PCR Negative     RSV PCR Negative    Narrative:      This test has been performed using the CoV-2/Flu/RSV plus assay on the [employer] GeneXpert platform. This test has been validated by the   and verified by the performing laboratory.     This test is designed to amplify and detect the following: nucleocapsid (N), envelope (E), and RNA-dependent RNA polymerase (RdRP) genes of the SARS-CoV-2 genome; matrix (M), basic polymerase (PB2), and acidic protein (PA) segments of the influenza A genome; matrix (M) and non-structural protein (NS) segments of the influenza B genome, and the nucleocapsid genes of RSV A and RSV B.     Positive results are indicative of the presence of Flu A, Flu B, RSV, and/or SARS-CoV-2 RNA. Positive results for SARS-CoV-2 or suspected novel influenza should be reported to state, local, or federal health departments according to local reporting requirements.      All results should be assessed in conjunction with clinical presentation and other laboratory markers for clinical management.     FOR PEDIATRIC PATIENTS - copy/paste COVID Guidelines URL to browser: [email or web address]       TSH, 3rd generation with Free T4 reflex [[id number]]  (Normal) Collected: 10/18/24 0908    Lab Status: Final result Specimen: Blood from Arm, Right Updated: 10/18/24 0955     TSH 3RD GENERATON 1.656 uIU/mL     B-Type Natriuretic Peptide(BNP) [[id number]]  (Normal) Collected: 10/18/24 0908    Lab Status: Final result Specimen: Blood from Arm, Right Updated: 10/18/24 0949     BNP 11 pg/mL     HS Troponin 0hr (reflex protocol) [[id number]]  (Normal) Collected: 10/18/24 0908    Lab Status: Final result Specimen: Blood from Arm, Right Updated: 10/18/24 0946     hs TnI 0hr 5 ng/L     Comprehensive metabolic panel [[id number]] Collected: 10/18/24 0908    Lab Status: Final result Specimen: Blood from Arm, Right Updated: 10/18/24 0942     Sodium 137 mmol/L      Potassium 3.7 mmol/L      Chloride 101 mmol/L      CO2 29 mmol/L      ANION GAP 7 mmol/L      BUN 9 mg/dL      Creatinine 1.04 mg/dL      Glucose 115 mg/dL      Calcium 9.3 mg/dL      AST 15  U/L      ALT 15 U/L      Alkaline Phosphatase 85 U/L      Total Protein 7.8 g/dL      Albumin 4.4 g/dL      Total Bilirubin 0.42 mg/dL      eGFR 58 ml/min/1.73sq m     Narrative:      National Kid[employer] Disease Foundation guidelines for Chronic Kidney Disease (CKD):     Stage 1 with normal or high GFR (GFR > 90 mL/min/1.73 square meters)    Stage 2 Mild CKD (GFR = 60-89 mL/min/1.73 square meters)    Stage 3A Moderate CKD (GFR = 45-59 mL/min/1.73 square meters)    Stage 3B Moderate CKD (GFR = 30-44 mL/min/1.73 square meters)    Stage 4 Severe CKD (GFR = 15-29 mL/min/1.73 square meters)    Stage 5 End Stage CKD (GFR <15 mL/min/1.73 square meters)  Note: GFR calculation is accurate only with a steady state creatinine    Magnesium [[id number]]  (Normal) Collected: 10/18/24 0908    Lab Status: Final result Specimen: Blood from Arm, Right Updated: 10/18/24 0942     Magnesium 2.2 mg/dL     Protime-INR [[id number]]  (Normal) Collected: 10/18/24 0908    Lab Status: Final result Specimen: Blood from Arm, Right Updated: 10/18/24 0937     Protime 12.5 seconds      INR 0.91    Narrative:      INR Therapeutic Range    Indication                                             INR Range      Atrial Fibrillation                                               2.0-3.0  Hypercoagulable State                                    2.0.2.3  Left Ventricular Asist Device                            2.0-3.0  Mechanical Heart Valve                                  -    Aortic(with afib, MI, embolism, HF, LA enlargement,    and/or coagulopathy)                                     2.0-3.0 (2.5-3.5)     Mitral                                                             2.5-3.5  Prosthetic/Bioprosthetic Heart Valve               2.0-3.0  Venous thromboembolism (VTE: VT, PE        2.0-3.0    APTT [[id number]]  (Normal) Collected: 10/18/24 0908    Lab Status: Final result Specimen: Blood from Arm, Right Updated: 10/18/24 0937     PTT 30 seconds     CBC and  differential [[id number]]  (Abnormal) Collected: 10/18/24 0908    Lab Status: Final result Specimen: Blood from Arm, Right Updated: 10/18/24 0925     WBC 5.13 Thousand/uL      RBC 4.92 Million/uL      Hemoglobin 15.5 g/dL      Hematocrit 47.2 %      MCV 96 fL      MCH 31.5 pg      MCHC 32.8 g/dL      RDW 13.6 %      MPV 9.9 fL      Platelets 171 Thousands/uL      nRBC 0 /100 WBCs      Segmented % 58 %      Immature Grans % 1 %      Lymphocytes % 28 %      Monocytes % 11 %      Eosinophils Relative 1 %      Basophils Relative 1 %      Absolute Neutrophils 3.03 Thousands/µL      Absolute Immature Grans 0.04 Thousand/uL      Absolute Lymphocytes 1.44 Thousands/µL      Absolute Monocytes 0.54 Thousand/µL      Eosinophils Absolute 0.05 Thousand/µL      Basophils Absolute 0.03 Thousands/µL             XR chest 2 views   ED Interpretation by Anant Joseph MD (10/18 3210)   I have reviewed the film, per my independent interpretation : No acute infiltrate, effusion, vascular congestion.  Formal read per radiology..        Final Interpretation by Juan Strickland MD (10/18 4030)      No acute cardiopulmonary disease.            Workstation performed: [id number]             ECG 12 Lead Documentation Only    Date/Time: 10/18/2024 9:01 AM    Performed by: Anant Joseph MD  Authorized by: Anant Joseph MD    Indications / Diagnosis:  Dyspnea and wheezing  ECG reviewed by me, the ED Provider: yes    Patient location:  ED  Previous ECG:     Previous ECG:  Compared to current    Similarity:  No change  Interpretation:     Interpretation: non-specific    Rate:     ECG rate:  102    ECG rate assessment: tachycardic    Rhythm:     Rhythm: sinus tachycardia    Ectopy:     Ectopy: none    QRS:     QRS axis:  Normal  Conduction:     Conduction: normal    ST segments:     ST segments:  Normal  T waves:     T waves: normal    Comments:      Low voltage's which is similar to prior EKG.  Suspect  possible body habitus as the cause.  CriticalCare Time    Date/Time: 10/18/2024 11:00 AM    Performed by: Anant Joseph MD  Authorized by: Anant Joseph MD    Critical care provider statement:     Critical care time (minutes):  35    Critical care time was exclusive of:  Separately billable procedures and treating other patients and teaching time    Critical care was necessary to treat or prevent imminent or life-threatening deterioration of the following conditions: COPD exacerbation, hypoxia, nasal cannula oxygen, heart neb, IV magnesium, admission to internal medicine.    Critical care was time spent personally by me on the following activities:  Obtaining history from patient or surrogate, development of treatment plan with patient or surrogate, discussions with consultants, evaluation of patient's response to treatment, examination of patient, review of old charts, re-evaluation of patient's condition, ordering and review of radiographic studies and ordering and review of laboratory studies    I assumed direction of critical care for this patient from another provider in my specialty: no        ED Medication and Procedure Management   Prior to Admission Medications   Prescriptions Last Dose Informant Patient Reported? Taking?   ARIPiprazole (ABILIFY) 10 mg tablet 10/17/2024  No Yes   Sig: Take 1 tablet (10 mg total) by mouth daily at bedtime   ARIPiprazole (ABILIFY) 2 mg tablet 10/17/2024  No Yes   Sig: Take 2 tabs po qhs for a total of 14mg nightly   albuterol (PROVENTIL HFA,VENTOLIN HFA) 90 mcg/act inhaler 10/18/2024  No Yes   Sig: Inhale 2 puffs every 6 (six) hours as needed for wheezing or shortness of breath   fluticasone (FLONASE) 50 mcg/act nasal spray 10/17/2024  Yes Yes   fluticasone-umeclidinium-vilanterol (Trelegy Ellipta) 100-62.5-25 mcg/actuation inhaler Past Week  No Yes   Sig: Inhale 1 puff daily Rinse mouth after use.   hydrOXYzine HCL (ATARAX) 50 mg tablet 10/17/2024   No Yes   Sig: Take 1 and 1/2 to 2 tabs po qd-bid prn anxiety   meloxicam (MOBIC) 7.5 mg tablet 10/17/2024  Yes Yes   Sig: Take 7.5 mg by mouth daily   sertraline (ZOLOFT) 50 mg tablet Past Week  No Yes   Sig: Take 1 tablet (50 mg total) by mouth daily at bedtime   traZODone (DESYREL) 100 mg tablet 10/17/2024  No Yes   Sig: Take 1 to 3 tabs po qhs prn insomnia      Facility-Administered Medications: None     Current Discharge Medication List        CONTINUE these medications which have NOT CHANGED    Details   albuterol (PROVENTIL HFA,VENTOLIN HFA) 90 mcg/act inhaler Inhale 2 puffs every 6 (six) hours as needed for wheezing or shortness of breath  Qty: 18 g, Refills: 0    Comments: Disp with spacer  Associated Diagnoses: RSV bronchitis      !! ARIPiprazole (ABILIFY) 10 mg tablet Take 1 tablet (10 mg total) by mouth daily at bedtime  Qty: 90 tablet, Refills: 0    Associated Diagnoses: Major depressive disorder, recurrent episode, moderate (HCC)      !! ARIPiprazole (ABILIFY) 2 mg tablet Take 2 tabs po qhs for a total of 14mg nightly  Qty: 180 tablet, Refills: 0    Associated Diagnoses: Major depressive disorder, recurrent episode, moderate (HCC)      fluticasone (FLONASE) 50 mcg/act nasal spray       fluticasone-umeclidinium-vilanterol (Trelegy Ellipta) 100-62.5-25 mcg/actuation inhaler Inhale 1 puff daily Rinse mouth after use.  Qty: 180 blister, Refills: 0    Associated Diagnoses: Chronic obstructive pulmonary disease, unspecified COPD type (HCC)      hydrOXYzine HCL (ATARAX) 50 mg tablet Take 1 and 1/2 to 2 tabs po qd-bid prn anxiety  Qty: 360 tablet, Refills: 0    Associated Diagnoses: Major depressive disorder, recurrent episode, moderate (HCC); Generalized anxiety disorder      meloxicam (MOBIC) 7.5 mg tablet Take 7.5 mg by mouth daily      sertraline (ZOLOFT) 50 mg tablet Take 1 tablet (50 mg total) by mouth daily at bedtime  Qty: 90 tablet, Refills: 0    Comments: I am increasing the dose to 50mg.  She was  weaned off of the Duloxetine  Associated Diagnoses: Major depressive disorder, recurrent episode, moderate (HCC); Generalized anxiety disorder      traZODone (DESYREL) 100 mg tablet Take 1 to 3 tabs po qhs prn insomnia  Qty: 270 tablet, Refills: 0    Associated Diagnoses: Insomnia, unspecified type       !! - Potential duplicate medications found. Please discuss with provider.        No discharge procedures on file.  ED SEPSIS DOCUMENTATION   Time reflects when diagnosis was documented in both MDM as applicable and the Disposition within this note       Time User Action Codes Description Comment    10/18/2024 10:49 AM Anant Joseph [J44.1] COPD with acute exacerbation (HCC)     10/18/2024 10:49 AM Anant Joseph [R09.02] Hypoxia            Initial Sepsis Screening       Row Name 10/18/24 1101                Is the patient's history suggestive of a new or worsening infection? No  -CS                  User Key  (r) = Recorded By, (t) = Taken By, (c) = Cosigned By      Initials Name Provider Type    CS Anant Joseph MD Physician                       Anant Joseph MD  10/18/24 0621

## 2024-10-18 NOTE — ASSESSMENT & PLAN NOTE
69-year-old female with PMH of tobacco abuse, COPD, obesity and depression/anxiety presented with cough and shortness of breath  Treated with haart nebs with improvement  Reportedly ran out of her trelogy inhaler 3 days prior  Chest x-ray without any acute findings  Slightly hypoxic requiring 2 L  Continue treatment with IV Solu-Medrol 40 every 8 hours  Nebulizers with Xopenex, Atrovent and Pulmicort  Continue doxycycline to complete 5-day course  Recommend tobacco cessation

## 2024-10-19 LAB
ALBUMIN SERPL BCG-MCNC: 4.1 G/DL (ref 3.5–5)
ALP SERPL-CCNC: 66 U/L (ref 34–104)
ALT SERPL W P-5'-P-CCNC: 14 U/L (ref 7–52)
ANION GAP SERPL CALCULATED.3IONS-SCNC: 4 MMOL/L (ref 4–13)
AST SERPL W P-5'-P-CCNC: 13 U/L (ref 13–39)
BASOPHILS # BLD AUTO: 0.01 THOUSANDS/ΜL (ref 0–0.1)
BASOPHILS NFR BLD AUTO: 0 % (ref 0–1)
BILIRUB SERPL-MCNC: 0.27 MG/DL (ref 0.2–1)
BUN SERPL-MCNC: 16 MG/DL (ref 5–25)
CALCIUM SERPL-MCNC: 9.4 MG/DL (ref 8.4–10.2)
CHLORIDE SERPL-SCNC: 102 MMOL/L (ref 96–108)
CO2 SERPL-SCNC: 31 MMOL/L (ref 21–32)
CREAT SERPL-MCNC: 0.86 MG/DL (ref 0.6–1.3)
EOSINOPHIL # BLD AUTO: 0 THOUSAND/ΜL (ref 0–0.61)
EOSINOPHIL NFR BLD AUTO: 0 % (ref 0–6)
ERYTHROCYTE [DISTWIDTH] IN BLOOD BY AUTOMATED COUNT: 13.5 % (ref 11.6–15.1)
GFR SERPL CREATININE-BSD FRML MDRD: 74 ML/MIN/1.73SQ M
GLUCOSE SERPL-MCNC: 141 MG/DL (ref 65–140)
HCT VFR BLD AUTO: 44.3 % (ref 34.8–46.1)
HGB BLD-MCNC: 14.4 G/DL (ref 11.5–15.4)
IMM GRANULOCYTES # BLD AUTO: 0.04 THOUSAND/UL (ref 0–0.2)
IMM GRANULOCYTES NFR BLD AUTO: 1 % (ref 0–2)
LYMPHOCYTES # BLD AUTO: 0.92 THOUSANDS/ΜL (ref 0.6–4.47)
LYMPHOCYTES NFR BLD AUTO: 14 % (ref 14–44)
MCH RBC QN AUTO: 31 PG (ref 26.8–34.3)
MCHC RBC AUTO-ENTMCNC: 32.5 G/DL (ref 31.4–37.4)
MCV RBC AUTO: 95 FL (ref 82–98)
MONOCYTES # BLD AUTO: 0.49 THOUSAND/ΜL (ref 0.17–1.22)
MONOCYTES NFR BLD AUTO: 8 % (ref 4–12)
NEUTROPHILS # BLD AUTO: 4.98 THOUSANDS/ΜL (ref 1.85–7.62)
NEUTS SEG NFR BLD AUTO: 77 % (ref 43–75)
NRBC BLD AUTO-RTO: 0 /100 WBCS
PLATELET # BLD AUTO: 169 THOUSANDS/UL (ref 149–390)
PMV BLD AUTO: 9.9 FL (ref 8.9–12.7)
POTASSIUM SERPL-SCNC: 5 MMOL/L (ref 3.5–5.3)
PROT SERPL-MCNC: 7.1 G/DL (ref 6.4–8.4)
RBC # BLD AUTO: 4.65 MILLION/UL (ref 3.81–5.12)
SODIUM SERPL-SCNC: 137 MMOL/L (ref 135–147)
WBC # BLD AUTO: 6.44 THOUSAND/UL (ref 4.31–10.16)

## 2024-10-19 PROCEDURE — 94640 AIRWAY INHALATION TREATMENT: CPT

## 2024-10-19 PROCEDURE — 85025 COMPLETE CBC W/AUTO DIFF WBC: CPT | Performed by: STUDENT IN AN ORGANIZED HEALTH CARE EDUCATION/TRAINING PROGRAM

## 2024-10-19 PROCEDURE — 99232 SBSQ HOSP IP/OBS MODERATE 35: CPT | Performed by: STUDENT IN AN ORGANIZED HEALTH CARE EDUCATION/TRAINING PROGRAM

## 2024-10-19 PROCEDURE — 80053 COMPREHEN METABOLIC PANEL: CPT | Performed by: STUDENT IN AN ORGANIZED HEALTH CARE EDUCATION/TRAINING PROGRAM

## 2024-10-19 PROCEDURE — 94760 N-INVAS EAR/PLS OXIMETRY 1: CPT

## 2024-10-19 RX ORDER — GUAIFENESIN/DEXTROMETHORPHAN 100-10MG/5
10 SYRUP ORAL EVERY 4 HOURS PRN
Status: DISCONTINUED | OUTPATIENT
Start: 2024-10-19 | End: 2024-10-20 | Stop reason: HOSPADM

## 2024-10-19 RX ORDER — BENZONATATE 100 MG/1
100 CAPSULE ORAL 3 TIMES DAILY
Status: DISCONTINUED | OUTPATIENT
Start: 2024-10-19 | End: 2024-10-20 | Stop reason: HOSPADM

## 2024-10-19 RX ADMIN — LEVALBUTEROL HYDROCHLORIDE 1.25 MG: 1.25 SOLUTION RESPIRATORY (INHALATION) at 19:26

## 2024-10-19 RX ADMIN — SERTRALINE HYDROCHLORIDE 50 MG: 50 TABLET ORAL at 21:12

## 2024-10-19 RX ADMIN — HYDROXYZINE HYDROCHLORIDE 50 MG: 25 TABLET ORAL at 21:12

## 2024-10-19 RX ADMIN — ENOXAPARIN SODIUM 40 MG: 40 INJECTION SUBCUTANEOUS at 08:35

## 2024-10-19 RX ADMIN — TRAZODONE HYDROCHLORIDE 100 MG: 100 TABLET ORAL at 21:12

## 2024-10-19 RX ADMIN — METHYLPREDNISOLONE SODIUM SUCCINATE 40 MG: 40 INJECTION, POWDER, FOR SOLUTION INTRAMUSCULAR; INTRAVENOUS at 14:56

## 2024-10-19 RX ADMIN — METHYLPREDNISOLONE SODIUM SUCCINATE 40 MG: 40 INJECTION, POWDER, FOR SOLUTION INTRAMUSCULAR; INTRAVENOUS at 08:34

## 2024-10-19 RX ADMIN — METHYLPREDNISOLONE SODIUM SUCCINATE 40 MG: 40 INJECTION, POWDER, FOR SOLUTION INTRAMUSCULAR; INTRAVENOUS at 23:07

## 2024-10-19 RX ADMIN — DOXYCYCLINE 100 MG: 100 CAPSULE ORAL at 09:07

## 2024-10-19 RX ADMIN — FLUTICASONE PROPIONATE 2 SPRAY: 50 SPRAY, METERED NASAL at 17:04

## 2024-10-19 RX ADMIN — ARIPIPRAZOLE 4 MG: 2 TABLET ORAL at 21:11

## 2024-10-19 RX ADMIN — BUDESONIDE INHALATION 0.5 MG: 0.5 SUSPENSION RESPIRATORY (INHALATION) at 19:26

## 2024-10-19 RX ADMIN — GUAIFENESIN AND DEXTROMETHORPHAN 10 ML: 20; 200 SYRUP ORAL at 23:07

## 2024-10-19 RX ADMIN — LEVALBUTEROL HYDROCHLORIDE 1.25 MG: 1.25 SOLUTION RESPIRATORY (INHALATION) at 08:01

## 2024-10-19 RX ADMIN — IPRATROPIUM BROMIDE 0.5 MG: 0.5 SOLUTION RESPIRATORY (INHALATION) at 08:01

## 2024-10-19 RX ADMIN — BENZONATATE 100 MG: 100 CAPSULE ORAL at 23:07

## 2024-10-19 RX ADMIN — DOXYCYCLINE 100 MG: 100 CAPSULE ORAL at 21:12

## 2024-10-19 RX ADMIN — GUAIFENESIN 600 MG: 600 TABLET, EXTENDED RELEASE ORAL at 08:35

## 2024-10-19 RX ADMIN — GUAIFENESIN 600 MG: 600 TABLET, EXTENDED RELEASE ORAL at 17:05

## 2024-10-19 RX ADMIN — BUDESONIDE INHALATION 0.5 MG: 0.5 SUSPENSION RESPIRATORY (INHALATION) at 08:01

## 2024-10-19 RX ADMIN — ACETAMINOPHEN 650 MG: 325 TABLET, FILM COATED ORAL at 21:12

## 2024-10-19 RX ADMIN — FLUTICASONE PROPIONATE 2 SPRAY: 50 SPRAY, METERED NASAL at 08:36

## 2024-10-19 RX ADMIN — ARIPIPRAZOLE 10 MG: 10 TABLET ORAL at 21:11

## 2024-10-19 RX ADMIN — IPRATROPIUM BROMIDE 0.5 MG: 0.5 SOLUTION RESPIRATORY (INHALATION) at 13:22

## 2024-10-19 RX ADMIN — LEVALBUTEROL HYDROCHLORIDE 1.25 MG: 1.25 SOLUTION RESPIRATORY (INHALATION) at 13:22

## 2024-10-19 RX ADMIN — IPRATROPIUM BROMIDE 0.5 MG: 0.5 SOLUTION RESPIRATORY (INHALATION) at 19:26

## 2024-10-19 NOTE — UTILIZATION REVIEW
Initial Clinical Review    Admission: Date/Time/Statement:   Admission Orders (From admission, onward)       Ordered        10/18/24 1059  INPATIENT ADMISSION  Once                          Orders Placed This Encounter   Procedures    INPATIENT ADMISSION     Standing Status:   Standing     Number of Occurrences:   1     Order Specific Question:   Level of Care     Answer:   Med Surg [16]     Order Specific Question:   Estimated length of stay     Answer:   More than 2 Midnights     Order Specific Question:   Certification     Answer:   I certify that inpatient services are medically necessary for this patient for a duration of greater than two midnights. See H&P and MD Progress Notes for additional information about the patient's course of treatment.     ED Arrival Information       Expected   -    Arrival   10/18/2024 08:51    Acuity   Emergent              Means of arrival   Ambulance    Escorted by   [facility] ([facility])    Service   Hospitalist    Admission type   Emergency              Arrival complaint   asthma             Chief Complaint   Patient presents with    Shortness of Breath     Patient came via home. Patient c/os of unproductive cough that has been going on for the past couple of days. Pt denies taking steroid neb. Patients some SOB and hx of COPD. Patient was given duo neb. Patient denies wearing o2 at home. Patient denies C/P.        Initial Presentation: 59 y.o. female with a PMH of COPD, tobacco abuse, obesity and anxiety/depression presented to the ED from home w/ productive cough and SOB, worsening over the last several days. She was unable to refill her Trelegy inhaler. Continues to smoke about half pack per day. Given nebs and IV Solu Medrol by EMS.   In the ED, . CXR w/o acute findings. Slightly hypoxic requiring 2 L   On exam, wheezing and rhonchi present.  Given nebs, IV Mag, po Doxycycline.     Admit as Inpatient for evaluation and treatment of acute COPD  exacerbation.  Plan: Continue treatment with IV Solu-Medrol 40 every 8 hours. Nebulizers with Xopenex, Atrovent and Pulmicort. Continue doxycycline to complete 5-day course. Recommend tobacco cessation.    Anticipated Length of Stay/Certification Statement: Patient will be admitted on an inpatient basis with an anticipated length of stay of greater than 2 midnights secondary to IV steroids, Nebs.     Date: 10/19   Day 2: Pt reports breathing has improved but does not yet feel that she is back at her baseline yet. Sputum cultures Gram stain noted GPC's and GPR. Cont IV Solu Medrol, decreased to q12h. Cont nebs, doxycycline.   PE: Rhonchi present.    ED Treatment-Medication Administration from 10/18/2024 0851 to 10/18/2024 1407         Date/Time Order Dose Route Action     10/18/2024 0856 ipratropium-albuterol (FOR EMS ONLY) (DUO-NEB) 0.5-2.5 mg/3 mL inhalation solution 3 mL 0 mL Does not apply Given to EMS     10/18/2024 0856 methylPREDNISolone sodium succinate (FOR EMS ONLY) (Solu-MEDROL) 125 MG injection 125 mg 0 mg Does not apply Given to EMS     10/18/2024 0917 albuterol inhalation solution 10 mg 10 mg Nebulization Given     10/18/2024 0917 ipratropium (ATROVENT) 0.02 % inhalation solution 1 mg 1 mg Nebulization Given     10/18/2024 0917 sodium chloride 0.9 % inhalation solution 12 mL 12 mL Nebulization Given     10/18/2024 0924 magnesium sulfate 2 g/50 mL IVPB (premix) 2 g 2 g Intravenous New Bag     10/18/2024 1110 doxycycline hyclate (VIBRAMYCIN) capsule 100 mg 100 mg Oral Given     10/18/2024 1110 albuterol inhalation solution 5 mg 5 mg Nebulization Given     10/18/2024 1326 levalbuterol (XOPENEX) inhalation solution 1.25 mg 1.25 mg Nebulization Given     10/18/2024 1326 sodium chloride 0.9 % inhalation solution 3 mL 3 mL Nebulization Given     10/18/2024 1326 ipratropium (ATROVENT) 0.02 % inhalation solution 0.5 mg 0.5 mg Nebulization Given            Scheduled Medications:  ARIPiprazole, 10 mg, Oral,  HS  ARIPiprazole, 4 mg, Oral, HS  budesonide, 0.5 mg, Nebulization, Q12H  doxycycline hyclate, 100 mg, Oral, Q12H  enoxaparin, 40 mg, Subcutaneous, Daily  fluticasone, 2 spray, Each Nare, BID  guaiFENesin, 600 mg, Oral, BID  ipratropium, 0.5 mg, Nebulization, TID  levalbuterol, 1.25 mg, Nebulization, TID  methylPREDNISolone sodium succinate, 40 mg, Intravenous, Q8H  nicotine, 1 patch, Transdermal, Daily  sertraline, 50 mg, Oral, HS      Continuous IV Infusions:     PRN Meds:  acetaminophen, 650 mg, Oral, Q6H PRN  aluminum-magnesium hydroxide-simethicone, 30 mL, Oral, Q6H PRN  hydrOXYzine HCL, 50 mg, Oral, TID PRN  traZODone, 100 mg, Oral, HS PRN      ED Triage Vitals   Temperature Pulse Respirations Blood Pressure SpO2 Pain Score   10/18/24 0856 10/18/24 0856 10/18/24 0856 10/18/24 0859 10/18/24 0856 10/18/24 1145   98.1 °F (36.7 °C) 102 16 132/69 95 % No Pain     Weight (last 2 days)       Date/Time Weight    10/18/24 1145 85.3 (188.05)    10/18/24 1036 85.3 (188.05)    10/18/24 0916 82.4 (181.66)            Vital Signs (last 3 days)       Date/Time Temp Pulse Resp BP MAP (mmHg) SpO2 Calculated FIO2 (%) - Nasal Cannula Nasal Cannula O2 Flow Rate (L/min) O2 Device Patient Position - Orthostatic VS [place] Coma Scale Score Pain    10/19/24 1335 -- -- -- -- -- 99 % -- -- -- -- -- --    10/19/24 1322 -- -- -- -- -- 95 % 28 2 L/min Nasal cannula -- -- --    10/19/24 0815 -- -- -- -- -- 93 % -- -- -- -- -- --    10/19/24 0802 -- -- -- -- -- 93 % 28 2 L/min Nasal cannula -- -- --    10/19/24 0700 97 °F (36.1 °C) 85 14 112/68 -- -- -- -- -- Lying -- No Pain    10/18/24 2325 97.8 °F (36.6 °C) 88 18 104/57 74 96 % 28 2 L/min Nasal cannula Lying -- --    10/18/24 2040 -- -- -- -- -- -- -- -- -- -- -- No Pain    10/18/24 1947 -- -- -- -- -- 95 % -- -- -- -- -- --    10/18/24 1445 98 °F (36.7 °C) 103 20 134/76 99 94 % 28 2 L/min Nasal cannula Lying -- --    10/18/24 1433 -- -- -- -- -- -- -- -- -- -- -- 8    10/18/24 1410 -- --  -- -- -- -- 28 2 L/min Nasal cannula -- 15 No Pain    10/18/24 1408 -- -- -- -- -- -- -- -- -- -- -- No Pain    10/18/24 1327 -- -- -- -- -- 93 % 28 2 L/min Nasal cannula -- -- --    10/18/24 1255 -- 104 20 111/80 -- 93 % 28 2 L/min Nasal cannula Lying -- --    10/18/24 1200 -- 111 28 123/89 97 93 % -- -- -- -- -- --    10/18/24 1145 98.1 °F (36.7 °C) 107 16 131/63 -- 93 % 28 2 L/min Nasal cannula Lying -- No Pain    10/18/24 1100 -- 110 30 138/68 93 92 % 28 2 L/min Nasal cannula Lying -- --    10/18/24 1030 -- 105 34 128/61 -- 89 % -- -- None (Room air) Lying -- --    10/18/24 1000 -- 103 36 121/71 88 99 % -- -- Other (comment) Lying -- --    10/18/24 0955 -- 94 16 121/71 -- 99 % -- -- Simple mask Lying -- --    10/18/24 0917 -- -- -- -- -- 92 % -- -- None (Room air) -- -- --    10/18/24 0912 -- -- -- -- -- -- -- -- -- -- 15 --    10/18/24 0859 -- -- -- 132/69 92 -- -- -- -- -- -- --    10/18/24 0856 98.1 °F (36.7 °C) 102 16 -- -- 95 % -- -- None (Room air) Lying -- --              Pertinent Labs/Diagnostic Test Results:   Radiology:  XR chest 2 views   ED Interpretation by Anant Joseph MD (10/18 1106)   I have reviewed the film, per my independent interpretation : No acute infiltrate, effusion, vascular congestion.  Formal read per radiology..        Final Interpretation by Juan Strickland MD (10/18 1354)      No acute cardiopulmonary disease.            Workstation performed: [id number]           Cardiology:  ECG 12 lead   Final Result by Maulik Cabral DO (10/18 7743)   Sinus tachycardia   Low voltage QRS   Borderline ECG   When compared with ECG of 22-DEC-2023 12:08,   No significant change was found   Confirmed by Maulik Cabral ([id number]) on 10/18/2024 3:10:32 PM        GI:  No orders to display       Results from last 7 days   Lab Units 10/18/24  0908   SARS-COV-2  Negative     Results from last 7 days   Lab Units 10/19/24  0609 10/18/24  0908   WBC Thousand/uL 6.44 5.13   HEMOGLOBIN  g/dL 14.4 15.5*   HEMATOCRIT % 44.3 47.2*   PLATELETS Thousands/uL 169 171   TOTAL NEUT ABS Thousands/µL 4.98 3.03         Results from last 7 days   Lab Units 10/19/24  0542 10/18/24  0908   SODIUM mmol/L 137 137   POTASSIUM mmol/L 5.0 3.7   CHLORIDE mmol/L 102 101   CO2 mmol/L 31 29   ANION GAP mmol/L 4 7   BUN mg/dL 16 9   CREATININE mg/dL 0.86 1.04   EGFR ml/min/1.73sq m 74 58   CALCIUM mg/dL 9.4 9.3   MAGNESIUM mg/dL  --  2.2     Results from last 7 days   Lab Units 10/19/24  0542 10/18/24  0908   AST U/L 13 15   ALT U/L 14 15   ALK PHOS U/L 66 85   TOTAL PROTEIN g/dL 7.1 7.8   ALBUMIN g/dL 4.1 4.4   TOTAL BILIRUBIN mg/dL 0.27 0.42         Results from last 7 days   Lab Units 10/19/24  0542 10/18/24  0908   GLUCOSE RANDOM mg/dL 141* 115             Results from last 7 days   Lab Units 10/18/24  0908   HS TNI 0HR ng/L 5         Results from last 7 days   Lab Units 10/18/24  0908   PROTIME seconds 12.5   INR  0.91   PTT seconds 30     Results from last 7 days   Lab Units 10/18/24  0908   TSH 3RD GENERATON uIU/mL 1.656                     Results from last 7 days   Lab Units 10/18/24  0908   BNP pg/mL 11                                         Results from last 7 days   Lab Units 10/18/24  0908   INFLUENZA A PCR  Negative   INFLUENZA B PCR  Negative   RSV PCR  Negative                             Results from last 7 days   Lab Units 10/18/24  1542   SPUTUM CULTURE  Culture too young- will reincubate   GRAM STAIN RESULT  1+ Epithelial cells per low power field*  2+ Gram positive cocci in pairs*  2+ Gram positive rods*  No polys seen*                   Past Medical History:   Diagnosis Date    Anxiety     Chronic pain     Back Pain    COPD (chronic obstructive pulmonary disease) ([facility])     Depression     Hyperlipidemia     Migraine     Psychiatric disorder     Severe episode of recurrent major depressive disorder, without psychotic features ([facility]) 12/12/2017     Present on Admission:   Generalized anxiety  disorder   Tobacco abuse   COPD with acute exacerbation (HCC)   Major depressive disorder, recurrent episode, moderate (HCC)      Admitting Diagnosis: Asthma [J45.909]  Hypoxia [R09.02]  COPD with acute exacerbation (HCC) [J44.1]  Age/Sex: 59 y.o. female    Network Utilization Review Department  ATTENTION: Please call with any questions or concerns to [phone] and carefully listen to the prompts so that you are directed to the right person. All voicemails are confidential.   For Discharge needs, contact Care Management DC Support Team at [phone] opt. [phone]  Send all requests for admission clinical reviews, approved or denied determinations and any other requests to dedicated fax number below belonging to the campus where the patient is receiving treatment. List of dedicated fax numbers for the Facilities:  FACILITY NAME UR FAX NUMBER   ADMISSION DENIALS (Administrative/Medical Necessity) [phone]   DISCHARGE SUPPORT TEAM (NETWORK) [phone]   PARENT CHILD HEALTH (Maternity/NICU/Pediatrics) [phone]   [facility] [phone]   [facility] [phone]   [facility] [phone]   [facility] [phone]   [facility] [phone]   [facility] [phone]   [facility] [phone]   [facility] [phone]   [facility] [phone]   [facility] [phone]   [facility] [phone]   [facility] [phone]

## 2024-10-19 NOTE — ASSESSMENT & PLAN NOTE
69-year-old female with PMH of tobacco abuse, COPD, obesity and depression/anxiety presented with cough and shortness of breath  Treated with haart nebs with improvement  Reportedly ran out of her trelogy inhaler 3 days prior  Chest x-ray without any acute findings  Continue treatment with IV Solu-Medrol 40 every 8 hours  Nebulizers with Xopenex, Atrovent and Pulmicort  Continue doxycycline to complete 5-day course  Sputum cultures Gram stain noted GPC's and GPR  Currently improving, continue current treatment and plan to decrease to Solu-Medrol every 12 hours tomorrow

## 2024-10-19 NOTE — PROGRESS NOTES
Progress Note - Hospitalist   Name: Krystin Francis 59 y.o. female I MRN: [id number]  Unit/Bed#: E4 -01 I Date of Admission: 10/18/2024   Date of Service: 10/19/2024 I Hospital Day: 1     Assessment & Plan  COPD with acute exacerbation (HCC)  69-year-old female with PMH of tobacco abuse, COPD, obesity and depression/anxiety presented with cough and shortness of breath  Treated with haart nebs with improvement  Reportedly ran out of her trelogy inhaler 3 days prior  Chest x-ray without any acute findings  Continue treatment with IV Solu-Medrol 40 every 8 hours  Nebulizers with Xopenex, Atrovent and Pulmicort  Continue doxycycline to complete 5-day course  Sputum cultures Gram stain noted GPC's and GPR  Currently improving, continue current treatment and plan to decrease to Solu-Medrol every 12 hours tomorrow  Generalized anxiety disorder  Continue sertraline  Tobacco abuse  Continues smoke about half pack per day  Nicotine patch  Major depressive disorder, recurrent episode, moderate (HCC)  Continue Abilify, sertraline and trazodone  Obesity (BMI 30-39.9)  Will benefit from weight loss, dietary changes and lifestyle modifications    VTE Pharmacologic Prophylaxis:   Pharmacologic: Enoxaparin (Lovenox)  Mechanical VTE Prophylaxis in Place: Yes    Current Length of Stay: 1 day(s)    Current Patient Status: Inpatient   Certification Statement: The patient will continue to require additional inpatient hospital stay due to IV steroids    Discharge Plan: 24-48 hours    Code Status: Level 1 - Full Code      Subjective:   Patient reports her breathing has improved from day prior.  Denies any fevers or chills.  Does not yet feel that she is back at her baseline yet.    Objective:     Vitals:   Temp (24hrs), Av.6 °F (36.4 °C), Min:97 °F (36.1 °C), Max:98 °F (36.7 °C)    Temp:  [97 °F (36.1 °C)-98 °F (36.7 °C)] 97 °F (36.1 °C)  HR:  [] 85  Resp:  [14-20] 14  BP: (104-134)/(57-80) 112/68  SpO2:  [93 %-96 %] 93  %  Body mass index is 33.32 kg/m².     Input and Output Summary (last 24 hours):       Intake/Output Summary (Last 24 hours) at 10/19/2024 1200  Last data filed at 10/19/2024 0001  Gross per 24 hour   Intake 420 ml   Output --   Net 420 ml       Physical Exam:     Physical Exam  Vitals and nursing note reviewed.   Constitutional:       Appearance: Normal appearance. She is obese.   HENT:      Head: Normocephalic.   Eyes:      Conjunctiva/sclera: Conjunctivae normal.   Cardiovascular:      Rate and Rhythm: Normal rate.      Heart sounds: Normal heart sounds.   Pulmonary:      Effort: Pulmonary effort is normal.      Breath sounds: Rhonchi present. No wheezing.   Abdominal:      General: Bowel sounds are normal. There is no distension.      Palpations: Abdomen is soft.   Musculoskeletal:         General: No swelling. Normal range of motion.   Skin:     General: Skin is warm and dry.   Neurological:      General: No focal deficit present.      Mental Status: She is alert. Mental status is at baseline.         Additional Data:     Labs:    Results from last 7 days   Lab Units 10/19/24  0609   WBC Thousand/uL 6.44   HEMOGLOBIN g/dL 14.4   HEMATOCRIT % 44.3   PLATELETS Thousands/uL 169   SEGS PCT % 77*   LYMPHO PCT % 14   MONO PCT % 8   EOS PCT % 0     Results from last 7 days   Lab Units 10/19/24  0542   SODIUM mmol/L 137   POTASSIUM mmol/L 5.0   CHLORIDE mmol/L 102   CO2 mmol/L 31   BUN mg/dL 16   CREATININE mg/dL 0.86   ANION GAP mmol/L 4   CALCIUM mg/dL 9.4   ALBUMIN g/dL 4.1   TOTAL BILIRUBIN mg/dL 0.27   ALK PHOS U/L 66   ALT U/L 14   AST U/L 13   GLUCOSE RANDOM mg/dL 141*     Results from last 7 days   Lab Units 10/18/24  0908   INR  0.91                       * I Have Reviewed All Lab Data Listed Above.  * Additional Pertinent Lab Tests Reviewed: All Labs For Current Hospital Admission Reviewed    Mobility:  Basic Mobility Inpatient Raw Score: 24  JH-HLM Goal: 8: Walk 250 feet or more  JH-HLM Achieved: 6: Walk 10  steps or more    Lines:     Invasive Devices       Peripheral Intravenous Line  Duration             Peripheral IV 10/18/24 Right Antecubital 1 day                       Imaging:    Imaging Reports Reviewed Today Include:     XR chest 2 views    Result Date: 10/18/2024  Impression: No acute cardiopulmonary disease. Workstation performed: [id number]        Recent Cultures (last 7 days):     Results from last 7 days   Lab Units 10/18/24  1542   SPUTUM CULTURE  Culture too young- will reincubate   GRAM STAIN RESULT  1+ Epithelial cells per low power field*  2+ Gram positive cocci in pairs*  2+ Gram positive rods*  No polys seen*       Last 24 Hours Medication List:   Current Facility-Administered Medications   Medication Dose Route Frequency Provider Last Rate    acetaminophen  650 mg Oral Q6H PRN Rojas Mckenna MD      aluminum-magnesium hydroxide-simethicone  30 mL Oral Q6H PRN Rojas Mckenna MD      ARIPiprazole  10 mg Oral HS Rojas Mckenna MD      ARIPiprazole  4 mg Oral HS Rojas Mckenna MD      budesonide  0.5 mg Nebulization Q12H Rojas Mckenna MD      doxycycline hyclate  100 mg Oral Q12H Rojas Mckenna MD      enoxaparin  40 mg Subcutaneous Daily Rojas Mckenna MD      fluticasone  2 spray Each Nare BID Rojas Mckenna MD      guaiFENesin  600 mg Oral BID Rojas Mckenna MD      hydrOXYzine HCL  50 mg Oral TID PRN Rojas Mckenna MD      ipratropium  0.5 mg Nebulization TID Rojas Mckenna MD      levalbuterol  1.25 mg Nebulization TID Rojas Mckenna MD      methylPREDNISolone sodium succinate  40 mg Intravenous Q8H Rojas Mckenna MD      nicotine  1 patch Transdermal Daily Rojas Mckenna MD      sertraline  50 mg Oral HS Rojas Mckenna MD      traZODone  100 mg Oral HS PRN Rojas Mckenna MD          Today, Patient Was Seen By: Rojas Mckenna MD    ** Please Note: Dictation voice to text software may have been used in the creation of this document. **

## 2024-10-19 NOTE — PLAN OF CARE
Problem: PAIN - ADULT  Goal: Verbalizes/displays adequate comfort level or baseline comfort level  Description: Interventions:  - Encourage patient to monitor pain and request assistance  - Assess pain using appropriate pain scale  - Administer analgesics based on type and severity of pain and evaluate response  - Implement non-pharmacological measures as appropriate and evaluate response  - Consider cultural and social influences on pain and pain management  - Notify physician/advanced practitioner if interventions unsuccessful or patient reports new pain  Outcome: Progressing     Problem: INFECTION - ADULT  Goal: Absence or prevention of progression during hospitalization  Description: INTERVENTIONS:  - Assess and monitor for signs and symptoms of infection  - Monitor lab/diagnostic results  - Monitor all insertion sites, i.e. indwelling lines, tubes, and drains  - Monitor endotracheal if appropriate and nasal secretions for changes in amount and color  - [place] appropriate cooling/warming therapies per order  - Administer medications as ordered  - Instruct and encourage patient and family to use good hand hygiene technique  - Identify and instruct in appropriate isolation precautions for identified infection/condition  Outcome: Progressing  Goal: Absence of fever/infection during neutropenic period  Description: INTERVENTIONS:  - Monitor WBC    Outcome: Progressing     Problem: SAFETY ADULT  Goal: Patient will remain free of falls  Description: INTERVENTIONS:  - Educate patient/family on patient safety including physical limitations  - Instruct patient to call for assistance with activity   - Consult OT/PT to assist with strengthening/mobility   - Keep Call bell within reach  - Keep bed low and locked with side rails adjusted as appropriate  - Keep care items and personal belongings within reach  - Initiate and maintain comfort rounds  - Make Fall Risk Sign visible to staff  - Offer Toileting every 2 Hours,  in advance of need  - Initiate/Maintain bed alarm  - Obtain necessary fall risk management equipment:   - Apply yellow socks and bracelet for high fall risk patients  - Consider moving patient to room near nurses station  Outcome: Progressing  Goal: Maintain or return to baseline ADL function  Description: INTERVENTIONS:  -  Assess patient's ability to carry out ADLs; assess patient's baseline for ADL function and identify physical deficits which impact ability to perform ADLs (bathing, care of mouth/teeth, toileting, grooming, dressing, etc.)  - Assess/evaluate cause of self-care deficits   - Assess range of motion  - Assess patient's mobility; develop plan if impaired  - Assess patient's need for assistive devices and provide as appropriate  - Encourage maximum independence but intervene and supervise when necessary  - Involve family in performance of ADLs  - Assess for home care needs following discharge   - Consider OT consult to assist with ADL evaluation and planning for discharge  - Provide patient education as appropriate  Outcome: Progressing  Goal: Maintains/Returns to pre admission functional level  Description: INTERVENTIONS:  - Perform AM-PAC 6 Click Basic Mobility/ Daily Activity assessment daily.  - Set and communicate daily mobility goal to care team and patient/family/caregiver.   - Collaborate with rehabilitation services on mobility goals if consulted  - Perform Range of Motion 3 times a day.  - Reposition patient every 2 hours.  - Dangle patient 3 times a day  - Stand patient 3 times a day  - Ambulate patient 3 times a day  - Out of bed to chair 3 times a day   - Out of bed for meals 3 times a day  - Out of bed for toileting  - Record patient progress and toleration of activity level   Outcome: Progressing     Problem: DISCHARGE PLANNING  Goal: Discharge to home or other facility with appropriate resources  Description: INTERVENTIONS:  - Identify barriers to discharge w/patient and caregiver  -  Arrange for needed discharge resources and transportation as appropriate  - Identify discharge learning needs (meds, wound care, etc.)  - Arrange for interpretive services to assist at discharge as needed  - Refer to Case Management Department for coordinating discharge planning if the patient needs post-hospital services based on physician/advanced practitioner order or complex needs related to functional status, cognitive ability, or social support system  Outcome: Progressing     Problem: Knowledge Deficit  Goal: Patient/family/caregiver demonstrates understanding of disease process, treatment plan, medications, and discharge instructions  Description: Complete learning assessment and assess knowledge base.  Interventions:  - Provide teaching at level of understanding  - Provide teaching via preferred learning methods  Outcome: Progressing

## 2024-10-19 NOTE — PLAN OF CARE
Problem: PAIN - ADULT  Goal: Verbalizes/displays adequate comfort level or baseline comfort level  Description: Interventions:  - Encourage patient to monitor pain and request assistance  - Assess pain using appropriate pain scale  - Administer analgesics based on type and severity of pain and evaluate response  - Implement non-pharmacological measures as appropriate and evaluate response  - Consider cultural and social influences on pain and pain management  - Notify physician/advanced practitioner if interventions unsuccessful or patient reports new pain  Outcome: Progressing     Problem: INFECTION - ADULT  Goal: Absence or prevention of progression during hospitalization  Description: INTERVENTIONS:  - Assess and monitor for signs and symptoms of infection  - Monitor lab/diagnostic results  - Monitor all insertion sites, i.e. indwelling lines, tubes, and drains  - Monitor endotracheal if appropriate and nasal secretions for changes in amount and color  - [place] appropriate cooling/warming therapies per order  - Administer medications as ordered  - Instruct and encourage patient and family to use good hand hygiene technique  - Identify and instruct in appropriate isolation precautions for identified infection/condition  Outcome: Progressing  Goal: Absence of fever/infection during neutropenic period  Description: INTERVENTIONS:  - Monitor WBC    Outcome: Progressing     Problem: SAFETY ADULT  Goal: Patient will remain free of falls  Description: INTERVENTIONS:  - Educate patient/family on patient safety including physical limitations  - Instruct patient to call for assistance with activity   - Consult OT/PT to assist with strengthening/mobility   - Keep Call bell within reach  - Keep bed low and locked with side rails adjusted as appropriate  - Keep care items and personal belongings within reach  - Initiate and maintain comfort rounds  - Make Fall Risk Sign visible to staff  - Offer Toileting every  Hours,  in advance of need  - Initiate/Maintain alarm  - Obtain necessary fall risk management equipment:   - Apply yellow socks and bracelet for high fall risk patients  - Consider moving patient to room near nurses station  Outcome: Progressing  Goal: Maintain or return to baseline ADL function  Description: INTERVENTIONS:  -  Assess patient's ability to carry out ADLs; assess patient's baseline for ADL function and identify physical deficits which impact ability to perform ADLs (bathing, care of mouth/teeth, toileting, grooming, dressing, etc.)  - Assess/evaluate cause of self-care deficits   - Assess range of motion  - Assess patient's mobility; develop plan if impaired  - Assess patient's need for assistive devices and provide as appropriate  - Encourage maximum independence but intervene and supervise when necessary  - Involve family in performance of ADLs  - Assess for home care needs following discharge   - Consider OT consult to assist with ADL evaluation and planning for discharge  - Provide patient education as appropriate  Outcome: Progressing  Goal: Maintains/Returns to pre admission functional level  Description: INTERVENTIONS:  - Perform AM-PAC 6 Click Basic Mobility/ Daily Activity assessment daily.  - Set and communicate daily mobility goal to care team and patient/family/caregiver.   - Collaborate with rehabilitation services on mobility goals if consulted  - Perform Range of Motion  times a day.  - Reposition patient every  hours.  - Dangle patient  times a day  - Stand patient  times a day  - Ambulate patient  times a day  - Out of bed to chair  times a day   - Out of bed for meals  times a day  - Out of bed for toileting  - Record patient progress and toleration of activity level   Outcome: Progressing     Problem: DISCHARGE PLANNING  Goal: Discharge to home or other facility with appropriate resources  Description: INTERVENTIONS:  - Identify barriers to discharge w/patient and caregiver  - Arrange for  needed discharge resources and transportation as appropriate  - Identify discharge learning needs (meds, wound care, etc.)  - Arrange for interpretive services to assist at discharge as needed  - Refer to Case Management Department for coordinating discharge planning if the patient needs post-hospital services based on physician/advanced practitioner order or complex needs related to functional status, cognitive ability, or social support system  Outcome: Progressing     Problem: Knowledge Deficit  Goal: Patient/family/caregiver demonstrates understanding of disease process, treatment plan, medications, and discharge instructions  Description: Complete learning assessment and assess knowledge base.  Interventions:  - Provide teaching at level of understanding  - Provide teaching via preferred learning methods  Outcome: Progressing

## 2024-10-20 VITALS
HEIGHT: 63 IN | DIASTOLIC BLOOD PRESSURE: 79 MMHG | WEIGHT: 179.23 LBS | OXYGEN SATURATION: 92 % | SYSTOLIC BLOOD PRESSURE: 137 MMHG | BODY MASS INDEX: 31.76 KG/M2 | TEMPERATURE: 97.1 F | RESPIRATION RATE: 18 BRPM | HEART RATE: 82 BPM

## 2024-10-20 LAB
ANION GAP SERPL CALCULATED.3IONS-SCNC: 6 MMOL/L (ref 4–13)
BUN SERPL-MCNC: 20 MG/DL (ref 5–25)
CALCIUM SERPL-MCNC: 9.3 MG/DL (ref 8.4–10.2)
CHLORIDE SERPL-SCNC: 104 MMOL/L (ref 96–108)
CO2 SERPL-SCNC: 27 MMOL/L (ref 21–32)
CREAT SERPL-MCNC: 0.92 MG/DL (ref 0.6–1.3)
ERYTHROCYTE [DISTWIDTH] IN BLOOD BY AUTOMATED COUNT: 13.6 % (ref 11.6–15.1)
GFR SERPL CREATININE-BSD FRML MDRD: 68 ML/MIN/1.73SQ M
GLUCOSE SERPL-MCNC: 111 MG/DL (ref 65–140)
HCT VFR BLD AUTO: 43.5 % (ref 34.8–46.1)
HGB BLD-MCNC: 14 G/DL (ref 11.5–15.4)
MCH RBC QN AUTO: 30.3 PG (ref 26.8–34.3)
MCHC RBC AUTO-ENTMCNC: 32.2 G/DL (ref 31.4–37.4)
MCV RBC AUTO: 94 FL (ref 82–98)
PLATELET # BLD AUTO: 210 THOUSANDS/UL (ref 149–390)
PMV BLD AUTO: 9.6 FL (ref 8.9–12.7)
POTASSIUM SERPL-SCNC: 4.5 MMOL/L (ref 3.5–5.3)
RBC # BLD AUTO: 4.62 MILLION/UL (ref 3.81–5.12)
SODIUM SERPL-SCNC: 137 MMOL/L (ref 135–147)
WBC # BLD AUTO: 12.6 THOUSAND/UL (ref 4.31–10.16)

## 2024-10-20 PROCEDURE — 85027 COMPLETE CBC AUTOMATED: CPT | Performed by: STUDENT IN AN ORGANIZED HEALTH CARE EDUCATION/TRAINING PROGRAM

## 2024-10-20 PROCEDURE — 94760 N-INVAS EAR/PLS OXIMETRY 1: CPT

## 2024-10-20 PROCEDURE — 99239 HOSP IP/OBS DSCHRG MGMT >30: CPT | Performed by: STUDENT IN AN ORGANIZED HEALTH CARE EDUCATION/TRAINING PROGRAM

## 2024-10-20 PROCEDURE — 80048 BASIC METABOLIC PNL TOTAL CA: CPT | Performed by: STUDENT IN AN ORGANIZED HEALTH CARE EDUCATION/TRAINING PROGRAM

## 2024-10-20 PROCEDURE — 94640 AIRWAY INHALATION TREATMENT: CPT

## 2024-10-20 RX ORDER — DOXYCYCLINE 100 MG/1
100 CAPSULE ORAL EVERY 12 HOURS
Qty: 6 CAPSULE | Refills: 0 | Status: SHIPPED | OUTPATIENT
Start: 2024-10-20 | End: 2024-10-23

## 2024-10-20 RX ORDER — PREDNISONE 20 MG/1
40 TABLET ORAL DAILY
Status: DISCONTINUED | OUTPATIENT
Start: 2024-10-21 | End: 2024-10-20 | Stop reason: HOSPADM

## 2024-10-20 RX ORDER — PREDNISONE 10 MG/1
TABLET ORAL
Qty: 30 TABLET | Refills: 0 | Status: SHIPPED | OUTPATIENT
Start: 2024-10-21 | End: 2024-11-02

## 2024-10-20 RX ORDER — GUAIFENESIN 600 MG/1
600 TABLET, EXTENDED RELEASE ORAL 2 TIMES DAILY
Qty: 20 TABLET | Refills: 0 | Status: SHIPPED | OUTPATIENT
Start: 2024-10-20 | End: 2024-10-30

## 2024-10-20 RX ORDER — BENZONATATE 100 MG/1
100 CAPSULE ORAL 3 TIMES DAILY
Qty: 20 CAPSULE | Refills: 0 | Status: SHIPPED | OUTPATIENT
Start: 2024-10-20

## 2024-10-20 RX ORDER — METHYLPREDNISOLONE SODIUM SUCCINATE 40 MG/ML
40 INJECTION, POWDER, LYOPHILIZED, FOR SOLUTION INTRAMUSCULAR; INTRAVENOUS EVERY 12 HOURS SCHEDULED
Status: DISCONTINUED | OUTPATIENT
Start: 2024-10-20 | End: 2024-10-20 | Stop reason: HOSPADM

## 2024-10-20 RX ADMIN — BENZONATATE 100 MG: 100 CAPSULE ORAL at 08:47

## 2024-10-20 RX ADMIN — ACETAMINOPHEN 650 MG: 325 TABLET, FILM COATED ORAL at 09:17

## 2024-10-20 RX ADMIN — FLUTICASONE PROPIONATE 2 SPRAY: 50 SPRAY, METERED NASAL at 08:48

## 2024-10-20 RX ADMIN — METHYLPREDNISOLONE SODIUM SUCCINATE 40 MG: 40 INJECTION, POWDER, FOR SOLUTION INTRAMUSCULAR; INTRAVENOUS at 06:10

## 2024-10-20 RX ADMIN — GUAIFENESIN AND DEXTROMETHORPHAN 10 ML: 20; 200 SYRUP ORAL at 03:21

## 2024-10-20 RX ADMIN — GUAIFENESIN AND DEXTROMETHORPHAN 10 ML: 20; 200 SYRUP ORAL at 07:29

## 2024-10-20 RX ADMIN — BUDESONIDE INHALATION 0.5 MG: 0.5 SUSPENSION RESPIRATORY (INHALATION) at 08:43

## 2024-10-20 RX ADMIN — LEVALBUTEROL HYDROCHLORIDE 1.25 MG: 1.25 SOLUTION RESPIRATORY (INHALATION) at 08:43

## 2024-10-20 RX ADMIN — GUAIFENESIN 600 MG: 600 TABLET, EXTENDED RELEASE ORAL at 08:47

## 2024-10-20 RX ADMIN — ENOXAPARIN SODIUM 40 MG: 40 INJECTION SUBCUTANEOUS at 08:47

## 2024-10-20 RX ADMIN — IPRATROPIUM BROMIDE 0.5 MG: 0.5 SOLUTION RESPIRATORY (INHALATION) at 08:43

## 2024-10-20 NOTE — ASSESSMENT & PLAN NOTE
>>ASSESSMENT AND PLAN FOR TOBACCO ABUSE WRITTEN ON 10/20/2024 11:45 AM BY GABE GUTIERREZ MD    Continues smoke about half pack per day  Nicotine patch

## 2024-10-20 NOTE — PLAN OF CARE
Problem: PAIN - ADULT  Goal: Verbalizes/displays adequate comfort level or baseline comfort level  Description: Interventions:  - Encourage patient to monitor pain and request assistance  - Assess pain using appropriate pain scale  - Administer analgesics based on type and severity of pain and evaluate response  - Implement non-pharmacological measures as appropriate and evaluate response  - Consider cultural and social influences on pain and pain management  - Notify physician/advanced practitioner if interventions unsuccessful or patient reports new pain  Outcome: Progressing     Problem: INFECTION - ADULT  Goal: Absence or prevention of progression during hospitalization  Description: INTERVENTIONS:  - Assess and monitor for signs and symptoms of infection  - Monitor lab/diagnostic results  - Monitor all insertion sites, i.e. indwelling lines, tubes, and drains  - Monitor endotracheal if appropriate and nasal secretions for changes in amount and color  - [place] appropriate cooling/warming therapies per order  - Administer medications as ordered  - Instruct and encourage patient and family to use good hand hygiene technique  - Identify and instruct in appropriate isolation precautions for identified infection/condition  Outcome: Progressing  Goal: Absence of fever/infection during neutropenic period  Description: INTERVENTIONS:  - Monitor WBC    Outcome: Progressing     Problem: SAFETY ADULT  Goal: Patient will remain free of falls  Description: INTERVENTIONS:  - Educate patient/family on patient safety including physical limitations  - Instruct patient to call for assistance with activity   - Consult OT/PT to assist with strengthening/mobility   - Keep Call bell within reach  - Keep bed low and locked with side rails adjusted as appropriate  - Keep care items and personal belongings within reach  - Initiate and maintain comfort rounds  - Make Fall Risk Sign visible to staff  - Offer Toileting every 2 Hours,  in advance of need  - Initiate/Maintain bed/chair alarm  - Obtain necessary fall risk management equipment: bed/chair alarm  - Apply yellow socks and bracelet for high fall risk patients  - Consider moving patient to room near nurses station  Outcome: Progressing  Goal: Maintain or return to baseline ADL function  Description: INTERVENTIONS:  -  Assess patient's ability to carry out ADLs; assess patient's baseline for ADL function and identify physical deficits which impact ability to perform ADLs (bathing, care of mouth/teeth, toileting, grooming, dressing, etc.)  - Assess/evaluate cause of self-care deficits   - Assess range of motion  - Assess patient's mobility; develop plan if impaired  - Assess patient's need for assistive devices and provide as appropriate  - Encourage maximum independence but intervene and supervise when necessary  - Involve family in performance of ADLs  - Assess for home care needs following discharge   - Consider OT consult to assist with ADL evaluation and planning for discharge  - Provide patient education as appropriate  Outcome: Progressing  Goal: Maintains/Returns to pre admission functional level  Description: INTERVENTIONS:  - Perform AM-PAC 6 Click Basic Mobility/ Daily Activity assessment daily.  - Set and communicate daily mobility goal to care team and patient/family/caregiver.   - Collaborate with rehabilitation services on mobility goals if consulted  - Perform Range of Motion 4 times a day.  - Reposition patient every 2 hours.  - Dangle patient 3 times a day  - Stand patient 3 times a day  - Ambulate patient 3 times a day  - Out of bed to chair 3 times a day   - Out of bed for meals 3 times a day  - Out of bed for toileting  - Record patient progress and toleration of activity level   Outcome: Progressing     Problem: DISCHARGE PLANNING  Goal: Discharge to home or other facility with appropriate resources  Description: INTERVENTIONS:  - Identify barriers to discharge  w/patient and caregiver  - Arrange for needed discharge resources and transportation as appropriate  - Identify discharge learning needs (meds, wound care, etc.)  - Arrange for interpretive services to assist at discharge as needed  - Refer to Case Management Department for coordinating discharge planning if the patient needs post-hospital services based on physician/advanced practitioner order or complex needs related to functional status, cognitive ability, or social support system  Outcome: Progressing     Problem: Knowledge Deficit  Goal: Patient/family/caregiver demonstrates understanding of disease process, treatment plan, medications, and discharge instructions  Description: Complete learning assessment and assess knowledge base.  Interventions:  - Provide teaching at level of understanding  - Provide teaching via preferred learning methods  Outcome: Progressing

## 2024-10-20 NOTE — NURSING NOTE
Patient 96% at rest 2 LNC 02. Walked half the unit with pulse sox, dropped to 92 % while ambulating and remained at 92% RA.

## 2024-10-20 NOTE — ASSESSMENT & PLAN NOTE
69-year-old female with PMH of tobacco abuse, COPD, obesity and depression/anxiety presented with cough and shortness of breath  Treated with haart nebs with improvement  Reportedly ran out of her trelogy inhaler 3 days prior  Chest x-ray without any acute findings  Continue treatment with IV Solu-Medrol 40 every 8 hours  Nebulizers with Xopenex, Atrovent and Pulmicort  Sputum cultures Gram stain noted GPC's and GPR  As patient clinically improved, transition steroids to prednisone 40 mg tapering 10 mg every 3 days until completion  Doxycycline to complete 5-day course  Recommend a follow-up outpatient with PCP and pulmonology

## 2024-10-20 NOTE — PLAN OF CARE
Problem: PAIN - ADULT  Goal: Verbalizes/displays adequate comfort level or baseline comfort level  Description: Interventions:  - Encourage patient to monitor pain and request assistance  - Assess pain using appropriate pain scale  - Administer analgesics based on type and severity of pain and evaluate response  - Implement non-pharmacological measures as appropriate and evaluate response  - Consider cultural and social influences on pain and pain management  - Notify physician/advanced practitioner if interventions unsuccessful or patient reports new pain  Outcome: Progressing     Problem: INFECTION - ADULT  Goal: Absence or prevention of progression during hospitalization  Description: INTERVENTIONS:  - Assess and monitor for signs and symptoms of infection  - Monitor lab/diagnostic results  - Monitor all insertion sites, i.e. indwelling lines, tubes, and drains  - Monitor endotracheal if appropriate and nasal secretions for changes in amount and color  - [place] appropriate cooling/warming therapies per order  - Administer medications as ordered  - Instruct and encourage patient and family to use good hand hygiene technique  - Identify and instruct in appropriate isolation precautions for identified infection/condition  Outcome: Progressing  Goal: Absence of fever/infection during neutropenic period  Description: INTERVENTIONS:  - Monitor WBC    Outcome: Progressing     Problem: SAFETY ADULT  Goal: Patient will remain free of falls  Description: INTERVENTIONS:  - Educate patient/family on patient safety including physical limitations  - Instruct patient to call for assistance with activity   - Consult OT/PT to assist with strengthening/mobility   - Keep Call bell within reach  - Keep bed low and locked with side rails adjusted as appropriate  - Keep care items and personal belongings within reach  - Initiate and maintain comfort rounds  - Make Fall Risk Sign visible to staff  - Offer Toileting every  Hours,  in advance of need  - Initiate/Maintain alarm  - Obtain necessary fall risk management equipment:   - Apply yellow socks and bracelet for high fall risk patients  - Consider moving patient to room near nurses station  Outcome: Progressing  Goal: Maintain or return to baseline ADL function  Description: INTERVENTIONS:  -  Assess patient's ability to carry out ADLs; assess patient's baseline for ADL function and identify physical deficits which impact ability to perform ADLs (bathing, care of mouth/teeth, toileting, grooming, dressing, etc.)  - Assess/evaluate cause of self-care deficits   - Assess range of motion  - Assess patient's mobility; develop plan if impaired  - Assess patient's need for assistive devices and provide as appropriate  - Encourage maximum independence but intervene and supervise when necessary  - Involve family in performance of ADLs  - Assess for home care needs following discharge   - Consider OT consult to assist with ADL evaluation and planning for discharge  - Provide patient education as appropriate  Outcome: Progressing  Goal: Maintains/Returns to pre admission functional level  Description: INTERVENTIONS:  - Perform AM-PAC 6 Click Basic Mobility/ Daily Activity assessment daily.  - Set and communicate daily mobility goal to care team and patient/family/caregiver.   - Collaborate with rehabilitation services on mobility goals if consulted  - Perform Range of Motion  times a day.  - Reposition patient every  hours.  - Dangle patient  times a day  - Stand patient  times a day  - Ambulate patient  times a day  - Out of bed to chair  times a day   - Out of bed for meals  times a day  - Out of bed for toileting  - Record patient progress and toleration of activity level   Outcome: Progressing     Problem: DISCHARGE PLANNING  Goal: Discharge to home or other facility with appropriate resources  Description: INTERVENTIONS:  - Identify barriers to discharge w/patient and caregiver  - Arrange for  needed discharge resources and transportation as appropriate  - Identify discharge learning needs (meds, wound care, etc.)  - Arrange for interpretive services to assist at discharge as needed  - Refer to Case Management Department for coordinating discharge planning if the patient needs post-hospital services based on physician/advanced practitioner order or complex needs related to functional status, cognitive ability, or social support system  Outcome: Progressing     Problem: Knowledge Deficit  Goal: Patient/family/caregiver demonstrates understanding of disease process, treatment plan, medications, and discharge instructions  Description: Complete learning assessment and assess knowledge base.  Interventions:  - Provide teaching at level of understanding  - Provide teaching via preferred learning methods  Outcome: Progressing

## 2024-10-20 NOTE — DISCHARGE SUMMARY
Discharge Summary - Hospitalist   Name: Krystin Francis 59 y.o. female I MRN: [id number]  Unit/Bed#: E4 -01 I Date of Admission: 10/18/2024   Date of Service: 10/20/2024 I Hospital Day: 2     Assessment & Plan  COPD with acute exacerbation (HCC)  69-year-old female with PMH of tobacco abuse, COPD, obesity and depression/anxiety presented with cough and shortness of breath  Treated with haart nebs with improvement  Reportedly ran out of her trelogy inhaler 3 days prior  Chest x-ray without any acute findings  Continue treatment with IV Solu-Medrol 40 every 8 hours  Nebulizers with Xopenex, Atrovent and Pulmicort  Sputum cultures Gram stain noted GPC's and GPR  As patient clinically improved, transition steroids to prednisone 40 mg tapering 10 mg every 3 days until completion  Doxycycline to complete 5-day course  Recommend a follow-up outpatient with PCP and pulmonology  Generalized anxiety disorder  Continue sertraline  Tobacco abuse  Continues smoke about half pack per day  Nicotine patch  Major depressive disorder, recurrent episode, moderate (HCC)  Continue Abilify, sertraline and trazodone  Obesity (BMI 30-39.9)  Will benefit from weight loss, dietary changes and lifestyle modifications     Discharging Physician / Practitioner: Rojas Mckenna MD  PCP: Douglas C Shoenberger, MD  Admission Date:   Admission Orders (From admission, onward)       Ordered        10/18/24 1059  INPATIENT ADMISSION  Once                          Discharge Date: 10/20/24    Medical Problems       Resolved Problems  Date Reviewed: 8/6/2024   None         Consultations During Hospital Stay:  none    Procedures Performed:   none    Significant Findings / Test Results:   XR chest 2 views    Result Date: 10/18/2024  Impression: No acute cardiopulmonary disease. Workstation performed: [id number]        Incidental Findings:   none     Test Results Pending at Discharge (will require follow up):   none     Outpatient Tests  "Requested:  none    Complications:  none    Reason for Admission: COPD Exacerbation    Hospital Course:     Krystin Francis is a 59 y.o. female patient who originally presented to the hospital on 10/18/2024 due to COPD exacerbation.  Presented with worsening cough, shortness of breath and chest tightness.  Known history of COPD and asthma.  Ran out of Trelegy several days prior and was not able to  the refill.  Continues to smoke about half pack per day.  Patient was mildly hypoxic, required supplemental oxygen which improved.  She was treated with steroids and nebulizers.  Monitor over 48 hours with symptomatic improvement.  Transition to prednisone 40 mg on discharge with taper.  Sputum Gram stain notable for GPC's and GPR's.  Recommended doxycycline to complete 5-day course.  Discharged home to follow-up with pulmonology and PCP outpatient.    Please see above list of diagnoses and related plan for additional information.     Condition at Discharge: fair     Discharge Day Visit / Exam:     Subjective:    Reports feeling better overall.  Denies any fevers or chills.  States that her breathing has improved, ambulating without difficulty.  Vitals: Blood Pressure: 137/79 (10/20/24 0733)  Pulse: 82 (10/20/24 0733)  Temperature: (!) 97.1 °F (36.2 °C) (10/20/24 0733)  Temp Source: Temporal (10/20/24 0733)  Respirations: 18 (10/20/24 0733)  Height: 5' 2.99\" (160 cm) (10/18/24 1145)  Weight - Scale: 81.3 kg (179 lb 3.7 oz) (10/20/24 0600)  SpO2: 92 % (10/20/24 0914)  Exam:   Physical Exam  Vitals and nursing note reviewed.   Constitutional:       Appearance: Normal appearance. She is obese.   HENT:      Head: Normocephalic.   Eyes:      Conjunctiva/sclera: Conjunctivae normal.   Cardiovascular:      Rate and Rhythm: Normal rate.      Heart sounds: Normal heart sounds.   Pulmonary:      Effort: Pulmonary effort is normal.      Breath sounds: No wheezing or rhonchi.   Abdominal:      General: Bowel sounds are normal. " There is no distension.      Palpations: Abdomen is soft.   Musculoskeletal:         General: No swelling. Normal range of motion.   Skin:     General: Skin is warm and dry.   Neurological:      General: No focal deficit present.      Mental Status: She is alert. Mental status is at baseline.       Discharge instructions/Information to patient and family:   See after visit summary for information provided to patient and family.      Provisions for Follow-Up Care:  See after visit summary for information related to follow-up care and any pertinent home health orders.      Disposition:     Home       Discharge Statement:  I spent 40 minutes discharging the patient. This time was spent on the day of discharge. I had direct contact with the patient on the day of discharge. Greater than 50% of the total time was spent examining patient, answering all patient questions, arranging and discussing plan of care with patient as well as directly providing post-discharge instructions.  Additional time then spent on discharge activities.    Discharge Medications:  See after visit summary for reconciled discharge medications provided to patient and family.      ** Please Note: This note has been constructed using a voice recognition system **

## 2024-10-21 NOTE — UTILIZATION REVIEW
NOTIFICATION OF INPATIENT ADMISSION   AUTHORIZATION REQUEST   SERVICING FACILITY:   [place] [address]  Tax ID: [id number]  NPI: [id number] ATTENDING PROVIDER:  Attending Name and NPI#: Rojas Mckenna Md [[id number]]  Address: [address] [place] [address]  Phone: [phone]     ADMISSION INFORMATION:  Place of Service: Inpatient [facility]  Place of Service Code: 21  Inpatient Admission Date/Time: 10/18/24 10:59 AM  Discharge Date/Time: 10/20/2024 11:44 AM  Admitting Diagnosis Code/Description:  Asthma [J45.909]  Hypoxia [R09.02]  COPD with acute exacerbation (HCC) [J44.1]     UTILIZATION REVIEW CONTACT:  Katina Dietrich Utilization   Network Utilization Review Department  Phone: [phone]  Fax [phone]  Email: [email or web address]  Contact for approvals/pending authorizations, clinical reviews, and discharge.     PHYSICIAN ADVISORY SERVICES:  Medical Necessity Denial & Ljpb-ad-Hnwn Review  Phone: [phone]  Fax: [phone]  Email: [email or web address]     DISCHARGE SUPPORT TEAM:  For Patients Discharge Needs & Updates  Phone: [phone] opt. 2 Fax: [phone]  Email: [email or web address]           Vin Reich RN   Registered Nurse  Specialty: Utilization Review     Utilization Review     Signed     Date of Service: 10/19/2024  2:38 PM     Signed       Expand All Collapse All    Initial Clinical Review     Admission: Date/Time/Statement:   Admission Orders (From admission, onward)          Ordered         10/18/24 1059   INPATIENT ADMISSION  Once                                     Orders Placed This Encounter   Procedures    INPATIENT ADMISSION       Standing Status:   Standing       Number of Occurrences:   1       Order Specific Question:   Level of Care       Answer:   Med Surg [16]       Order Specific Question:   Estimated length of stay        Answer:   More than 2 Midnights       Order Specific Question:   Certification       Answer:   I certify that inpatient services are medically necessary for this patient for a duration of greater than two midnights. See H&P and MD Progress Notes for additional information about the patient's course of treatment.      ED Arrival Information         Expected   -    Arrival   10/18/2024 08:51    Acuity   Emergent                 Means of arrival   Ambulance    Escorted by   [facility] ([facility])    Service   Hospitalist    Admission type   Emergency                 Arrival complaint   asthma                     Chief Complaint   Patient presents with    Shortness of Breath       Patient came via home. Patient c/os of unproductive cough that has been going on for the past couple of days. Pt denies taking steroid neb. Patients some SOB and hx of COPD. Patient was given duo neb. Patient denies wearing o2 at home. Patient denies C/P.          Initial Presentation: 59 y.o. female with a PMH of COPD, tobacco abuse, obesity and anxiety/depression presented to the ED from home w/ productive cough and SOB, worsening over the last several days. She was unable to refill her Trelegy inhaler. Continues to smoke about half pack per day. Given nebs and IV Solu Medrol by EMS.   In the ED, . CXR w/o acute findings. Slightly hypoxic requiring 2 L   On exam, wheezing and rhonchi present.  Given nebs, IV Mag, po Doxycycline.      Admit as Inpatient for evaluation and treatment of acute COPD exacerbation.  Plan: Continue treatment with IV Solu-Medrol 40 every 8 hours. Nebulizers with Xopenex, Atrovent and Pulmicort. Continue doxycycline to complete 5-day course. Recommend tobacco cessation.     Anticipated Length of Stay/Certification Statement: Patient will be admitted on an inpatient basis with an anticipated length of stay of greater than 2 midnights secondary to IV steroids, Nebs.      Date: 10/19   Day 2: Pt reports  breathing has improved but does not yet feel that she is back at her baseline yet. Sputum cultures Gram stain noted GPC's and GPR. Cont IV Solu Medrol, decreased to q12h. Cont nebs, doxycycline.   PE: Rhonchi present.     ED Treatment-Medication Administration from 10/18/2024 0851 to 10/18/2024 1407           Date/Time Order Dose Route Action       10/18/2024 0856 ipratropium-albuterol (FOR EMS ONLY) (DUO-NEB) 0.5-2.5 mg/3 mL inhalation solution 3 mL 0 mL Does not apply Given to EMS       10/18/2024 0856 methylPREDNISolone sodium succinate (FOR EMS ONLY) (Solu-MEDROL) 125 MG injection 125 mg 0 mg Does not apply Given to EMS       10/18/2024 0917 albuterol inhalation solution 10 mg 10 mg Nebulization Given       10/18/2024 0917 ipratropium (ATROVENT) 0.02 % inhalation solution 1 mg 1 mg Nebulization Given       10/18/2024 0917 sodium chloride 0.9 % inhalation solution 12 mL 12 mL Nebulization Given       10/18/2024 0924 magnesium sulfate 2 g/50 mL IVPB (premix) 2 g 2 g Intravenous New Bag       10/18/2024 1110 doxycycline hyclate (VIBRAMYCIN) capsule 100 mg 100 mg Oral Given       10/18/2024 1110 albuterol inhalation solution 5 mg 5 mg Nebulization Given       10/18/2024 1326 levalbuterol (XOPENEX) inhalation solution 1.25 mg 1.25 mg Nebulization Given       10/18/2024 1326 sodium chloride 0.9 % inhalation solution 3 mL 3 mL Nebulization Given       10/18/2024 1326 ipratropium (ATROVENT) 0.02 % inhalation solution 0.5 mg 0.5 mg Nebulization Given                Scheduled Medications:    Scheduled Medications ONLY (does not pull in infusions nor PRN medications order   ARIPiprazole, 10 mg, Oral, HS  ARIPiprazole, 4 mg, Oral, HS  budesonide, 0.5 mg, Nebulization, Q12H  doxycycline hyclate, 100 mg, Oral, Q12H  enoxaparin, 40 mg, Subcutaneous, Daily  fluticasone, 2 spray, Each Nare, BID  guaiFENesin, 600 mg, Oral, BID  ipratropium, 0.5 mg, Nebulization, TID  levalbuterol, 1.25 mg, Nebulization, TID  methylPREDNISolone  sodium succinate, 40 mg, Intravenous, Q8H  nicotine, 1 patch, Transdermal, Daily  sertraline, 50 mg, Oral, HS         Continuous IV Infusions:  Infusions Meds - Displays dose, route, & frequency only         PRN Meds:    PRN Medications - displays dose, route, and frequency   acetaminophen, 650 mg, Oral, Q6H PRN  aluminum-magnesium hydroxide-simethicone, 30 mL, Oral, Q6H PRN  hydrOXYzine HCL, 50 mg, Oral, TID PRN  traZODone, 100 mg, Oral, HS PRN                 ED Triage Vitals   Temperature Pulse Respirations Blood Pressure SpO2 Pain Score   10/18/24 0856 10/18/24 0856 10/18/24 0856 10/18/24 0859 10/18/24 0856 10/18/24 1145   98.1 °F (36.7 °C) 102 16 132/69 95 % No Pain      Weight (last 2 days)         Date/Time Weight     10/18/24 1145 85.3 (188.05)     10/18/24 1036 85.3 (188.05)     10/18/24 0916 82.4 (181.66)                Vital Signs (last 3 days)         Date/Time Temp Pulse Resp BP MAP (mmHg) SpO2 Calculated FIO2 (%) - Nasal Cannula Nasal Cannula O2 Flow Rate (L/min) O2 Device Patient Position - Orthostatic VS [place] Coma Scale Score Pain     10/19/24 1335 -- -- -- -- -- 99 % -- -- -- -- -- --     10/19/24 1322 -- -- -- -- -- 95 % 28 2 L/min Nasal cannula -- -- --     10/19/24 0815 -- -- -- -- -- 93 % -- -- -- -- -- --     10/19/24 0802 -- -- -- -- -- 93 % 28 2 L/min Nasal cannula -- -- --     10/19/24 0700 97 °F (36.1 °C) 85 14 112/68 -- -- -- -- -- Lying -- No Pain     10/18/24 2325 97.8 °F (36.6 °C) 88 18 104/57 74 96 % 28 2 L/min Nasal cannula Lying -- --     10/18/24 2040 -- -- -- -- -- -- -- -- -- -- -- No Pain     10/18/24 1947 -- -- -- -- -- 95 % -- -- -- -- -- --     10/18/24 1445 98 °F (36.7 °C) 103 20 134/76 99 94 % 28 2 L/min Nasal cannula Lying -- --     10/18/24 1433 -- -- -- -- -- -- -- -- -- -- -- 8     10/18/24 1410 -- -- -- -- -- -- 28 2 L/min Nasal cannula -- 15 No Pain     10/18/24 1408 -- -- -- -- -- -- -- -- -- -- -- No Pain     10/18/24 1327 -- -- -- -- -- 93 % 28 2 L/min Nasal  cannula -- -- --     10/18/24 1255 -- 104 20 111/80 -- 93 % 28 2 L/min Nasal cannula Lying -- --     10/18/24 1200 -- 111 28 123/89 97 93 % -- -- -- -- -- --     10/18/24 1145 98.1 °F (36.7 °C) 107 16 131/63 -- 93 % 28 2 L/min Nasal cannula Lying -- No Pain     10/18/24 1100 -- 110 30 138/68 93 92 % 28 2 L/min Nasal cannula Lying -- --     10/18/24 1030 -- 105 34 128/61 -- 89 % -- -- None (Room air) Lying -- --     10/18/24 1000 -- 103 36 121/71 88 99 % -- -- Other (comment) Lying -- --     10/18/24 0955 -- 94 16 121/71 -- 99 % -- -- Simple mask Lying -- --     10/18/24 0917 -- -- -- -- -- 92 % -- -- None (Room air) -- -- --     10/18/24 0912 -- -- -- -- -- -- -- -- -- -- 15 --     10/18/24 0859 -- -- -- 132/69 92 -- -- -- -- -- -- --     10/18/24 0856 98.1 °F (36.7 °C) 102 16 -- -- 95 % -- -- None (Room air) Lying -- --                   Pertinent Labs/Diagnostic Test Results:   Radiology:  XR chest 2 views   ED Interpretation by Anant Joseph MD (10/18 1036)   I have reviewed the film, per my independent interpretation : No acute infiltrate, effusion, vascular congestion.  Formal read per radiology..          Final Interpretation by Juan Strickland MD (10/18 0773)       No acute cardiopulmonary disease.               Workstation performed: [id number]              Cardiology:  ECG 12 lead   Final Result by Maulik Cabral DO (10/18 4978)   Sinus tachycardia   Low voltage QRS   Borderline ECG   When compared with ECG of 22-DEC-2023 12:08,   No significant change was found   Confirmed by Maulik Cabral ([id number]) on 10/18/2024 3:10:32 PM          GI:  No orders to display              Results from last 7 days   Lab Units 10/18/24  0908   SARS-COV-2   Negative            Results from last 7 days   Lab Units 10/19/24  0609 10/18/24  0908   WBC Thousand/uL 6.44 5.13   HEMOGLOBIN g/dL 14.4 15.5*   HEMATOCRIT % 44.3 47.2*   PLATELETS Thousands/uL 169 171   TOTAL NEUT ABS Thousands/µL 4.98 3.03                 Results from last 7 days   Lab Units 10/19/24  0542 10/18/24  0908   SODIUM mmol/L 137 137   POTASSIUM mmol/L 5.0 3.7   CHLORIDE mmol/L 102 101   CO2 mmol/L 31 29   ANION GAP mmol/L 4 7   BUN mg/dL 16 9   CREATININE mg/dL 0.86 1.04   EGFR ml/min/1.73sq m 74 58   CALCIUM mg/dL 9.4 9.3   MAGNESIUM mg/dL  --  2.2            Results from last 7 days   Lab Units 10/19/24  0542 10/18/24  0908   AST U/L 13 15   ALT U/L 14 15   ALK PHOS U/L 66 85   TOTAL PROTEIN g/dL 7.1 7.8   ALBUMIN g/dL 4.1 4.4   TOTAL BILIRUBIN mg/dL 0.27 0.42                Results from last 7 days   Lab Units 10/19/24  0542 10/18/24  0908   GLUCOSE RANDOM mg/dL 141* 115                   Results from last 7 days   Lab Units 10/18/24  0908   HS TNI 0HR ng/L 5               Results from last 7 days   Lab Units 10/18/24  0908   PROTIME seconds 12.5   INR   0.91   PTT seconds 30           Results from last 7 days   Lab Units 10/18/24  0908   TSH 3RD GENERATON uIU/mL 1.656                           Results from last 7 days   Lab Units 10/18/24  0908   BNP pg/mL 11                                               Results from last 7 days   Lab Units 10/18/24  0908   INFLUENZA A PCR   Negative   INFLUENZA B PCR   Negative   RSV PCR   Negative                                   Results from last 7 days   Lab Units 10/18/24  1542   SPUTUM CULTURE   Culture too young- will reincubate   GRAM STAIN RESULT   1+ Epithelial cells per low power field*  2+ Gram positive cocci in pairs*  2+ Gram positive rods*  No polys seen*                     Medical History        Past Medical History:   Diagnosis Date    Anxiety      Chronic pain       Back Pain    COPD (chronic obstructive pulmonary disease) ([facility])      Depression      Hyperlipidemia      Migraine      Psychiatric disorder      Severe episode of recurrent major depressive disorder, without psychotic features ([facility]) 12/12/2017         Present on Admission:   Generalized anxiety disorder   Tobacco abuse   COPD  with acute exacerbation (HCC)   Major depressive disorder, recurrent episode, moderate (HCC)        Admitting Diagnosis: Asthma [J45.909]  Hypoxia [R09.02]  COPD with acute exacerbation (HCC) [J44.1]  Age/Sex: 59 y.o. female     Network Utilization Review Department  ATTENTION: Please call with any questions or concerns to [phone] and carefully listen to the prompts so that you are directed to the right person. All voicemails are confidential.   For Discharge needs, contact Care Management DC Support Team at [phone] opt. 2  Send all requests for admission clinical reviews, approved or denied determinations and any other requests to dedicated fax number below belonging to the campus where the patient is receiving treatment. List of dedicated fax numbers for the Facilities:  FACILITY NAME UR FAX NUMBER   ADMISSION DENIALS (Administrative/Medical Necessity) [phone]   DISCHARGE SUPPORT TEAM (NETWORK) [phone]   PARENT CHILD HEALTH (Maternity/NICU/Pediatrics) [phone]   [facility] [phone]   [facility] [phone]   [facility] [phone]   [facility] [phone]   [facility] [phone]   [facility] [phone]   [facility] [phone]   [facility] [phone]   [facility] [phone]   [facility] [phone]   [facility] [phone]   [facility] [phone]

## 2024-10-23 ENCOUNTER — PATIENT OUTREACH (OUTPATIENT)
Dept: CASE MANAGEMENT | Facility: OTHER | Age: 59
End: 2024-10-23

## 2024-10-23 ENCOUNTER — PATIENT OUTREACH (OUTPATIENT)
Dept: OTHER | Facility: CLINIC | Age: 59
End: 2024-10-23

## 2024-10-23 ENCOUNTER — TELEPHONE (OUTPATIENT)
Age: 59
End: 2024-10-23

## 2024-10-23 DIAGNOSIS — Z72.0 TOBACCO ABUSE: Primary | ICD-10-CM

## 2024-10-23 DIAGNOSIS — J44.1 COPD WITH ACUTE EXACERBATION (HCC): ICD-10-CM

## 2024-10-23 LAB
BACTERIA SPT RESP CULT: ABNORMAL
BACTERIA SPT RESP CULT: ABNORMAL
GRAM STN SPEC: ABNORMAL

## 2024-10-23 NOTE — UTILIZATION REVIEW
NOTIFICATION OF ADMISSION DISCHARGE   This is a Notification of Discharge from [facility]. Please be advised that this patient has been discharge from our facility. Below you will find the admission and discharge date and time including the patient’s disposition.   UTILIZATION REVIEW CONTACT:  Katina Dietrich MA  Utilization   Network Utilization Review Department  Phone: [phone] x carefully listen to the prompts. All voicemails are confidential.  Email: [email or web address]     ADMISSION INFORMATION  PRESENTATION DATE: 10/18/2024  8:51 AM  OBERVATION ADMISSION DATE: N/A  INPATIENT ADMISSION DATE: 10/18/24 10:59 AM   DISCHARGE DATE: 10/20/2024 11:44 AM   DISPOSITION:Home/Self Care    Network Utilization Review Department  ATTENTION: Please call with any questions or concerns to [phone] and carefully listen to the prompts so that you are directed to the right person. All voicemails are confidential.   For Discharge needs, contact Care Management DC Support Team at [phone] opt. 2  Send all requests for admission clinical reviews, approved or denied determinations and any other requests to dedicated fax number below belonging to the campus where the patient is receiving treatment. List of dedicated fax numbers for the Facilities:  FACILITY NAME UR FAX NUMBER   ADMISSION DENIALS (Administrative/Medical Necessity) [phone]   DISCHARGE SUPPORT TEAM ([facility]) [phone]   PARENT CHILD HEALTH (Maternity/NICU/Pediatrics) [phone]   [facility] [phone]   [facility] [phone]   [facility] [phone]   [facility] [phone]   [facility] [phone]   [facility] [phone]   [facility] [phone]   [facility]  [phone]   [facility] [phone]   [facility] [phone]   [facility] [phone]   [facility] [phone]

## 2024-10-23 NOTE — TELEPHONE ENCOUNTER
Jennifer  called in to schedule a FU appt for the patient . Please schedule lyft ride for the patient

## 2024-10-29 ENCOUNTER — OFFICE VISIT (OUTPATIENT)
Dept: PULMONOLOGY | Facility: CLINIC | Age: 59
End: 2024-10-29
Payer: COMMERCIAL

## 2024-10-29 VITALS
HEART RATE: 99 BPM | WEIGHT: 186 LBS | DIASTOLIC BLOOD PRESSURE: 82 MMHG | BODY MASS INDEX: 32.96 KG/M2 | OXYGEN SATURATION: 97 % | SYSTOLIC BLOOD PRESSURE: 112 MMHG | TEMPERATURE: 98 F | HEIGHT: 63 IN

## 2024-10-29 DIAGNOSIS — J20.5 RSV BRONCHITIS: ICD-10-CM

## 2024-10-29 DIAGNOSIS — F17.210 CIGARETTE NICOTINE DEPENDENCE WITHOUT COMPLICATION: ICD-10-CM

## 2024-10-29 DIAGNOSIS — R91.1 LUNG NODULE: ICD-10-CM

## 2024-10-29 DIAGNOSIS — J44.1 COPD WITH ACUTE EXACERBATION (HCC): Primary | ICD-10-CM

## 2024-10-29 DIAGNOSIS — J44.9 CHRONIC OBSTRUCTIVE PULMONARY DISEASE, UNSPECIFIED COPD TYPE (HCC): ICD-10-CM

## 2024-10-29 PROBLEM — F12.90 CANNABIS USE DISORDER: Status: RESOLVED | Noted: 2017-12-12 | Resolved: 2024-10-29

## 2024-10-29 PROBLEM — R41.82 AMS (ALTERED MENTAL STATUS): Status: RESOLVED | Noted: 2022-09-07 | Resolved: 2024-10-29

## 2024-10-29 PROBLEM — G25.81 RESTLESS LEG SYNDROME: Status: RESOLVED | Noted: 2023-11-07 | Resolved: 2024-10-29

## 2024-10-29 PROCEDURE — 99214 OFFICE O/P EST MOD 30 MIN: CPT | Performed by: INTERNAL MEDICINE

## 2024-10-29 RX ORDER — LORATADINE 10 MG/1
10 TABLET ORAL DAILY
COMMUNITY
Start: 2024-05-16 | End: 2025-05-16

## 2024-10-29 RX ORDER — FLUTICASONE FUROATE, UMECLIDINIUM BROMIDE AND VILANTEROL TRIFENATATE 100; 62.5; 25 UG/1; UG/1; UG/1
1 POWDER RESPIRATORY (INHALATION) DAILY
Qty: 60 BLISTER | Refills: 6 | Status: SHIPPED | OUTPATIENT
Start: 2024-10-29 | End: 2025-05-27

## 2024-10-29 RX ORDER — ALBUTEROL SULFATE 90 UG/1
2 INHALANT RESPIRATORY (INHALATION) EVERY 6 HOURS PRN
Qty: 18 G | Refills: 6 | Status: SHIPPED | OUTPATIENT
Start: 2024-10-29

## 2024-10-29 NOTE — PROGRESS NOTES
Progress note - Pulmonary Medicine   Krystin Francis 59 y.o. female MRN: [id number]       Impression & Plan:     COPD with acute exacerbation (HCC)  Completing steroid course from the hospital  Taking Trelegy regularly  Has albuterol rescue inhaler which she has not needed since hospital discharge  Significantly clinically improved  Prescriptions renewed as appropriate  Benefit from PFTs/spirometry which can be ordered or performed in office at follow-up.  Would not recommend performing today since she is on steroids and not at baseline    Lung nodule  Last CT imaging May 2023 showed tiny 3 mm nodule  Has not had any follow-up  Does meet lung cancer screening criteria which would be the most appropriate study for follow-up for her    Cigarette nicotine dependence without complication  Current smoker and recently cut down to 3 cigarettes a day  Previously smoked much more heavily and has 34-pack-year history in total  Meets lung cancer screening criteria and after discussion of risks and benefits, is agreeable to lung cancer screening testing and this has been ordered    Return in about 3 months (around 1/29/2025).      ______________________________________________________________________    HPI:    Krystin Francis presents today for follow-up of authorization for COPD exacerbation.  She had a short hospitalization and was placed on steroids and bronchodilators with improvement in symptomatology.  She is on Trelegy 100 which is working well for her.  She has rescue albuterol.  She does not currently have a nebulizer.    Currently she is without cough or phlegm production.  Denies significant wheezing.  Her breathing is overall markedly improved.  No chest pain or cardiac complaints.  No abdominal pain or GERD.  Denies current allergy symptoms but does typically have seasonal allergies that affect her breathing.    Current Medications:    Current Outpatient Medications:     albuterol (PROVENTIL HFA,VENTOLIN HFA) 90  mcg/act inhaler, Inhale 2 puffs every 6 (six) hours as needed for wheezing or shortness of breath, Disp: 18 g, Rfl: 6    ARIPiprazole (ABILIFY) 10 mg tablet, Take 1 tablet (10 mg total) by mouth daily at bedtime, Disp: 90 tablet, Rfl: 0    ARIPiprazole (ABILIFY) 2 mg tablet, Take 2 tabs po qhs for a total of 14mg nightly, Disp: 180 tablet, Rfl: 0    fluticasone (FLONASE) 50 mcg/act nasal spray, , Disp: , Rfl:     fluticasone-umeclidinium-vilanterol (Trelegy Ellipta) 100-62.5-25 mcg/actuation inhaler, Inhale 1 puff daily Rinse mouth after use., Disp: 60 blister, Rfl: 6    hydrOXYzine HCL (ATARAX) 50 mg tablet, Take 1 and 1/2 to 2 tabs po qd-bid prn anxiety, Disp: 360 tablet, Rfl: 0    loratadine (CLARITIN) 10 mg tablet, Take 10 mg by mouth daily, Disp: , Rfl:     meloxicam (MOBIC) 7.5 mg tablet, Take 7.5 mg by mouth daily, Disp: , Rfl:     predniSONE 10 mg tablet, Take 4 tablets (40 mg total) by mouth daily for 3 days, THEN 3 tablets (30 mg total) daily for 3 days, THEN 2 tablets (20 mg total) daily for 3 days, THEN 1 tablet (10 mg total) daily for 3 days. Do not start before October 21, 2024., Disp: 30 tablet, Rfl: 0    sertraline (ZOLOFT) 50 mg tablet, Take 1 tablet (50 mg total) by mouth daily at bedtime, Disp: 90 tablet, Rfl: 0    traZODone (DESYREL) 100 mg tablet, Take 1 to 3 tabs po qhs prn insomnia, Disp: 270 tablet, Rfl: 0    Review of Systems:    Aside from what is mentioned in the HPI, the review of systems is otherwise negative    Past medical history, surgical history, and family history were reviewed and updated as appropriate    Social history updates:  Social History     Tobacco Use   Smoking Status Every Day    Current packs/day: 0.25    Average packs/day: 0.7 packs/day for 45.8 years (34.4 ttl pk-yrs)    Types: Cigarettes    Start date: 1979    Passive exposure: Current   Smokeless Tobacco Former   Tobacco Comments    Currently smoking 3 cigarettes daily       PhysicalExamination:  Vitals:   BP  "112/82 (BP Location: Right arm, Patient Position: Sitting, Cuff Size: Large)   Pulse 99   Temp 98 °F (36.7 °C) (Tympanic)   Ht 5' 2.99\" (1.6 m)   Wt 84.4 kg (186 lb)   LMP  (LMP Unknown)   SpO2 97%   BMI 32.96 kg/m²     Gen: Overweight.  Comfortable on room air.  No conversational dyspnea  HEENT:  Conjugate gaze.  sclerae anicteric.  Oropharynx moist  Neck: Trachea is midline. No JVD. No adenopathy  Chest: Slightly distant breath sounds.  No wheezes or crackles currently  Cardiac: Heart tones slightly distant but regular. no murmur  Abdomen: Obese.  Benign  Extremities: No edema  Neuro:  Normal speech and mentation    Diagnostic Data:  Labs:  I personally reviewed the most recent laboratory data pertinent to today's visit    Lab Results   Component Value Date    WBC 12.60 (H) 10/20/2024    HGB 14.0 10/20/2024    HCT 43.5 10/20/2024    MCV 94 10/20/2024     10/20/2024     Lab Results   Component Value Date    SODIUM 137 10/20/2024    K 4.5 10/20/2024    CO2 27 10/20/2024     10/20/2024    BUN 20 10/20/2024    CREATININE 0.92 10/20/2024    GLUCOSE 113 01/19/2014    CALCIUM 9.3 10/20/2024       PFT results:  No prior PFTs    Imaging:  I personally reviewed the images on the PAC system pertinent to today's visit  CT scan from May 2023 was reviewed.  There is a tiny pulmonary nodule.  No substantial emphysema however.  No significant adenopathy.    Chest x-ray from 10/18/2024 shows clear lung vora    Anant Pompa MD  "

## 2024-10-29 NOTE — ASSESSMENT & PLAN NOTE
Completing steroid course from the hospital  Taking Phongandrew regularly  Has albuterol rescue inhaler which she has not needed since hospital discharge  Significantly clinically improved  Prescriptions renewed as appropriate  Benefit from PFTs/spirometry which can be ordered or performed in office at follow-up.  Would not recommend performing today since she is on steroids and not at baseline

## 2024-10-29 NOTE — ASSESSMENT & PLAN NOTE
Current smoker and recently cut down to 3 cigarettes a day  Previously smoked much more heavily and has 34-pack-year history in total  Meets lung cancer screening criteria and after discussion of risks and benefits, is agreeable to lung cancer screening testing and this has been ordered

## 2024-10-29 NOTE — ASSESSMENT & PLAN NOTE
Last CT imaging May 2023 showed tiny 3 mm nodule  Has not had any follow-up  Does meet lung cancer screening criteria which would be the most appropriate study for follow-up for her

## 2024-10-30 ENCOUNTER — PATIENT OUTREACH (OUTPATIENT)
Dept: OTHER | Facility: CLINIC | Age: 59
End: 2024-10-30

## 2024-10-30 ENCOUNTER — PATIENT OUTREACH (OUTPATIENT)
Dept: CASE MANAGEMENT | Facility: OTHER | Age: 59
End: 2024-10-30

## 2024-10-30 ENCOUNTER — TELEPHONE (OUTPATIENT)
Age: 59
End: 2024-10-30

## 2024-10-30 NOTE — PROGRESS NOTES
.OP RT CM called and spoke with patient regarding her  breathing, medications and recent MD appt. Krystin had a recent MD appt. And she is to be seen in three months. She is well managed on her respiratory therapy regime.    Krystin stated she is using Albuterol rescue inhaler BID and Trelegy Qday, rinsing out her mouth.    She denies fever/chills, wheezing and increased SOB.   Krystin is raising yellow sputum easily. We discussed the COPD zone tools. Krystin is smoking 3 cigarettes per day.  She stated she did not hear from [facility] smoking cessation.  I will in basket message.   Krystin is agreeable for one more outreach.  OP RT CM will outreach in two weeks and Krystin has my contact information.

## 2024-10-31 ENCOUNTER — PATIENT OUTREACH (OUTPATIENT)
Dept: OTHER | Facility: CLINIC | Age: 59
End: 2024-10-31

## 2024-10-31 ENCOUNTER — TELEPHONE (OUTPATIENT)
Dept: PSYCHIATRY | Facility: CLINIC | Age: 59
End: 2024-10-31
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
## 2024-11-04 ENCOUNTER — SOCIAL WORK (OUTPATIENT)
Dept: BEHAVIORAL/MENTAL HEALTH CLINIC | Facility: CLINIC | Age: 59
End: 2024-11-04
Payer: COMMERCIAL

## 2024-11-04 DIAGNOSIS — F41.1 GENERALIZED ANXIETY DISORDER: ICD-10-CM

## 2024-11-04 DIAGNOSIS — F43.12 CHRONIC POST-TRAUMATIC STRESS DISORDER (PTSD): Primary | ICD-10-CM

## 2024-11-04 DIAGNOSIS — F41.0 PANIC ATTACKS: ICD-10-CM

## 2024-11-04 DIAGNOSIS — F33.1 MAJOR DEPRESSIVE DISORDER, RECURRENT EPISODE, MODERATE (HCC): ICD-10-CM

## 2024-11-04 PROCEDURE — 90834 PSYTX W PT 45 MINUTES: CPT

## 2024-11-04 NOTE — BH TREATMENT PLAN
"Outpatient Behavioral Health Psychotherapy Treatment Plan    Krystin Francis  1965     Date of Initial Psychotherapy Assessment: 9/16/24    Date of Current Treatment Plan: 11/04/24  Treatment Plan Target Date: TBD  Treatment Plan Expiration Date: 4/26/25    Diagnosis:   1. Chronic post-traumatic stress disorder (PTSD)        2. Panic attacks        3. Major depressive disorder, recurrent episode, moderate (HCC)        4. Generalized anxiety disorder            Area(s) of Need: Depression (It's that time of year, with holidays coming up. Missing loved ones). Some days I don't want to get out of bed. Wish someone could be in my head for a day, to know what it's like. Anxiety triggers around money. Worries about the unknown. Physical symptoms manifest. Anxiety going to mall. Lasted 10 min's. Plan to go to concerts. Not good with words. Express self  better with letters. Bf since Sept 27th, 2024.     Long Term Goal 1 (in the client's own words): I want better control of me. I want to manage my life better. Develop healthy coping skill set for anxiety and depression. \"Taking my life back\"    Stage of Change: Preparation    Target Date for completion: TBD     Anticipated therapeutic modalities: CBT     People identified to complete this goal: Krystin Francis      Objective 1: (identify the means of measuring success in meeting the objective): A new year, a new me. Going to all doctor appointments for 2025.      Objective 2: (identify the means of measuring success in meeting the objective): Get dental work done in 2025    Objective 3: (identify the means of measuring success in meeting the objective): Get new glasses in 2025         I am currently under the care of a [facility] psychiatric provider: yes    My [facility] psychiatric provider is: HAZEL Lopez    I am currently taking psychiatric medications: Yes, as prescribed    I feel that I will be ready for discharge from mental health care when I reach the " following (measurable goal/objective): To be able to do day to day functions without worrying, without crying over spilled milk.     For children and adults who have a legal guardian:   Has there been any change to custody orders and/or guardianship status? NA. If yes, attach updated documentation.    I have updated my Crisis Plan and have been offered a copy of this plan    Behavioral Health Treatment Plan [facility]: Diagnosis and Treatment Plan explained to Krystin Francis acknowledges an understanding of their diagnosis. Krystin Francis agrees to this treatment plan.    I have been offered a copy of this Treatment Plan. yes

## 2024-11-04 NOTE — PSYCH
"Behavioral Health Psychotherapy Progress Note    Psychotherapy Provided: Individual Psychotherapy     1. Chronic post-traumatic stress disorder (PTSD)        2. Panic attacks        3. Major depressive disorder, recurrent episode, moderate (HCC)        4. Generalized anxiety disorder            Goals addressed in session: Goal 1     DATA: Tx plan completed. Talked about Peng her room mate who is her #1 support person. Her son, radha mentioned is the only person who she is completely out of her shell with. Dad  in , which pt acknowledges what \"the start of it\" meaning depression/anxiety and \"not being myself\". Pt wants her life back. Brother  in approx . Brother was her protector and it was after his death that SI started. Pt checked herself inpatient and was there for a week. Mom  a few years later/ago, Pt has forgiven Dad and Brother for what they do. Presenting day to day hard to function due to anxiety/depression.   During this session, this clinician used the following therapeutic modalities: Client-centered Therapy and Cognitive Behavioral Therapy    Substance Abuse was not addressed during this session. If the client is diagnosed with a co-occurring substance use disorder, please indicate any changes in the frequency or amount of use: na. Stage of change for addressing substance use diagnoses: No substance use/Not applicable    ASSESSMENT:  Krystin Francis presents with a Euthymic/ normal and Depressed mood.     her affect is Normal range and intensity, which is congruent, with her mood and the content of the session. The client has made progress on their goals.    Krystin did not present as a danger to self or others at this time. Pt denies SI at this time. Pt states SI is few and far between and utilizes her supports when those times happen. Mainly roommate Copper.  Krystin Francis presents with a minimal risk of suicide, minimal risk of self-harm, and minimal risk of harm to " "others.    For any risk assessment that surpasses a \"low\" rating, a safety plan must be developed.    A safety plan was indicated: yes  If yes, describe in detail na    PLAN: Between sessions, Krystin Francis will \"work on herself\". At the next session, the therapist will use Client-centered Therapy and Cognitive Behavioral Therapy to address where the client is at.    Behavioral Health Treatment Plan and Discharge Planning: Krystin Francis is aware of and agrees to continue to work on their treatment plan. They have identified and are working toward their discharge goals. yes    Visit start and stop times:    11/04/24  Start Time: 1005  Stop Time: 1057  Total Visit Time: 52 minutes  "

## 2024-11-08 ENCOUNTER — OFFICE VISIT (OUTPATIENT)
Dept: PSYCHIATRY | Facility: CLINIC | Age: 59
End: 2024-11-08
Payer: COMMERCIAL

## 2024-11-08 VITALS — WEIGHT: 183.4 LBS | BODY MASS INDEX: 32.5 KG/M2 | HEIGHT: 63 IN

## 2024-11-08 DIAGNOSIS — F41.1 GENERALIZED ANXIETY DISORDER: ICD-10-CM

## 2024-11-08 DIAGNOSIS — G47.00 INSOMNIA, UNSPECIFIED TYPE: ICD-10-CM

## 2024-11-08 DIAGNOSIS — F41.0 PANIC ATTACKS: ICD-10-CM

## 2024-11-08 DIAGNOSIS — F43.12 CHRONIC POST-TRAUMATIC STRESS DISORDER (PTSD): ICD-10-CM

## 2024-11-08 DIAGNOSIS — Z79.899 ENCOUNTER FOR LONG-TERM (CURRENT) USE OF HIGH-RISK MEDICATION: ICD-10-CM

## 2024-11-08 DIAGNOSIS — F33.1 MAJOR DEPRESSIVE DISORDER, RECURRENT EPISODE, MODERATE (HCC): Primary | ICD-10-CM

## 2024-11-08 PROCEDURE — 99214 OFFICE O/P EST MOD 30 MIN: CPT | Performed by: PHYSICIAN ASSISTANT

## 2024-11-08 RX ORDER — TRAZODONE HYDROCHLORIDE 100 MG/1
TABLET ORAL
Qty: 270 TABLET | Refills: 0 | Status: SHIPPED | OUTPATIENT
Start: 2024-11-08 | End: 2024-11-13 | Stop reason: SDDI

## 2024-11-08 RX ORDER — ARIPIPRAZOLE 10 MG/1
10 TABLET ORAL
Qty: 90 TABLET | Refills: 0 | Status: SHIPPED | OUTPATIENT
Start: 2024-11-08

## 2024-11-08 RX ORDER — ZALEPLON 5 MG/1
5 CAPSULE ORAL
Qty: 15 CAPSULE | Refills: 5 | Status: SHIPPED | OUTPATIENT
Start: 2024-11-08

## 2024-11-08 RX ORDER — ARIPIPRAZOLE 2 MG/1
TABLET ORAL
Qty: 180 TABLET | Refills: 0 | Status: SHIPPED | OUTPATIENT
Start: 2024-11-08

## 2024-11-08 RX ORDER — HYDROXYZINE HYDROCHLORIDE 50 MG/1
TABLET, FILM COATED ORAL
Qty: 360 TABLET | Refills: 0 | Status: SHIPPED | OUTPATIENT
Start: 2024-11-08

## 2024-11-08 NOTE — ASSESSMENT & PLAN NOTE
Orders:    hydrOXYzine HCL (ATARAX) 50 mg tablet; Take 1 and 1/2 to 2 tabs po qd-bid prn anxiety    sertraline (ZOLOFT) 50 mg tablet; Take 1.5 tablets (75 mg total) by mouth daily at bedtime    Lipid panel; Future

## 2024-11-08 NOTE — ASSESSMENT & PLAN NOTE
Orders:    ARIPiprazole (ABILIFY) 10 mg tablet; Take 1 tablet (10 mg total) by mouth daily at bedtime    ARIPiprazole (ABILIFY) 2 mg tablet; Take 2 tabs po qhs for a total of 14mg nightly    hydrOXYzine HCL (ATARAX) 50 mg tablet; Take 1 and 1/2 to 2 tabs po qd-bid prn anxiety    sertraline (ZOLOFT) 50 mg tablet; Take 1.5 tablets (75 mg total) by mouth daily at bedtime    Lipid panel; Future

## 2024-11-08 NOTE — ASSESSMENT & PLAN NOTE
Orders:    traZODone (DESYREL) 100 mg tablet; Take 1 to 3 tabs po qhs prn insomnia    zaleplon (SONATA) 5 MG capsule; Take 1 capsule (5 mg total) by mouth daily at bedtime as needed for sleep    Lipid panel; Future

## 2024-11-12 ENCOUNTER — HOSPITAL ENCOUNTER (OUTPATIENT)
Dept: CT IMAGING | Facility: HOSPITAL | Age: 59
Discharge: HOME | End: 2024-11-12
Attending: INTERNAL MEDICINE

## 2024-11-12 ENCOUNTER — APPOINTMENT (OUTPATIENT)
Dept: LAB | Facility: HOSPITAL | Age: 59
End: 2024-11-12
Payer: COMMERCIAL

## 2024-11-12 DIAGNOSIS — Z79.899 ENCOUNTER FOR LONG-TERM (CURRENT) USE OF HIGH-RISK MEDICATION: ICD-10-CM

## 2024-11-12 DIAGNOSIS — F17.210 CIGARETTE NICOTINE DEPENDENCE WITHOUT COMPLICATION: ICD-10-CM

## 2024-11-12 DIAGNOSIS — F33.1 MAJOR DEPRESSIVE DISORDER, RECURRENT EPISODE, MODERATE (HCC): ICD-10-CM

## 2024-11-12 DIAGNOSIS — F10.11 HISTORY OF ALCOHOL ABUSE: ICD-10-CM

## 2024-11-12 DIAGNOSIS — G47.00 INSOMNIA, UNSPECIFIED TYPE: ICD-10-CM

## 2024-11-12 DIAGNOSIS — F41.1 GENERALIZED ANXIETY DISORDER: ICD-10-CM

## 2024-11-12 DIAGNOSIS — F43.12 CHRONIC POST-TRAUMATIC STRESS DISORDER (PTSD): ICD-10-CM

## 2024-11-12 DIAGNOSIS — F41.0 PANIC ATTACKS: ICD-10-CM

## 2024-11-12 LAB
CHOLEST SERPL-MCNC: 253 MG/DL
HDLC SERPL-MCNC: 59 MG/DL
LDLC SERPL CALC-MCNC: 162 MG/DL (ref 0–100)
NONHDLC SERPL-MCNC: 194 MG/DL
TRIGL SERPL-MCNC: 160 MG/DL
TSH SERPL DL<=0.05 MIU/L-ACNC: 2.39 UIU/ML (ref 0.45–4.5)

## 2024-11-12 PROCEDURE — 80061 LIPID PANEL: CPT

## 2024-11-12 PROCEDURE — 84443 ASSAY THYROID STIM HORMONE: CPT

## 2024-11-12 PROCEDURE — 36415 COLL VENOUS BLD VENIPUNCTURE: CPT

## 2024-11-13 ENCOUNTER — PATIENT OUTREACH (OUTPATIENT)
Dept: CASE MANAGEMENT | Facility: OTHER | Age: 59
End: 2024-11-13

## 2024-11-13 ENCOUNTER — OFFICE VISIT (OUTPATIENT)
Dept: FAMILY MEDICINE CLINIC | Facility: CLINIC | Age: 59
End: 2024-11-13

## 2024-11-13 VITALS
SYSTOLIC BLOOD PRESSURE: 118 MMHG | HEIGHT: 62 IN | OXYGEN SATURATION: 96 % | DIASTOLIC BLOOD PRESSURE: 76 MMHG | RESPIRATION RATE: 18 BRPM | TEMPERATURE: 97.8 F | HEART RATE: 102 BPM | BODY MASS INDEX: 34.23 KG/M2 | WEIGHT: 186 LBS

## 2024-11-13 DIAGNOSIS — F41.1 GENERALIZED ANXIETY DISORDER: ICD-10-CM

## 2024-11-13 DIAGNOSIS — M54.41 ACUTE BILATERAL LOW BACK PAIN WITH BILATERAL SCIATICA: ICD-10-CM

## 2024-11-13 DIAGNOSIS — Z11.4 SCREENING FOR HIV (HUMAN IMMUNODEFICIENCY VIRUS): ICD-10-CM

## 2024-11-13 DIAGNOSIS — M54.42 ACUTE BILATERAL LOW BACK PAIN WITH BILATERAL SCIATICA: ICD-10-CM

## 2024-11-13 DIAGNOSIS — J44.1 COPD WITH ACUTE EXACERBATION (HCC): ICD-10-CM

## 2024-11-13 DIAGNOSIS — E66.9 OBESITY (BMI 30-39.9): ICD-10-CM

## 2024-11-13 DIAGNOSIS — Z11.59 NEED FOR HEPATITIS C SCREENING TEST: ICD-10-CM

## 2024-11-13 DIAGNOSIS — Z23 ENCOUNTER FOR IMMUNIZATION: ICD-10-CM

## 2024-11-13 DIAGNOSIS — F33.1 MAJOR DEPRESSIVE DISORDER, RECURRENT EPISODE, MODERATE (HCC): ICD-10-CM

## 2024-11-13 DIAGNOSIS — Z00.00 ANNUAL PHYSICAL EXAM: Primary | ICD-10-CM

## 2024-11-13 PROCEDURE — 90673 RIV3 VACCINE NO PRESERV IM: CPT

## 2024-11-13 PROCEDURE — 90632 HEPA VACCINE ADULT IM: CPT

## 2024-11-13 PROCEDURE — 99396 PREV VISIT EST AGE 40-64: CPT

## 2024-11-13 PROCEDURE — 90472 IMMUNIZATION ADMIN EACH ADD: CPT

## 2024-11-13 PROCEDURE — 99213 OFFICE O/P EST LOW 20 MIN: CPT

## 2024-11-13 PROCEDURE — 90471 IMMUNIZATION ADMIN: CPT

## 2024-11-13 RX ORDER — LIDOCAINE 50 MG/G
1 PATCH TOPICAL DAILY
Qty: 30 PATCH | Refills: 0 | Status: SHIPPED | OUTPATIENT
Start: 2024-11-13

## 2024-11-13 NOTE — PROGRESS NOTES
Adult Annual Physical  Name: Krystin Francis      : 1965      MRN: [id number]  Encounter Provider: FRANKLIN Maldonado  Encounter Date: 2024   Encounter department: [facility]    Assessment & Plan  Annual physical exam         COPD with acute exacerbation (HCC)  - Reviewed pulmonary note  - Continue treatment  per pulmonary          Acute bilateral low back pain with bilateral sciatica  - May use lidocaine patch on affected area  - Encouraged Heat/Ice application  - Discussed regular exercise for skeletal strengthening and weight loss  - Encouraged using a cane on stronger side     Orders:    lidocaine (Lidoderm) 5 %; Apply 1 patch topically over 12 hours daily Remove & Discard patch within 12 hours or as directed by MD    Major depressive disorder, recurrent episode, moderate (HCC)  - Continue treatment per psychiatry         Generalized anxiety disorder  - Continue treatment per psychiatry         Obesity (BMI 30-39.9)  Body mass index is 34.02 kg/m².  The BMI is above normal. Nutrition recommendations include reducing portion sizes, decreasing overall calorie intake, 3-5 servings of fruits/vegetables daily, reducing fast food intake, consuming healthier snacks, decreasing soda and/or juice intake, moderation in carbohydrate intake, increasing intake of lean protein, reducing intake of saturated fat and trans fat and reducing intake of cholesterol. Exercise recommendations include moderate aerobic physical activity for 150 minutes/week.      Orders:    Hemoglobin A1C; Future    Need for hepatitis C screening test    Orders:    Hepatitis C Antibody; Future    Screening for HIV (human immunodeficiency virus)    Orders:    HIV 1/2 AG/AB w Reflex [facility] for 2 yr old and above; Future    Encounter for immunization    Orders:    influenza vaccine, recombinant, PF, 0.5 mL IM (Flublok)    HEPATITIS A VACCINE ADULT IM    Immunizations and preventive care screenings were  discussed with patient today. Appropriate education was printed on patient's after visit summary.    Counseling:  Alcohol/drug use: discussed moderation in alcohol intake, the recommendations for healthy alcohol use, and avoidance of illicit drug use.  Dental Health: discussed importance of regular tooth brushing, flossing, and dental visits.  Injury prevention: discussed safety/seat belts, safety helmets, smoke detectors, carbon monoxide detectors, and smoking near bedding or upholstery.  Sexual health: discussed sexually transmitted diseases, partner selection, use of condoms, avoidance of unintended pregnancy, and contraceptive alternatives.  Exercise: the importance of regular exercise/physical activity was discussed. Recommend exercise 3-5 times per week for at least 30 minutes.     BMI Counseling: Body mass index is 34.02 kg/m². The BMI is above normal. Nutrition recommendations include decreasing portion sizes, encouraging healthy choices of fruits and vegetables, decreasing fast food intake, consuming healthier snacks, limiting drinks that contain sugar, moderation in carbohydrate intake, increasing intake of lean protein, reducing intake of saturated and trans fat and reducing intake of cholesterol. Exercise recommendations include exercising 3-5 times per week. No pharmacotherapy was ordered. Patient referred to PCP. Rationale for BMI follow-up plan is due to patient being overweight or obese.         History of Present Illness     Adult Annual Physical:  Patient presents for annual physical. Krystin Francis is a 59 y.o. with  has a past medical history of Anxiety, Cannabis use disorder, Cervical cancer (HCC), Chronic pain, COPD (chronic obstructive pulmonary disease) (HCC), Depression, Hyperlipidemia, Migraine, Migraine headache, Psychiatric disorder, and Severe episode of recurrent major depressive disorder, without psychotic features ([facility]).     Patient is presents to the clinic for management of his  "chronic medical conditions.  Patient's medical conditions are stable unless noted otherwise above.  Patient has no further complaints other than what is mentioned in the ROS.  .     Diet and Physical Activity:  - Diet/Nutrition: well balanced diet.  - Exercise: no formal exercise.    General Health:  - Sleep: sleeps poorly.  - Hearing: normal hearing right ear.  - Vision: no vision problems.  - Dental: regular dental visits.    /GYN Health:  - Follows with GYN: no.   - History of STDs: no    Review of Systems   Constitutional: Negative.  Negative for chills, fatigue and fever.   HENT: Negative.  Negative for ear pain and sore throat.    Eyes: Negative.  Negative for pain and visual disturbance.   Respiratory: Negative.  Negative for cough and shortness of breath.    Cardiovascular: Negative.  Negative for chest pain and palpitations.   Gastrointestinal: Negative.  Negative for abdominal pain and vomiting.   Endocrine: Negative.    Genitourinary: Negative.  Negative for dysuria and hematuria.   Musculoskeletal:  Positive for back pain. Negative for arthralgias.   Skin: Negative.  Negative for color change and rash.   Allergic/Immunologic: Negative.    Neurological: Negative.  Negative for seizures and syncope.   Hematological: Negative.    Psychiatric/Behavioral: Negative.     All other systems reviewed and are negative.        Objective     /76 (BP Location: Left arm, Patient Position: Sitting, Cuff Size: Standard)   Pulse 102   Temp 97.8 °F (36.6 °C) (Temporal)   Resp 18   Ht 5' 2\" (1.575 m)   Wt 84.4 kg (186 lb)   LMP  (LMP Unknown)   SpO2 96%   BMI 34.02 kg/m²     Physical Exam  Vitals and nursing note reviewed.   Constitutional:       General: She is not in acute distress.     Appearance: Normal appearance. She is well-developed. She is obese.   HENT:      Head: Normocephalic and atraumatic.      Right Ear: Tympanic membrane normal.      Left Ear: Tympanic membrane normal.      Nose: Nose normal. "      Mouth/Throat:      Mouth: Mucous membranes are moist.   Eyes:      Conjunctiva/sclera: Conjunctivae normal.   Cardiovascular:      Rate and Rhythm: Normal rate and regular rhythm.      Pulses: Normal pulses.      Heart sounds: Normal heart sounds. No murmur heard.  Pulmonary:      Effort: Pulmonary effort is normal. No respiratory distress.      Breath sounds: Normal breath sounds.   Abdominal:      General: Abdomen is flat. Bowel sounds are normal.      Palpations: Abdomen is soft.      Tenderness: There is no abdominal tenderness.   Musculoskeletal:         General: No swelling. Normal range of motion.      Cervical back: Normal range of motion and neck supple.      Lumbar back: Tenderness present.   Skin:     General: Skin is warm and dry.      Capillary Refill: Capillary refill takes less than 2 seconds.   Neurological:      General: No focal deficit present.      Mental Status: She is alert and oriented to person, place, and time. Mental status is at baseline.   Psychiatric:         Mood and Affect: Mood normal.         Behavior: Behavior normal.         Thought Content: Thought content normal.         Judgment: Judgment normal.

## 2024-11-13 NOTE — ASSESSMENT & PLAN NOTE
- May use lidocaine patch on affected area  - Encouraged Heat/Ice application  - Discussed regular exercise for skeletal strengthening and weight loss  - Encouraged using a cane on stronger side     Orders:    lidocaine (Lidoderm) 5 %; Apply 1 patch topically over 12 hours daily Remove & Discard patch within 12 hours or as directed by MD

## 2024-11-13 NOTE — PROGRESS NOTES
.OP RT CM called and spoke with patient regarding her breathing, medications and smoking. Krystin stated she is smoking about 4 cigarettes per day.  She has [facility] smoking cessation contact information and I strongly encouraged her to reach out to avoid smoking more and getting NRT products and counseling.     Krystin is taking respiratory medications as prescribed. Trelegy Qday and rinsing out mouth and Albuterol rescue inhaler prn, patient stated usually about once per day.   She denies fever/chills and is raising white sputum.   She is due for a pulmonary appt. In January and I offered to make appt for her, she accepted.  Done.     No further outreach is needed with OP RT CM at this time. Krystin has my contact information.

## 2024-11-15 ENCOUNTER — TELEPHONE (OUTPATIENT)
Dept: PSYCHIATRY | Facility: CLINIC | Age: 59
End: 2024-11-15

## 2024-11-15 NOTE — TELEPHONE ENCOUNTER
NO-SHOW LETTER MAILED TO Krystin Francis.  ADDRESS: [address] [place] [id number]    Letter sent thru MyChart

## 2024-11-18 ENCOUNTER — RESULTS FOLLOW-UP (OUTPATIENT)
Dept: PULMONOLOGY | Facility: CLINIC | Age: 59
End: 2024-11-18

## 2024-11-21 ENCOUNTER — TELEPHONE (OUTPATIENT)
Age: 59
End: 2024-11-21

## 2024-11-21 DIAGNOSIS — G47.00 INSOMNIA, UNSPECIFIED TYPE: Primary | ICD-10-CM

## 2024-11-21 RX ORDER — TRAZODONE HYDROCHLORIDE 100 MG/1
100-300 TABLET ORAL
Qty: 270 TABLET | Refills: 0 | Status: SHIPPED | OUTPATIENT
Start: 2024-11-21

## 2024-11-21 NOTE — TELEPHONE ENCOUNTER
Patient called in regard to med refill on Trazodone. Writer did not see medication on list in chart. Patient stated she does take this and is out of medication.     Patient is requesting a provider call back also to discuss something with her (no other info provided).   Writer confirmed phone number on file.

## 2024-11-22 NOTE — TELEPHONE ENCOUNTER
I called Krystin via her cell/home phone # of record and she stated she was told by her pharmacy that she had no refills left on her Trazodone.  EMR shows that I renewed the Rx for a 90 day supply on 11/8/2024 and it transmitted successfully. However on review of the chart, Pt's PCP Angeline GUILLAUME discontinued the Rx on 11/13/2024 for reason of non-compliance -- (???).  Pt denies that she had stopped this medicine and reaffirms that she needs the Trazodone (which she takes at about 8:30PM) and the Zaleplon (which she takes at 9:30PM) to facilitate a good night's rest.  Between them both, she gets at least 6 hours --which she finds sufficient.  I e-scribed the following to her designated  Pharmacy Emily:  Trazodone 100mg (1-3) tabs po qhs prn insomnia # 270

## 2024-12-02 ENCOUNTER — SOCIAL WORK (OUTPATIENT)
Dept: BEHAVIORAL/MENTAL HEALTH CLINIC | Facility: CLINIC | Age: 59
End: 2024-12-02
Payer: COMMERCIAL

## 2024-12-02 DIAGNOSIS — F41.0 PANIC ATTACKS: ICD-10-CM

## 2024-12-02 DIAGNOSIS — F43.12 CHRONIC POST-TRAUMATIC STRESS DISORDER (PTSD): Primary | ICD-10-CM

## 2024-12-02 DIAGNOSIS — F41.1 GENERALIZED ANXIETY DISORDER: ICD-10-CM

## 2024-12-02 DIAGNOSIS — F33.1 MAJOR DEPRESSIVE DISORDER, RECURRENT EPISODE, MODERATE (HCC): ICD-10-CM

## 2024-12-02 PROCEDURE — 90834 PSYTX W PT 45 MINUTES: CPT

## 2024-12-02 NOTE — PSYCH
"Behavioral Health Psychotherapy Progress Note    Psychotherapy Provided: Individual Psychotherapy     1. Chronic post-traumatic stress disorder (PTSD)        2. Panic attacks        3. Major depressive disorder, recurrent episode, moderate (HCC)        4. Generalized anxiety disorder            Goals addressed in session: Goal 1     DATA: Krystin shared that she broke her previous \"5 minute\" [place] Along with a friend, she was able to walk 20 minutes out in public. Pt shared this time of year is difficult. Pt acknowledged \"homework\" of working through painful memories, to find the ana maria in them that they actually are. Pt stated she's going through numerous health issues. Pt shared that Peng's friend from work unexpectedly  in a work related accident. Pt helped Peng deal with his grief.   During this session, this clinician used the following therapeutic modalities: Bereavement Therapy, Client-centered Therapy, and Cognitive Behavioral Therapy    Substance Abuse was not addressed during this session. If the client is diagnosed with a co-occurring substance use disorder, please indicate any changes in the frequency or amount of use: na. Stage of change for addressing substance use diagnoses: No substance use/Not applicable    ASSESSMENT:  Krystin Francis presents with a Euthymic/ normal, Anxious, and Depressed mood.     her affect is Normal range and intensity and Tearful, which is congruent, with her mood and the content of the session. The client has made progress on their goals.    Krystin did not present as a danger to self or others.  Krystin Francis presents with a minimal risk of suicide, minimal risk of self-harm, and minimal risk of harm to others.    For any risk assessment that surpasses a \"low\" rating, a safety plan must be developed.    A safety plan was indicated: no  If yes, describe in detail na    PLAN: Between sessions, Krystin Francis will continue therapy work. At " the next session, the therapist will use Client-centered Therapy and Cognitive Behavioral Therapy to address where the client is at.    Behavioral Health Treatment Plan and Discharge Planning: Krystin Francis is aware of and agrees to continue to work on their treatment plan. They have identified and are working toward their discharge goals. yes    Visit start and stop times:    12/02/24  Start Time: 1005  Stop Time: 1057  Total Visit Time: 52 minutes   no

## 2024-12-16 ENCOUNTER — TELEPHONE (OUTPATIENT)
Dept: PSYCHIATRY | Facility: CLINIC | Age: 59
End: 2024-12-16

## 2024-12-16 NOTE — TELEPHONE ENCOUNTER
Patients Name: Krystin Francis    : 1965    Phone Number: [phone]    Appointment Date: 24    Appointment time:10:00AM     Address: [address] [place] [address]  [place] [address]    Drop Off Facility/Office: [facility]    Drop Off Address: [facility] [place], [place] [address]     time 9:20am
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
## 2025-01-16 ENCOUNTER — TELEPHONE (OUTPATIENT)
Dept: PSYCHIATRY | Facility: CLINIC | Age: 60
End: 2025-01-16

## 2025-01-16 ENCOUNTER — ANNUAL EXAM (OUTPATIENT)
Dept: FAMILY MEDICINE CLINIC | Facility: CLINIC | Age: 60
End: 2025-01-16

## 2025-01-16 ENCOUNTER — TELEPHONE (OUTPATIENT)
Dept: ADMINISTRATIVE | Facility: OTHER | Age: 60
End: 2025-01-16

## 2025-01-16 VITALS
RESPIRATION RATE: 16 BRPM | BODY MASS INDEX: 34.45 KG/M2 | SYSTOLIC BLOOD PRESSURE: 118 MMHG | HEART RATE: 84 BPM | DIASTOLIC BLOOD PRESSURE: 70 MMHG | HEIGHT: 62 IN | WEIGHT: 187.2 LBS | TEMPERATURE: 97.5 F

## 2025-01-16 DIAGNOSIS — Z12.31 ENCOUNTER FOR SCREENING MAMMOGRAM FOR BREAST CANCER: ICD-10-CM

## 2025-01-16 DIAGNOSIS — M54.42 CHRONIC BILATERAL LOW BACK PAIN WITH BILATERAL SCIATICA: Primary | ICD-10-CM

## 2025-01-16 DIAGNOSIS — M54.41 CHRONIC BILATERAL LOW BACK PAIN WITH BILATERAL SCIATICA: Primary | ICD-10-CM

## 2025-01-16 DIAGNOSIS — G89.29 CHRONIC BILATERAL LOW BACK PAIN WITH BILATERAL SCIATICA: Primary | ICD-10-CM

## 2025-01-16 PROCEDURE — 99213 OFFICE O/P EST LOW 20 MIN: CPT

## 2025-01-16 RX ORDER — ACETAMINOPHEN 500 MG
500 TABLET ORAL EVERY 6 HOURS PRN
Qty: 90 TABLET | Refills: 0 | Status: SHIPPED | OUTPATIENT
Start: 2025-01-16

## 2025-01-16 RX ORDER — METHOCARBAMOL 500 MG/1
500 TABLET, FILM COATED ORAL 2 TIMES DAILY PRN
Qty: 90 TABLET | Refills: 0 | Status: SHIPPED | OUTPATIENT
Start: 2025-01-16

## 2025-01-16 RX ORDER — NAPROXEN 500 MG/1
500 TABLET ORAL 2 TIMES DAILY WITH MEALS
Qty: 60 TABLET | Refills: 0 | Status: SHIPPED | OUTPATIENT
Start: 2025-01-16

## 2025-01-16 NOTE — PROGRESS NOTES
Name: Krystin Francis      : 1965      MRN: [id number]  Encounter Provider: FRANKLIN Maldonado  Encounter Date: 2025   Encounter department: [facility]  :  Assessment & Plan  Chronic bilateral low back pain with bilateral sciatica  - Use Tylenol or Naproxen PRN  - Will send Robaxin to use as needed. Patient understands possible side effects  - Encouraged Heat/Ice application  - Discussed regular exercise for skeletal strengthening and weight loss  - Encouraged using a cane on stronger side. Cane is ordered via parachute  - Offered PT but patient declines. Home exercises provided     Orders:    methocarbamol (ROBAXIN) 500 mg tablet; Take 1 tablet (500 mg total) by mouth 2 (two) times a day as needed for muscle spasms    naproxen (Naprosyn) 500 mg tablet; Take 1 tablet (500 mg total) by mouth 2 (two) times a day with meals    acetaminophen (TYLENOL) 500 mg tablet; Take 1 tablet (500 mg total) by mouth every 6 (six) hours as needed for mild pain    Encounter for screening mammogram for breast cancer    Orders:    Mammo screening bilateral w 3d and cad; Future        BMI Counseling: Body mass index is 34.24 kg/m². The BMI is above normal. Nutrition recommendations include decreasing portion sizes, encouraging healthy choices of fruits and vegetables, decreasing fast food intake, consuming healthier snacks, limiting drinks that contain sugar, moderation in carbohydrate intake, increasing intake of lean protein, reducing intake of saturated and trans fat and reducing intake of cholesterol. Exercise recommendations include exercising 3-5 times per week. No pharmacotherapy was ordered. Patient referred to PCP. Rationale for BMI follow-up plan is due to patient being overweight or obese.       History of Present Illness     Krystin Francis is a 59 y.o. with  has a past medical history of Anxiety, Cannabis use disorder, Cervical cancer (HCC), Chronic pain, COPD (chronic  "obstructive pulmonary disease) (HCC), Depression, Hyperlipidemia, Migraine, Migraine headache, Psychiatric disorder, and Severe episode of recurrent major depressive disorder, without psychotic features (HCC).     Patient is here for low back pain follow up. States that pain has been more severe and persistent in the past week. States taking tylenol and Mobic which is not really helping with her pain. Patient denies any focal weakness and or paresthesias to the bilateral lower extremities, GI/ problems, or saddle anesthesia.         Review of Systems   Constitutional: Negative.  Negative for chills, fatigue and fever.   HENT: Negative.  Negative for ear pain and sore throat.    Eyes: Negative.  Negative for pain and visual disturbance.   Respiratory: Negative.  Negative for cough and shortness of breath.    Cardiovascular: Negative.  Negative for chest pain and palpitations.   Gastrointestinal: Negative.  Negative for abdominal pain and vomiting.   Endocrine: Negative.    Genitourinary: Negative.  Negative for dysuria and hematuria.   Musculoskeletal:  Positive for arthralgias, back pain and myalgias.   Skin: Negative.  Negative for color change and rash.   Allergic/Immunologic: Negative.    Neurological: Negative.  Negative for seizures and syncope.   Hematological: Negative.    Psychiatric/Behavioral: Negative.     All other systems reviewed and are negative.      Objective   /70 (BP Location: Left arm, Patient Position: Sitting, Cuff Size: Standard)   Pulse 84   Temp 97.5 °F (36.4 °C) (Temporal)   Resp 16   Ht 5' 2\" (1.575 m)   Wt 84.9 kg (187 lb 3.2 oz)   LMP  (LMP Unknown)   PF 97 L/min   BMI 34.24 kg/m²      Physical Exam  Vitals and nursing note reviewed.   Constitutional:       General: She is not in acute distress.     Appearance: Normal appearance. She is well-developed. She is obese.   HENT:      Head: Normocephalic and atraumatic.      Right Ear: Tympanic membrane normal.      Left Ear: " Tympanic membrane normal.      Nose: Nose normal.      Mouth/Throat:      Mouth: Mucous membranes are moist.   Eyes:      Conjunctiva/sclera: Conjunctivae normal.   Cardiovascular:      Rate and Rhythm: Normal rate and regular rhythm.      Pulses: Normal pulses.      Heart sounds: Normal heart sounds. No murmur heard.  Pulmonary:      Effort: Pulmonary effort is normal. No respiratory distress.      Breath sounds: Normal breath sounds.   Abdominal:      General: Abdomen is flat. Bowel sounds are normal.      Palpations: Abdomen is soft.      Tenderness: There is no abdominal tenderness.   Musculoskeletal:         General: No swelling.      Cervical back: Normal range of motion and neck supple.      Lumbar back: Spasms and tenderness present. Decreased range of motion. Positive right straight leg raise test and positive left straight leg raise test.   Skin:     General: Skin is warm and dry.      Capillary Refill: Capillary refill takes less than 2 seconds.   Neurological:      General: No focal deficit present.      Mental Status: She is alert and oriented to person, place, and time. Mental status is at baseline.   Psychiatric:         Mood and Affect: Mood normal.         Behavior: Behavior normal.         Thought Content: Thought content normal.         Judgment: Judgment normal.

## 2025-01-16 NOTE — ASSESSMENT & PLAN NOTE
- Use Tylenol or Naproxen PRN  - Will send Robaxin to use as needed. Patient understands possible side effects  - Encouraged Heat/Ice application  - Discussed regular exercise for skeletal strengthening and weight loss  - Encouraged using a cane on stronger side. Cane is ordered via parachute  - Offered PT but patient declines. Home exercises provided     Orders:    methocarbamol (ROBAXIN) 500 mg tablet; Take 1 tablet (500 mg total) by mouth 2 (two) times a day as needed for muscle spasms    naproxen (Naprosyn) 500 mg tablet; Take 1 tablet (500 mg total) by mouth 2 (two) times a day with meals    acetaminophen (TYLENOL) 500 mg tablet; Take 1 tablet (500 mg total) by mouth every 6 (six) hours as needed for mild pain

## 2025-01-16 NOTE — TELEPHONE ENCOUNTER
Patients Name: Krystin Francis    : 1965    Phone Number: [phone]    Appointment Date: 2025    Appointment time: 10:00am     Address: [address] [place] [address]  [place] [address]    Drop Off Facility/Office: [facility]    Drop Off Address: [facility] [address], [place] [address]    P/U 9:20am

## 2025-01-16 NOTE — TELEPHONE ENCOUNTER
Upon review of the In Basket request we were able to locate, review, and update the patient chart as requested for Pap Smear (HPV) aka Cervical Cancer Screening.    Any additional questions or concerns should be emailed to the Practice Liaisons via the appropriate education email address, please do not reply via In Basket.    Thank you  Christy Torres [facility]

## 2025-01-16 NOTE — TELEPHONE ENCOUNTER
----- Message from FRANKLIN Maldonado sent at 1/16/2025 10:45 AM EST -----  Regarding: papsmear  01/16/25 10:46 AM    Hello, our patient Krystin Francis has had Pap Smear (HPV) aka Cervical Cancer Screening completed/performed. Please assist in updating the patient chart by pulling a previous Electronic Medical Record (EMR) document. The previous EMR is [facility]. The date of service is 7/11/23  Can be seen on test results in epic as well.    Thank you,  FRANKLIN Maldonado  SW ZIA FRAZIER

## 2025-01-17 ENCOUNTER — OFFICE VISIT (OUTPATIENT)
Dept: PSYCHIATRY | Facility: CLINIC | Age: 60
End: 2025-01-17
Payer: COMMERCIAL

## 2025-01-17 ENCOUNTER — TELEPHONE (OUTPATIENT)
Age: 60
End: 2025-01-17

## 2025-01-17 VITALS — HEIGHT: 62 IN | BODY MASS INDEX: 33.33 KG/M2 | WEIGHT: 181.1 LBS

## 2025-01-17 DIAGNOSIS — F41.1 GENERALIZED ANXIETY DISORDER: ICD-10-CM

## 2025-01-17 DIAGNOSIS — G47.00 INSOMNIA, UNSPECIFIED TYPE: ICD-10-CM

## 2025-01-17 DIAGNOSIS — F43.12 CHRONIC POST-TRAUMATIC STRESS DISORDER (PTSD): ICD-10-CM

## 2025-01-17 DIAGNOSIS — F33.1 MAJOR DEPRESSIVE DISORDER, RECURRENT EPISODE, MODERATE (HCC): Primary | ICD-10-CM

## 2025-01-17 DIAGNOSIS — F41.0 PANIC ATTACKS: ICD-10-CM

## 2025-01-17 PROCEDURE — 99214 OFFICE O/P EST MOD 30 MIN: CPT | Performed by: PHYSICIAN ASSISTANT

## 2025-01-17 RX ORDER — ARIPIPRAZOLE 2 MG/1
TABLET ORAL
Qty: 180 TABLET | Refills: 0 | Status: SHIPPED | OUTPATIENT
Start: 2025-01-17

## 2025-01-17 RX ORDER — SERTRALINE HYDROCHLORIDE 100 MG/1
100 TABLET, FILM COATED ORAL
Qty: 30 TABLET | Refills: 0 | Status: SHIPPED | OUTPATIENT
Start: 2025-01-17

## 2025-01-17 RX ORDER — HYDROXYZINE HYDROCHLORIDE 50 MG/1
TABLET, FILM COATED ORAL
Qty: 360 TABLET | Refills: 0 | Status: SHIPPED | OUTPATIENT
Start: 2025-01-17

## 2025-01-17 RX ORDER — ARIPIPRAZOLE 10 MG/1
10 TABLET ORAL
Qty: 90 TABLET | Refills: 0 | Status: SHIPPED | OUTPATIENT
Start: 2025-01-17

## 2025-01-17 RX ORDER — TRAZODONE HYDROCHLORIDE 100 MG/1
100-300 TABLET ORAL
Qty: 270 TABLET | Refills: 0 | Status: SHIPPED | OUTPATIENT
Start: 2025-01-17

## 2025-01-17 NOTE — ASSESSMENT & PLAN NOTE
Orders:    traZODone (DESYREL) 100 mg tablet; Take 1-3 tablets (100-300 mg total) by mouth daily at bedtime as needed for sleep

## 2025-01-17 NOTE — ASSESSMENT & PLAN NOTE
Orders:    hydrOXYzine HCL (ATARAX) 50 mg tablet; Take 1 and 1/2 to 2 tabs po qd-bid prn anxiety    sertraline (ZOLOFT) 100 mg tablet; Take 1 tablet (100 mg total) by mouth daily at bedtime

## 2025-01-17 NOTE — ASSESSMENT & PLAN NOTE
Orders:    ARIPiprazole (ABILIFY) 10 mg tablet; Take 1 tablet (10 mg total) by mouth daily at bedtime    ARIPiprazole (ABILIFY) 2 mg tablet; Take 2 tabs po qhs for a total of 14mg nightly    hydrOXYzine HCL (ATARAX) 50 mg tablet; Take 1 and 1/2 to 2 tabs po qd-bid prn anxiety    sertraline (ZOLOFT) 100 mg tablet; Take 1 tablet (100 mg total) by mouth daily at bedtime

## 2025-01-17 NOTE — TELEPHONE ENCOUNTER
Patient called and requested peter burdick for appointment 1/21/25 at 1:20 with Dr Pompa. Please advise.

## 2025-01-19 LAB
DME PARACHUTE DELIVERY DATE ACTUAL: NORMAL
DME PARACHUTE DELIVERY DATE REQUESTED: NORMAL
DME PARACHUTE ITEM DESCRIPTION: NORMAL
DME PARACHUTE ORDER STATUS: NORMAL
DME PARACHUTE SUPPLIER NAME: NORMAL
DME PARACHUTE SUPPLIER PHONE: NORMAL

## 2025-01-21 ENCOUNTER — OFFICE VISIT (OUTPATIENT)
Dept: PULMONOLOGY | Facility: CLINIC | Age: 60
End: 2025-01-21
Payer: COMMERCIAL

## 2025-01-21 VITALS
TEMPERATURE: 97.1 F | HEART RATE: 95 BPM | OXYGEN SATURATION: 94 % | BODY MASS INDEX: 34.23 KG/M2 | SYSTOLIC BLOOD PRESSURE: 116 MMHG | DIASTOLIC BLOOD PRESSURE: 72 MMHG | HEIGHT: 62 IN | WEIGHT: 186 LBS

## 2025-01-21 DIAGNOSIS — R91.8 MULTIPLE LUNG NODULES: ICD-10-CM

## 2025-01-21 DIAGNOSIS — J44.89 COPD (CHRONIC OBSTRUCTIVE PULMONARY DISEASE) WITH CHRONIC BRONCHITIS (HCC): Primary | ICD-10-CM

## 2025-01-21 DIAGNOSIS — F17.210 CIGARETTE NICOTINE DEPENDENCE WITHOUT COMPLICATION: ICD-10-CM

## 2025-01-21 PROCEDURE — 99214 OFFICE O/P EST MOD 30 MIN: CPT | Performed by: INTERNAL MEDICINE

## 2025-01-21 NOTE — ASSESSMENT & PLAN NOTE
Doing very well since last visit, no recurrent exacerbation  Taking Trelegy consistently with good response to treatment.  Will continue  Rescue albuterol as needed, has only had rare need for this  Now that her exacerbation is resolved, I have recommended we obtain complete pulmonary function testing to establish baseline and determine severity      Orders:    Complete PFT with post bronchodilator; Future

## 2025-01-21 NOTE — ASSESSMENT & PLAN NOTE
Still smoking and not ready to quit  Has cut back substantially however and is only smoking 5 cigarettes or less daily.  Cigarettes are her anxiety and stress reliever and therefore not ready to eliminate totally at this time  I continue to strongly encourage complete cessation.  Tobacco cessation counseling time was 3 minutes  Continue lung cancer screening.  Patient agreeable and next due in November

## 2025-01-21 NOTE — ASSESSMENT & PLAN NOTE
Tiny lung nodules stable for 18 months, likely benign  Continue lung cancer screening surveillance, this will next be due in November 2025

## 2025-01-21 NOTE — PROGRESS NOTES
Progress Note - Pulmonary Medicine     Name: Krystin Francis      : 1965      MRN: [id number]  Encounter Provider: Anant Pompa MD  Encounter Date: 2025   Encounter department: [facility] [place]    :  Assessment & Plan  COPD (chronic obstructive pulmonary disease) with chronic bronchitis (HCC)  Doing very well since last visit, no recurrent exacerbation  Taking Trelegy consistently with good response to treatment.  Will continue  Rescue albuterol as needed, has only had rare need for this  Now that her exacerbation is resolved, I have recommended we obtain complete pulmonary function testing to establish baseline and determine severity      Orders:    Complete PFT with post bronchodilator; Future    Multiple lung nodules  Tiny lung nodules stable for 18 months, likely benign  Continue lung cancer screening surveillance, this will next be due in 2025    Cigarette nicotine dependence without complication  Still smoking and not ready to quit  Has cut back substantially however and is only smoking 5 cigarettes or less daily.  Cigarettes are her anxiety and stress reliever and therefore not ready to eliminate totally at this time  I continue to strongly encourage complete cessation.  Tobacco cessation counseling time was 3 minutes  Continue lung cancer screening.  Patient agreeable and next due in November    Return in about 6 months (around 2025).    History of Present Illness   Krystin Francis is a 59 y.o. female who presents for follow-up of COPD.  At the last visit, she had been at the tail end of an exacerbation.  She is currently doing well.  She continues on Trelegy.  Trelegy has worked extremely well for her.  She has had only rare need for rescue albuterol.  She has cut back on smoking but has not been able to quit.  She is down to 5 cigarettes or less daily but is not interested in quitting further.  She uses cigarettes as her main stress reliever for  "times when she is anxious or angry.    No chest pain.  No cardiac complaints.  Denies any abdominal pain.  No GERD.  No postnasal drip.  No worsening cough or wheezing.  Denies any new arthralgias or myalgias.  Weight and appetite are stable    Review of Systems:    Aside from what is mentioned in the HPI, ROS is otherwise negative         Medications:  Medication list reviewed and updated as appropriate    PMH, Social history, family history and tobacco history  Reviewed and updated as appropriate    Objective   /72 (BP Location: Left arm, Patient Position: Sitting, Cuff Size: Standard)   Pulse 95   Temp (!) 97.1 °F (36.2 °C) (Tympanic)   Ht 5' 2\" (1.575 m)   Wt 84.4 kg (186 lb)   LMP  (LMP Unknown)   SpO2 94%   BMI 34.02 kg/m²     Physical Exam:  Gen: Weight.  Comfortable on room air.  No conversational dyspnea  HEENT:  Conjugate gaze.  sclerae anicteric.  Oropharynx moist  Neck: Trachea is midline. No JVD. No adenopathy  Chest: Symmetric chest wall excursion with slightly distant breath sounds.  No wheezes or crackles  Cardiac: Regular, distant heart tones. no murmur  Abdomen:  benign  Extremities: No edema  Neuro:  Normal speech and mentation    Diagnostic Data:    CT scan of the chest from November was viewed on the PACS system.  The study showed stable 4 and 6 mm pulmonary nodules.  No new nodules.  Background emphysema.  Unchanged from May 2023      Anant Pompa MD    "

## 2025-01-30 ENCOUNTER — APPOINTMENT (OUTPATIENT)
Dept: LAB | Facility: HOSPITAL | Age: 60
End: 2025-01-30
Payer: COMMERCIAL

## 2025-01-30 ENCOUNTER — OFFICE VISIT (OUTPATIENT)
Dept: FAMILY MEDICINE CLINIC | Facility: CLINIC | Age: 60
End: 2025-01-30

## 2025-01-30 VITALS
OXYGEN SATURATION: 96 % | RESPIRATION RATE: 16 BRPM | BODY MASS INDEX: 34.23 KG/M2 | HEART RATE: 85 BPM | SYSTOLIC BLOOD PRESSURE: 110 MMHG | TEMPERATURE: 97.7 F | HEIGHT: 62 IN | WEIGHT: 186 LBS | DIASTOLIC BLOOD PRESSURE: 70 MMHG

## 2025-01-30 DIAGNOSIS — E66.9 OBESITY (BMI 30-39.9): Primary | ICD-10-CM

## 2025-01-30 DIAGNOSIS — Z11.59 NEED FOR HEPATITIS C SCREENING TEST: ICD-10-CM

## 2025-01-30 DIAGNOSIS — E66.9 OBESITY (BMI 30-39.9): ICD-10-CM

## 2025-01-30 DIAGNOSIS — M54.41 CHRONIC BILATERAL LOW BACK PAIN WITH BILATERAL SCIATICA: ICD-10-CM

## 2025-01-30 DIAGNOSIS — M54.42 CHRONIC BILATERAL LOW BACK PAIN WITH BILATERAL SCIATICA: ICD-10-CM

## 2025-01-30 DIAGNOSIS — Z11.4 SCREENING FOR HIV (HUMAN IMMUNODEFICIENCY VIRUS): ICD-10-CM

## 2025-01-30 DIAGNOSIS — R82.90 MALODOROUS URINE: ICD-10-CM

## 2025-01-30 DIAGNOSIS — G89.29 CHRONIC BILATERAL LOW BACK PAIN WITH BILATERAL SCIATICA: ICD-10-CM

## 2025-01-30 LAB
EST. AVERAGE GLUCOSE BLD GHB EST-MCNC: 111 MG/DL
HBA1C MFR BLD: 5.5 %
SL AMB POCT URINE HCG: NEGATIVE

## 2025-01-30 PROCEDURE — 99214 OFFICE O/P EST MOD 30 MIN: CPT

## 2025-01-30 PROCEDURE — 81025 URINE PREGNANCY TEST: CPT

## 2025-01-30 PROCEDURE — 83036 HEMOGLOBIN GLYCOSYLATED A1C: CPT

## 2025-01-30 PROCEDURE — 87389 HIV-1 AG W/HIV-1&-2 AB AG IA: CPT

## 2025-01-30 PROCEDURE — 36415 COLL VENOUS BLD VENIPUNCTURE: CPT

## 2025-01-30 PROCEDURE — G2211 COMPLEX E/M VISIT ADD ON: HCPCS

## 2025-01-30 PROCEDURE — 86803 HEPATITIS C AB TEST: CPT

## 2025-01-30 RX ORDER — GABAPENTIN 100 MG/1
100 CAPSULE ORAL 3 TIMES DAILY
Qty: 30 CAPSULE | Refills: 0 | Status: SHIPPED | OUTPATIENT
Start: 2025-01-30 | End: 2025-02-06 | Stop reason: SDUPTHER

## 2025-01-30 NOTE — ASSESSMENT & PLAN NOTE
- Use Tylenol or Naproxen PRN  - Robaxin is not effective. Will trial gabapentin   - Encouraged Heat/Ice application  - Discussed regular exercise for skeletal strengthening and weight loss  - Encouraged using a cane on stronger side. Cane is ordered via parachute  - Offered PT and to follow up with ortho but patient declines. Home exercises provided  -Will recheck in 1 week  Orders:    gabapentin (NEURONTIN) 100 mg capsule; Take 1 capsule (100 mg total) by mouth 3 (three) times a day

## 2025-01-30 NOTE — PROGRESS NOTES
Name: Krystin Francis      : 1965      MRN: [id number]  Encounter Provider: FRANKLIN Maldonado  Encounter Date: 2025   Encounter department: [facility]  :  Assessment & Plan  Obesity (BMI 30-39.9)  Prior Authorization Clinical Questions for Weight Management Pharmacotherapy    1. Does the patient have a contrainidcation to medication prescribed for weight management?: No  2. Does the patient have a diagnosis of obesity, confirmed by a BMI greater than or equal to 30 kg/m^2?: Yes  3. Does the patient have a BMI of greater than or equal to 27 kg/m^2 with at least one weight-related comorbidity/risk factor/complication (e.g. diabetes, dyslipidemia, coronary artery disease)?: Yes  4. Weight-related co-morbidities/risk factors: dyslipidemia, depression  5. Has the patient been on a weight loss regimen of low-calorie diet, increased physical activity, and lifestyle modifications for a minimum of 6 months?: Yes  6. Has the patient completed a comprehensive weight loss program (ie, Weight Watchers, Noom, Bariatrics, other scott on phone)? If so, what?: No  7. Does the patient have a history of type 2 diabetes?: No  8. Has the member tried and failed other weight loss medication within the past 12 months?: No  9. Will the member use requested medication in combination with another GLP agonist or weight loss drug?: No  10. Is the medication a controlled substance?: No  For renewals: Has the patient had a positive outcome with current weight management medication (i.e., change in body weight of at least 4-5% after 12-16 weeks on maximally tolerated dose)?: Yes     Baseline weight (in pounds): 186 lbs       - She wants to try Wegovy to aid with weight loss. Patient tried lifestyle management >6 months which was not effective.   - Will start with wegovy 0.25 mg/weekly for 4 weeks. Plan to increase to 0.5mg  - Discuss importance of healthy diet and physical exercises.   -   about possible S/E and  to report S/E.   - Will follow up in 6 weeks.        Orders:    Semaglutide-Weight Management (WEGOVY) 0.25 MG/0.5ML; Inject 0.5 mL (0.25 mg total) under the skin once a week for 28 days    Semaglutide-Weight Management (WEGOVY) 0.5 MG/0.5ML; Inject 0.5 mL (0.5 mg total) under the skin once a week for 28 days Do not start before February 28, 2025.    Semaglutide-Weight Management (WEGOVY) 1 MG/0.5ML; Inject 0.5 mL (1 mg total) under the skin once a week for 28 days Do not start before March 28, 2025.    Semaglutide-Weight Management (WEGOVY) 1.7 MG/0.75ML; Inject 0.75 mL (1.7 mg total) under the skin once a week for 28 days Do not start before April 25, 2025.    Semaglutide-Weight Management (WEGOVY) 2.4 MG/0.75ML; Inject 0.75 mL (2.4 mg total) under the skin once a week for 28 days Do not start before May 23, 2025.    Chronic bilateral low back pain with bilateral sciatica  - Use Tylenol or Naproxen PRN  - Robaxin is not effective. Will trial gabapentin   - Encouraged Heat/Ice application  - Discussed regular exercise for skeletal strengthening and weight loss  - Encouraged using a cane on stronger side. Cane is ordered via parachute  - Offered PT and to follow up with ortho but patient declines. Home exercises provided  -Will recheck in 1 week  Orders:    gabapentin (NEURONTIN) 100 mg capsule; Take 1 capsule (100 mg total) by mouth 3 (three) times a day    Malodorous urine  -POCT urine dip neg for leuk and nitrate  -  to increase fluid intake and proper hygiene    Orders:    POCT urine HCG           History of Present Illness   Krystin Francis is a 59 y.o. with  has a past medical history of Anxiety, Cannabis use disorder, Cervical cancer ([facility]), Chronic pain, COPD (chronic obstructive pulmonary disease) ([facility]), Depression, Hyperlipidemia, Migraine, Migraine headache, Psychiatric disorder, and Severe episode of recurrent major depressive disorder, without psychotic features ([facility]).  "    Patient is here for weight loss management. Patient tried lifestyle management greater than 6 months which was not effective.      Review of Systems   Constitutional: Negative.  Negative for chills, fatigue and fever.   HENT: Negative.  Negative for ear pain and sore throat.    Eyes: Negative.  Negative for pain and visual disturbance.   Respiratory: Negative.  Negative for cough and shortness of breath.    Cardiovascular: Negative.  Negative for chest pain and palpitations.   Gastrointestinal: Negative.  Negative for abdominal pain and vomiting.   Endocrine: Negative.    Genitourinary: Negative.  Negative for dysuria and hematuria.   Musculoskeletal:  Positive for arthralgias, back pain and myalgias.   Skin: Negative.  Negative for color change and rash.   Allergic/Immunologic: Negative.    Neurological: Negative.  Negative for seizures and syncope.   Hematological: Negative.    Psychiatric/Behavioral: Negative.     All other systems reviewed and are negative.      Objective   /70 (BP Location: Right arm, Patient Position: Sitting, Cuff Size: Large)   Pulse 85   Temp 97.7 °F (36.5 °C) (Temporal)   Resp 16   Ht 5' 2\" (1.575 m)   Wt 84.4 kg (186 lb)   LMP  (LMP Unknown)   SpO2 96%   BMI 34.02 kg/m²      Physical Exam  Vitals and nursing note reviewed.   Constitutional:       General: She is not in acute distress.     Appearance: Normal appearance. She is well-developed. She is obese.   HENT:      Head: Normocephalic and atraumatic.      Right Ear: Tympanic membrane normal.      Left Ear: Tympanic membrane normal.      Nose: Nose normal.      Mouth/Throat:      Mouth: Mucous membranes are moist.   Eyes:      Conjunctiva/sclera: Conjunctivae normal.   Cardiovascular:      Rate and Rhythm: Normal rate and regular rhythm.      Pulses: Normal pulses.      Heart sounds: Normal heart sounds. No murmur heard.  Pulmonary:      Effort: Pulmonary effort is normal. No respiratory distress.      Breath sounds: " Normal breath sounds.   Abdominal:      General: Abdomen is flat. Bowel sounds are normal.      Palpations: Abdomen is soft.      Tenderness: There is no abdominal tenderness.   Musculoskeletal:         General: No swelling.      Cervical back: Normal range of motion and neck supple.      Lumbar back: Spasms and tenderness present. Decreased range of motion.   Skin:     General: Skin is warm and dry.      Capillary Refill: Capillary refill takes less than 2 seconds.   Neurological:      General: No focal deficit present.      Mental Status: She is alert and oriented to person, place, and time. Mental status is at baseline.   Psychiatric:         Mood and Affect: Mood normal.         Behavior: Behavior normal.         Thought Content: Thought content normal.         Judgment: Judgment normal.

## 2025-01-30 NOTE — ASSESSMENT & PLAN NOTE
Prior Authorization Clinical Questions for Weight Management Pharmacotherapy    1. Does the patient have a contrainidcation to medication prescribed for weight management?: No  2. Does the patient have a diagnosis of obesity, confirmed by a BMI greater than or equal to 30 kg/m^2?: Yes  3. Does the patient have a BMI of greater than or equal to 27 kg/m^2 with at least one weight-related comorbidity/risk factor/complication (e.g. diabetes, dyslipidemia, coronary artery disease)?: Yes  4. Weight-related co-morbidities/risk factors: dyslipidemia, depression  5. Has the patient been on a weight loss regimen of low-calorie diet, increased physical activity, and lifestyle modifications for a minimum of 6 months?: Yes  6. Has the patient completed a comprehensive weight loss program (ie, Weight Watchers, Noom, Bariatrics, other scott on phone)? If so, what?: No  7. Does the patient have a history of type 2 diabetes?: No  8. Has the member tried and failed other weight loss medication within the past 12 months?: No  9. Will the member use requested medication in combination with another GLP agonist or weight loss drug?: No  10. Is the medication a controlled substance?: No  For renewals: Has the patient had a positive outcome with current weight management medication (i.e., change in body weight of at least 4-5% after 12-16 weeks on maximally tolerated dose)?: Yes     Baseline weight (in pounds): 186 lbs       - She wants to try Wegovy to aid with weight loss. Patient tried lifestyle management >6 months which was not effective.   - Will start with wegovy 0.25 mg/weekly for 4 weeks. Plan to increase to 0.5mg  - Discuss importance of healthy diet and physical exercises.   -  about possible S/E and  to report S/E.   - Will follow up in 6 weeks.        Orders:    Semaglutide-Weight Management (WEGOVY) 0.25 MG/0.5ML; Inject 0.5 mL (0.25 mg total) under the skin once a week for 28 days    Semaglutide-Weight Management  (WEGOVY) 0.5 MG/0.5ML; Inject 0.5 mL (0.5 mg total) under the skin once a week for 28 days Do not start before February 28, 2025.    Semaglutide-Weight Management (WEGOVY) 1 MG/0.5ML; Inject 0.5 mL (1 mg total) under the skin once a week for 28 days Do not start before March 28, 2025.    Semaglutide-Weight Management (WEGOVY) 1.7 MG/0.75ML; Inject 0.75 mL (1.7 mg total) under the skin once a week for 28 days Do not start before April 25, 2025.    Semaglutide-Weight Management (WEGOVY) 2.4 MG/0.75ML; Inject 0.75 mL (2.4 mg total) under the skin once a week for 28 days Do not start before May 23, 2025.

## 2025-01-31 ENCOUNTER — TELEPHONE (OUTPATIENT)
Dept: FAMILY MEDICINE CLINIC | Facility: CLINIC | Age: 60
End: 2025-01-31

## 2025-01-31 LAB
HCV AB SER QL: NORMAL
HIV 1+2 AB+HIV1 P24 AG SERPL QL IA: NORMAL
HIV 2 AB SERPL QL IA: NORMAL
HIV1 AB SERPL QL IA: NORMAL
HIV1 P24 AG SERPL QL IA: NORMAL

## 2025-01-31 NOTE — TELEPHONE ENCOUNTER
----- Message from FRANKLIN Maldonado sent at 1/31/2025  7:52 AM EST -----  Regarding: prior auth  Patient needs a prior authorization placed for Wegovy initiated via Covermymeds or Surescripts. Patient has failed traditional weight loss recommendations for this repeatedly over the past 6 months. Weight loss interventions include traditional calorie restriction dieting, and moderate intensity exercise per guideline recommendations. Patient meets criteria for this drug because BMI is greater than 30    Place complete prior authorization and document completion with KEY reference in this encounter    Thank you

## 2025-02-06 ENCOUNTER — TELEPHONE (OUTPATIENT)
Dept: FAMILY MEDICINE CLINIC | Facility: CLINIC | Age: 60
End: 2025-02-06

## 2025-02-06 ENCOUNTER — TELEMEDICINE (OUTPATIENT)
Dept: FAMILY MEDICINE CLINIC | Facility: CLINIC | Age: 60
End: 2025-02-06

## 2025-02-06 DIAGNOSIS — M54.42 CHRONIC BILATERAL LOW BACK PAIN WITH BILATERAL SCIATICA: Primary | ICD-10-CM

## 2025-02-06 DIAGNOSIS — E66.9 OBESITY (BMI 30-39.9): ICD-10-CM

## 2025-02-06 DIAGNOSIS — M54.41 CHRONIC BILATERAL LOW BACK PAIN WITH BILATERAL SCIATICA: Primary | ICD-10-CM

## 2025-02-06 DIAGNOSIS — G89.29 CHRONIC BILATERAL LOW BACK PAIN WITH BILATERAL SCIATICA: Primary | ICD-10-CM

## 2025-02-06 PROCEDURE — G2025 DIS SITE TELE SVCS RHC/FQHC: HCPCS

## 2025-02-06 RX ORDER — GABAPENTIN 100 MG/1
100 CAPSULE ORAL 3 TIMES DAILY
Qty: 270 CAPSULE | Refills: 0 | Status: SHIPPED | OUTPATIENT
Start: 2025-02-06

## 2025-02-06 NOTE — PROGRESS NOTES
Virtual Brief Visit  Name: Krystin Francis      : 1965      MRN: [id number]  Encounter Provider: FRANKLIN Maldonado  Encounter Date: 2025   [facility]    This Visit is being completed by telephone. The Patient is located at Home and in the following state in which I hold an active license [place]    The patient was identified by name and date of birth. Krystin Francis was informed that this is a telemedicine visit and that the visit is being conducted through the [employer] Teams platform. She agrees to proceed..  My office door was closed. No one else was in the room.  She acknowledged consent and understanding of privacy and security of the video platform. The patient has agreed to participate and understands they can discontinue the visit at any time.    Patient is aware this is a billable service.     :  Assessment & Plan  Chronic bilateral low back pain with bilateral sciatica  - Continue gabapentin up to TID.   - Continue Tylenol or Naproxen PRN  - Encouraged Heat/Ice application  - Discussed regular exercise for skeletal strengthening and weight loss  - Encouraged using a cane on stronger side.  - Offered PT and to follow up with ortho but patient declines. Home exercises provided  -Will recheck in 6 weeks  Orders:    gabapentin (NEURONTIN) 100 mg capsule; Take 1 capsule (100 mg total) by mouth 3 (three) times a day    Obesity (BMI 30-39.9)  -Reports wegovy is available to  tomorrow.   -She verbalizes understanding on how to administer medications.   -Will start with wegovy 0.25 mg/weekly for 4 weeks. Plan to increase to 0.5mg  -Discuss importance of healthy diet and physical exercises.   - about possible S/E and  to report S/E.   -Will follow up in 6 weeks.                   History of Present Illness   Krystin Francis is a 59 y.o. with  has a past medical history of Anxiety, Cannabis use disorder, Cervical cancer (HCC), Chronic  pain, COPD (chronic obstructive pulmonary disease) (HCC), Depression, Hyperlipidemia, Migraine, Migraine headache, Psychiatric disorder, and Severe episode of recurrent major depressive disorder, without psychotic features (HCC).     Patient presents for low back pain follow up. States that she is taking gabapentin 2 times a day which improves her pain and restless leg. States that her pain went down from 8/10 to 5.5/10. Patient denies any acute complains.        Visit Time  Total Visit Duration: 10

## 2025-02-06 NOTE — ASSESSMENT & PLAN NOTE
-Reports wegovy is available to  tomorrow.   -She verbalizes understanding on how to administer medications.   -Will start with wegovy 0.25 mg/weekly for 4 weeks. Plan to increase to 0.5mg  -Discuss importance of healthy diet and physical exercises.   - about possible S/E and  to report S/E.   -Will follow up in 6 weeks.

## 2025-02-06 NOTE — ASSESSMENT & PLAN NOTE
- Continue gabapentin up to TID.   - Continue Tylenol or Naproxen PRN  - Encouraged Heat/Ice application  - Discussed regular exercise for skeletal strengthening and weight loss  - Encouraged using a cane on stronger side.  - Offered PT and to follow up with ortho but patient declines. Home exercises provided  -Will recheck in 6 weeks  Orders:    gabapentin (NEURONTIN) 100 mg capsule; Take 1 capsule (100 mg total) by mouth 3 (three) times a day     [Seasonal Allergies] : seasonal allergies [Hearing Loss] : hearing loss [Nasal Congestion] : nasal congestion [Negative] : Head and Neck

## 2025-02-06 NOTE — TELEPHONE ENCOUNTER
----- Message from FRANKLIN Maldonado sent at 2/6/2025 10:22 AM EST -----  Regarding: schedule appt  Please call pt to schedule for 6 weeks for weight loss and back pain. Thanks

## 2025-02-07 DIAGNOSIS — F41.1 GENERALIZED ANXIETY DISORDER: ICD-10-CM

## 2025-02-07 DIAGNOSIS — F33.1 MAJOR DEPRESSIVE DISORDER, RECURRENT EPISODE, MODERATE (HCC): ICD-10-CM

## 2025-02-10 RX ORDER — SERTRALINE HYDROCHLORIDE 100 MG/1
100 TABLET, FILM COATED ORAL
Qty: 30 TABLET | Refills: 0 | Status: SHIPPED | OUTPATIENT
Start: 2025-02-10

## 2025-02-14 DIAGNOSIS — G47.00 INSOMNIA, UNSPECIFIED TYPE: ICD-10-CM

## 2025-02-14 PROCEDURE — PBNCHG PB NO CHARGE PLACEHOLDER: Performed by: PHYSICIAN ASSISTANT

## 2025-02-14 RX ORDER — ZALEPLON 5 MG/1
5 CAPSULE ORAL
Qty: 15 CAPSULE | Refills: 0 | Status: CANCELLED | OUTPATIENT
Start: 2025-02-14

## 2025-02-14 NOTE — TELEPHONE ENCOUNTER
Refill must be reviewed and completed by the office or provider. The refill is unable to be approved or denied by the medication management team.      Patient Id Prescription # Filled Written Drug Label Qty Days Strength MME** Prescriber Pharmacy Payment REFILL #/Auth State Detail   1 [id number] 02/03/2025 11/08/2024 Zaleplon (Capsule) 15.0 15 5 MG NA EDITA [facility] [place] 5 / 5 [place]    1 [id number] 01/17/2025 11/08/2024 Zaleplon (Capsule) 15.0 15 5 MG NA [facility] [place] 4 / 5 [place]    1 [id number] 01/03/2025 11/08/2024 Zaleplon (Capsule) 15.0 15 5 MG NA [facility] [place] 3 / 5 [place]

## 2025-02-14 NOTE — TELEPHONE ENCOUNTER
Reason for call:   [x] Refill   [] Prior Auth  [] Other:     Office:   [] PCP/Provider -   [x] Specialty/Provider - psych/  Annie Lopez PA-C     Medication: zaleplon (SONATA) 5 MG capsule     Dose/Frequency: 5 mg, Oral, Daily at bedtime PRN     Quantity: 15    Pharmacy:  [facility] - [address] [place] [address]     Does the patient have enough for 3 days?   [] Yes   [x] No - Send as HP to POD

## 2025-02-18 RX ORDER — ZALEPLON 5 MG/1
5 CAPSULE ORAL
Qty: 15 CAPSULE | Refills: 0 | Status: SHIPPED | OUTPATIENT
Start: 2025-02-18

## 2025-02-18 NOTE — TELEPHONE ENCOUNTER
"I contacted Krystin via her cell/home phone # of record and she reports having misunderstood the directions for Zaleplon and was taking it nightly rather than prn.  I reviewed that he Rx was not meant for use every night of the week -- which was the rationale for Qty 15 per Rx.  Reviewed slight addictive potential of the sleep medicine.  I asked if she is using the Trazodone and she states she is at 2 1/2 tabs per night (she has leeway to use 3 of the 100mg tabs qhs prn insomnia).  Pt verbalized understanding of all that was discussed above.  She denies snoring or other sleep problems.  She is depressed but presently denies any SI, HI, or manic Sxs of irritable/elevated moods, elevated energy, reduced need for sleep, increased goal directed behaviors or impulsivity.  She states her anxiety is \"Doing good.\"   PDMP reviewed and seems she should have at least one more refill left on the last Zaleplon Rx.  I will send one now with instruction to cancel out any prior Rxs/refills.  3/14/2025 and  I e-scribed the following to her designated pharmacy:   Zaleplon 10mg (1) cap po qhs prn insomnia # 15   "

## 2025-03-05 ENCOUNTER — TELEMEDICINE (OUTPATIENT)
Dept: FAMILY MEDICINE CLINIC | Facility: CLINIC | Age: 60
End: 2025-03-05

## 2025-03-05 DIAGNOSIS — K21.9 GASTROESOPHAGEAL REFLUX DISEASE WITHOUT ESOPHAGITIS: Primary | ICD-10-CM

## 2025-03-05 PROCEDURE — G2025 DIS SITE TELE SVCS RHC/FQHC: HCPCS

## 2025-03-05 RX ORDER — FAMOTIDINE 20 MG/1
20 TABLET, FILM COATED ORAL 2 TIMES DAILY
Qty: 60 TABLET | Refills: 0 | Status: SHIPPED | OUTPATIENT
Start: 2025-03-05

## 2025-03-09 NOTE — PSYCH
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 Phosphatase 10/18/2024 85     Total Protein 10/18/2024 7.8     Albumin 10/18/2024 4.4     Total Bilirubin 10/18/2024 0.42     eGFR 10/18/2024 58     SARS-CoV-2 10/18/2024 Negative     INFLUENZA A PCR 10/18/2024 Negative     INFLUENZA B PCR 10/18/2024 Negative     RSV PCR 10/18/2024 Negative     TSH 3RD GENERATON 10/18/2024 1.656     Magnesium 10/18/2024 2.2     hs TnI 0hr 10/18/2024 5     BNP 10/18/2024 11     Sputum Culture 10/18/2024 2+ Growth of Streptococcus pneumoniae (A)     Sputum Culture 10/18/2024 2+ Growth of     Gram Stain Result 10/18/2024 1+ Epithelial cells per low power field (A)     Gram Stain Result 10/18/2024 2+ Gram positive cocci in pairs (A)     Gram Stain Result 10/18/2024 2+ Gram positive rods (A)     Gram Stain Result 10/18/2024 No polys seen (A)     WBC 10/19/2024 6.44     RBC 10/19/2024 4.65     Hemoglobin 10/19/2024 14.4     Hematocrit 10/19/2024 44.3     MCV 10/19/2024 95     MCH 10/19/2024 31.0     MCHC 10/19/2024 32.5     RDW 10/19/2024 13.5     MPV 10/19/2024 9.9     Platelets 10/19/2024 169     nRBC 10/19/2024 0     Segmented % 10/19/2024 77 (H)     Immature Grans % 10/19/2024 1     Lymphocytes % 10/19/2024 14     Monocytes %  10/19/2024 8     Eosinophils Relative 10/19/2024 0     Basophils Relative 10/19/2024 0     Absolute Neutrophils 10/19/2024 4.98     Absolute Immature Grans 10/19/2024 0.04     Absolute Lymphocytes 10/19/2024 0.92     Absolute Monocytes 10/19/2024 0.49     Eosinophils Absolute 10/19/2024 0.00     Basophils Absolute 10/19/2024 0.01     Sodium 10/19/2024 137     Potassium 10/19/2024 5.0     Chloride 10/19/2024 102     CO2 10/19/2024 31     ANION GAP 10/19/2024 4     BUN 10/19/2024 16     Creatinine 10/19/2024 0.86     Glucose 10/19/2024 141 (H)     Calcium 10/19/2024 9.4     AST 10/19/2024 13     ALT 10/19/2024 14     Alkaline Phosphatase 10/19/2024 66     Total Protein 10/19/2024 7.1     Albumin 10/19/2024 4.1     Total Bilirubin 10/19/2024 0.27     eGFR 10/19/2024 74     WBC 10/20/2024 12.60 (H)     RBC 10/20/2024 4.62     Hemoglobin 10/20/2024 14.0     Hematocrit 10/20/2024 43.5     MCV 10/20/2024 94     MCH 10/20/2024 30.3     MCHC 10/20/2024 32.2     RDW 10/20/2024 13.6     Platelets 10/20/2024 210     MPV 10/20/2024 9.6     Sodium 10/20/2024 137     Potassium 10/20/2024 4.5     Chloride 10/20/2024 104     CO2 10/20/2024 27     ANION GAP 10/20/2024 6     BUN 10/20/2024 20     Creatinine 10/20/2024 0.92     Glucose 10/20/2024 111     Calcium 10/20/2024 9.3     eGFR 10/20/2024 68        Suicide/Homicide Risk Assessment:    Risk of Harm to Self:  Historical Risk Factors include: chronic anxiety symptoms, chronic mood disorder, history of substance use  Protective Factors: no current suicidal ideation, access to mental health treatment, compliant with medications, having a desire to be alive, having a sense of purpose or meaning in life, personal beliefs, stable living environment, supportive friends  Weapons/Firearms: none. The following steps have been taken to ensure weapons are properly secured: not applicable  Based on today's assessment, Krystin presents the following risk of harm to self: none    Risk of Harm  to Others:  Historical Risk Factors include: drug abuse  Protective Factors: no current homicidal ideation, access to mental health treatment, compliant with medications, moral system, personal beliefs, supportive friends  Weapons/Firearms: none. The following steps have been taken to ensure weapons are properly secured: not applicable  Based on today's assessment, Krystin presents the following risk of harm to others: none    The following interventions are recommended: Continue medication management. No other intervention changes indicated at this time.    Psychotherapy Provided:     Individual psychotherapy provided: No    Treatment Plan:    Completed and signed during the session: Yes - with Krystin    Goals: Progress towards Treatment Plan goals - Yes, progressing, as evidenced by subjective findings in HPI/Subjective Section and in Assessment and Plan Section    Depression Follow-up Plan Completed: Yes    Note Share:    This note was shared with patient.    Administrative Statements       Visit Time  Visit Start Time: 9:50 PM  Visit Stop Time: 10:35 PM  Total Visit Duration:  45 minutes    Annie Lopez PA-C 03/14/25

## 2025-03-11 ENCOUNTER — TELEPHONE (OUTPATIENT)
Dept: PSYCHIATRY | Facility: CLINIC | Age: 60
End: 2025-03-11

## 2025-03-11 NOTE — TELEPHONE ENCOUNTER
Patients Name: Krystin Francis    : 1965    Phone Number: [phone]    Appointment Date: 3/14/2025    Appointment time: 9:30am     Address: [address] [place] [address]  [place] [address]    Drop Off Facility/Office: [facility]    Drop Off Address: [facility] [place], [place] [address]     8:50am

## 2025-03-12 DIAGNOSIS — F41.1 GENERALIZED ANXIETY DISORDER: ICD-10-CM

## 2025-03-12 DIAGNOSIS — F33.1 MAJOR DEPRESSIVE DISORDER, RECURRENT EPISODE, MODERATE (HCC): ICD-10-CM

## 2025-03-12 RX ORDER — SERTRALINE HYDROCHLORIDE 100 MG/1
100 TABLET, FILM COATED ORAL
Qty: 90 TABLET | Refills: 0 | Status: SHIPPED | OUTPATIENT
Start: 2025-03-12 | End: 2025-03-14 | Stop reason: SDUPTHER

## 2025-03-14 ENCOUNTER — OFFICE VISIT (OUTPATIENT)
Dept: PSYCHIATRY | Facility: CLINIC | Age: 60
End: 2025-03-14
Payer: COMMERCIAL

## 2025-03-14 VITALS — BODY MASS INDEX: 32.61 KG/M2 | WEIGHT: 177.2 LBS | HEIGHT: 62 IN

## 2025-03-14 DIAGNOSIS — G47.00 INSOMNIA, UNSPECIFIED TYPE: ICD-10-CM

## 2025-03-14 DIAGNOSIS — F33.1 MAJOR DEPRESSIVE DISORDER, RECURRENT EPISODE, MODERATE (HCC): Primary | ICD-10-CM

## 2025-03-14 DIAGNOSIS — F41.0 PANIC ATTACKS: ICD-10-CM

## 2025-03-14 DIAGNOSIS — F41.1 GENERALIZED ANXIETY DISORDER: ICD-10-CM

## 2025-03-14 DIAGNOSIS — F43.12 CHRONIC POST-TRAUMATIC STRESS DISORDER (PTSD): ICD-10-CM

## 2025-03-14 PROCEDURE — 99214 OFFICE O/P EST MOD 30 MIN: CPT | Performed by: PHYSICIAN ASSISTANT

## 2025-03-14 RX ORDER — ZALEPLON 5 MG/1
5 CAPSULE ORAL
Qty: 15 CAPSULE | Refills: 5 | Status: SHIPPED | OUTPATIENT
Start: 2025-03-14

## 2025-03-14 RX ORDER — TRAZODONE HYDROCHLORIDE 100 MG/1
100-300 TABLET ORAL
Qty: 270 TABLET | Refills: 0 | Status: SHIPPED | OUTPATIENT
Start: 2025-03-14

## 2025-03-14 RX ORDER — SERTRALINE HYDROCHLORIDE 100 MG/1
100 TABLET, FILM COATED ORAL
Qty: 90 TABLET | Refills: 0 | Status: SHIPPED | OUTPATIENT
Start: 2025-03-14

## 2025-03-14 RX ORDER — HYDROXYZINE HYDROCHLORIDE 50 MG/1
TABLET, FILM COATED ORAL
Qty: 360 TABLET | Refills: 0 | Status: SHIPPED | OUTPATIENT
Start: 2025-03-14

## 2025-03-14 RX ORDER — ARIPIPRAZOLE 20 MG/1
20 TABLET ORAL
Qty: 90 TABLET | Refills: 1 | Status: SHIPPED | OUTPATIENT
Start: 2025-03-14

## 2025-03-14 NOTE — ASSESSMENT & PLAN NOTE
Orders:    traZODone (DESYREL) 100 mg tablet; Take 1-3 tablets (100-300 mg total) by mouth daily at bedtime as needed for sleep    zaleplon (SONATA) 5 MG capsule; Take 1 capsule (5 mg total) by mouth daily at bedtime as needed for sleep

## 2025-03-14 NOTE — BH TREATMENT PLAN
"TREATMENT PLAN (Medication Management Only)        [facility]    Name/Date of Birth/MRN:  Krystin Francis 59 y.o. 1965 MRN: [id number]  Date of Treatment Plan: March 14, 2025  Diagnosis/Diagnoses:   1. Major depressive disorder, recurrent episode, moderate (HCC)    2. Generalized anxiety disorder    3. Chronic post-traumatic stress disorder (PTSD)    4. Panic attacks    5. Insomnia, unspecified type      Strengths/Personal Resources for Self-Care: \"Reliable\"  Area/Areas of need (in own words): \"I've been good, but a little tired\" -- she feels \"A little tiny bit better\" regarding depression and anxiety, than at last visit. \".  1. Long Term Goal:   Maintain mood stability and control of anxiety  Target Date: 6 months - 9/14/2025  Person/Persons responsible for completion of goal: Geovanna, Tejas Rivas, therapist  2.  Short Term Objective (s) - How will we reach this goal?:   A.  Provider new recommended medication/dosage changes and/or continue medication(s):  Pt is having slightly milder depression and anxiety but also some recent hypomanic type Sxs on a very mild scale.  PTSD is stable, slowly improving. Tx options discussed and Pt accepts to increase Aripiprazole for mood mgt.  No change in other medications at this time.  Treatment plan done and Pt accepts the plan.  Safety Plan was done on 8/6/2024 but Epic flags it as being due.   Continue:  Zaleplon 5mg (1) cap po qhs prn insomnia # 15 R5  Sertraline 100mg (1) tabs po qhs # 90  Aripiprazole 10mg (1) tab po qhs # 90  Aripiprazole 2mg (2) tabs po qAM # 180  Hydroxyzine 50mg (1 1/2 - 2) tabs po qd-bid prn anxiety # 360   Trazodone 100mg (1-3) tabs po qhs prn insomnia # 270   Gabapentin 100mg tid for neuropathic pain - per PCP  Pt to continue psychotherapy with Tejas Rivas   Return 8-9 weeks, call any time sooner       Target Date: 6 months - 9/14/2025  Person/Persons Responsible for Completion " of Goal: Krystin and Tejas Valencia    Progress Towards Goals: stable, continuing treatment  Treatment Modality:  Medication mgt and psychotherapy  Review due 180 days from date of this plan: 6 months - 9/14/2025  Expected length of service: ongoing treatment unless revised  My Physician/PA/NP and I have developed this plan together and I agree to work on the goals and objectives. I understand the treatment goals that were developed for my treatment.  Electronic Signatures: on file (unless signed below)    Annie Lopez PA-C 03/14/25

## 2025-03-14 NOTE — ASSESSMENT & PLAN NOTE
Orders:    ARIPiprazole (ABILIFY) 20 MG tablet; Take 1 tablet (20 mg total) by mouth daily at bedtime    hydrOXYzine HCL (ATARAX) 50 mg tablet; Take 1 and 1/2 to 2 tabs po qd-bid prn anxiety    sertraline (ZOLOFT) 100 mg tablet; Take 1 tablet (100 mg total) by mouth daily at bedtime

## 2025-03-19 ENCOUNTER — TELEPHONE (OUTPATIENT)
Age: 60
End: 2025-03-19

## 2025-03-19 ENCOUNTER — TELEPHONE (OUTPATIENT)
Dept: FAMILY MEDICINE CLINIC | Facility: CLINIC | Age: 60
End: 2025-03-19

## 2025-03-19 DIAGNOSIS — R29.818 EXTRAPYRAMIDAL SYMPTOM: Primary | ICD-10-CM

## 2025-03-19 NOTE — TELEPHONE ENCOUNTER
Clinical will follow up as directed.   Topical Sulfur Applications Counseling: Topical Sulfur Counseling: Patient counseled that this medication may cause skin irritation or allergic reactions.  In the event of skin irritation, the patient was advised to reduce the amount of the drug applied or use it less frequently.   The patient verbalized understanding of the proper use and possible adverse effects of topical sulfur application.  All of the patient's questions and concerns were addressed.

## 2025-03-19 NOTE — TELEPHONE ENCOUNTER
Krystin Francis and/or patient requested a call back to discuss patient stated provider told her to contact office for update on tremors. Patient stated her tremors have gotten a little worse and wanted provider to know.    They can be reached at P# [phone].       Thank you.

## 2025-03-19 NOTE — TELEPHONE ENCOUNTER
Patient left a message requesting a call back. Patient is requesting to reschedule tomorrows appointment. Appointment scheduled for 3/24.

## 2025-03-20 ENCOUNTER — RA CDI HCC (OUTPATIENT)
Dept: OTHER | Facility: HOSPITAL | Age: 60
End: 2025-03-20

## 2025-03-20 RX ORDER — BENZTROPINE MESYLATE 1 MG/1
TABLET ORAL
Qty: 30 TABLET | Refills: 1 | Status: SHIPPED | OUTPATIENT
Start: 2025-03-20

## 2025-03-20 NOTE — TELEPHONE ENCOUNTER
"Nurse spoke with Krystin # [phone]    Tremors in right hand  - started one week ago - affecting handwriting - also toes (on bilateral feet)  \"bounce up and down\"     Asking for medication for help with same.   "

## 2025-03-20 NOTE — TELEPHONE ENCOUNTER
Patient called back in regard to prior note and asked to speak to nurse. Writer transferred call to nurse for assistance.

## 2025-03-21 NOTE — TELEPHONE ENCOUNTER
I contacted Krystin via her cell/home phone # of record and Pt reported increased hand tremors since the Aripiprazole was increased.  I discussed Tx options and weighed out the risks/benefits/the goal of keeping her stability, and working with the Aripiprazole versus switching to a different SGA, which may cause the same SE.  She opts to continue the current SGA.  I offered Benztropine to mitigate EPS SE, explained its own risks/benefits and SE, and Pt verbalized understanding and acceptance to try this medicine.  She returns for f/u 5/9/2025 and will call sooner prn any problems.  I e-scribed the following to her designated  Pharmacy Emily:  Benztropine 1mg (1/2 - 1) tab po qhs prn tremors # 30 R1

## 2025-03-24 ENCOUNTER — OFFICE VISIT (OUTPATIENT)
Dept: FAMILY MEDICINE CLINIC | Facility: CLINIC | Age: 60
End: 2025-03-24

## 2025-03-24 VITALS
TEMPERATURE: 98.2 F | HEIGHT: 62 IN | SYSTOLIC BLOOD PRESSURE: 118 MMHG | HEART RATE: 76 BPM | DIASTOLIC BLOOD PRESSURE: 72 MMHG | BODY MASS INDEX: 32.62 KG/M2 | OXYGEN SATURATION: 98 % | RESPIRATION RATE: 18 BRPM | WEIGHT: 177.25 LBS

## 2025-03-24 DIAGNOSIS — E66.9 OBESITY (BMI 30-39.9): Primary | ICD-10-CM

## 2025-03-24 DIAGNOSIS — M54.41 CHRONIC BILATERAL LOW BACK PAIN WITH BILATERAL SCIATICA: ICD-10-CM

## 2025-03-24 DIAGNOSIS — G89.29 CHRONIC BILATERAL LOW BACK PAIN WITH BILATERAL SCIATICA: ICD-10-CM

## 2025-03-24 DIAGNOSIS — M54.42 CHRONIC BILATERAL LOW BACK PAIN WITH BILATERAL SCIATICA: ICD-10-CM

## 2025-03-24 PROCEDURE — G2211 COMPLEX E/M VISIT ADD ON: HCPCS

## 2025-03-24 PROCEDURE — 99214 OFFICE O/P EST MOD 30 MIN: CPT

## 2025-03-24 RX ORDER — ACETAMINOPHEN 500 MG
500 TABLET ORAL EVERY 6 HOURS PRN
Qty: 90 TABLET | Refills: 0 | Status: SHIPPED | OUTPATIENT
Start: 2025-03-24

## 2025-03-24 NOTE — ASSESSMENT & PLAN NOTE
- Continue gabapentin up to TID.   - Continue Tylenol or Naproxen PRN  - Encouraged Heat/Ice application  - Discussed regular exercise for skeletal strengthening and weight loss  - Encouraged using a cane on stronger side.  - Offered PT and to follow up with ortho but patient declines. Continue home exercises    Orders:    acetaminophen (TYLENOL) 500 mg tablet; Take 1 tablet (500 mg total) by mouth every 6 (six) hours as needed for mild pain

## 2025-03-24 NOTE — ASSESSMENT & PLAN NOTE
Baseline weight: 186 lbs  Current weight: 177 lbs  -Continue wegovy 0.50 mg/weekly. Plan to increase to 1 mg  -Discuss importance of healthy diet and physical exercises.   - about possible S/E and  to report S/E.   -Will follow up in 8 weeks.

## 2025-03-24 NOTE — PROGRESS NOTES
Name: Krystin Francis      : 1965      MRN: [id number]  Encounter Provider: FRANKLIN Maldonado  Encounter Date: 3/24/2025   Encounter department: [facility]  :  Assessment & Plan  Obesity (BMI 30-39.9)  Baseline weight: 186 lbs  Current weight: 177 lbs  -Continue wegovy 0.50 mg/weekly. Plan to increase to 1 mg  -Discuss importance of healthy diet and physical exercises.   - about possible S/E and  to report S/E.   -Will follow up in 8 weeks.             Chronic bilateral low back pain with bilateral sciatica  - Continue gabapentin up to TID.   - Continue Tylenol or Naproxen PRN  - Encouraged Heat/Ice application  - Discussed regular exercise for skeletal strengthening and weight loss  - Encouraged using a cane on stronger side.  - Offered PT and to follow up with ortho but patient declines. Continue home exercises    Orders:    acetaminophen (TYLENOL) 500 mg tablet; Take 1 tablet (500 mg total) by mouth every 6 (six) hours as needed for mild pain             History of Present Illness   Krystin Francis is a 59 y.o. with  has a past medical history of Anxiety, Cannabis use disorder, Cervical cancer (HCC), Chronic pain, COPD (chronic obstructive pulmonary disease) (HCC), Depression, Hyperlipidemia, Migraine, Migraine headache, Psychiatric disorder, and Severe episode of recurrent major depressive disorder, without psychotic features ([facility]).     Patient is here for weight loss managament. She reports being compliant with wegovy 0.5mg/weekly and she is tolerating it well. No other concerns.    Review of Systems   Constitutional: Negative.  Negative for chills, fatigue and fever.   HENT: Negative.  Negative for ear pain and sore throat.    Eyes: Negative.  Negative for pain and visual disturbance.   Respiratory: Negative.  Negative for cough and shortness of breath.    Cardiovascular: Negative.  Negative for chest pain and palpitations.   Gastrointestinal: Negative.   "Negative for abdominal pain and vomiting.   Endocrine: Negative.  Negative for cold intolerance and heat intolerance.   Genitourinary: Negative.  Negative for dysuria and hematuria.   Musculoskeletal:  Positive for arthralgias and back pain.   Skin: Negative.  Negative for color change and rash.   Neurological: Negative.  Negative for seizures and syncope.   Hematological: Negative.    Psychiatric/Behavioral: Negative.  Negative for behavioral problems.    All other systems reviewed and are negative.      Objective   /72 (BP Location: Right arm, Patient Position: Sitting, Cuff Size: Standard)   Pulse 76   Temp 98.2 °F (36.8 °C) (Temporal)   Resp 18   Ht 5' 2\" (1.575 m)   Wt 80.4 kg (177 lb 4 oz)   LMP  (LMP Unknown)   SpO2 98%   BMI 32.42 kg/m²      Physical Exam  Vitals and nursing note reviewed.   Constitutional:       General: She is not in acute distress.     Appearance: Normal appearance. She is well-developed.   HENT:      Head: Normocephalic and atraumatic.      Right Ear: Tympanic membrane normal.      Left Ear: Tympanic membrane normal.      Nose: Nose normal.      Mouth/Throat:      Mouth: Mucous membranes are moist.   Eyes:      Conjunctiva/sclera: Conjunctivae normal.   Cardiovascular:      Rate and Rhythm: Normal rate and regular rhythm.      Pulses: Normal pulses.      Heart sounds: Normal heart sounds. No murmur heard.  Pulmonary:      Effort: Pulmonary effort is normal. No respiratory distress.      Breath sounds: Normal breath sounds.   Abdominal:      General: Abdomen is flat. Bowel sounds are normal.      Palpations: Abdomen is soft.      Tenderness: There is no abdominal tenderness.   Musculoskeletal:         General: No swelling.      Cervical back: Normal range of motion and neck supple.      Lumbar back: Tenderness present. Decreased range of motion.   Skin:     General: Skin is warm and dry.      Capillary Refill: Capillary refill takes less than 2 seconds.   Neurological:      " General: No focal deficit present.      Mental Status: She is alert and oriented to person, place, and time. Mental status is at baseline.   Psychiatric:         Mood and Affect: Mood normal.         Behavior: Behavior normal.         Thought Content: Thought content normal.         Judgment: Judgment normal.

## 2025-03-25 ENCOUNTER — TELEPHONE (OUTPATIENT)
Dept: FAMILY MEDICINE CLINIC | Facility: CLINIC | Age: 60
End: 2025-03-25

## 2025-03-25 NOTE — TELEPHONE ENCOUNTER
Patient called and stated that medication needs prior auth.      Semaglutide-Weight Management (WEGOVY) 1 MG/0.5ML

## 2025-03-26 DIAGNOSIS — E66.9 OBESITY (BMI 30-39.9): ICD-10-CM

## 2025-03-26 NOTE — TELEPHONE ENCOUNTER
I called the pharmacy and the medication does not need a prior auth ,the pharmacist just stated a refill needs to be sent in.

## 2025-03-31 ENCOUNTER — TELEPHONE (OUTPATIENT)
Dept: FAMILY MEDICINE CLINIC | Facility: CLINIC | Age: 60
End: 2025-03-31

## 2025-03-31 NOTE — TELEPHONE ENCOUNTER
Pt called and still isnt able to get her    Semaglutide-Weight Management (WEGOVY) 2.4 MG/0.75ML       She has called today already and they said they received refills but still need the prior Auth  Pt wants someone to call her back about this